# Patient Record
Sex: FEMALE | Race: WHITE | NOT HISPANIC OR LATINO | Employment: PART TIME | URBAN - NONMETROPOLITAN AREA
[De-identification: names, ages, dates, MRNs, and addresses within clinical notes are randomized per-mention and may not be internally consistent; named-entity substitution may affect disease eponyms.]

---

## 2017-01-03 RX ORDER — BUDESONIDE AND FORMOTEROL FUMARATE DIHYDRATE 160; 4.5 UG/1; UG/1
AEROSOL RESPIRATORY (INHALATION)
Qty: 10.2 G | Refills: 1 | Status: SHIPPED | OUTPATIENT
Start: 2017-01-03 | End: 2017-05-10

## 2017-01-03 RX ORDER — PANTOPRAZOLE SODIUM 40 MG/1
TABLET, DELAYED RELEASE ORAL
Qty: 30 TABLET | Refills: 1 | Status: SHIPPED | OUTPATIENT
Start: 2017-01-03 | End: 2017-03-18 | Stop reason: SDUPTHER

## 2017-01-18 RX ORDER — LEVOTHYROXINE SODIUM 0.1 MG/1
TABLET ORAL
Qty: 30 TABLET | Refills: 0 | Status: SHIPPED | OUTPATIENT
Start: 2017-01-18 | End: 2017-02-22 | Stop reason: SDUPTHER

## 2017-01-23 RX ORDER — METHOCARBAMOL 750 MG/1
TABLET, FILM COATED ORAL
Qty: 30 TABLET | Refills: 1 | Status: SHIPPED | OUTPATIENT
Start: 2017-01-23 | End: 2017-04-30 | Stop reason: SDUPTHER

## 2017-01-27 RX ORDER — LISINOPRIL AND HYDROCHLOROTHIAZIDE 12.5; 1 MG/1; MG/1
TABLET ORAL
Qty: 30 TABLET | Refills: 2 | Status: SHIPPED | OUTPATIENT
Start: 2017-01-27 | End: 2017-04-26 | Stop reason: SDUPTHER

## 2017-02-22 RX ORDER — LEVOTHYROXINE SODIUM 0.1 MG/1
TABLET ORAL
Qty: 30 TABLET | Refills: 0 | Status: SHIPPED | OUTPATIENT
Start: 2017-02-22 | End: 2017-03-24 | Stop reason: SDUPTHER

## 2017-02-28 ENCOUNTER — OFFICE VISIT (OUTPATIENT)
Dept: FAMILY MEDICINE CLINIC | Facility: CLINIC | Age: 57
End: 2017-02-28

## 2017-02-28 VITALS
OXYGEN SATURATION: 98 % | DIASTOLIC BLOOD PRESSURE: 64 MMHG | SYSTOLIC BLOOD PRESSURE: 108 MMHG | HEART RATE: 75 BPM | TEMPERATURE: 98 F | WEIGHT: 206 LBS | HEIGHT: 62 IN | BODY MASS INDEX: 37.91 KG/M2

## 2017-02-28 DIAGNOSIS — M25.552 HIP PAIN, BILATERAL: ICD-10-CM

## 2017-02-28 DIAGNOSIS — M21.41 PES PLANUS OF BOTH FEET: ICD-10-CM

## 2017-02-28 DIAGNOSIS — M79.671 BILATERAL FOOT PAIN: ICD-10-CM

## 2017-02-28 DIAGNOSIS — M25.562 CHRONIC PAIN OF BOTH KNEES: ICD-10-CM

## 2017-02-28 DIAGNOSIS — F41.8 MIXED ANXIETY DEPRESSIVE DISORDER: ICD-10-CM

## 2017-02-28 DIAGNOSIS — E53.8 B12 DEFICIENCY: ICD-10-CM

## 2017-02-28 DIAGNOSIS — M21.42 PES PLANUS OF BOTH FEET: ICD-10-CM

## 2017-02-28 DIAGNOSIS — M25.551 HIP PAIN, BILATERAL: ICD-10-CM

## 2017-02-28 DIAGNOSIS — G89.29 CHRONIC PAIN OF BOTH KNEES: ICD-10-CM

## 2017-02-28 DIAGNOSIS — E55.9 VITAMIN D DEFICIENCY: Primary | ICD-10-CM

## 2017-02-28 DIAGNOSIS — E78.2 MIXED HYPERLIPIDEMIA: ICD-10-CM

## 2017-02-28 DIAGNOSIS — R79.89 ABNORMAL LIVER FUNCTION TESTS: ICD-10-CM

## 2017-02-28 DIAGNOSIS — M25.561 CHRONIC PAIN OF BOTH KNEES: ICD-10-CM

## 2017-02-28 DIAGNOSIS — D72.829 LEUKOCYTOSIS, UNSPECIFIED TYPE: ICD-10-CM

## 2017-02-28 DIAGNOSIS — M79.672 BILATERAL FOOT PAIN: ICD-10-CM

## 2017-02-28 DIAGNOSIS — I10 BENIGN ESSENTIAL HYPERTENSION: ICD-10-CM

## 2017-02-28 PROCEDURE — 99214 OFFICE O/P EST MOD 30 MIN: CPT | Performed by: PHYSICIAN ASSISTANT

## 2017-02-28 RX ORDER — CITALOPRAM 20 MG/1
20 TABLET ORAL DAILY
Qty: 30 TABLET | Refills: 1 | Status: SHIPPED | OUTPATIENT
Start: 2017-02-28 | End: 2017-04-17 | Stop reason: SDUPTHER

## 2017-02-28 NOTE — PROGRESS NOTES
Subjective   Lydia Gutierrez is a 56 y.o. female here today for medication follow-up and labs for HTN, hyperlipidemia, thyroid, B12, and vitamin D deficiency. Also having bilateral foot pain with increased working and anxiety during stressful situations at work.     History of Present Illness     NOT TAKING B12 OR VITAMIN D. TAKING ALL OTHER MEDICATIONS AS PRESCRIBED.     NO COMPLAINTS ASIDE FROM BILATERAL FOOT PAIN. HAS NOT GONE FOR PHYSICAL THERAPY. STARTED WORKING END OF September AT [employer] 9-15 HOUR SHIFTS. ON FEET ALL DAY. MEMORY FOAM SHOES HELP BUT AT THE END OF THE DAY, FEET FEEL SWOLLEN BUT DON'T LOOK SWOLLEN. WANTS TO RUB FEET BUT HURTS TO TOUCH FEET WITH NUMBNESS.     CHRONIC LEG PAIN- TAKING 800-1000 MG IN AM AND SOMETIMES AT NIGHT. HELPS LEG PAIN BUT NOT FOOT PAIN.     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 PATIENT REPORTS TAKING PROZAC 20 MG BID OR 2 PILLS QD. PREVIOUSLY ON [employer]- STOPPED WORKING.     The following portions of the patient's history were reviewed and updated as appropriate: allergies, current medications, past family history, past medical history, past social history, past surgical history and problem list.    Review of Systems   Musculoskeletal: Positive for arthralgias and back pain.   Psychiatric/Behavioral: The patient is nervous/anxious.    All other systems reviewed and are negative.      Objective   Physical Exam   Constitutional: She is oriented to person, place, and time. She appears well-developed and well-nourished.   HENT:   Head: Normocephalic and atraumatic.   Right Ear: External ear normal.   Left Ear: External ear normal.   Eyes: Conjunctivae are normal.   Neck: Neck supple.   Cardiovascular: Normal rate, regular rhythm, normal heart sounds and intact distal pulses.  Exam reveals no gallop and no friction rub.    No murmur heard.  Pulmonary/Chest: Effort normal and breath sounds normal. No respiratory distress. She has no wheezes. She has no  rales.   Musculoskeletal: She exhibits no edema or deformity.   BILATERAL PES PLANUS. NORMAL CAP REFILL. NO TTP.    Neurological: She is alert and oriented to person, place, and time.   Skin: Skin is warm and dry.   Psychiatric: She has a normal mood and affect. Her speech is normal and behavior is normal. Judgment and thought content normal. Cognition and memory are normal.   Nursing note and vitals reviewed.      Assessment/Plan   Lydia was seen today for medication follow-up.    Diagnoses and all orders for this visit:    Vitamin D deficiency  -     CBC & Differential  -     Comprehensive Metabolic Panel  -     CK  -     Lipid Panel With LDL / HDL Ratio  -     Thyroid Panel With TSH  -     Vitamin B12 & Folate  -     Iron and TIBC  -     Ferritin  -     Vitamin D 25 Hydroxy  -     Vitamin B6  -     Vitamin B7 (Biotin)    B12 deficiency  -     CBC & Differential  -     Comprehensive Metabolic Panel  -     CK  -     Lipid Panel With LDL / HDL Ratio  -     Thyroid Panel With TSH  -     Vitamin B12 & Folate  -     Iron and TIBC  -     Ferritin  -     Vitamin D 25 Hydroxy  -     Vitamin B6  -     Vitamin B7 (Biotin)    Mixed hyperlipidemia  -     CBC & Differential  -     Comprehensive Metabolic Panel  -     CK  -     Lipid Panel With LDL / HDL Ratio  -     Thyroid Panel With TSH  -     Vitamin B12 & Folate  -     Iron and TIBC  -     Ferritin  -     Vitamin D 25 Hydroxy  -     Vitamin B6  -     Vitamin B7 (Biotin)    Hip pain, bilateral  -     CBC & Differential  -     Comprehensive Metabolic Panel  -     CK  -     Lipid Panel With LDL / HDL Ratio  -     Thyroid Panel With TSH  -     Vitamin B12 & Folate  -     Iron and TIBC  -     Ferritin  -     Vitamin D 25 Hydroxy  -     Vitamin B6  -     Vitamin B7 (Biotin)  -     Ambulatory Referral to Podiatry    Mixed anxiety depressive disorder  -     CBC & Differential  -     Comprehensive Metabolic Panel  -     CK  -     Lipid Panel With LDL / HDL Ratio  -     Thyroid Panel  With TSH  -     Vitamin B12 & Folate  -     Iron and TIBC  -     Ferritin  -     Vitamin D 25 Hydroxy  -     Vitamin B6  -     Vitamin B7 (Biotin)  -     citalopram (CELEXA) 20 MG tablet; Take 1 tablet by mouth Daily.    Abnormal liver function tests  -     CBC & Differential  -     Comprehensive Metabolic Panel  -     CK  -     Lipid Panel With LDL / HDL Ratio  -     Thyroid Panel With TSH  -     Vitamin B12 & Folate  -     Iron and TIBC  -     Ferritin  -     Vitamin D 25 Hydroxy  -     Vitamin B6  -     Vitamin B7 (Biotin)    Benign essential hypertension  -     CBC & Differential  -     Comprehensive Metabolic Panel  -     CK  -     Lipid Panel With LDL / HDL Ratio  -     Thyroid Panel With TSH  -     Vitamin B12 & Folate  -     Iron and TIBC  -     Ferritin  -     Vitamin D 25 Hydroxy  -     Vitamin B6  -     Vitamin B7 (Biotin)    Leukocytosis, unspecified type  -     CBC & Differential  -     Comprehensive Metabolic Panel  -     CK  -     Lipid Panel With LDL / HDL Ratio  -     Thyroid Panel With TSH  -     Vitamin B12 & Folate  -     Iron and TIBC  -     Ferritin  -     Vitamin D 25 Hydroxy  -     Vitamin B6  -     Vitamin B7 (Biotin)    Chronic pain of both knees  -     CBC & Differential  -     Comprehensive Metabolic Panel  -     CK  -     Lipid Panel With LDL / HDL Ratio  -     Thyroid Panel With TSH  -     Vitamin B12 & Folate  -     Iron and TIBC  -     Ferritin  -     Vitamin D 25 Hydroxy  -     Vitamin B6  -     Vitamin B7 (Biotin)  -     Ambulatory Referral to Podiatry    Pes planus of both feet  -     CBC & Differential  -     Comprehensive Metabolic Panel  -     CK  -     Lipid Panel With LDL / HDL Ratio  -     Thyroid Panel With TSH  -     Vitamin B12 & Folate  -     Iron and TIBC  -     Ferritin  -     Vitamin D 25 Hydroxy  -     Vitamin B6  -     Vitamin B7 (Biotin)  -     Ambulatory Referral to Podiatry    Bilateral foot pain  -     CBC & Differential  -     Comprehensive Metabolic Panel  -      CK  -     Lipid Panel With LDL / HDL Ratio  -     Thyroid Panel With TSH  -     Vitamin B12 & Folate  -     Iron and TIBC  -     Ferritin  -     Vitamin D 25 Hydroxy  -     Vitamin B6  -     Vitamin B7 (Biotin)  -     Ambulatory Referral to Podiatry      Patient Instructions   56 YEAR OLD FEMALE WHO PRESENTS TODAY IN FOLLOW UP OF HTN, HYPERLIPIDEMIA, HYPOTHYROIDISM, B12 AND VIT D DEFICIENCY. BP TODAY IS OK. PATIENT TO CONTINUE SAME REGIMEN. SHE IS NOT TAKING B12 OR VITAMIN D. I WILL CHECK LABS TODAY AND MAKE RECOMMENDATIONS. CALL IF NO RESULTS IN 1 WEEK.     PATIENT HAS HAD CHRONIC BILATERAL KNEE AND HIP PAIN. SHE IS ALSO HAVING BILATERAL FOOT PAIN WITH WORKING INCREASED HOURS ON [employer] FLOOR. PATIENT HAS PES PLANUS NOTED. I HAVE DISCUSSED PHYSICAL THERAPY IN THE PAST AND SHE REPORTS NO TIME IN HER SCHEDULE FOR THIS. I WILL REFER TO PODIATRY FOR EVALUATION AND TREATMENT AND CONSIDERATION OF ORTHOTICS.     SHE HAS CONTINUED TO HAVE INCREASED ANXIETY ABOUT 1-2 X WEEKLY AT WORK DURING STRESSFUL SITUATIONS. I WILL HAVE HER DECREASE PROZAC TO 20 MG ONCE DAILY FOR 2 WEEKS THEN DECREASE TO 10 MG ONCE DAILY FOR 3-5 DAYS THEN STOP AND START CELEXA 20 MG ONCE DAILY. WE WILL CONSIDER INCREASE TO 40 MG IF NEEDED AFTER 4-6 WEEKS. FOLLOW UP WITH ME AFTER BEING ON CELEXA 20 MG ONCE DAILY FOR 4-6 WEEKS. TO CALL OR RETURN SOONER IF ANY PROBLEMS OR ADVERSE REACTION WITH MEDICATION. 9-1-1 TO ER IF ANY THOUGHTS OF HARMING HERSELF OR ANYONE ELSE. PATIENT VERBALIZED UNDERSTANDING AND AGREEMENT WITH PLAN AND RECOMMENDATIONS.     SHE IS CURRENTLY TAKING IBUPROFEN 800-1000 MG 1-2 X DAILY. I DISCUSSED CHANGING TO TYLENOL, HOWEVER, PATIENT REPORTS TYLENOL UPSETS HER STOMACH. I DISCUSSED THE RISKS OF NSAIDS, INCLUDING GI BLEED, RENAL AND HEPATIC DAMAGE, INCREASED RISK AND INCIDENCE OF CARDIOVASCULAR AND CEREBROVASCULAR EVENTS. WE WILL REFER TO PODIATRY TO SEE IF THEY CAN GIVE ORTHOTICS OR TREATMENT PLAN TO HELP PAIN AND HAVE HER STOP NSAID  USE.     FOLLOW UP IN 6-8 WEEKS HERE OR SOONER IF NEEDED PENDING LABS AND SYMPTOMS.

## 2017-02-28 NOTE — PATIENT INSTRUCTIONS
56 YEAR OLD FEMALE WHO PRESENTS TODAY IN FOLLOW UP OF HTN, HYPERLIPIDEMIA, HYPOTHYROIDISM, B12 AND VIT D DEFICIENCY. BP TODAY IS OK. PATIENT TO CONTINUE SAME REGIMEN. SHE IS NOT TAKING B12 OR VITAMIN D. I WILL CHECK LABS TODAY AND MAKE RECOMMENDATIONS. CALL IF NO RESULTS IN 1 WEEK.     PATIENT HAS HAD CHRONIC BILATERAL KNEE AND HIP PAIN. SHE IS ALSO HAVING BILATERAL FOOT PAIN WITH WORKING INCREASED HOURS ON [employer] FLOOR. PATIENT HAS PES PLANUS NOTED. I HAVE DISCUSSED PHYSICAL THERAPY IN THE PAST AND SHE REPORTS NO TIME IN HER SCHEDULE FOR THIS. I WILL REFER TO PODIATRY FOR EVALUATION AND TREATMENT AND CONSIDERATION OF ORTHOTICS.     SHE HAS CONTINUED TO HAVE INCREASED ANXIETY ABOUT 1-2 X WEEKLY AT WORK DURING STRESSFUL SITUATIONS. I WILL HAVE HER DECREASE PROZAC TO 20 MG ONCE DAILY FOR 2 WEEKS THEN DECREASE TO 10 MG ONCE DAILY FOR 3-5 DAYS THEN STOP AND START CELEXA 20 MG ONCE DAILY. WE WILL CONSIDER INCREASE TO 40 MG IF NEEDED AFTER 4-6 WEEKS. FOLLOW UP WITH ME AFTER BEING ON CELEXA 20 MG ONCE DAILY FOR 4-6 WEEKS. TO CALL OR RETURN SOONER IF ANY PROBLEMS OR ADVERSE REACTION WITH MEDICATION. 9-1-1 TO ER IF ANY THOUGHTS OF HARMING HERSELF OR ANYONE ELSE. PATIENT VERBALIZED UNDERSTANDING AND AGREEMENT WITH PLAN AND RECOMMENDATIONS.     SHE IS CURRENTLY TAKING IBUPROFEN 800-1000 MG 1-2 X DAILY. I DISCUSSED CHANGING TO TYLENOL, HOWEVER, PATIENT REPORTS TYLENOL UPSETS HER STOMACH. I DISCUSSED THE RISKS OF NSAIDS, INCLUDING GI BLEED, RENAL AND HEPATIC DAMAGE, INCREASED RISK AND INCIDENCE OF CARDIOVASCULAR AND CEREBROVASCULAR EVENTS. WE WILL REFER TO PODIATRY TO SEE IF THEY CAN GIVE ORTHOTICS OR TREATMENT PLAN TO HELP PAIN AND HAVE HER STOP NSAID USE.     FOLLOW UP IN 6-8 WEEKS HERE OR SOONER IF NEEDED PENDING LABS AND SYMPTOMS.

## 2017-03-03 LAB
25(OH)D3+25(OH)D2 SERPL-MCNC: 18.2 NG/ML (ref 30–100)
ALBUMIN SERPL-MCNC: 4.1 G/DL (ref 3.5–5.2)
ALBUMIN/GLOB SERPL: 1.5 G/DL
ALP SERPL-CCNC: 93 U/L (ref 39–117)
ALT SERPL-CCNC: 25 U/L (ref 1–33)
AST SERPL-CCNC: 20 U/L (ref 1–32)
BASOPHILS # BLD AUTO: 0.01 10*3/MM3 (ref 0–0.2)
BASOPHILS NFR BLD AUTO: 0.2 % (ref 0–1.5)
BILIRUB SERPL-MCNC: 0.2 MG/DL (ref 0.1–1.2)
BIOTIN SERPL-MCNC: 0.07 NG/ML (ref 0.05–0.83)
BUN SERPL-MCNC: 25 MG/DL (ref 6–20)
BUN/CREAT SERPL: 41 (ref 7–25)
CALCIUM SERPL-MCNC: 9.3 MG/DL (ref 8.6–10.5)
CHLORIDE SERPL-SCNC: 103 MMOL/L (ref 98–107)
CHOLEST SERPL-MCNC: 234 MG/DL (ref 0–200)
CK SERPL-CCNC: 48 U/L (ref 20–180)
CO2 SERPL-SCNC: 27.7 MMOL/L (ref 22–29)
CREAT SERPL-MCNC: 0.61 MG/DL (ref 0.57–1)
EOSINOPHIL # BLD AUTO: 0.22 10*3/MM3 (ref 0–0.7)
EOSINOPHIL NFR BLD AUTO: 4.1 % (ref 0.3–6.2)
ERYTHROCYTE [DISTWIDTH] IN BLOOD BY AUTOMATED COUNT: 13.9 % (ref 11.7–13)
FERRITIN SERPL-MCNC: 28.8 NG/ML (ref 13–150)
FOLATE SERPL-MCNC: 6.61 NG/ML (ref 4.78–24.2)
FT4I SERPL CALC-MCNC: 1.9 (ref 1.2–4.9)
GLOBULIN SER CALC-MCNC: 2.7 GM/DL
GLUCOSE SERPL-MCNC: 103 MG/DL (ref 65–99)
HCT VFR BLD AUTO: 37.3 % (ref 35.6–45.5)
HDLC SERPL-MCNC: 46 MG/DL (ref 40–60)
HGB BLD-MCNC: 12.3 G/DL (ref 11.9–15.5)
IMM GRANULOCYTES # BLD: 0 10*3/MM3 (ref 0–0.03)
IMM GRANULOCYTES NFR BLD: 0 % (ref 0–0.5)
IRON SATN MFR SERPL: 14 % (ref 20–50)
IRON SERPL-MCNC: 58 MCG/DL (ref 37–145)
LDLC SERPL CALC-MCNC: 159 MG/DL (ref 0–100)
LDLC/HDLC SERPL: 3.45 {RATIO}
LYMPHOCYTES # BLD AUTO: 1.56 10*3/MM3 (ref 0.9–4.8)
LYMPHOCYTES NFR BLD AUTO: 29.3 % (ref 19.6–45.3)
MCH RBC QN AUTO: 29.5 PG (ref 26.9–32)
MCHC RBC AUTO-ENTMCNC: 33 G/DL (ref 32.4–36.3)
MCV RBC AUTO: 89.4 FL (ref 80.5–98.2)
MONOCYTES # BLD AUTO: 0.41 10*3/MM3 (ref 0.2–1.2)
MONOCYTES NFR BLD AUTO: 7.7 % (ref 5–12)
NEUTROPHILS # BLD AUTO: 3.12 10*3/MM3 (ref 1.9–8.1)
NEUTROPHILS NFR BLD AUTO: 58.7 % (ref 42.7–76)
PLATELET # BLD AUTO: 311 10*3/MM3 (ref 140–500)
POTASSIUM SERPL-SCNC: 4.1 MMOL/L (ref 3.5–5.2)
PROT SERPL-MCNC: 6.8 G/DL (ref 6–8.5)
RBC # BLD AUTO: 4.17 10*6/MM3 (ref 3.9–5.2)
SODIUM SERPL-SCNC: 142 MMOL/L (ref 136–145)
T3RU NFR SERPL: 28 % (ref 24–39)
T4 SERPL-MCNC: 6.9 UG/DL (ref 4.5–12)
TIBC SERPL-MCNC: 429 MCG/DL
TRIGL SERPL-MCNC: 147 MG/DL (ref 0–150)
TSH SERPL DL<=0.005 MIU/L-ACNC: 2.62 UIU/ML (ref 0.45–4.5)
UIBC SERPL-MCNC: 371 MCG/DL
VIT B12 SERPL-MCNC: 314 PG/ML (ref 211–946)
VIT B6 SERPL-MCNC: 4.9 UG/L (ref 2–32.8)
VLDLC SERPL CALC-MCNC: 29.4 MG/DL (ref 5–40)
WBC # BLD AUTO: 5.32 10*3/MM3 (ref 4.5–10.7)

## 2017-03-13 RX ORDER — ROSUVASTATIN CALCIUM 20 MG/1
20 TABLET, COATED ORAL NIGHTLY
Qty: 30 TABLET | Refills: 2 | Status: SHIPPED | OUTPATIENT
Start: 2017-03-13 | End: 2017-05-10 | Stop reason: SDUPTHER

## 2017-03-13 RX ORDER — ATORVASTATIN CALCIUM 40 MG/1
40 TABLET, FILM COATED ORAL DAILY
Qty: 30 TABLET | Refills: 2 | Status: CANCELLED | OUTPATIENT
Start: 2017-03-13

## 2017-03-20 RX ORDER — PANTOPRAZOLE SODIUM 40 MG/1
TABLET, DELAYED RELEASE ORAL
Qty: 30 TABLET | Refills: 0 | Status: SHIPPED | OUTPATIENT
Start: 2017-03-20 | End: 2017-05-19 | Stop reason: SDUPTHER

## 2017-03-24 RX ORDER — LEVOTHYROXINE SODIUM 0.1 MG/1
TABLET ORAL
Qty: 30 TABLET | Refills: 2 | Status: SHIPPED | OUTPATIENT
Start: 2017-03-24 | End: 2017-06-30 | Stop reason: SDUPTHER

## 2017-03-31 RX ORDER — LORATADINE 10 MG/1
TABLET ORAL
Qty: 90 TABLET | Refills: 0 | Status: SHIPPED | OUTPATIENT
Start: 2017-03-31 | End: 2017-06-30 | Stop reason: SDUPTHER

## 2017-03-31 RX ORDER — MONTELUKAST SODIUM 10 MG/1
TABLET ORAL
Qty: 90 TABLET | Refills: 0 | Status: SHIPPED | OUTPATIENT
Start: 2017-03-31 | End: 2017-06-30 | Stop reason: SDUPTHER

## 2017-04-17 DIAGNOSIS — F41.8 MIXED ANXIETY DEPRESSIVE DISORDER: ICD-10-CM

## 2017-04-17 NOTE — TELEPHONE ENCOUNTER
PATIENT CANNOT TAKE BOTH MEDICATIONS. SHE WAS WEANED OFF PROZAC AND TO BE TAKING CELEXA. PLEASE VERIFY WHAT PATIENT IS TAKING.

## 2017-04-18 RX ORDER — FLUOXETINE HYDROCHLORIDE 20 MG/1
CAPSULE ORAL
Qty: 60 CAPSULE | Refills: 0 | OUTPATIENT
Start: 2017-04-18

## 2017-04-18 NOTE — TELEPHONE ENCOUNTER
Spoke with patient and she is taking the Celexa and not the Prozac.  Sent refill and canceled other script.

## 2017-04-19 RX ORDER — CITALOPRAM 20 MG/1
TABLET ORAL
Qty: 30 TABLET | Refills: 1 | Status: SHIPPED | OUTPATIENT
Start: 2017-04-19 | End: 2017-05-10 | Stop reason: SDUPTHER

## 2017-04-28 RX ORDER — LISINOPRIL AND HYDROCHLOROTHIAZIDE 12.5; 1 MG/1; MG/1
TABLET ORAL
Qty: 30 TABLET | Refills: 2 | Status: SHIPPED | OUTPATIENT
Start: 2017-04-28 | End: 2017-05-10 | Stop reason: SDUPTHER

## 2017-04-28 RX ORDER — AMLODIPINE BESYLATE 10 MG/1
TABLET ORAL
Qty: 30 TABLET | Refills: 2 | Status: SHIPPED | OUTPATIENT
Start: 2017-04-28 | End: 2017-09-07 | Stop reason: SDUPTHER

## 2017-05-01 RX ORDER — METHOCARBAMOL 750 MG/1
TABLET, FILM COATED ORAL
Qty: 30 TABLET | Refills: 2 | Status: SHIPPED | OUTPATIENT
Start: 2017-05-01 | End: 2017-09-08 | Stop reason: SDUPTHER

## 2017-05-10 ENCOUNTER — OFFICE VISIT (OUTPATIENT)
Dept: FAMILY MEDICINE CLINIC | Facility: CLINIC | Age: 57
End: 2017-05-10

## 2017-05-10 VITALS
SYSTOLIC BLOOD PRESSURE: 112 MMHG | HEART RATE: 85 BPM | BODY MASS INDEX: 38.83 KG/M2 | HEIGHT: 62 IN | OXYGEN SATURATION: 98 % | DIASTOLIC BLOOD PRESSURE: 64 MMHG | TEMPERATURE: 98.2 F | WEIGHT: 211 LBS

## 2017-05-10 DIAGNOSIS — I10 BENIGN ESSENTIAL HYPERTENSION: ICD-10-CM

## 2017-05-10 DIAGNOSIS — E53.8 B12 DEFICIENCY: ICD-10-CM

## 2017-05-10 DIAGNOSIS — E55.9 VITAMIN D DEFICIENCY: ICD-10-CM

## 2017-05-10 DIAGNOSIS — R79.89 ABNORMAL LIVER FUNCTION TESTS: ICD-10-CM

## 2017-05-10 DIAGNOSIS — G47.33 OBSTRUCTIVE SLEEP APNEA: Primary | ICD-10-CM

## 2017-05-10 DIAGNOSIS — E78.2 MIXED HYPERLIPIDEMIA: ICD-10-CM

## 2017-05-10 DIAGNOSIS — G47.61 PERIODIC LIMB MOVEMENT DISORDER: ICD-10-CM

## 2017-05-10 DIAGNOSIS — F41.8 MIXED ANXIETY DEPRESSIVE DISORDER: ICD-10-CM

## 2017-05-10 PROCEDURE — 99214 OFFICE O/P EST MOD 30 MIN: CPT | Performed by: PHYSICIAN ASSISTANT

## 2017-05-10 RX ORDER — CITALOPRAM 40 MG/1
40 TABLET ORAL EVERY MORNING
Qty: 30 TABLET | Refills: 2 | Status: SHIPPED | OUTPATIENT
Start: 2017-05-10 | End: 2017-07-21 | Stop reason: SDUPTHER

## 2017-05-10 RX ORDER — ROSUVASTATIN CALCIUM 20 MG/1
20 TABLET, COATED ORAL NIGHTLY
Qty: 30 TABLET | Refills: 2 | Status: SHIPPED | OUTPATIENT
Start: 2017-05-10 | End: 2017-07-21 | Stop reason: SDUPTHER

## 2017-05-10 RX ORDER — MELATONIN
1000 2 TIMES DAILY
COMMUNITY
End: 2017-09-29 | Stop reason: SDUPTHER

## 2017-05-11 LAB
ALBUMIN SERPL-MCNC: 4.5 G/DL (ref 3.5–5.2)
ALBUMIN/GLOB SERPL: 1.7 G/DL
ALP SERPL-CCNC: 93 U/L (ref 39–117)
ALT SERPL-CCNC: 15 U/L (ref 1–33)
AST SERPL-CCNC: 12 U/L (ref 1–32)
BILIRUB SERPL-MCNC: <0.2 MG/DL (ref 0.1–1.2)
BUN SERPL-MCNC: 28 MG/DL (ref 6–20)
BUN/CREAT SERPL: 41.8 (ref 7–25)
CALCIUM SERPL-MCNC: 9.9 MG/DL (ref 8.6–10.5)
CHLORIDE SERPL-SCNC: 99 MMOL/L (ref 98–107)
CHOLEST SERPL-MCNC: 313 MG/DL (ref 0–200)
CK SERPL-CCNC: 46 U/L (ref 20–180)
CO2 SERPL-SCNC: 24.6 MMOL/L (ref 22–29)
CREAT SERPL-MCNC: 0.67 MG/DL (ref 0.57–1)
GLOBULIN SER CALC-MCNC: 2.7 GM/DL
GLUCOSE SERPL-MCNC: 107 MG/DL (ref 65–99)
HDLC SERPL-MCNC: 37 MG/DL (ref 40–60)
LDLC SERPL CALC-MCNC: ABNORMAL MG/DL
LDLC/HDLC SERPL: ABNORMAL {RATIO}
POTASSIUM SERPL-SCNC: 4 MMOL/L (ref 3.5–5.2)
PROT SERPL-MCNC: 7.2 G/DL (ref 6–8.5)
SODIUM SERPL-SCNC: 139 MMOL/L (ref 136–145)
TRIGL SERPL-MCNC: 425 MG/DL (ref 0–150)
VLDLC SERPL CALC-MCNC: ABNORMAL MG/DL

## 2017-05-19 RX ORDER — PANTOPRAZOLE SODIUM 40 MG/1
40 TABLET, DELAYED RELEASE ORAL DAILY
Qty: 30 TABLET | Refills: 0 | Status: SHIPPED | OUTPATIENT
Start: 2017-05-19 | End: 2017-06-19 | Stop reason: SDUPTHER

## 2017-05-31 RX ORDER — FLUTICASONE PROPIONATE 50 MCG
SPRAY, SUSPENSION (ML) NASAL
Qty: 16 G | Refills: 1 | Status: SHIPPED | OUTPATIENT
Start: 2017-05-31 | End: 2017-12-09 | Stop reason: SDUPTHER

## 2017-06-19 RX ORDER — PANTOPRAZOLE SODIUM 40 MG/1
TABLET, DELAYED RELEASE ORAL
Qty: 30 TABLET | Refills: 2 | Status: SHIPPED | OUTPATIENT
Start: 2017-06-19 | End: 2017-10-04 | Stop reason: SDUPTHER

## 2017-06-20 ENCOUNTER — OFFICE VISIT (OUTPATIENT)
Dept: FAMILY MEDICINE CLINIC | Facility: CLINIC | Age: 57
End: 2017-06-20

## 2017-06-20 VITALS
WEIGHT: 215.6 LBS | DIASTOLIC BLOOD PRESSURE: 70 MMHG | HEART RATE: 84 BPM | SYSTOLIC BLOOD PRESSURE: 110 MMHG | OXYGEN SATURATION: 98 % | TEMPERATURE: 97.8 F | HEIGHT: 62 IN | BODY MASS INDEX: 39.67 KG/M2

## 2017-06-20 DIAGNOSIS — R42 DIZZINESS: Primary | ICD-10-CM

## 2017-06-20 DIAGNOSIS — M25.512 STERNOCLAVICULAR JOINT PAIN, LEFT: ICD-10-CM

## 2017-06-20 DIAGNOSIS — M54.2 CERVICALGIA: ICD-10-CM

## 2017-06-20 DIAGNOSIS — G44.209 TENSION-TYPE HEADACHE, NOT INTRACTABLE, UNSPECIFIED CHRONICITY PATTERN: ICD-10-CM

## 2017-06-20 DIAGNOSIS — M94.0 COSTOCHONDRITIS: ICD-10-CM

## 2017-06-20 PROCEDURE — 99214 OFFICE O/P EST MOD 30 MIN: CPT | Performed by: PHYSICIAN ASSISTANT

## 2017-06-20 RX ORDER — METHYLPREDNISOLONE 4 MG/1
TABLET ORAL
Qty: 21 TABLET | Refills: 0 | Status: SHIPPED | OUTPATIENT
Start: 2017-06-20 | End: 2017-09-26

## 2017-06-20 RX ORDER — AMOXICILLIN 875 MG/1
875 TABLET, COATED ORAL 2 TIMES DAILY
Qty: 20 TABLET | Refills: 0 | Status: SHIPPED | OUTPATIENT
Start: 2017-06-20 | End: 2017-09-26

## 2017-06-20 NOTE — PROGRESS NOTES
Subjective   Lydia Gutierrez is a 56 y.o. female here today for follow-up [facility] ER for last night due to collarbone pain since lifting a chair on Friday felt something pop, told pulled a ligament, increased dizzy spells since Friday     History of Present Illness     PICKED UP A BOX AT WORK AND POPPED BUT DIDN'T HURT. 6/16/17- PICKED UP HEAVY CHAIR AT HOME AND POPPED AND STARTED HURTING. WAS SEEN AT ER 6/19/17. HAD XRAY- REPORTED NEGATIVE. LEFT COLLARBONE NOT AS TENDER BUT STILL HAS SORENESS. STATES CANNOT LAY ON LEFT SIDE AND CANNOT REST. TENDER AT STERNOCLAVICULAR JOINT. PAIN THAT GOES FROM CLAVICLE TO LEFT SHOULDER AND DOWN LEFT ARM. WHEN WORKING AND LIFTING, LEFT ARM NUMBNESS. WORSE WITH POSITIONS AND USE OF LEFT ARM. BETTER IF LAYS ON BACK.     DIZZINESS- STARTED 6/16/17- WOULD HEAR NECK POP IN THE BACK. THEN RINGING SOUND AND DIZZINESS AND OFF BALANCE FEELING. NO OTHER NEUROLOGICAL SYMPTOMS. HEADACHES THE PAST COUPLE DAYS. OCCIPITAL AND TEMPORAL BILATERALLY. NO CONGESTION, COUGH, PND. NORMAL HEARING. DIZZINESS WORSE WITH STANDING AND TURNING HEAD. FEELS LIKE HAS TO GRAB SOMETHING.     6/18- SENT HOME FROM WORK DUE TO DIZZINESS. DIDN'T GO TO WORK YESTERDAY DUE TO SYMPTOMS.     The following portions of the patient's history were reviewed and updated as appropriate: allergies, current medications, past family history, past medical history, past social history, past surgical history and problem list.    Review of Systems   Musculoskeletal:        Pain in collar bone area    Neurological: Positive for dizziness.   All other systems reviewed and are negative.      Objective   Physical Exam   Constitutional: She is oriented to person, place, and time. Vital signs are normal. She appears well-developed and well-nourished.   HENT:   Head: Normocephalic and atraumatic.   Right Ear: Tympanic membrane, external ear and ear canal normal.   Left Ear: Tympanic membrane, external ear and ear canal normal.   Nose: Nose normal.    Mouth/Throat: Uvula is midline, oropharynx is clear and moist and mucous membranes are normal.   Eyes: Conjunctivae and EOM are normal. Pupils are equal, round, and reactive to light.   Neck: Neck supple.   Cardiovascular: Normal rate, regular rhythm, normal heart sounds and intact distal pulses.  Exam reveals no gallop and no friction rub.    No murmur heard.  Pulmonary/Chest: Effort normal and breath sounds normal. No respiratory distress. She has no wheezes. She has no rhonchi. She has no rales. She exhibits tenderness (AS NOTED) and bony tenderness.       Musculoskeletal: She exhibits no edema or deformity.   Neurological: She is alert and oriented to person, place, and time. She has normal strength and normal reflexes. She is not disoriented. No cranial nerve deficit or sensory deficit.   Skin: Skin is warm and dry.   Psychiatric: She has a normal mood and affect. Her speech is normal and behavior is normal. Judgment and thought content normal. She is not actively hallucinating. Cognition and memory are normal. She is attentive.   Nursing note and vitals reviewed.      Assessment/Plan   Lydia was seen today for dizziness and follow-up [facility] er  from yesterday.    Diagnoses and all orders for this visit:    Dizziness  -     amoxicillin (AMOXIL) 875 MG tablet; Take 1 tablet by mouth 2 (Two) Times a Day.  -     MethylPREDNISolone (MEDROL) 4 MG tablet; follow package directions  -     Ambulatory Referral to Physical Therapy Vestibular  -     Duplex Carotid Ultrasound CAR    Sternoclavicular joint pain, left  -     amoxicillin (AMOXIL) 875 MG tablet; Take 1 tablet by mouth 2 (Two) Times a Day.  -     MethylPREDNISolone (MEDROL) 4 MG tablet; follow package directions  -     Ambulatory Referral to Physical Therapy Vestibular    Costochondritis  -     amoxicillin (AMOXIL) 875 MG tablet; Take 1 tablet by mouth 2 (Two) Times a Day.  -     MethylPREDNISolone (MEDROL) 4 MG tablet; follow package directions  -      Ambulatory Referral to Physical Therapy Vestibular    Cervicalgia  -     amoxicillin (AMOXIL) 875 MG tablet; Take 1 tablet by mouth 2 (Two) Times a Day.  -     MethylPREDNISolone (MEDROL) 4 MG tablet; follow package directions  -     Ambulatory Referral to Physical Therapy Vestibular    Tension-type headache, not intractable, unspecified chronicity pattern  -     amoxicillin (AMOXIL) 875 MG tablet; Take 1 tablet by mouth 2 (Two) Times a Day.  -     MethylPREDNISolone (MEDROL) 4 MG tablet; follow package directions  -     Ambulatory Referral to Physical Therapy Vestibular      Patient Instructions   56 YEAR OLD FEMALE WHO PRESENTS TODAY IN FOLLOW UP OF ER YESTERDAY. PATIENT STATES SHE WAS LIFTING A BOX AT WORK LAST WEEK AND FELT A POP, HOWEVER, SHE HAD NO PAIN OR PROBLEMS. PATIENT STATES 6/16/17 SHE WAS LIFTING A CHAIR AT HOME AND FELT ANOTHER POP AND INCREASED PAIN. SHE HAS PAIN AND TENDERNESS AT LEFT STERNOCLAVICULAR JOINT. THIS IS WORSENED WITH LAYING ON LEFT SIDE AND USING LEFT ARM. WHEN OVERUSES LEFT ARM, HAS PAIN ACROSS TO LEFT SHOULDER AND DOWN LEFT ARM WITH ASSOCIATED NUMBNESS IN ARM. PATIENT HAS TENDERNESS AT LEFT STERNAL BORDER AND LEFT STERNOCLAVICULAR JOINT. NO OTHER CLAVICLE TENDERNESS. MILD TENDERNESS LEFT AC JOINT. SHE WAS SEEN AT ER 6/19/17 AND HAD XRAY WITH DJD AC JOINT- OTHERWISE NEGATIVE. WE WILL TRY TO OBTAIN XRAY REPORT FROM [facility].     PATIENT ALSO REPORTS DIZZINESS, EAR POPPING, EAR RINGING, AND PAIN IN LEFT OCCIPUT AS WELL AS TEMPORAL SINCE 6/16/17. SHE DENIES OTHER NEUROLOGICAL SYMPTOMS. PATIENT STATES DIZZINESS IS WORSENED WITH TURNING HEAD. SHE WAS SEEN AT ER WITH NORMAL EXAM BUT NO IMAGING. NORMAL ENT AND NEUROLOGICAL EXAM TODAY. I WILL HAVE HER COMPLETE CAROTID DOPPLER AND TRIAL PHYSICAL THERAPY FOR VESTIBULAR THERAPY AS WELL AS NECK PT. I WILL HAVE HER TAKE MEDROL DOSE PACK AND AMOXIL 875 MG TWICE DAILY FOR 10 DAYS. IF NO IMPROVEMENT, WORSENING, NEW SYMPTOMS, SHE MUST BE  SEEN IMMEDIATELY. IF WE ARE NOT IMMEDIATELY AVAILABLE, TO GO TO ER. CONSIDERATION OF MRI INTERNAL AUDITORY CANAL IF NO RESOLUTION OF SYMPTOMS.     REVIEWED MRI 2014- NORMAL.     PATIENT TO FOLLOW UP PENDING SYMPTOMS.

## 2017-06-20 NOTE — PATIENT INSTRUCTIONS
56 YEAR OLD FEMALE WHO PRESENTS TODAY IN FOLLOW UP OF ER YESTERDAY. PATIENT STATES SHE WAS LIFTING A BOX AT WORK LAST WEEK AND FELT A POP, HOWEVER, SHE HAD NO PAIN OR PROBLEMS. PATIENT STATES 6/16/17 SHE WAS LIFTING A CHAIR AT HOME AND FELT ANOTHER POP AND INCREASED PAIN. SHE HAS PAIN AND TENDERNESS AT LEFT STERNOCLAVICULAR JOINT. THIS IS WORSENED WITH LAYING ON LEFT SIDE AND USING LEFT ARM. WHEN OVERUSES LEFT ARM, HAS PAIN ACROSS TO LEFT SHOULDER AND DOWN LEFT ARM WITH ASSOCIATED NUMBNESS IN ARM. PATIENT HAS TENDERNESS AT LEFT STERNAL BORDER AND LEFT STERNOCLAVICULAR JOINT. NO OTHER CLAVICLE TENDERNESS. MILD TENDERNESS LEFT AC JOINT. SHE WAS SEEN AT ER 6/19/17 AND HAD XRAY WITH DJD AC JOINT- OTHERWISE NEGATIVE. WE WILL TRY TO OBTAIN XRAY REPORT FROM [facility].     PATIENT ALSO REPORTS DIZZINESS, EAR POPPING, EAR RINGING, AND PAIN IN LEFT OCCIPUT AS WELL AS TEMPORAL SINCE 6/16/17. SHE DENIES OTHER NEUROLOGICAL SYMPTOMS. PATIENT STATES DIZZINESS IS WORSENED WITH TURNING HEAD. SHE WAS SEEN AT ER WITH NORMAL EXAM BUT NO IMAGING. NORMAL ENT AND NEUROLOGICAL EXAM TODAY. I WILL HAVE HER COMPLETE CAROTID DOPPLER AND TRIAL PHYSICAL THERAPY FOR VESTIBULAR THERAPY AS WELL AS NECK PT. I WILL HAVE HER TAKE MEDROL DOSE PACK AND AMOXIL 875 MG TWICE DAILY FOR 10 DAYS. IF NO IMPROVEMENT, WORSENING, NEW SYMPTOMS, SHE MUST BE SEEN IMMEDIATELY. IF WE ARE NOT IMMEDIATELY AVAILABLE, TO GO TO ER. CONSIDERATION OF MRI INTERNAL AUDITORY CANAL IF NO RESOLUTION OF SYMPTOMS.     REVIEWED MRI 2014- NORMAL.     PATIENT TO FOLLOW UP PENDING SYMPTOMS.

## 2017-06-30 RX ORDER — LORATADINE 10 MG/1
TABLET ORAL
Qty: 90 TABLET | Refills: 0 | Status: SHIPPED | OUTPATIENT
Start: 2017-06-30 | End: 2017-10-12 | Stop reason: SDUPTHER

## 2017-06-30 RX ORDER — LEVOTHYROXINE SODIUM 0.1 MG/1
TABLET ORAL
Qty: 30 TABLET | Refills: 2 | Status: SHIPPED | OUTPATIENT
Start: 2017-06-30 | End: 2017-09-29 | Stop reason: SDUPTHER

## 2017-06-30 RX ORDER — MONTELUKAST SODIUM 10 MG/1
TABLET ORAL
Qty: 90 TABLET | Refills: 0 | Status: SHIPPED | OUTPATIENT
Start: 2017-06-30 | End: 2017-10-12 | Stop reason: SDUPTHER

## 2017-07-21 DIAGNOSIS — F41.8 MIXED ANXIETY DEPRESSIVE DISORDER: ICD-10-CM

## 2017-07-21 RX ORDER — ALBUTEROL SULFATE 90 UG/1
AEROSOL, METERED RESPIRATORY (INHALATION)
Qty: 18 G | Refills: 1 | Status: SHIPPED | OUTPATIENT
Start: 2017-07-21 | End: 2017-12-29 | Stop reason: SDUPTHER

## 2017-07-21 RX ORDER — CITALOPRAM 40 MG/1
TABLET ORAL
Qty: 30 TABLET | Refills: 0 | Status: SHIPPED | OUTPATIENT
Start: 2017-07-21 | End: 2017-09-26 | Stop reason: SDUPTHER

## 2017-07-21 RX ORDER — ROSUVASTATIN CALCIUM 20 MG/1
TABLET, COATED ORAL
Qty: 30 TABLET | Refills: 0 | Status: SHIPPED | OUTPATIENT
Start: 2017-07-21 | End: 2017-09-29 | Stop reason: SDUPTHER

## 2017-08-21 DIAGNOSIS — F41.8 MIXED ANXIETY DEPRESSIVE DISORDER: ICD-10-CM

## 2017-08-21 RX ORDER — CITALOPRAM 40 MG/1
TABLET ORAL
Qty: 30 TABLET | Refills: 2 | Status: SHIPPED | OUTPATIENT
Start: 2017-08-21 | End: 2017-09-26 | Stop reason: SDUPTHER

## 2017-09-08 RX ORDER — LISINOPRIL AND HYDROCHLOROTHIAZIDE 12.5; 1 MG/1; MG/1
TABLET ORAL
Qty: 30 TABLET | Refills: 2 | Status: SHIPPED | OUTPATIENT
Start: 2017-09-08 | End: 2017-09-26 | Stop reason: SDUPTHER

## 2017-09-08 RX ORDER — AMLODIPINE BESYLATE 10 MG/1
TABLET ORAL
Qty: 30 TABLET | Refills: 2 | Status: SHIPPED | OUTPATIENT
Start: 2017-09-08 | End: 2017-12-20 | Stop reason: SDUPTHER

## 2017-09-08 RX ORDER — METHOCARBAMOL 750 MG/1
750 TABLET, FILM COATED ORAL NIGHTLY
Qty: 30 TABLET | Refills: 0 | Status: SHIPPED | OUTPATIENT
Start: 2017-09-08 | End: 2017-10-12 | Stop reason: SDUPTHER

## 2017-09-08 RX ORDER — METHOCARBAMOL 750 MG/1
TABLET, FILM COATED ORAL
Qty: 30 TABLET | Refills: 2 | OUTPATIENT
Start: 2017-09-08

## 2017-09-26 ENCOUNTER — OFFICE VISIT (OUTPATIENT)
Dept: FAMILY MEDICINE CLINIC | Facility: CLINIC | Age: 57
End: 2017-09-26

## 2017-09-26 VITALS
OXYGEN SATURATION: 98 % | HEIGHT: 62 IN | TEMPERATURE: 98.4 F | DIASTOLIC BLOOD PRESSURE: 70 MMHG | HEART RATE: 84 BPM | WEIGHT: 207.2 LBS | BODY MASS INDEX: 38.13 KG/M2 | SYSTOLIC BLOOD PRESSURE: 114 MMHG

## 2017-09-26 DIAGNOSIS — E78.2 MIXED HYPERLIPIDEMIA: ICD-10-CM

## 2017-09-26 DIAGNOSIS — E55.9 VITAMIN D DEFICIENCY: ICD-10-CM

## 2017-09-26 DIAGNOSIS — E03.9 ACQUIRED HYPOTHYROIDISM: ICD-10-CM

## 2017-09-26 DIAGNOSIS — G89.29 CHRONIC PAIN OF BOTH KNEES: Primary | ICD-10-CM

## 2017-09-26 DIAGNOSIS — E53.8 B12 DEFICIENCY: ICD-10-CM

## 2017-09-26 DIAGNOSIS — I10 BENIGN ESSENTIAL HYPERTENSION: ICD-10-CM

## 2017-09-26 DIAGNOSIS — F41.8 MIXED ANXIETY DEPRESSIVE DISORDER: ICD-10-CM

## 2017-09-26 DIAGNOSIS — Z00.00 HEALTHCARE MAINTENANCE: ICD-10-CM

## 2017-09-26 DIAGNOSIS — M25.552 HIP PAIN, BILATERAL: ICD-10-CM

## 2017-09-26 DIAGNOSIS — M25.561 CHRONIC PAIN OF BOTH KNEES: Primary | ICD-10-CM

## 2017-09-26 DIAGNOSIS — M25.562 CHRONIC PAIN OF BOTH KNEES: Primary | ICD-10-CM

## 2017-09-26 DIAGNOSIS — M25.551 HIP PAIN, BILATERAL: ICD-10-CM

## 2017-09-26 PROCEDURE — 99214 OFFICE O/P EST MOD 30 MIN: CPT | Performed by: PHYSICIAN ASSISTANT

## 2017-09-26 PROCEDURE — 90471 IMMUNIZATION ADMIN: CPT | Performed by: PHYSICIAN ASSISTANT

## 2017-09-26 PROCEDURE — 90686 IIV4 VACC NO PRSV 0.5 ML IM: CPT | Performed by: PHYSICIAN ASSISTANT

## 2017-09-26 RX ORDER — CITALOPRAM 20 MG/1
20 TABLET ORAL DAILY
Qty: 30 TABLET | Refills: 1 | Status: SHIPPED | OUTPATIENT
Start: 2017-09-26 | End: 2017-10-24

## 2017-09-28 LAB
25(OH)D3+25(OH)D2 SERPL-MCNC: 24.5 NG/ML (ref 30–100)
ALBUMIN SERPL-MCNC: 4.4 G/DL (ref 3.5–5.5)
ALBUMIN/GLOB SERPL: 1.7 {RATIO} (ref 1.2–2.2)
ALP SERPL-CCNC: 97 IU/L (ref 39–117)
ALT SERPL-CCNC: 25 IU/L (ref 0–32)
ANA SER QL: NEGATIVE
AST SERPL-CCNC: 19 IU/L (ref 0–40)
BASOPHILS # BLD AUTO: 0 X10E3/UL (ref 0–0.2)
BASOPHILS NFR BLD AUTO: 1 %
BILIRUB SERPL-MCNC: <0.2 MG/DL (ref 0–1.2)
BUN SERPL-MCNC: 17 MG/DL (ref 6–24)
BUN/CREAT SERPL: 29 (ref 9–23)
CALCIUM SERPL-MCNC: 9.2 MG/DL (ref 8.7–10.2)
CCP IGA+IGG SERPL IA-ACNC: 4 UNITS (ref 0–19)
CHLORIDE SERPL-SCNC: 101 MMOL/L (ref 96–106)
CHOLEST SERPL-MCNC: 256 MG/DL (ref 100–199)
CK SERPL-CCNC: 60 U/L (ref 24–173)
CO2 SERPL-SCNC: 25 MMOL/L (ref 18–29)
CREAT SERPL-MCNC: 0.59 MG/DL (ref 0.57–1)
CRP SERPL-MCNC: 5 MG/L (ref 0–4.9)
EOSINOPHIL # BLD AUTO: 0.2 X10E3/UL (ref 0–0.4)
EOSINOPHIL NFR BLD AUTO: 4 %
ERYTHROCYTE [DISTWIDTH] IN BLOOD BY AUTOMATED COUNT: 13.7 % (ref 12.3–15.4)
ERYTHROCYTE [SEDIMENTATION RATE] IN BLOOD BY WESTERGREN METHOD: 13 MM/HR (ref 0–40)
FERRITIN SERPL-MCNC: 38 NG/ML (ref 15–150)
FOLATE SERPL-MCNC: 6.1 NG/ML
FT4I SERPL CALC-MCNC: 1.9 (ref 1.2–4.9)
GLOBULIN SER CALC-MCNC: 2.6 G/DL (ref 1.5–4.5)
GLUCOSE SERPL-MCNC: 91 MG/DL (ref 65–99)
HCT VFR BLD AUTO: 38.9 % (ref 34–46.6)
HDLC SERPL-MCNC: 36 MG/DL
HGB BLD-MCNC: 12.9 G/DL (ref 11.1–15.9)
IMM GRANULOCYTES # BLD: 0 X10E3/UL (ref 0–0.1)
IMM GRANULOCYTES NFR BLD: 0 %
IRON SATN MFR SERPL: 12 % (ref 15–55)
IRON SERPL-MCNC: 45 UG/DL (ref 27–159)
LDLC SERPL CALC-MCNC: 179 MG/DL (ref 0–99)
LDLC/HDLC SERPL: 5 RATIO UNITS (ref 0–3.2)
LYMPHOCYTES # BLD AUTO: 1.8 X10E3/UL (ref 0.7–3.1)
LYMPHOCYTES NFR BLD AUTO: 33 %
MCH RBC QN AUTO: 28.7 PG (ref 26.6–33)
MCHC RBC AUTO-ENTMCNC: 33.2 G/DL (ref 31.5–35.7)
MCV RBC AUTO: 87 FL (ref 79–97)
MONOCYTES # BLD AUTO: 0.3 X10E3/UL (ref 0.1–0.9)
MONOCYTES NFR BLD AUTO: 6 %
NEUTROPHILS # BLD AUTO: 3.1 X10E3/UL (ref 1.4–7)
NEUTROPHILS NFR BLD AUTO: 56 %
PLATELET # BLD AUTO: 326 X10E3/UL (ref 150–379)
POTASSIUM SERPL-SCNC: 3.9 MMOL/L (ref 3.5–5.2)
PROT SERPL-MCNC: 7 G/DL (ref 6–8.5)
RBC # BLD AUTO: 4.49 X10E6/UL (ref 3.77–5.28)
RHEUMATOID FACT SERPL-ACNC: <10 IU/ML (ref 0–13.9)
SODIUM SERPL-SCNC: 142 MMOL/L (ref 134–144)
T3RU NFR SERPL: 28 % (ref 24–39)
T4 SERPL-MCNC: 6.9 UG/DL (ref 4.5–12)
TIBC SERPL-MCNC: 369 UG/DL (ref 250–450)
TRIGL SERPL-MCNC: 206 MG/DL (ref 0–149)
TSH SERPL DL<=0.005 MIU/L-ACNC: 4.77 UIU/ML (ref 0.45–4.5)
UIBC SERPL-MCNC: 324 UG/DL (ref 131–425)
URATE SERPL-MCNC: 3.9 MG/DL (ref 2.5–7.1)
VIT B12 SERPL-MCNC: 361 PG/ML (ref 211–946)
VLDLC SERPL CALC-MCNC: 41 MG/DL (ref 5–40)
WBC # BLD AUTO: 5.4 X10E3/UL (ref 3.4–10.8)

## 2017-09-29 DIAGNOSIS — E55.9 VITAMIN D DEFICIENCY: ICD-10-CM

## 2017-09-29 DIAGNOSIS — F41.8 MIXED ANXIETY DEPRESSIVE DISORDER: Primary | ICD-10-CM

## 2017-09-29 DIAGNOSIS — E53.8 B12 DEFICIENCY: ICD-10-CM

## 2017-09-29 DIAGNOSIS — E03.9 ACQUIRED HYPOTHYROIDISM: ICD-10-CM

## 2017-09-29 RX ORDER — UBIDECARENONE 75 MG
100 CAPSULE ORAL DAILY
Qty: 30 TABLET | Refills: 3 | Status: SHIPPED | OUTPATIENT
Start: 2017-09-29 | End: 2018-02-15 | Stop reason: SDUPTHER

## 2017-09-29 RX ORDER — MELATONIN
1000 DAILY
Qty: 30 TABLET | Refills: 3 | Status: SHIPPED | OUTPATIENT
Start: 2017-09-29 | End: 2021-08-10 | Stop reason: HOSPADM

## 2017-09-29 RX ORDER — LEVOTHYROXINE SODIUM 112 UG/1
112 TABLET ORAL DAILY
Qty: 30 TABLET | Refills: 1 | Status: SHIPPED | OUTPATIENT
Start: 2017-09-29 | End: 2017-10-25 | Stop reason: SDUPTHER

## 2017-09-29 RX ORDER — ROSUVASTATIN CALCIUM 40 MG/1
40 TABLET, COATED ORAL NIGHTLY
Qty: 30 TABLET | Refills: 2 | Status: SHIPPED | OUTPATIENT
Start: 2017-09-29 | End: 2017-12-14 | Stop reason: SDUPTHER

## 2017-10-02 NOTE — PATIENT INSTRUCTIONS
57 YEAR OLD FEMALE WHO PRESENTS TODAY IN FOLLOW UP OF HTN, HYPERLIPIDEMIA, B12 AND VITAMIN D DEFICIENCY, DEPRESSION WITH ANXIETY, AND KNEE PAIN. BP TODAY IS OK- CONTINUE REGIMEN. FASTING LABS TODAY- CALL IF NO RESULTS IN 1 WEEK. PATIENT CONTINUES WITH ANXIETY AND DEPRESSION SYMPTOMS. SHE REPORTS CONTINUED IRRITABILITY AND DIZZINESS WHEN SHE MISSES CELEXA DOSING. NO DIZZINESS IF SHE TAKES MEDICATION. SHE WOULD LIKE TO CHANGE MEDICATION BACK TO PROZAC, AS SHE DID NOT HAVE DIZZINESS WHEN SHE MISSED MEDICATION. I DISCUSSED WITH HER THAT PROZAC HAD STOPPED BEING EFFECTIVE AND I AM CONCERNED TO PUT HE BACK ON THE MEDICATION IF IT IS NOT EFFECTIVE. I DISCUSSED POSSIBLY CHANGING TO SNRI. PATIENT WOULD LIKE TO CHANGE MEDICATION. I WILL HAVE HER DECREASE CELEXA TO 20 MG X 2-4 WEEKS THEN 10 MG X 2 WEEKS THEN WE CAN STOP AND RESTART PROZAC OR NEW MEDICATION. SHE ALSO HAS INCREASED STRESSORS AT HOME AND SOME AT WORK. I WILL HAVE HER FOLLOW UP WITH ME IN 4 WEEKS TO DETERMINE MEDICATION. SHE HAS JOINT PAINS AND KNEE PAIN, CONSIDERATION OF CYMBALTA MAY HELP WITH PAIN AND MOODS. SHE HAS CONTINUED KNEE PAIN AND WAS NOT ABLE TO DO PHYSICAL THERAPY. I WILL REFER TO ORTHOPEDIST FOR EVALUATION. I WILL ALSO CHECK AUTOIMMUNE TESTING WITH SEVERAL JOINT PAINS.

## 2017-10-04 RX ORDER — PANTOPRAZOLE SODIUM 40 MG/1
TABLET, DELAYED RELEASE ORAL
Qty: 30 TABLET | Refills: 2 | Status: SHIPPED | OUTPATIENT
Start: 2017-10-04 | End: 2018-01-13 | Stop reason: SDUPTHER

## 2017-10-12 RX ORDER — LEVOTHYROXINE SODIUM 0.1 MG/1
TABLET ORAL
Qty: 30 TABLET | Refills: 2 | OUTPATIENT
Start: 2017-10-12

## 2017-10-12 RX ORDER — LORATADINE 10 MG/1
TABLET ORAL
Qty: 90 TABLET | Refills: 0 | Status: SHIPPED | OUTPATIENT
Start: 2017-10-12 | End: 2018-01-23 | Stop reason: SDUPTHER

## 2017-10-12 RX ORDER — MONTELUKAST SODIUM 10 MG/1
TABLET ORAL
Qty: 90 TABLET | Refills: 0 | Status: SHIPPED | OUTPATIENT
Start: 2017-10-12 | End: 2018-01-19 | Stop reason: SDUPTHER

## 2017-10-12 RX ORDER — METHOCARBAMOL 750 MG/1
TABLET, FILM COATED ORAL
Qty: 30 TABLET | Refills: 0 | Status: SHIPPED | OUTPATIENT
Start: 2017-10-12 | End: 2017-11-13 | Stop reason: SDUPTHER

## 2017-10-24 ENCOUNTER — OFFICE VISIT (OUTPATIENT)
Dept: ORTHOPEDIC SURGERY | Facility: CLINIC | Age: 57
End: 2017-10-24

## 2017-10-24 ENCOUNTER — OFFICE VISIT (OUTPATIENT)
Dept: FAMILY MEDICINE CLINIC | Facility: CLINIC | Age: 57
End: 2017-10-24

## 2017-10-24 VITALS
OXYGEN SATURATION: 97 % | HEART RATE: 76 BPM | HEIGHT: 62 IN | WEIGHT: 208 LBS | DIASTOLIC BLOOD PRESSURE: 74 MMHG | BODY MASS INDEX: 38.28 KG/M2 | SYSTOLIC BLOOD PRESSURE: 122 MMHG | TEMPERATURE: 98 F

## 2017-10-24 VITALS — TEMPERATURE: 99.4 F | BODY MASS INDEX: 38.28 KG/M2 | WEIGHT: 208 LBS | HEIGHT: 62 IN

## 2017-10-24 DIAGNOSIS — M25.561 CHRONIC PAIN OF BOTH KNEES: Primary | ICD-10-CM

## 2017-10-24 DIAGNOSIS — F41.8 MIXED ANXIETY DEPRESSIVE DISORDER: ICD-10-CM

## 2017-10-24 DIAGNOSIS — E78.2 MIXED HYPERLIPIDEMIA: ICD-10-CM

## 2017-10-24 DIAGNOSIS — E53.8 B12 DEFICIENCY: ICD-10-CM

## 2017-10-24 DIAGNOSIS — G89.29 CHRONIC PAIN OF BOTH KNEES: Primary | ICD-10-CM

## 2017-10-24 DIAGNOSIS — E55.9 VITAMIN D DEFICIENCY: ICD-10-CM

## 2017-10-24 DIAGNOSIS — E03.9 ACQUIRED HYPOTHYROIDISM: Primary | ICD-10-CM

## 2017-10-24 DIAGNOSIS — M25.562 CHRONIC PAIN OF BOTH KNEES: Primary | ICD-10-CM

## 2017-10-24 PROCEDURE — 73562 X-RAY EXAM OF KNEE 3: CPT | Performed by: NURSE PRACTITIONER

## 2017-10-24 PROCEDURE — 99203 OFFICE O/P NEW LOW 30 MIN: CPT | Performed by: NURSE PRACTITIONER

## 2017-10-24 PROCEDURE — 20610 DRAIN/INJ JOINT/BURSA W/O US: CPT | Performed by: NURSE PRACTITIONER

## 2017-10-24 PROCEDURE — 99214 OFFICE O/P EST MOD 30 MIN: CPT | Performed by: PHYSICIAN ASSISTANT

## 2017-10-24 RX ORDER — BUPIVACAINE HYDROCHLORIDE 5 MG/ML
2 INJECTION, SOLUTION EPIDURAL; INTRACAUDAL
Status: COMPLETED | OUTPATIENT
Start: 2017-10-24 | End: 2017-10-24

## 2017-10-24 RX ORDER — FLUOXETINE HYDROCHLORIDE 20 MG/1
CAPSULE ORAL
Qty: 60 CAPSULE | Refills: 1 | Status: SHIPPED | OUTPATIENT
Start: 2017-10-24 | End: 2017-12-29 | Stop reason: SDUPTHER

## 2017-10-24 RX ORDER — METHYLPREDNISOLONE ACETATE 80 MG/ML
80 INJECTION, SUSPENSION INTRA-ARTICULAR; INTRALESIONAL; INTRAMUSCULAR; SOFT TISSUE
Status: COMPLETED | OUTPATIENT
Start: 2017-10-24 | End: 2017-10-24

## 2017-10-24 RX ORDER — MELOXICAM 15 MG/1
TABLET ORAL
Qty: 30 TABLET | Refills: 3 | Status: SHIPPED | OUTPATIENT
Start: 2017-10-24 | End: 2019-12-02

## 2017-10-24 RX ADMIN — METHYLPREDNISOLONE ACETATE 80 MG: 80 INJECTION, SUSPENSION INTRA-ARTICULAR; INTRALESIONAL; INTRAMUSCULAR; SOFT TISSUE at 16:10

## 2017-10-24 RX ADMIN — BUPIVACAINE HYDROCHLORIDE 2 ML: 5 INJECTION, SOLUTION EPIDURAL; INTRACAUDAL at 16:10

## 2017-10-24 NOTE — PROGRESS NOTES
Patient: Lydia Gutierrez  YOB: 1960 57 y.o. female  Medical Record Number: [id number]    Chief Complaints:   Chief Complaint   Patient presents with   • Left Knee - Establish Care, Pain   • Right Knee - Establish Care       History of Present Illness:Lydia Gutierrez is a 57 y.o. female who presentsAs a new patient with complaints of bilateral knee pain left greater than right.  Patient reports she has had knee pain for years.  Denies any injury.  She reports the pain is been worse over the last 2 years or so.  She describes the bilateral knee pain as a severe crushing aching type pain with intermittent swelling worse with standing sitting driving and slightly better with ice.    Allergies:   Allergies   Allergen Reactions   • Atorvastatin Other (See Comments)       Medications:   Current Outpatient Prescriptions   Medication Sig Dispense Refill   • amLODIPine (NORVASC) 10 MG tablet TAKE 1 TABLET BY MOUTH DAILY. 30 tablet 2   • cholecalciferol (VITAMIN D3) 1000 units tablet Take 1 tablet by mouth Daily. 30 tablet 3   • FLUoxetine (PROzac) 20 MG capsule Take 1 capsule by mouth once daily for 1-2 weeks then may increase to 2 capsules by mouth daily. 60 capsule 1   • fluticasone (FLONASE) 50 MCG/ACT nasal spray USE 1 SPRAY IN EACH NOSTRIL TWO TIMES A DAY. 16 g 1   • levothyroxine (SYNTHROID, LEVOTHROID) 112 MCG tablet Take 1 tablet by mouth Daily. 30 tablet 1   • lisinopril-hydrochlorothiazide (PRINZIDE,ZESTORETIC) 10-12.5 MG per tablet TAKE 1 TABLET BY MOUTH DAILY. 30 tablet 2   • loratadine (CLARITIN) 10 MG tablet TAKE 1 TABLET BY MOUTH DAILY AS NEEDED. 90 tablet 0   • methocarbamol (ROBAXIN) 750 MG tablet TAKE 1 TABLET BY MOUTH EVERY NIGHT FOR MUSCLE SPASMS. 30 tablet 0   • montelukast (SINGULAIR) 10 MG tablet TAKE 1 TABLET BY MOUTH AT BEDTIME. 90 tablet 0   • pantoprazole (PROTONIX) 40 MG EC tablet TAKE 1 TABLET BY MOUTH DAILY. 30 tablet 2   • rosuvastatin (CRESTOR) 40 MG tablet Take 1 tablet by mouth  "Every Night. 30 tablet 2   • VENTOLIN  (90 BASE) MCG/ACT inhaler INHALE 1-2 PUFFS BY MOUTH EVERY 4-6 HOURS AS NEEDED AND AS DIRECTED. 18 g 1   • vitamin B-12 (CYANOCOBALAMIN) 100 MCG tablet Take 1 tablet by mouth Daily. 30 tablet 3   • meloxicam (MOBIC) 15 MG tablet 1 PO Daily with food. 30 tablet 3     No current facility-administered medications for this visit.          The following portions of the patient's history were reviewed and updated as appropriate: allergies, current medications, past family history, past medical history, past social history, past surgical history and problem list.    Review of Systems:   A 14 point review of systems was performed. All systems negative except pertinent positives/negative listed in HPI above    Physical Exam:   Vitals:    10/24/17 1535   Temp: 99.4 °F (37.4 °C)   TempSrc: Temporal Artery    Weight: 208 lb (94.3 kg)   Height: 62\" (157.5 cm)       General: A and O x 3, ASA, NAD    SCLERA:    Normal    DENTITION:   Normal  Skin clear no unusual lesions noted  Bilateral knees no appreciable fusion 115° flexion neutral in extension with a positive Yuni negative Lockman she also has significant patellofemoral crepitus noted bilaterally calf soft nontender    Radiology:  Xrays 3views (ap,lateral, sunrise) bilateral knees were ordered and reviewed today secondary to pain and show significant arthritic changes.  No comparative views available     Assessment/Plan:  Osteoarthritis bilateral knees    We will proceed with bilateral knee cortisone injection, prior to injections risks were discussed including but not limited to pain, elevated blood sugars, infection.  Patient verbalized understanding would like to proceed.  In addition she was encouraged to lose weight, we sent a prescription to her pharmacy for meloxicam and referred her to outpatient physical therapy.  We will otherwise see her back as needed    Large Joint Arthrocentesis  Date/Time: 10/24/2017 4:10 " PM  Consent given by: patient  Site marked: site marked  Timeout: Immediately prior to procedure a time out was called to verify the correct patient, procedure, equipment, support staff and site/side marked as required   Supporting Documentation  Indications: pain and joint swelling   Procedure Details  Location: knee - R knee  Preparation: Patient was prepped and draped in the usual sterile fashion  Needle size: 22 G  Approach: anterolateral  Medications administered: 80 mg methylPREDNISolone acetate 80 MG/ML; 2 mL bupivacaine (PF) 0.5 %      Large Joint Arthrocentesis  Date/Time: 10/24/2017 4:10 PM  Consent given by: patient  Site marked: site marked  Timeout: Immediately prior to procedure a time out was called to verify the correct patient, procedure, equipment, support staff and site/side marked as required   Supporting Documentation  Indications: pain and joint swelling   Procedure Details  Location: knee - L knee  Preparation: Patient was prepped and draped in the usual sterile fashion  Needle size: 22 G  Approach: anterolateral  Medications administered: 80 mg methylPREDNISolone acetate 80 MG/ML; 2 mL bupivacaine (PF) 0.5 %

## 2017-10-24 NOTE — PROGRESS NOTES
"Subjective   Lydia Gutierrez is a 57 y.o. female seen today for follow-up for anxiety/depression, states she is decreasing Celexa and is currently taking 10 mg daily     History of Present Illness     DOWN TO CELEXA 10 MG DAILY. WOULD LIKE TO CHANGE BACK TO PROZAC. NO SI/HI. CANNOT TOLERATE THE DIZZINESS AND FEELING POORLY IF SHE MISSES A DOSE. WOULD NOT LIKE MEDICATION THAT WILL MAKE HER FEEL THIS WAY.     STATES THINGS HAVE NOT IMPROVED AT HOME. REPORTS  CONTINUES TO ACT DIFFERENTLY AND IS NOT EATING PROPERLY. HAS \"JUNK THAT HE HAULS\" IN DRIVEWAY, AND PATIENT REPORTS SHE CANNOT GET IN TO DRIVEWAY AND IS HAVING TO PARK IN [place]. SHE REPORTS SHE GAVE HIM UNTIL THE END OF THE WEEK AND IF NOT GONE, SHE WILL MOVE OUT FOR A WHILE AND MOVE IN WITH HER SISTER. FEELS SAFE. BEST FRIEND'S  IS . \    The following portions of the patient's history were reviewed and updated as appropriate: allergies, current medications, past family history, past medical history, past social history, past surgical history and problem list.    Review of Systems   Psychiatric/Behavioral: The patient is nervous/anxious.    All other systems reviewed and are negative.      Objective   Physical Exam   Constitutional: She is oriented to person, place, and time. She appears well-developed and well-nourished.   HENT:   Head: Normocephalic and atraumatic.   Right Ear: External ear normal.   Left Ear: External ear normal.   Nose: Nose normal.   Eyes: Conjunctivae and lids are normal.   Neck: Neck supple. Carotid bruit is not present.   Cardiovascular: Normal rate, regular rhythm, normal heart sounds and intact distal pulses.  Exam reveals no gallop and no friction rub.    No murmur heard.  Pulmonary/Chest: Effort normal and breath sounds normal. No respiratory distress. She has no wheezes. She has no rhonchi. She has no rales.   Musculoskeletal: She exhibits no edema or deformity.   Neurological: She is alert and oriented to person, " place, and time. Gait normal.   Skin: Skin is warm and dry.   Psychiatric: She has a normal mood and affect. Her speech is normal and behavior is normal. Judgment and thought content normal. Cognition and memory are normal.   Nursing note and vitals reviewed.      Assessment/Plan   Lydia was seen today for follow-up for anxiety/depression.    Diagnoses and all orders for this visit:    Acquired hypothyroidism  -     CBC & Differential  -     Comprehensive Metabolic Panel  -     CK  -     Thyroid Panel With TSH  -     Vitamin B12 & Folate    Mixed anxiety depressive disorder  -     CBC & Differential  -     Comprehensive Metabolic Panel  -     CK  -     Thyroid Panel With TSH  -     Vitamin B12 & Folate  -     FLUoxetine (PROzac) 20 MG capsule; Take 1 capsule by mouth once daily for 1-2 weeks then may increase to 2 capsules by mouth daily.    B12 deficiency  -     CBC & Differential  -     Comprehensive Metabolic Panel  -     CK  -     Thyroid Panel With TSH  -     Vitamin B12 & Folate    Vitamin D deficiency  -     CBC & Differential  -     Comprehensive Metabolic Panel  -     CK  -     Thyroid Panel With TSH  -     Vitamin B12 & Folate    Mixed hyperlipidemia  -     CBC & Differential  -     Comprehensive Metabolic Panel  -     CK  -     Thyroid Panel With TSH  -     Vitamin B12 & Folate    Other orders  -     Verbal Order  -     Anti-Thyroglobulin Antibody  -     Thyroid Peroxidase Antibody  -     Written Authorization      Patient Instructions   57 YEAR OLD FEMALE WHO PRESENTS TODAY IN FOLLOW UP OF MOODS, HTN, LIPIDS, B12 AND VITAMIN D DEFICIENCY, AND HYPOTHYROIDISM. SHE REPORTS MOODS HAVE NOT BEEN GREAT BUT CONTINUES WITH INCREASED STRESSORS AT HOME. SHE WOULD LIKE TO STOP CELEXA AND GO BACK ON PROZAC. I AGAIN COUNSELED HER THAT WE STOPPED PROZAC BECAUSE IT WAS NO LONGER WORKING. SHE STATES THIS WAS WITH STARTING A NEW JOB AND THAT SHE FEELS THIS WILL WORK AGAIN, AS SHE HAS LEARNED HER JOB WELL. I WILL HAVE HER  STOP CELEXA AND START PROZAC 20 MG ONCE DAILY. MAY INCREASE IF NEEDED TO 40 MG ONCE DAILY AFTER 1-2 WEEKS. TO FOLLOW UP IN 6 WEEKS - TO BE SEEN SOONER IF ANY AE. I WOULD LIKE TO CONSIDER CYMBALTA WITH CHRONIC PAIN AND DEPRESSION WITH ANXIETY. SHE IS LIKELY TO HAVE SYMPTOMS IF MISSES DOSES OF THIS AND SHE PREFERS NOT TO START THIS. I WILL CHECK FASTING LABS ASAP. FURTHER RECOMMENDATIONS PENDING RESULTS. IF LABS ARE STABLE, FOLLOW UP IN 6 WEEKS FOR RE-EVALUATION OF MOODS.

## 2017-10-25 DIAGNOSIS — E03.9 ACQUIRED HYPOTHYROIDISM: ICD-10-CM

## 2017-10-25 LAB
ALBUMIN SERPL-MCNC: 4.5 G/DL (ref 3.5–5.5)
ALBUMIN/GLOB SERPL: 1.7 {RATIO} (ref 1.2–2.2)
ALP SERPL-CCNC: 97 IU/L (ref 39–117)
ALT SERPL-CCNC: 14 IU/L (ref 0–32)
AST SERPL-CCNC: 14 IU/L (ref 0–40)
BASOPHILS # BLD AUTO: 0 X10E3/UL (ref 0–0.2)
BASOPHILS NFR BLD AUTO: 0 %
BILIRUB SERPL-MCNC: 0.3 MG/DL (ref 0–1.2)
BUN SERPL-MCNC: 22 MG/DL (ref 6–24)
BUN/CREAT SERPL: 34 (ref 9–23)
CALCIUM SERPL-MCNC: 9.3 MG/DL (ref 8.7–10.2)
CHLORIDE SERPL-SCNC: 101 MMOL/L (ref 96–106)
CK SERPL-CCNC: 53 U/L (ref 24–173)
CO2 SERPL-SCNC: 26 MMOL/L (ref 18–29)
CREAT SERPL-MCNC: 0.64 MG/DL (ref 0.57–1)
EOSINOPHIL # BLD AUTO: 0.3 X10E3/UL (ref 0–0.4)
EOSINOPHIL NFR BLD AUTO: 5 %
ERYTHROCYTE [DISTWIDTH] IN BLOOD BY AUTOMATED COUNT: 13.7 % (ref 12.3–15.4)
FOLATE SERPL-MCNC: 5.2 NG/ML
FT4I SERPL CALC-MCNC: 2 (ref 1.2–4.9)
GFR SERPLBLD CREATININE-BSD FMLA CKD-EPI: 101 ML/MIN/1.73
GFR SERPLBLD CREATININE-BSD FMLA CKD-EPI: 116 ML/MIN/1.73
GLOBULIN SER CALC-MCNC: 2.7 G/DL (ref 1.5–4.5)
GLUCOSE SERPL-MCNC: 100 MG/DL (ref 65–99)
HCT VFR BLD AUTO: 37.8 % (ref 34–46.6)
HGB BLD-MCNC: 12.4 G/DL (ref 11.1–15.9)
IMM GRANULOCYTES # BLD: 0 X10E3/UL (ref 0–0.1)
IMM GRANULOCYTES NFR BLD: 0 %
LYMPHOCYTES # BLD AUTO: 1.7 X10E3/UL (ref 0.7–3.1)
LYMPHOCYTES NFR BLD AUTO: 25 %
Lab: NORMAL
Lab: NORMAL
MCH RBC QN AUTO: 28.6 PG (ref 26.6–33)
MCHC RBC AUTO-ENTMCNC: 32.8 G/DL (ref 31.5–35.7)
MCV RBC AUTO: 87 FL (ref 79–97)
MONOCYTES # BLD AUTO: 0.5 X10E3/UL (ref 0.1–0.9)
MONOCYTES NFR BLD AUTO: 7 %
NEUTROPHILS # BLD AUTO: 4.3 X10E3/UL (ref 1.4–7)
NEUTROPHILS NFR BLD AUTO: 63 %
PLATELET # BLD AUTO: 313 X10E3/UL (ref 150–379)
POTASSIUM SERPL-SCNC: 3.5 MMOL/L (ref 3.5–5.2)
PROT SERPL-MCNC: 7.2 G/DL (ref 6–8.5)
RBC # BLD AUTO: 4.33 X10E6/UL (ref 3.77–5.28)
SODIUM SERPL-SCNC: 144 MMOL/L (ref 134–144)
T3RU NFR SERPL: 27 % (ref 24–39)
T4 SERPL-MCNC: 7.5 UG/DL (ref 4.5–12)
TSH SERPL DL<=0.005 MIU/L-ACNC: 5.26 UIU/ML (ref 0.45–4.5)
VIT B12 SERPL-MCNC: 485 PG/ML (ref 211–946)
WBC # BLD AUTO: 6.8 X10E3/UL (ref 3.4–10.8)

## 2017-10-25 RX ORDER — LEVOTHYROXINE SODIUM 0.12 MG/1
125 TABLET ORAL DAILY
Qty: 30 TABLET | Refills: 1 | Status: SHIPPED | OUTPATIENT
Start: 2017-10-25 | End: 2018-01-10 | Stop reason: SDUPTHER

## 2017-10-27 LAB
THYROGLOB AB SERPL-ACNC: <1 IU/ML (ref 0–0.9)
THYROPEROXIDASE AB SERPL-ACNC: 9 IU/ML (ref 0–34)
WRITTEN AUTHORIZATION: NORMAL

## 2017-10-30 NOTE — PATIENT INSTRUCTIONS
57 YEAR OLD FEMALE WHO PRESENTS TODAY IN FOLLOW UP OF MOODS, HTN, LIPIDS, B12 AND VITAMIN D DEFICIENCY, AND HYPOTHYROIDISM. SHE REPORTS MOODS HAVE NOT BEEN GREAT BUT CONTINUES WITH INCREASED STRESSORS AT HOME. SHE WOULD LIKE TO STOP CELEXA AND GO BACK ON PROZAC. I AGAIN COUNSELED HER THAT WE STOPPED PROZAC BECAUSE IT WAS NO LONGER WORKING. SHE STATES THIS WAS WITH STARTING A NEW JOB AND THAT SHE FEELS THIS WILL WORK AGAIN, AS SHE HAS LEARNED HER JOB WELL. I WILL HAVE HER STOP CELEXA AND START PROZAC 20 MG ONCE DAILY. MAY INCREASE IF NEEDED TO 40 MG ONCE DAILY AFTER 1-2 WEEKS. TO FOLLOW UP IN 6 WEEKS - TO BE SEEN SOONER IF ANY AE. I WOULD LIKE TO CONSIDER CYMBALTA WITH CHRONIC PAIN AND DEPRESSION WITH ANXIETY. SHE IS LIKELY TO HAVE SYMPTOMS IF MISSES DOSES OF THIS AND SHE PREFERS NOT TO START THIS. I WILL CHECK FASTING LABS ASAP. FURTHER RECOMMENDATIONS PENDING RESULTS. IF LABS ARE STABLE, FOLLOW UP IN 6 WEEKS FOR RE-EVALUATION OF MOODS.

## 2017-11-13 RX ORDER — METHOCARBAMOL 750 MG/1
TABLET, FILM COATED ORAL
Qty: 30 TABLET | Refills: 0 | Status: SHIPPED | OUTPATIENT
Start: 2017-11-13 | End: 2017-12-14 | Stop reason: SDUPTHER

## 2017-11-17 RX ORDER — ALBUTEROL SULFATE 90 UG/1
AEROSOL, METERED RESPIRATORY (INHALATION)
Qty: 18 G | Refills: 1 | OUTPATIENT
Start: 2017-11-17

## 2017-11-17 NOTE — TELEPHONE ENCOUNTER
THIS REPORTS LAST RF 10/19- SHE SHOULDN'T BE USING ENTIRE ALBUTEROL INHALER PER MONTH. PLEASE FIND OUT HOW OFTEN SHE IS USING RESCUE INHALER.

## 2017-12-11 RX ORDER — FLUTICASONE PROPIONATE 50 MCG
SPRAY, SUSPENSION (ML) NASAL
Qty: 16 G | Refills: 1 | Status: SHIPPED | OUTPATIENT
Start: 2017-12-11 | End: 2018-04-30 | Stop reason: SDUPTHER

## 2017-12-14 DIAGNOSIS — F41.8 MIXED ANXIETY DEPRESSIVE DISORDER: ICD-10-CM

## 2017-12-15 RX ORDER — METHOCARBAMOL 750 MG/1
TABLET, FILM COATED ORAL
Qty: 30 TABLET | Refills: 0 | Status: SHIPPED | OUTPATIENT
Start: 2017-12-15 | End: 2018-01-19 | Stop reason: SDUPTHER

## 2017-12-15 RX ORDER — ROSUVASTATIN CALCIUM 40 MG/1
TABLET, COATED ORAL
Qty: 30 TABLET | Refills: 0 | Status: SHIPPED | OUTPATIENT
Start: 2017-12-15 | End: 2018-01-30 | Stop reason: SDUPTHER

## 2017-12-20 RX ORDER — LISINOPRIL AND HYDROCHLOROTHIAZIDE 12.5; 1 MG/1; MG/1
TABLET ORAL
Qty: 30 TABLET | Refills: 2 | Status: SHIPPED | OUTPATIENT
Start: 2017-12-20 | End: 2018-02-25 | Stop reason: SDUPTHER

## 2017-12-20 RX ORDER — AMLODIPINE BESYLATE 10 MG/1
TABLET ORAL
Qty: 30 TABLET | Refills: 2 | Status: SHIPPED | OUTPATIENT
Start: 2017-12-20 | End: 2018-02-25 | Stop reason: SDUPTHER

## 2017-12-29 DIAGNOSIS — F41.8 MIXED ANXIETY DEPRESSIVE DISORDER: ICD-10-CM

## 2017-12-29 RX ORDER — ALBUTEROL SULFATE 90 UG/1
AEROSOL, METERED RESPIRATORY (INHALATION)
Qty: 18 G | Refills: 1 | Status: SHIPPED | OUTPATIENT
Start: 2017-12-29 | End: 2018-03-15 | Stop reason: SDUPTHER

## 2017-12-29 RX ORDER — FLUOXETINE HYDROCHLORIDE 20 MG/1
CAPSULE ORAL
Qty: 60 CAPSULE | Refills: 1 | Status: SHIPPED | OUTPATIENT
Start: 2017-12-29 | End: 2018-03-05 | Stop reason: SDUPTHER

## 2018-01-08 ENCOUNTER — OFFICE VISIT (OUTPATIENT)
Dept: FAMILY MEDICINE CLINIC | Facility: CLINIC | Age: 58
End: 2018-01-08

## 2018-01-08 VITALS
TEMPERATURE: 97.6 F | SYSTOLIC BLOOD PRESSURE: 140 MMHG | DIASTOLIC BLOOD PRESSURE: 82 MMHG | WEIGHT: 207.8 LBS | BODY MASS INDEX: 38.24 KG/M2 | HEART RATE: 83 BPM | OXYGEN SATURATION: 98 % | HEIGHT: 62 IN

## 2018-01-08 DIAGNOSIS — F41.8 MIXED ANXIETY DEPRESSIVE DISORDER: ICD-10-CM

## 2018-01-08 DIAGNOSIS — E03.9 ACQUIRED HYPOTHYROIDISM: ICD-10-CM

## 2018-01-08 DIAGNOSIS — I10 BENIGN ESSENTIAL HYPERTENSION: Primary | ICD-10-CM

## 2018-01-08 DIAGNOSIS — E55.9 VITAMIN D DEFICIENCY: ICD-10-CM

## 2018-01-08 DIAGNOSIS — E53.8 B12 DEFICIENCY: ICD-10-CM

## 2018-01-08 DIAGNOSIS — D72.829 LEUKOCYTOSIS, UNSPECIFIED TYPE: ICD-10-CM

## 2018-01-08 DIAGNOSIS — R79.89 ABNORMAL C-REACTIVE PROTEIN: ICD-10-CM

## 2018-01-08 DIAGNOSIS — E78.2 MIXED HYPERLIPIDEMIA: ICD-10-CM

## 2018-01-08 PROCEDURE — 99214 OFFICE O/P EST MOD 30 MIN: CPT | Performed by: PHYSICIAN ASSISTANT

## 2018-01-08 NOTE — PATIENT INSTRUCTIONS
57 YEAR OLD FEMALE WHO PRESENTS TODAY IN FOLLOW UP OF HTN, HYPERLIPIDEMIA, HYPOTHYROIDISM, B12 AND VITAMIN D DEFICIENCY, LEUKOCYTOSIS, ELEVATED CRP, AND DEPRESSION WITH ANXIETY. PATIENT'S BP IS ELEVATED TODAY. SHE RECENTLY STARTED MOBIC FROM ORTHOPEDIST. I ASKED THAT SHE STOP THIS MEDICATION. TO MONITOR BP- IF CONTINUED ELEVATION, TO CALL OR RETURN ASAP. OTHERWISE, TO REMAIN OFF NSAIDS. PATIENT REPORTS THE INITIAL INJECTION FROM [facility]THOPEDIST HELPED FOR ONLY A SHORT TIME. SHE WILL CONTACT ORTHOPEDIC SURGEON FOR FOLLOW UP AND TREATMENT. FASTING LABS TODAY- CALL IF NO RESULTS IN 1 WEEK. PATIENT REPORTS MOODS HAVE BEEN STABLE. SHE CONTINUES UNDER INCREASED STRESS AT HOME, BUT REPORTS SHE IS COPING WELL, IS SAFE, AND HAS SUPPORT. I WILL CONTINUE MEDICATIONS. TO BE SEEN IF WORSENING OR UNCONTROLLED MOODS. FOLLOW UP IN 6 WEEKS FOR MOOD RE-EVALUATION, THYROID, AND HTN. TO BE SEEN SOONER IF NEEDED.

## 2018-01-08 NOTE — PROGRESS NOTES
Subjective   Lydia Gutierrez is a 57 y.o. female seen today for medication check for hyperlipidemia, hypothyroid, hypertension,  Vitamin D and B 12 deficiency     History of Present Illness     HAS DECIDED IS MOVING OUT. PATIENT REPORTS THINGS HAVE NOT IMPROVED AT HOME AND IS NOT ABLE TO CONTINUE TO TOLERATE. PATIENT REPORTS  LOST HIS JOB. REPORTS MEDICATION IS WORKING WELL. SHE DENIES ANY BREAKTHROUGH SYMPTOMS OR UNCONTROLLED MOODS. NO SI/HI.     PATIENT REPORTS SEEN BY ORTHO WITH INJECTION IN KNEE. STATES INJECTION WORKED FOR ABOUT 3 WEEKS THEN STARTED HURTING AGAIN. WAS ALSO GIVEN MOBIC 15 MG ONCE DAILY AND HAS BEEN TAKING - NOT HELPING.     NO OTHER CONCERNS OR PROBLEMS. TOLERATING MEDICATIONS WELL.     The following portions of the patient's history were reviewed and updated as appropriate: allergies, current medications, past family history, past medical history, past social history, past surgical history and problem list.    Review of Systems   Musculoskeletal: Positive for arthralgias.   All other systems reviewed and are negative.      Objective   Physical Exam   Constitutional: She is oriented to person, place, and time. She appears well-developed and well-nourished.   HENT:   Head: Normocephalic and atraumatic.   Right Ear: External ear normal.   Left Ear: External ear normal.   Nose: Nose normal.   Eyes: Conjunctivae and lids are normal.   Neck: Neck supple. Carotid bruit is not present.   Cardiovascular: Normal rate, regular rhythm, normal heart sounds and intact distal pulses.  Exam reveals no gallop and no friction rub.    No murmur heard.  Pulmonary/Chest: Effort normal and breath sounds normal. No respiratory distress. She has no wheezes. She has no rhonchi. She has no rales.   Musculoskeletal: She exhibits no edema or deformity.   Neurological: She is alert and oriented to person, place, and time. Gait normal.   Skin: Skin is warm and dry.   Psychiatric: She has a normal mood and affect. Her  speech is normal and behavior is normal. Judgment and thought content normal. Cognition and memory are normal.   Nursing note and vitals reviewed.      Assessment/Plan   Lydia was seen today for follow-up.    Diagnoses and all orders for this visit:    Benign essential hypertension  -     CBC & Differential  -     Comprehensive Metabolic Panel  -     CK  -     Iron and TIBC  -     Ferritin  -     Lipid Panel With LDL / HDL Ratio  -     Thyroid Panel With TSH  -     Vitamin B12 & Folate  -     Vitamin D 25 Hydroxy  -     C-reactive Protein    Mixed hyperlipidemia  -     CBC & Differential  -     Comprehensive Metabolic Panel  -     CK  -     Iron and TIBC  -     Ferritin  -     Lipid Panel With LDL / HDL Ratio  -     Thyroid Panel With TSH  -     Vitamin B12 & Folate  -     Vitamin D 25 Hydroxy  -     C-reactive Protein    B12 deficiency  -     CBC & Differential  -     Comprehensive Metabolic Panel  -     CK  -     Iron and TIBC  -     Ferritin  -     Lipid Panel With LDL / HDL Ratio  -     Thyroid Panel With TSH  -     Vitamin B12 & Folate  -     Vitamin D 25 Hydroxy  -     C-reactive Protein    Vitamin D deficiency  -     CBC & Differential  -     Comprehensive Metabolic Panel  -     CK  -     Iron and TIBC  -     Ferritin  -     Lipid Panel With LDL / HDL Ratio  -     Thyroid Panel With TSH  -     Vitamin B12 & Folate  -     Vitamin D 25 Hydroxy  -     C-reactive Protein    Acquired hypothyroidism  -     CBC & Differential  -     Comprehensive Metabolic Panel  -     CK  -     Iron and TIBC  -     Ferritin  -     Lipid Panel With LDL / HDL Ratio  -     Thyroid Panel With TSH  -     Vitamin B12 & Folate  -     Vitamin D 25 Hydroxy  -     C-reactive Protein    Leukocytosis, unspecified type  -     CBC & Differential  -     Comprehensive Metabolic Panel  -     CK  -     Iron and TIBC  -     Ferritin  -     Lipid Panel With LDL / HDL Ratio  -     Thyroid Panel With TSH  -     Vitamin B12 & Folate  -     Vitamin D 25  Hydroxy  -     C-reactive Protein    Abnormal C-reactive protein  -     CBC & Differential  -     Comprehensive Metabolic Panel  -     CK  -     Iron and TIBC  -     Ferritin  -     Lipid Panel With LDL / HDL Ratio  -     Thyroid Panel With TSH  -     Vitamin B12 & Folate  -     Vitamin D 25 Hydroxy  -     C-reactive Protein    Mixed anxiety depressive disorder  -     CBC & Differential  -     Comprehensive Metabolic Panel  -     CK  -     Iron and TIBC  -     Ferritin  -     Lipid Panel With LDL / HDL Ratio  -     Thyroid Panel With TSH  -     Vitamin B12 & Folate  -     Vitamin D 25 Hydroxy  -     C-reactive Protein      Patient Instructions   57 YEAR OLD FEMALE WHO PRESENTS TODAY IN FOLLOW UP OF HTN, HYPERLIPIDEMIA, HYPOTHYROIDISM, B12 AND VITAMIN D DEFICIENCY, LEUKOCYTOSIS, ELEVATED CRP, AND DEPRESSION WITH ANXIETY. PATIENT'S BP IS ELEVATED TODAY. SHE RECENTLY STARTED MOBIC FROM ORTHOPEDIST. I ASKED THAT SHE STOP THIS MEDICATION. TO MONITOR BP- IF CONTINUED ELEVATION, TO CALL OR RETURN ASAP. OTHERWISE, TO REMAIN OFF NSAIDS. PATIENT REPORTS THE INITIAL INJECTION FROM ORTHOPEDIST HELPED FOR ONLY A SHORT TIME. SHE WILL CONTACT ORTHOPEDIC SURGEON FOR FOLLOW UP AND TREATMENT. FASTING LABS TODAY- CALL IF NO RESULTS IN 1 WEEK. PATIENT REPORTS MOODS HAVE BEEN STABLE. SHE CONTINUES UNDER INCREASED STRESS AT HOME, BUT REPORTS SHE IS COPING WELL, IS SAFE, AND HAS SUPPORT. I WILL CONTINUE MEDICATIONS. TO BE SEEN IF WORSENING OR UNCONTROLLED MOODS. FOLLOW UP IN 6 WEEKS FOR MOOD RE-EVALUATION, THYROID, AND HTN. TO BE SEEN SOONER IF NEEDED.

## 2018-01-09 LAB
25(OH)D3+25(OH)D2 SERPL-MCNC: 23.3 NG/ML (ref 30–100)
ALBUMIN SERPL-MCNC: 4.4 G/DL (ref 3.5–5.2)
ALBUMIN/GLOB SERPL: 1.6 G/DL
ALP SERPL-CCNC: 103 U/L (ref 39–117)
ALT SERPL-CCNC: 17 U/L (ref 1–33)
AST SERPL-CCNC: 13 U/L (ref 1–32)
BASOPHILS # BLD AUTO: 0.03 10*3/MM3 (ref 0–0.2)
BASOPHILS NFR BLD AUTO: 0.4 % (ref 0–1.5)
BILIRUB SERPL-MCNC: 0.4 MG/DL (ref 0.1–1.2)
BUN SERPL-MCNC: 25 MG/DL (ref 6–20)
BUN/CREAT SERPL: 40.3 (ref 7–25)
CALCIUM SERPL-MCNC: 9.4 MG/DL (ref 8.6–10.5)
CHLORIDE SERPL-SCNC: 100 MMOL/L (ref 98–107)
CHOLEST SERPL-MCNC: 169 MG/DL (ref 0–200)
CK SERPL-CCNC: 57 U/L (ref 20–180)
CO2 SERPL-SCNC: 28.3 MMOL/L (ref 22–29)
CREAT SERPL-MCNC: 0.62 MG/DL (ref 0.57–1)
CRP SERPL-MCNC: 0.49 MG/DL (ref 0–0.5)
EOSINOPHIL # BLD AUTO: 0.24 10*3/MM3 (ref 0–0.7)
EOSINOPHIL NFR BLD AUTO: 3.5 % (ref 0.3–6.2)
ERYTHROCYTE [DISTWIDTH] IN BLOOD BY AUTOMATED COUNT: 14 % (ref 11.7–13)
FERRITIN SERPL-MCNC: 33.57 NG/ML (ref 13–150)
FOLATE SERPL-MCNC: 10.17 NG/ML (ref 4.78–24.2)
FT4I SERPL CALC-MCNC: 2 (ref 1.2–4.9)
GLOBULIN SER CALC-MCNC: 2.8 GM/DL
GLUCOSE SERPL-MCNC: 112 MG/DL (ref 65–99)
HCT VFR BLD AUTO: 39.2 % (ref 35.6–45.5)
HDLC SERPL-MCNC: 44 MG/DL (ref 40–60)
HGB BLD-MCNC: 12.8 G/DL (ref 11.9–15.5)
IMM GRANULOCYTES # BLD: 0 10*3/MM3 (ref 0–0.03)
IMM GRANULOCYTES NFR BLD: 0 % (ref 0–0.5)
IRON SATN MFR SERPL: 21 % (ref 20–50)
IRON SERPL-MCNC: 100 MCG/DL (ref 37–145)
LDLC SERPL CALC-MCNC: 83 MG/DL (ref 0–100)
LDLC/HDLC SERPL: 1.88 {RATIO}
LYMPHOCYTES # BLD AUTO: 1.68 10*3/MM3 (ref 0.9–4.8)
LYMPHOCYTES NFR BLD AUTO: 24.3 % (ref 19.6–45.3)
MCH RBC QN AUTO: 29.7 PG (ref 26.9–32)
MCHC RBC AUTO-ENTMCNC: 32.7 G/DL (ref 32.4–36.3)
MCV RBC AUTO: 91 FL (ref 80.5–98.2)
MONOCYTES # BLD AUTO: 0.57 10*3/MM3 (ref 0.2–1.2)
MONOCYTES NFR BLD AUTO: 8.2 % (ref 5–12)
NEUTROPHILS # BLD AUTO: 4.4 10*3/MM3 (ref 1.9–8.1)
NEUTROPHILS NFR BLD AUTO: 63.6 % (ref 42.7–76)
PLATELET # BLD AUTO: 333 10*3/MM3 (ref 140–500)
POTASSIUM SERPL-SCNC: 4.2 MMOL/L (ref 3.5–5.2)
PROT SERPL-MCNC: 7.2 G/DL (ref 6–8.5)
RBC # BLD AUTO: 4.31 10*6/MM3 (ref 3.9–5.2)
SODIUM SERPL-SCNC: 141 MMOL/L (ref 136–145)
T3RU NFR SERPL: 29 % (ref 24–39)
T4 SERPL-MCNC: 7 UG/DL (ref 4.5–12)
TIBC SERPL-MCNC: 470 MCG/DL
TRIGL SERPL-MCNC: 211 MG/DL (ref 0–150)
TSH SERPL DL<=0.005 MIU/L-ACNC: 3.29 UIU/ML (ref 0.45–4.5)
UIBC SERPL-MCNC: 370 MCG/DL
VIT B12 SERPL-MCNC: 510 PG/ML (ref 211–946)
VLDLC SERPL CALC-MCNC: 42.2 MG/DL (ref 5–40)
WBC # BLD AUTO: 6.92 10*3/MM3 (ref 4.5–10.7)

## 2018-01-10 DIAGNOSIS — E03.9 ACQUIRED HYPOTHYROIDISM: ICD-10-CM

## 2018-01-12 RX ORDER — LEVOTHYROXINE SODIUM 0.12 MG/1
TABLET ORAL
Qty: 30 TABLET | Refills: 1 | Status: SHIPPED | OUTPATIENT
Start: 2018-01-12 | End: 2018-03-12 | Stop reason: SDUPTHER

## 2018-01-15 RX ORDER — PANTOPRAZOLE SODIUM 40 MG/1
TABLET, DELAYED RELEASE ORAL
Qty: 30 TABLET | Refills: 2 | Status: SHIPPED | OUTPATIENT
Start: 2018-01-15 | End: 2018-04-30 | Stop reason: SDUPTHER

## 2018-01-22 RX ORDER — MONTELUKAST SODIUM 10 MG/1
TABLET ORAL
Qty: 90 TABLET | Refills: 0 | Status: SHIPPED | OUTPATIENT
Start: 2018-01-22 | End: 2018-05-04 | Stop reason: SDUPTHER

## 2018-01-22 RX ORDER — METHOCARBAMOL 750 MG/1
TABLET, FILM COATED ORAL
Qty: 30 TABLET | Refills: 0 | Status: SHIPPED | OUTPATIENT
Start: 2018-01-22 | End: 2018-02-25 | Stop reason: SDUPTHER

## 2018-01-23 RX ORDER — LORATADINE 10 MG/1
TABLET ORAL
Qty: 90 TABLET | Refills: 0 | Status: SHIPPED | OUTPATIENT
Start: 2018-01-23 | End: 2018-05-04 | Stop reason: SDUPTHER

## 2018-01-30 DIAGNOSIS — F41.8 MIXED ANXIETY DEPRESSIVE DISORDER: ICD-10-CM

## 2018-01-30 RX ORDER — ROSUVASTATIN CALCIUM 40 MG/1
TABLET, COATED ORAL
Qty: 30 TABLET | Refills: 2 | Status: SHIPPED | OUTPATIENT
Start: 2018-01-30 | End: 2018-04-06 | Stop reason: SDUPTHER

## 2018-02-06 ENCOUNTER — OFFICE VISIT (OUTPATIENT)
Dept: ORTHOPEDIC SURGERY | Facility: CLINIC | Age: 58
End: 2018-02-06

## 2018-02-06 VITALS — HEIGHT: 61 IN | BODY MASS INDEX: 39.57 KG/M2 | TEMPERATURE: 98.5 F | WEIGHT: 209.6 LBS

## 2018-02-06 DIAGNOSIS — M25.561 CHRONIC PAIN OF BOTH KNEES: Primary | ICD-10-CM

## 2018-02-06 DIAGNOSIS — M25.562 CHRONIC PAIN OF BOTH KNEES: Primary | ICD-10-CM

## 2018-02-06 DIAGNOSIS — G89.29 CHRONIC PAIN OF BOTH KNEES: Primary | ICD-10-CM

## 2018-02-06 PROCEDURE — 20610 DRAIN/INJ JOINT/BURSA W/O US: CPT | Performed by: NURSE PRACTITIONER

## 2018-02-06 RX ORDER — METHYLPREDNISOLONE ACETATE 80 MG/ML
80 INJECTION, SUSPENSION INTRA-ARTICULAR; INTRALESIONAL; INTRAMUSCULAR; SOFT TISSUE
Status: COMPLETED | OUTPATIENT
Start: 2018-02-06 | End: 2018-02-06

## 2018-02-06 RX ORDER — BUPIVACAINE HYDROCHLORIDE 5 MG/ML
2 INJECTION, SOLUTION EPIDURAL; INTRACAUDAL
Status: COMPLETED | OUTPATIENT
Start: 2018-02-06 | End: 2018-02-06

## 2018-02-06 RX ADMIN — METHYLPREDNISOLONE ACETATE 80 MG: 80 INJECTION, SUSPENSION INTRA-ARTICULAR; INTRALESIONAL; INTRAMUSCULAR; SOFT TISSUE at 14:55

## 2018-02-06 RX ADMIN — BUPIVACAINE HYDROCHLORIDE 2 ML: 5 INJECTION, SOLUTION EPIDURAL; INTRACAUDAL at 14:55

## 2018-02-06 NOTE — PROGRESS NOTES
2/6/2018    Lydia Gutierrez is here today for worsening knee pain. Pt has undergone injection of the knee in the past with good resolution of symptoms. Pt is requesting a repeat injection.     KNEE Injection Procedure Note:    Large Joint Arthrocentesis  Date/Time: 2/6/2018 2:55 PM  Consent given by: patient  Site marked: site marked  Timeout: Immediately prior to procedure a time out was called to verify the correct patient, procedure, equipment, support staff and site/side marked as required   Supporting Documentation  Indications: pain and joint swelling   Procedure Details  Location: knee - R knee  Preparation: Patient was prepped and draped in the usual sterile fashion  Needle size: 22 G  Approach: anterolateral  Medications administered: 80 mg methylPREDNISolone acetate 80 MG/ML; 2 mL bupivacaine (PF) 0.5 %  Patient tolerance: patient tolerated the procedure well with no immediate complications    Large Joint Arthrocentesis  Date/Time: 2/6/2018 2:55 PM  Consent given by: patient  Site marked: site marked  Timeout: Immediately prior to procedure a time out was called to verify the correct patient, procedure, equipment, support staff and site/side marked as required   Supporting Documentation  Indications: pain and joint swelling   Procedure Details  Location: knee - L knee  Preparation: Patient was prepped and draped in the usual sterile fashion  Needle size: 22 G  Approach: anterolateral  Medications administered: 2 mL bupivacaine (PF) 0.5 %; 80 mg methylPREDNISolone acetate 80 MG/ML  Patient tolerance: patient tolerated the procedure well with no immediate complications        Prior to injections risks were discussed including pain, infection, elevated blood sugar.  Patient verbalized understanding would like to proceed with injections  At the conclusion of the injection I discussed the importance of continued quad strengthening exercises on a daily basis.  Patient was given a new order for physical therapy and  I will see the patient back if the symptoms should fail to improve or worsen.    Anne Tejeda, APRN  2/6/2018

## 2018-02-15 DIAGNOSIS — E53.8 B12 DEFICIENCY: ICD-10-CM

## 2018-02-15 RX ORDER — UBIDECARENONE 75 MG
CAPSULE ORAL
Qty: 30 TABLET | Refills: 3 | Status: SHIPPED | OUTPATIENT
Start: 2018-02-15 | End: 2018-07-05 | Stop reason: SDUPTHER

## 2018-03-01 RX ORDER — LISINOPRIL AND HYDROCHLOROTHIAZIDE 12.5; 1 MG/1; MG/1
TABLET ORAL
Qty: 30 TABLET | Refills: 0 | Status: SHIPPED | OUTPATIENT
Start: 2018-03-01 | End: 2018-05-04 | Stop reason: SDUPTHER

## 2018-03-01 RX ORDER — METHOCARBAMOL 750 MG/1
TABLET, FILM COATED ORAL
Qty: 30 TABLET | Refills: 0 | Status: SHIPPED | OUTPATIENT
Start: 2018-03-01 | End: 2018-04-02 | Stop reason: SDUPTHER

## 2018-03-01 RX ORDER — AMLODIPINE BESYLATE 10 MG/1
TABLET ORAL
Qty: 30 TABLET | Refills: 0 | Status: SHIPPED | OUTPATIENT
Start: 2018-03-01 | End: 2018-05-04 | Stop reason: SDUPTHER

## 2018-03-05 DIAGNOSIS — F41.8 MIXED ANXIETY DEPRESSIVE DISORDER: ICD-10-CM

## 2018-03-05 RX ORDER — FLUOXETINE HYDROCHLORIDE 20 MG/1
CAPSULE ORAL
Qty: 60 CAPSULE | Refills: 0 | Status: SHIPPED | OUTPATIENT
Start: 2018-03-05 | End: 2018-04-06 | Stop reason: SDUPTHER

## 2018-03-06 ENCOUNTER — OFFICE VISIT (OUTPATIENT)
Dept: FAMILY MEDICINE CLINIC | Facility: CLINIC | Age: 58
End: 2018-03-06

## 2018-03-06 VITALS
SYSTOLIC BLOOD PRESSURE: 120 MMHG | OXYGEN SATURATION: 97 % | BODY MASS INDEX: 39.35 KG/M2 | TEMPERATURE: 97.8 F | WEIGHT: 208.4 LBS | HEART RATE: 75 BPM | DIASTOLIC BLOOD PRESSURE: 74 MMHG | HEIGHT: 61 IN

## 2018-03-06 DIAGNOSIS — D72.829 LEUKOCYTOSIS, UNSPECIFIED TYPE: ICD-10-CM

## 2018-03-06 DIAGNOSIS — I10 BENIGN ESSENTIAL HYPERTENSION: Primary | ICD-10-CM

## 2018-03-06 DIAGNOSIS — E78.2 MIXED HYPERLIPIDEMIA: ICD-10-CM

## 2018-03-06 DIAGNOSIS — J02.9 ACUTE PHARYNGITIS, UNSPECIFIED ETIOLOGY: ICD-10-CM

## 2018-03-06 DIAGNOSIS — R79.89 ABNORMAL LIVER FUNCTION TESTS: ICD-10-CM

## 2018-03-06 DIAGNOSIS — F41.8 MIXED ANXIETY DEPRESSIVE DISORDER: ICD-10-CM

## 2018-03-06 DIAGNOSIS — E03.9 ACQUIRED HYPOTHYROIDISM: ICD-10-CM

## 2018-03-06 DIAGNOSIS — E53.8 B12 DEFICIENCY: ICD-10-CM

## 2018-03-06 LAB
EXPIRATION DATE: NORMAL
INTERNAL CONTROL: NORMAL
Lab: NORMAL
S PYO AG THROAT QL: NEGATIVE

## 2018-03-06 PROCEDURE — 87880 STREP A ASSAY W/OPTIC: CPT | Performed by: PHYSICIAN ASSISTANT

## 2018-03-06 PROCEDURE — 99214 OFFICE O/P EST MOD 30 MIN: CPT | Performed by: PHYSICIAN ASSISTANT

## 2018-03-06 NOTE — PROGRESS NOTES
"Subjective   Lydia Gutierrez is a 57 y.o. female seen today follow-up moods, hypertensin, hypothyroid    History of Present Illness     RIGHT SIDED SORE THROAT- SUBMANDIBULAR PAIN- ALSO FEELS UNDER TONGUE. RIGHT EAR \"BUBBLING\". HAS BEEN USING MUCINEX AND NASAL SPRAY- HELPED SOME. LAST DENTIST WAS WHEN HAD RIGHT LOWER TOOTH PULLED- FEW YEARS AGO. NO FEVER, WEIGHT LOSS, NIGHT SWEATS.     PATIENT REPORTS MOODS HAVE IMPROVED. IS WORKING THINGS OUT WITH HER . STATES THEY ARE MOVING TO AN APARTMENT TOGETHER. HE STOPPED DRUG USE AND IS WORKING ON HIS TRUCK TO GET A JOB. HIS FRIENDS ARE NOT COMING BY ALL THE TIME. PATIENT FEELS THIS IS OK AND WOULD LIKE TO CONTINUE TO WORK IT OUT.     LATELY HAS HAD TO WEAR LONG SLEEVES AND HAS NOT BEEN SWEATING MUCH ANYMORE. PREVIOUSLY DIDN'T LIKE LONG SLEEVES AND STAYED WARMER.     The following portions of the patient's history were reviewed and updated as appropriate: allergies, current medications, past family history, past medical history, past social history, past surgical history and problem list.    Review of Systems   HENT: Positive for sore throat.    Endocrine: Positive for cold intolerance.   Hematological: Positive for adenopathy.   All other systems reviewed and are negative.      Objective   Physical Exam   Constitutional: She is oriented to person, place, and time. Vital signs are normal. She appears well-developed and well-nourished.   HENT:   Head: Normocephalic and atraumatic.   Right Ear: Tympanic membrane, external ear and ear canal normal.   Left Ear: Tympanic membrane, external ear and ear canal normal.   Nose: Nose normal.   Mouth/Throat: Uvula is midline, oropharynx is clear and moist and mucous membranes are normal.       Eyes: Conjunctivae and lids are normal.   Neck: Neck supple. Carotid bruit is not present.   Cardiovascular: Normal rate, regular rhythm, normal heart sounds and intact distal pulses.  Exam reveals no gallop and no friction rub.    No murmur " heard.  Pulmonary/Chest: Effort normal and breath sounds normal. No respiratory distress. She has no wheezes. She has no rhonchi. She has no rales.   Musculoskeletal: She exhibits no edema or deformity.   Neurological: She is alert and oriented to person, place, and time. Gait normal.   Skin: Skin is warm and dry.   Psychiatric: She has a normal mood and affect. Her speech is normal and behavior is normal. Judgment and thought content normal. Cognition and memory are normal.   Nursing note and vitals reviewed.      Assessment/Plan   Lydia was seen today for follow-up.    Diagnoses and all orders for this visit:    Benign essential hypertension  -     CBC & Differential  -     Comprehensive Metabolic Panel  -     Thyroid Panel With TSH  -     Vitamin B12 & Folate  -     Claus-Barr Virus VCA Antibody Panel  -     POC Rapid Strep A    Mixed hyperlipidemia  -     CBC & Differential  -     Comprehensive Metabolic Panel  -     Thyroid Panel With TSH  -     Vitamin B12 & Folate  -     Claus-Barr Virus VCA Antibody Panel  -     POC Rapid Strep A    B12 deficiency  -     CBC & Differential  -     Comprehensive Metabolic Panel  -     Thyroid Panel With TSH  -     Vitamin B12 & Folate  -     Claus-Barr Virus VCA Antibody Panel  -     POC Rapid Strep A    Acquired hypothyroidism  -     CBC & Differential  -     Comprehensive Metabolic Panel  -     Thyroid Panel With TSH  -     Vitamin B12 & Folate  -     Claus-Barr Virus VCA Antibody Panel  -     POC Rapid Strep A    Leukocytosis, unspecified type  -     CBC & Differential  -     Comprehensive Metabolic Panel  -     Thyroid Panel With TSH  -     Vitamin B12 & Folate  -     Claus-Barr Virus VCA Antibody Panel  -     POC Rapid Strep A    Abnormal liver function tests  -     CBC & Differential  -     Comprehensive Metabolic Panel  -     Thyroid Panel With TSH  -     Vitamin B12 & Folate  -     Claus-Barr Virus VCA Antibody Panel  -     POC Rapid Strep A    Mixed anxiety  depressive disorder  -     CBC & Differential  -     Comprehensive Metabolic Panel  -     Thyroid Panel With TSH  -     Vitamin B12 & Folate  -     Claus-Barr Virus VCA Antibody Panel  -     POC Rapid Strep A    Acute pharyngitis, unspecified etiology  -     CBC & Differential  -     Comprehensive Metabolic Panel  -     Thyroid Panel With TSH  -     Vitamin B12 & Folate  -     Claus-Barr Virus VCA Antibody Panel  -     POC Rapid Strep A      Patient Instructions   57 YEAR OLD FEMALE WHO PRESENTS TODAY IN FOLLOW UP OF HTN, HYPERLIPIDEMIA, HYPOTHYROIDISM, B12 DEFICIENCY, AND MOODS. BLOOD PRESSURE IS OK TODAY. CONTINUE LISINOPRIL HCTZ 10/12.5 MG 1 TABLET ONCE DAILY. SHE WILL HAVE LABS TODAY- CALL IF NO RESULTS IN 1 WEEK. IF LABS ARE STABLE, FOLLOW UP IN 2-3 MONTHS, FASTING, FOR FOLLOW UP WITH RECHECK LIPIDS, VIT D, B12, THYROID, AND FOLLOW UP MOODS. MOODS HAVE BEEN OK. SHE IS GETTING ALONG BETTER WITH . THEY ARE GOING TO MOVE FROM THEIR HOME AND GET AN APARTMENT. UTILITIES ARE VERY COSTLY AT CURRENT HOME. THIS WILL HELP WITH FINANCES.  IS FIXING HIS TRUCK TO GET A JOB. TO FOLLOW UP ASAP IF WORSENING MOODS.     PATIENT HAS RIGHT SIDED SORE THROAT WITH PAIN IN RIGHT SUBMANDIBULAR GLAND THAT RADIATES TO UNDER HER TONGUE. SHE HAS TENDERNESS AT SAME LOCATION AND POSSIBLE LAD VS GLAND ENLARGEMENT THAT IS VERY MILD. SHE DOES HAVE A WHITE LESION/PLAQUE RIGHT LOWER GUM. SHE DENIES WEARING A PARTIAL (TOOTH REMOVED). PATIENT ADVISED TO SEE HER DENTIST ASAP. IF CONTINUED PAIN, LESION, OR ANY OTHER SYMPTOMS, NEEDS CT SOFT TISSUE NECK AND TO SEE ENT.

## 2018-03-07 LAB
ALBUMIN SERPL-MCNC: 4.1 G/DL (ref 3.5–5.2)
ALBUMIN/GLOB SERPL: 1.7 G/DL
ALP SERPL-CCNC: 87 U/L (ref 39–117)
ALT SERPL-CCNC: 15 U/L (ref 1–33)
AST SERPL-CCNC: 11 U/L (ref 1–32)
BASOPHILS # BLD AUTO: 0.03 10*3/MM3 (ref 0–0.2)
BASOPHILS NFR BLD AUTO: 0.5 % (ref 0–1.5)
BILIRUB SERPL-MCNC: 0.2 MG/DL (ref 0.1–1.2)
BUN SERPL-MCNC: 24 MG/DL (ref 6–20)
BUN/CREAT SERPL: 35.8 (ref 7–25)
CALCIUM SERPL-MCNC: 9.4 MG/DL (ref 8.6–10.5)
CHLORIDE SERPL-SCNC: 103 MMOL/L (ref 98–107)
CO2 SERPL-SCNC: 27 MMOL/L (ref 22–29)
CREAT SERPL-MCNC: 0.67 MG/DL (ref 0.57–1)
EBV EA IGG SER-ACNC: <9 U/ML (ref 0–8.9)
EBV NA IGG SER IA-ACNC: <18 U/ML (ref 0–17.9)
EBV VCA IGG SER IA-ACNC: 57.8 U/ML (ref 0–17.9)
EBV VCA IGM SER IA-ACNC: <36 U/ML (ref 0–35.9)
EOSINOPHIL # BLD AUTO: 0.33 10*3/MM3 (ref 0–0.7)
EOSINOPHIL NFR BLD AUTO: 5 % (ref 0.3–6.2)
ERYTHROCYTE [DISTWIDTH] IN BLOOD BY AUTOMATED COUNT: 13.8 % (ref 11.7–13)
FOLATE SERPL-MCNC: 6.6 NG/ML (ref 4.78–24.2)
FT4I SERPL CALC-MCNC: 1.8 (ref 1.2–4.9)
GFR SERPLBLD CREATININE-BSD FMLA CKD-EPI: 111 ML/MIN/1.73
GFR SERPLBLD CREATININE-BSD FMLA CKD-EPI: 91 ML/MIN/1.73
GLOBULIN SER CALC-MCNC: 2.4 GM/DL
GLUCOSE SERPL-MCNC: 109 MG/DL (ref 65–99)
HCT VFR BLD AUTO: 39.5 % (ref 35.6–45.5)
HGB BLD-MCNC: 12.5 G/DL (ref 11.9–15.5)
IMM GRANULOCYTES # BLD: 0 10*3/MM3 (ref 0–0.03)
IMM GRANULOCYTES NFR BLD: 0 % (ref 0–0.5)
LYMPHOCYTES # BLD AUTO: 2.03 10*3/MM3 (ref 0.9–4.8)
LYMPHOCYTES NFR BLD AUTO: 30.9 % (ref 19.6–45.3)
MCH RBC QN AUTO: 29.3 PG (ref 26.9–32)
MCHC RBC AUTO-ENTMCNC: 31.6 G/DL (ref 32.4–36.3)
MCV RBC AUTO: 92.5 FL (ref 80.5–98.2)
MONOCYTES # BLD AUTO: 0.55 10*3/MM3 (ref 0.2–1.2)
MONOCYTES NFR BLD AUTO: 8.4 % (ref 5–12)
NEUTROPHILS # BLD AUTO: 3.64 10*3/MM3 (ref 1.9–8.1)
NEUTROPHILS NFR BLD AUTO: 55.2 % (ref 42.7–76)
PLATELET # BLD AUTO: 321 10*3/MM3 (ref 140–500)
POTASSIUM SERPL-SCNC: 3.6 MMOL/L (ref 3.5–5.2)
PROT SERPL-MCNC: 6.5 G/DL (ref 6–8.5)
RBC # BLD AUTO: 4.27 10*6/MM3 (ref 3.9–5.2)
SERVICE CMNT-IMP: ABNORMAL
SODIUM SERPL-SCNC: 142 MMOL/L (ref 136–145)
T3RU NFR SERPL: 27 % (ref 24–39)
T4 SERPL-MCNC: 6.7 UG/DL (ref 4.5–12)
TSH SERPL DL<=0.005 MIU/L-ACNC: 4.65 UIU/ML (ref 0.45–4.5)
VIT B12 SERPL-MCNC: 533 PG/ML (ref 211–946)
WBC # BLD AUTO: 6.58 10*3/MM3 (ref 4.5–10.7)

## 2018-03-09 NOTE — PATIENT INSTRUCTIONS
57 YEAR OLD FEMALE WHO PRESENTS TODAY IN FOLLOW UP OF HTN, HYPERLIPIDEMIA, HYPOTHYROIDISM, B12 DEFICIENCY, AND MOODS. BLOOD PRESSURE IS OK TODAY. CONTINUE LISINOPRIL HCTZ 10/12.5 MG 1 TABLET ONCE DAILY. SHE WILL HAVE LABS TODAY- CALL IF NO RESULTS IN 1 WEEK. IF LABS ARE STABLE, FOLLOW UP IN 2-3 MONTHS, FASTING, FOR FOLLOW UP WITH RECHECK LIPIDS, VIT D, B12, THYROID, AND FOLLOW UP MOODS. MOODS HAVE BEEN OK. SHE IS GETTING ALONG BETTER WITH . THEY ARE GOING TO MOVE FROM THEIR HOME AND GET AN APARTMENT. UTILITIES ARE VERY COSTLY AT CURRENT HOME. THIS WILL HELP WITH FINANCES.  IS FIXING HIS TRUCK TO GET A JOB. TO FOLLOW UP ASAP IF WORSENING MOODS.     PATIENT HAS RIGHT SIDED SORE THROAT WITH PAIN IN RIGHT SUBMANDIBULAR GLAND THAT RADIATES TO UNDER HER TONGUE. SHE HAS TENDERNESS AT SAME LOCATION AND POSSIBLE LAD VS GLAND ENLARGEMENT THAT IS VERY MILD. SHE DOES HAVE A WHITE LESION/PLAQUE RIGHT LOWER GUM. SHE DENIES WEARING A PARTIAL (TOOTH REMOVED). PATIENT ADVISED TO SEE HER DENTIST ASAP. IF CONTINUED PAIN, LESION, OR ANY OTHER SYMPTOMS, NEEDS CT SOFT TISSUE NECK AND TO SEE ENT.

## 2018-03-12 DIAGNOSIS — E03.9 ACQUIRED HYPOTHYROIDISM: ICD-10-CM

## 2018-03-12 RX ORDER — LEVOTHYROXINE SODIUM 137 UG/1
137 TABLET ORAL DAILY
Qty: 30 TABLET | Refills: 1 | Status: SHIPPED | OUTPATIENT
Start: 2018-03-12 | End: 2018-04-15 | Stop reason: SDUPTHER

## 2018-03-15 RX ORDER — ALBUTEROL SULFATE 90 UG/1
AEROSOL, METERED RESPIRATORY (INHALATION)
Qty: 18 G | Refills: 1 | Status: SHIPPED | OUTPATIENT
Start: 2018-03-15 | End: 2018-06-04 | Stop reason: SDUPTHER

## 2018-04-02 RX ORDER — METHOCARBAMOL 750 MG/1
TABLET, FILM COATED ORAL
Qty: 30 TABLET | Refills: 0 | Status: SHIPPED | OUTPATIENT
Start: 2018-04-02 | End: 2018-05-04 | Stop reason: SDUPTHER

## 2018-04-06 DIAGNOSIS — F41.8 MIXED ANXIETY DEPRESSIVE DISORDER: ICD-10-CM

## 2018-04-06 RX ORDER — FLUOXETINE HYDROCHLORIDE 20 MG/1
CAPSULE ORAL
Qty: 60 CAPSULE | Refills: 0 | Status: SHIPPED | OUTPATIENT
Start: 2018-04-06 | End: 2018-05-09 | Stop reason: SDUPTHER

## 2018-04-06 RX ORDER — ROSUVASTATIN CALCIUM 40 MG/1
TABLET, COATED ORAL
Qty: 30 TABLET | Refills: 2 | Status: SHIPPED | OUTPATIENT
Start: 2018-04-06 | End: 2018-07-13 | Stop reason: SDUPTHER

## 2018-04-15 DIAGNOSIS — E03.9 ACQUIRED HYPOTHYROIDISM: ICD-10-CM

## 2018-04-16 RX ORDER — LEVOTHYROXINE SODIUM 137 UG/1
137 TABLET ORAL DAILY
Qty: 30 TABLET | Refills: 1 | Status: SHIPPED | OUTPATIENT
Start: 2018-04-16 | End: 2018-08-07 | Stop reason: SDUPTHER

## 2018-04-24 ENCOUNTER — OFFICE VISIT (OUTPATIENT)
Dept: FAMILY MEDICINE CLINIC | Facility: CLINIC | Age: 58
End: 2018-04-24

## 2018-04-24 VITALS
DIASTOLIC BLOOD PRESSURE: 68 MMHG | HEART RATE: 77 BPM | BODY MASS INDEX: 39.61 KG/M2 | SYSTOLIC BLOOD PRESSURE: 120 MMHG | TEMPERATURE: 98 F | OXYGEN SATURATION: 96 % | HEIGHT: 61 IN | WEIGHT: 209.8 LBS

## 2018-04-24 DIAGNOSIS — E53.8 B12 DEFICIENCY: ICD-10-CM

## 2018-04-24 DIAGNOSIS — D72.829 LEUKOCYTOSIS, UNSPECIFIED TYPE: ICD-10-CM

## 2018-04-24 DIAGNOSIS — I10 BENIGN ESSENTIAL HYPERTENSION: Primary | ICD-10-CM

## 2018-04-24 DIAGNOSIS — F41.8 MIXED ANXIETY DEPRESSIVE DISORDER: ICD-10-CM

## 2018-04-24 DIAGNOSIS — E03.9 ACQUIRED HYPOTHYROIDISM: ICD-10-CM

## 2018-04-24 DIAGNOSIS — E78.2 MIXED HYPERLIPIDEMIA: ICD-10-CM

## 2018-04-24 DIAGNOSIS — E55.9 VITAMIN D DEFICIENCY: ICD-10-CM

## 2018-04-24 PROCEDURE — 99214 OFFICE O/P EST MOD 30 MIN: CPT | Performed by: PHYSICIAN ASSISTANT
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
## 2018-04-25 LAB
25(OH)D3+25(OH)D2 SERPL-MCNC: 30.1 NG/ML (ref 30–100)
ALBUMIN SERPL-MCNC: 4.3 G/DL (ref 3.5–5.2)
ALBUMIN/GLOB SERPL: 1.8 G/DL
ALP SERPL-CCNC: 91 U/L (ref 39–117)
ALT SERPL-CCNC: 15 U/L (ref 1–33)
AST SERPL-CCNC: 16 U/L (ref 1–32)
BASOPHILS # BLD AUTO: 0.03 10*3/MM3 (ref 0–0.2)
BASOPHILS NFR BLD AUTO: 0.4 % (ref 0–1.5)
BILIRUB SERPL-MCNC: 0.3 MG/DL (ref 0.1–1.2)
BUN SERPL-MCNC: 19 MG/DL (ref 6–20)
BUN/CREAT SERPL: 32.2 (ref 7–25)
CALCIUM SERPL-MCNC: 9.1 MG/DL (ref 8.6–10.5)
CHLORIDE SERPL-SCNC: 102 MMOL/L (ref 98–107)
CHOLEST SERPL-MCNC: 143 MG/DL (ref 0–200)
CO2 SERPL-SCNC: 26.5 MMOL/L (ref 22–29)
CREAT SERPL-MCNC: 0.59 MG/DL (ref 0.57–1)
EOSINOPHIL # BLD AUTO: 0.27 10*3/MM3 (ref 0–0.7)
EOSINOPHIL NFR BLD AUTO: 3.7 % (ref 0.3–6.2)
ERYTHROCYTE [DISTWIDTH] IN BLOOD BY AUTOMATED COUNT: 13.7 % (ref 11.7–13)
FOLATE SERPL-MCNC: 7.87 NG/ML (ref 4.78–24.2)
FT4I SERPL CALC-MCNC: 2.4 (ref 1.2–4.9)
GFR SERPLBLD CREATININE-BSD FMLA CKD-EPI: 106 ML/MIN/1.73
GFR SERPLBLD CREATININE-BSD FMLA CKD-EPI: 128 ML/MIN/1.73
GLOBULIN SER CALC-MCNC: 2.4 GM/DL
GLUCOSE SERPL-MCNC: 103 MG/DL (ref 65–99)
HCT VFR BLD AUTO: 39.2 % (ref 35.6–45.5)
HDLC SERPL-MCNC: 41 MG/DL (ref 40–60)
HGB BLD-MCNC: 13 G/DL (ref 11.9–15.5)
IMM GRANULOCYTES # BLD: 0.02 10*3/MM3 (ref 0–0.03)
IMM GRANULOCYTES NFR BLD: 0.3 % (ref 0–0.5)
LDLC SERPL CALC-MCNC: 63 MG/DL (ref 0–100)
LDLC/HDLC SERPL: 1.54 {RATIO}
LYMPHOCYTES # BLD AUTO: 1.92 10*3/MM3 (ref 0.9–4.8)
LYMPHOCYTES NFR BLD AUTO: 26.5 % (ref 19.6–45.3)
MCH RBC QN AUTO: 30.4 PG (ref 26.9–32)
MCHC RBC AUTO-ENTMCNC: 33.2 G/DL (ref 32.4–36.3)
MCV RBC AUTO: 91.6 FL (ref 80.5–98.2)
MONOCYTES # BLD AUTO: 0.53 10*3/MM3 (ref 0.2–1.2)
MONOCYTES NFR BLD AUTO: 7.3 % (ref 5–12)
NEUTROPHILS # BLD AUTO: 4.49 10*3/MM3 (ref 1.9–8.1)
NEUTROPHILS NFR BLD AUTO: 62.1 % (ref 42.7–76)
PLATELET # BLD AUTO: 339 10*3/MM3 (ref 140–500)
POTASSIUM SERPL-SCNC: 4.1 MMOL/L (ref 3.5–5.2)
PROT SERPL-MCNC: 6.7 G/DL (ref 6–8.5)
RBC # BLD AUTO: 4.28 10*6/MM3 (ref 3.9–5.2)
SODIUM SERPL-SCNC: 143 MMOL/L (ref 136–145)
T3RU NFR SERPL: 30 % (ref 24–39)
T4 SERPL-MCNC: 7.9 UG/DL (ref 4.5–12)
TRIGL SERPL-MCNC: 194 MG/DL (ref 0–150)
TSH SERPL DL<=0.005 MIU/L-ACNC: 2.19 UIU/ML (ref 0.45–4.5)
VIT B12 SERPL-MCNC: 533 PG/ML (ref 211–946)
VLDLC SERPL CALC-MCNC: 38.8 MG/DL (ref 5–40)
WBC # BLD AUTO: 7.24 10*3/MM3 (ref 4.5–10.7)

## 2018-04-27 NOTE — PATIENT INSTRUCTIONS
57 YEAR OLD FEMALE WHO PRESENTS TODAY IN FOLLOW UP OF ANXIETY AND DEPRESSION, HTN, HYPERLIPIDEMIA, HYPOTHYROIDISM, B12 AND VITAMIN D DEFICIENCY. SHE CONTINUES WITH HOME STRESSORS AND FINANCIAL STRESSORS. PATIENT REPORTS SHE IS COPING OK AND DOES NOT NEED CHANGES TO REGIMEN. TO CALL OR RETURN IF NEEDS MEDICATION CHANGES OR REFERRAL TO COUNSELING. BLOOD PRESSURE IS OK TODAY. CONTINUE NORVASC 10 MG ONCE DAILY AND LISINOPRIL HCTZ 10/12.5 MG ONCE DAILY. I WILL CHECK FASTING LABS TODAY- CALL IF NO RESULTS IN 1 WEEK. FOLLOW UP IN 3-6 MONTHS PENDING LABS. FOLLOW UP ASAP IF NEEDED.

## 2018-04-30 RX ORDER — FLUTICASONE PROPIONATE 50 MCG
SPRAY, SUSPENSION (ML) NASAL
Qty: 16 G | Refills: 1 | Status: SHIPPED | OUTPATIENT
Start: 2018-04-30 | End: 2019-12-02 | Stop reason: SDUPTHER

## 2018-04-30 RX ORDER — PANTOPRAZOLE SODIUM 40 MG/1
TABLET, DELAYED RELEASE ORAL
Qty: 30 TABLET | Refills: 2 | Status: SHIPPED | OUTPATIENT
Start: 2018-04-30 | End: 2018-08-07 | Stop reason: SDUPTHER

## 2018-04-30 NOTE — PROGRESS NOTES
I have reviewed the notes, assessments, and/or procedures performed by Joanna Sanabria, I concur with her/his documentation of Lydia Gutierrez.

## 2018-05-04 RX ORDER — LISINOPRIL AND HYDROCHLOROTHIAZIDE 12.5; 1 MG/1; MG/1
TABLET ORAL
Qty: 30 TABLET | Refills: 0 | Status: SHIPPED | OUTPATIENT
Start: 2018-05-04 | End: 2018-06-04 | Stop reason: SDUPTHER

## 2018-05-04 RX ORDER — MONTELUKAST SODIUM 10 MG/1
TABLET ORAL
Qty: 90 TABLET | Refills: 0 | Status: SHIPPED | OUTPATIENT
Start: 2018-05-04 | End: 2018-08-07 | Stop reason: SDUPTHER

## 2018-05-04 RX ORDER — LORATADINE 10 MG/1
TABLET ORAL
Qty: 90 TABLET | Refills: 0 | Status: SHIPPED | OUTPATIENT
Start: 2018-05-04 | End: 2018-06-04 | Stop reason: SDUPTHER

## 2018-05-04 RX ORDER — AMLODIPINE BESYLATE 10 MG/1
TABLET ORAL
Qty: 30 TABLET | Refills: 0 | Status: SHIPPED | OUTPATIENT
Start: 2018-05-04 | End: 2018-06-04 | Stop reason: SDUPTHER

## 2018-05-07 RX ORDER — METHOCARBAMOL 750 MG/1
TABLET, FILM COATED ORAL
Qty: 30 TABLET | Refills: 0 | Status: SHIPPED | OUTPATIENT
Start: 2018-05-07 | End: 2018-06-11 | Stop reason: SDUPTHER

## 2018-05-09 DIAGNOSIS — F41.8 MIXED ANXIETY DEPRESSIVE DISORDER: ICD-10-CM

## 2018-05-09 RX ORDER — FLUOXETINE HYDROCHLORIDE 20 MG/1
CAPSULE ORAL
Qty: 60 CAPSULE | Refills: 2 | Status: SHIPPED | OUTPATIENT
Start: 2018-05-09 | End: 2018-07-13 | Stop reason: SDUPTHER

## 2018-06-04 RX ORDER — ALBUTEROL SULFATE 90 UG/1
AEROSOL, METERED RESPIRATORY (INHALATION)
Qty: 18 G | Refills: 1 | Status: SHIPPED | OUTPATIENT
Start: 2018-06-04 | End: 2018-08-07 | Stop reason: SDUPTHER

## 2018-06-04 RX ORDER — LISINOPRIL AND HYDROCHLOROTHIAZIDE 12.5; 1 MG/1; MG/1
TABLET ORAL
Qty: 30 TABLET | Refills: 2 | Status: SHIPPED | OUTPATIENT
Start: 2018-06-04 | End: 2018-08-07 | Stop reason: SDUPTHER

## 2018-06-04 RX ORDER — AMLODIPINE BESYLATE 10 MG/1
TABLET ORAL
Qty: 30 TABLET | Refills: 2 | Status: SHIPPED | OUTPATIENT
Start: 2018-06-04 | End: 2018-08-07 | Stop reason: SDUPTHER

## 2018-06-04 RX ORDER — LORATADINE 10 MG/1
TABLET ORAL
Qty: 90 TABLET | Refills: 0 | Status: SHIPPED | OUTPATIENT
Start: 2018-06-04 | End: 2018-11-15 | Stop reason: SDUPTHER

## 2018-06-11 RX ORDER — METHOCARBAMOL 750 MG/1
TABLET, FILM COATED ORAL
Qty: 30 TABLET | Refills: 0 | Status: SHIPPED | OUTPATIENT
Start: 2018-06-11 | End: 2018-07-13 | Stop reason: SDUPTHER

## 2018-07-05 DIAGNOSIS — E53.8 B12 DEFICIENCY: ICD-10-CM

## 2018-07-05 RX ORDER — UBIDECARENONE 75 MG
CAPSULE ORAL
Qty: 30 TABLET | Refills: 0 | Status: SHIPPED | OUTPATIENT
Start: 2018-07-05 | End: 2018-08-07 | Stop reason: SDUPTHER

## 2018-07-13 DIAGNOSIS — F41.8 MIXED ANXIETY DEPRESSIVE DISORDER: ICD-10-CM

## 2018-07-13 RX ORDER — METHOCARBAMOL 750 MG/1
TABLET, FILM COATED ORAL
Qty: 30 TABLET | Refills: 0 | Status: SHIPPED | OUTPATIENT
Start: 2018-07-13 | End: 2018-08-14 | Stop reason: SDUPTHER

## 2018-07-13 RX ORDER — FLUOXETINE HYDROCHLORIDE 20 MG/1
CAPSULE ORAL
Qty: 60 CAPSULE | Refills: 0 | Status: SHIPPED | OUTPATIENT
Start: 2018-07-13 | End: 2018-09-15 | Stop reason: SDUPTHER

## 2018-07-13 RX ORDER — ROSUVASTATIN CALCIUM 40 MG/1
TABLET, COATED ORAL
Qty: 30 TABLET | Refills: 0 | Status: SHIPPED | OUTPATIENT
Start: 2018-07-13 | End: 2018-09-15 | Stop reason: SDUPTHER

## 2018-07-24 ENCOUNTER — OFFICE VISIT (OUTPATIENT)
Dept: FAMILY MEDICINE CLINIC | Facility: CLINIC | Age: 58
End: 2018-07-24

## 2018-07-24 VITALS
HEIGHT: 61 IN | HEART RATE: 89 BPM | SYSTOLIC BLOOD PRESSURE: 120 MMHG | DIASTOLIC BLOOD PRESSURE: 70 MMHG | TEMPERATURE: 98.4 F | BODY MASS INDEX: 39.99 KG/M2 | WEIGHT: 211.8 LBS | OXYGEN SATURATION: 96 %

## 2018-07-24 DIAGNOSIS — E78.2 MIXED HYPERLIPIDEMIA: ICD-10-CM

## 2018-07-24 DIAGNOSIS — E53.8 B12 DEFICIENCY: ICD-10-CM

## 2018-07-24 DIAGNOSIS — D72.829 LEUKOCYTOSIS, UNSPECIFIED TYPE: ICD-10-CM

## 2018-07-24 DIAGNOSIS — R53.83 OTHER FATIGUE: ICD-10-CM

## 2018-07-24 DIAGNOSIS — I10 BENIGN ESSENTIAL HYPERTENSION: Primary | ICD-10-CM

## 2018-07-24 DIAGNOSIS — E55.9 VITAMIN D DEFICIENCY: ICD-10-CM

## 2018-07-24 DIAGNOSIS — R79.89 ABNORMAL LIVER FUNCTION TESTS: ICD-10-CM

## 2018-07-24 DIAGNOSIS — F41.8 MIXED ANXIETY DEPRESSIVE DISORDER: ICD-10-CM

## 2018-07-24 DIAGNOSIS — E03.9 ACQUIRED HYPOTHYROIDISM: ICD-10-CM

## 2018-07-24 LAB
25(OH)D3+25(OH)D2 SERPL-MCNC: 30.4 NG/ML (ref 30–100)
ALBUMIN SERPL-MCNC: 4.6 G/DL (ref 3.5–5.2)
ALBUMIN/GLOB SERPL: 2.1 G/DL
ALP SERPL-CCNC: 105 U/L (ref 39–117)
ALT SERPL-CCNC: 18 U/L (ref 1–33)
AST SERPL-CCNC: 14 U/L (ref 1–32)
BASOPHILS # BLD AUTO: 0.02 10*3/MM3 (ref 0–0.2)
BASOPHILS NFR BLD AUTO: 0.4 % (ref 0–1.5)
BILIRUB SERPL-MCNC: 0.2 MG/DL (ref 0.1–1.2)
BUN SERPL-MCNC: 22 MG/DL (ref 6–20)
BUN/CREAT SERPL: 31.9 (ref 7–25)
CALCIUM SERPL-MCNC: 9.5 MG/DL (ref 8.6–10.5)
CHLORIDE SERPL-SCNC: 102 MMOL/L (ref 98–107)
CHOLEST SERPL-MCNC: 140 MG/DL (ref 0–200)
CK SERPL-CCNC: 64 U/L (ref 20–180)
CO2 SERPL-SCNC: 26.3 MMOL/L (ref 22–29)
CREAT SERPL-MCNC: 0.69 MG/DL (ref 0.57–1)
EOSINOPHIL # BLD AUTO: 0.2 10*3/MM3 (ref 0–0.7)
EOSINOPHIL NFR BLD AUTO: 3.6 % (ref 0.3–6.2)
ERYTHROCYTE [DISTWIDTH] IN BLOOD BY AUTOMATED COUNT: 13.3 % (ref 11.7–13)
FOLATE SERPL-MCNC: 9.07 NG/ML (ref 4.78–24.2)
GLOBULIN SER CALC-MCNC: 2.2 GM/DL
GLUCOSE SERPL-MCNC: 105 MG/DL (ref 65–99)
HCT VFR BLD AUTO: 39.1 % (ref 35.6–45.5)
HDLC SERPL-MCNC: 42 MG/DL (ref 40–60)
HGB BLD-MCNC: 13 G/DL (ref 11.9–15.5)
IMM GRANULOCYTES # BLD: 0.01 10*3/MM3 (ref 0–0.03)
IMM GRANULOCYTES NFR BLD: 0.2 % (ref 0–0.5)
LDLC SERPL CALC-MCNC: 64 MG/DL (ref 0–100)
LDLC/HDLC SERPL: 1.53 {RATIO}
LYMPHOCYTES # BLD AUTO: 1.7 10*3/MM3 (ref 0.9–4.8)
LYMPHOCYTES NFR BLD AUTO: 30.3 % (ref 19.6–45.3)
MCH RBC QN AUTO: 29.5 PG (ref 26.9–32)
MCHC RBC AUTO-ENTMCNC: 33.2 G/DL (ref 32.4–36.3)
MCV RBC AUTO: 88.7 FL (ref 80.5–98.2)
MONOCYTES # BLD AUTO: 0.39 10*3/MM3 (ref 0.2–1.2)
MONOCYTES NFR BLD AUTO: 7 % (ref 5–12)
NEUTROPHILS # BLD AUTO: 3.3 10*3/MM3 (ref 1.9–8.1)
NEUTROPHILS NFR BLD AUTO: 58.7 % (ref 42.7–76)
PLATELET # BLD AUTO: 321 10*3/MM3 (ref 140–500)
POTASSIUM SERPL-SCNC: 4.2 MMOL/L (ref 3.5–5.2)
PROT SERPL-MCNC: 6.8 G/DL (ref 6–8.5)
RBC # BLD AUTO: 4.41 10*6/MM3 (ref 3.9–5.2)
SODIUM SERPL-SCNC: 142 MMOL/L (ref 136–145)
TRIGL SERPL-MCNC: 168 MG/DL (ref 0–150)
TSH SERPL DL<=0.005 MIU/L-ACNC: 1.24 MIU/ML (ref 0.27–4.2)
VIT B12 SERPL-MCNC: 602 PG/ML (ref 211–946)
VLDLC SERPL CALC-MCNC: 33.6 MG/DL (ref 5–40)
WBC # BLD AUTO: 5.61 10*3/MM3 (ref 4.5–10.7)

## 2018-07-24 PROCEDURE — 99214 OFFICE O/P EST MOD 30 MIN: CPT | Performed by: PHYSICIAN ASSISTANT

## 2018-07-24 NOTE — PATIENT INSTRUCTIONS
Patient seen today for follow-up hypertension, hyperlipidemia, Vitamin D and B 12 deficiency. She is tolerating medications alright with no AE. She is not taking Mobic any longer. She continues with knee pain, worse when she is on her feet more. Working extra but it has been good to with finances. Occasional chest pain only when she is angry/upset or in an argument with her . She is able to control this by calming down. She denies any associated symptoms and she denies symptoms with exertion, activity, or lifting. I discussed with patient that she needs further workup if symptoms recur, persist, or change in symptoms or new symptoms. She should be seen here or at the ER. She does not think medication change is necessary at this time. She should call or return if she feels her medication for moods needs adjustment. Patient reports moods are overall ok- only having issues with her strained relationship with her  and one of her sons.     Otherwise, she feels okay and states that she is taking her meds as directed. Patient is fasting today and will have labs drawn. Patient to call if no lab results in about a week. Instructed to follow back up 3-6 months pending lab results.

## 2018-07-24 NOTE — PROGRESS NOTES
Subjective   Lydia Gutierrez is a 57 y.o. female. Patient seen today for follow-up hypertension, hyperlipidemia, Vitamin D and B 12 deficiency     History of Present Illness     Patient seen today for follow-up hypertension, hyperlipidemia, Vitamin D and B 12 deficiency  Tolerating medications alright with no AE. States that she does not think she needs refills on medications at the moment    Not taking Mobic anymore. Both knees hurt her, but she does not have the time to see ortho or worry about surgery at the moment. Saw an ortho specialist in the past but cannot remember who, has not contacted them for awhile regarding knees.    Working a lot but overall moods have been good. Sleeping okay at night. Sometimes gets CP when she gets angry with an argument with her . Sharp and stabbing, but goes away when she calms down. No SOA, syncope, or any other associated symptoms. Relates the anger to stress and being tired dealing with all the bills. No CP with exertion, activity, lifting, working. Symptoms only occur while she is upset or in an argument.     Otherwise feels okay and states that she is taking her meds as directed.  Patient is fasting today.    The following portions of the patient's history were reviewed and updated as appropriate: allergies, current medications, past family history, past medical history, past social history, past surgical history and problem list.    Review of Systems   Cardiovascular: Positive for chest pain.   Musculoskeletal: Positive for arthralgias.   Psychiatric/Behavioral: Positive for agitation.   All other systems reviewed and are negative.      Objective   Physical Exam   Constitutional: She is oriented to person, place, and time. Vital signs are normal. She appears well-developed and well-nourished.   HENT:   Head: Normocephalic and atraumatic.   Right Ear: External ear normal.   Left Ear: External ear normal.   Nose: Nose normal.   Eyes: Conjunctivae and lids are normal.    Neck: Neck supple. Carotid bruit is not present.   Cardiovascular: Normal rate, regular rhythm, normal heart sounds and intact distal pulses.  Exam reveals no gallop and no friction rub.    No murmur heard.  Pulses:       Radial pulses are 2+ on the right side, and 2+ on the left side.        Posterior tibial pulses are 2+ on the right side, and 2+ on the left side.   Pulmonary/Chest: Effort normal and breath sounds normal. No respiratory distress. She has no decreased breath sounds. She has no wheezes. She has no rhonchi. She has no rales.   Musculoskeletal: She exhibits no edema or deformity.   Neurological: She is alert and oriented to person, place, and time. Gait normal.   Skin: Skin is warm and dry.   Psychiatric: She has a normal mood and affect. Her speech is normal and behavior is normal. Judgment and thought content normal. Cognition and memory are normal.   Nursing note and vitals reviewed.      Assessment/Plan   Lydia was seen today for follow-up.    Diagnoses and all orders for this visit:    Benign essential hypertension  -     Lipid Panel With LDL/HDL Ratio  -     Comprehensive metabolic panel    Mixed hyperlipidemia  -     Lipid Panel With LDL/HDL Ratio  -     CK  -     Comprehensive metabolic panel    Vitamin D deficiency  -     Vitamin D 25 hydroxy    B12 deficiency  -     Vitamin B12  -     Folate    Acquired hypothyroidism  -     TSH  -     CBC w AUTO Differential    Leukocytosis, unspecified type  -     CBC w AUTO Differential    Other fatigue  -     CBC w AUTO Differential  -     Comprehensive metabolic panel    Mixed anxiety depressive disorder    Abnormal liver function tests      Patient Instructions   Patient seen today for follow-up hypertension, hyperlipidemia, Vitamin D and B 12 deficiency. She is tolerating medications alright with no AE. She is not taking Mobic any longer. She continues with knee pain, worse when she is on her feet more. Working extra but it has been good to with  finances. Occasional chest pain only when she is angry/upset or in an argument with her . She is able to control this by calming down. She denies any associated symptoms and she denies symptoms with exertion, activity, or lifting. I discussed with patient that she needs further workup if symptoms recur, persist, or change in symptoms or new symptoms. She should be seen here or at the ER. She does not think medication change is necessary at this time. She should call or return if she feels her medication for moods needs adjustment. Patient reports moods are overall ok- only having issues with her strained relationship with her  and one of her sons.     Otherwise, she feels okay and states that she is taking her meds as directed. Patient is fasting today and will have labs drawn. Patient to call if no lab results in about a week. Instructed to follow back up 3-6 months pending lab results.

## 2018-08-07 DIAGNOSIS — E53.8 B12 DEFICIENCY: ICD-10-CM

## 2018-08-07 DIAGNOSIS — E03.9 ACQUIRED HYPOTHYROIDISM: ICD-10-CM

## 2018-08-07 RX ORDER — PANTOPRAZOLE SODIUM 40 MG/1
TABLET, DELAYED RELEASE ORAL
Qty: 30 TABLET | Refills: 2 | Status: SHIPPED | OUTPATIENT
Start: 2018-08-07 | End: 2018-10-11 | Stop reason: SDUPTHER

## 2018-08-07 RX ORDER — ALBUTEROL SULFATE 90 UG/1
AEROSOL, METERED RESPIRATORY (INHALATION)
Qty: 18 G | Refills: 2 | Status: SHIPPED | OUTPATIENT
Start: 2018-08-07 | End: 2019-01-22 | Stop reason: SDUPTHER

## 2018-08-07 RX ORDER — MONTELUKAST SODIUM 10 MG/1
TABLET ORAL
Qty: 90 TABLET | Refills: 2 | Status: SHIPPED | OUTPATIENT
Start: 2018-08-07 | End: 2019-05-31 | Stop reason: SDUPTHER

## 2018-08-07 RX ORDER — LEVOTHYROXINE SODIUM 137 UG/1
137 TABLET ORAL DAILY
Qty: 30 TABLET | Refills: 2 | Status: SHIPPED | OUTPATIENT
Start: 2018-08-07 | End: 2018-10-11 | Stop reason: SDUPTHER

## 2018-08-07 RX ORDER — AMLODIPINE BESYLATE 10 MG/1
TABLET ORAL
Qty: 30 TABLET | Refills: 2 | Status: SHIPPED | OUTPATIENT
Start: 2018-08-07 | End: 2018-12-18 | Stop reason: SDUPTHER

## 2018-08-07 RX ORDER — UBIDECARENONE 75 MG
CAPSULE ORAL
Qty: 30 TABLET | Refills: 2 | Status: SHIPPED | OUTPATIENT
Start: 2018-08-07 | End: 2018-12-18 | Stop reason: SDUPTHER

## 2018-08-07 RX ORDER — LISINOPRIL AND HYDROCHLOROTHIAZIDE 12.5; 1 MG/1; MG/1
TABLET ORAL
Qty: 30 TABLET | Refills: 2 | Status: SHIPPED | OUTPATIENT
Start: 2018-08-07 | End: 2018-12-18 | Stop reason: SDUPTHER

## 2018-08-14 ENCOUNTER — OFFICE VISIT (OUTPATIENT)
Dept: ORTHOPEDIC SURGERY | Facility: CLINIC | Age: 58
End: 2018-08-14

## 2018-08-14 VITALS — WEIGHT: 211 LBS | HEIGHT: 55 IN | TEMPERATURE: 98.4 F | BODY MASS INDEX: 48.83 KG/M2

## 2018-08-14 DIAGNOSIS — M25.562 CHRONIC PAIN OF BOTH KNEES: Primary | ICD-10-CM

## 2018-08-14 DIAGNOSIS — M25.561 CHRONIC PAIN OF BOTH KNEES: Primary | ICD-10-CM

## 2018-08-14 DIAGNOSIS — G89.29 CHRONIC PAIN OF BOTH KNEES: Primary | ICD-10-CM

## 2018-08-14 PROCEDURE — 73562 X-RAY EXAM OF KNEE 3: CPT | Performed by: ORTHOPAEDIC SURGERY

## 2018-08-14 PROCEDURE — 99213 OFFICE O/P EST LOW 20 MIN: CPT | Performed by: ORTHOPAEDIC SURGERY

## 2018-08-14 RX ORDER — METHOCARBAMOL 750 MG/1
TABLET, FILM COATED ORAL
Qty: 30 TABLET | Refills: 0 | Status: SHIPPED | OUTPATIENT
Start: 2018-08-14 | End: 2018-09-26 | Stop reason: SDUPTHER

## 2018-08-14 NOTE — PROGRESS NOTES
Patient: Lydia Gutierrez  YOB: 1960 57 y.o. female  Medical Record Number: [id number]    Chief Complaints:   Chief Complaint   Patient presents with   • Left Knee - Follow-up, Pain   • Right Knee - Follow-up, Pain       History of Present Illness:Lydia Gutierrez is a 57 y.o. female who presents for follow-up of  Bilateral knee pain- moderate medial ache progressive over last few months, worse with activity    Allergies:   Allergies   Allergen Reactions   • Atorvastatin Other (See Comments)       Medications:   Current Outpatient Prescriptions   Medication Sig Dispense Refill   • amLODIPine (NORVASC) 10 MG tablet TAKE 1 TABLET BY MOUTH DAILY. 30 tablet 2   • cholecalciferol (VITAMIN D3) 1000 units tablet Take 1 tablet by mouth Daily. 30 tablet 3   • FLUoxetine (PROzac) 20 MG capsule TAKE 2 CAPSULES BY MOUTH DAILY. 60 capsule 0   • fluticasone (FLONASE) 50 MCG/ACT nasal spray INHALE ONE SPRAY IN EACH NOSTRIL TWO TIMES A DAY 16 g 1   • levothyroxine (SYNTHROID, LEVOTHROID) 137 MCG tablet TAKE 1 TABLET BY MOUTH DAILY. 30 tablet 2   • lisinopril-hydrochlorothiazide (PRINZIDE,ZESTORETIC) 10-12.5 MG per tablet TAKE 1 TABLET BY MOUTH DAILY. 30 tablet 2   • loratadine (CLARITIN) 10 MG tablet TAKE 1 TABLET BY MOUTH DAILY AS NEEDED. 90 tablet 0   • meloxicam (MOBIC) 15 MG tablet 1 PO Daily with food. 30 tablet 3   • montelukast (SINGULAIR) 10 MG tablet TAKE 1 TABLET BY MOUTH AT BEDTIME. 90 tablet 2   • pantoprazole (PROTONIX) 40 MG EC tablet TAKE 1 TABLET BY MOUTH DAILY. 30 tablet 2   • rosuvastatin (CRESTOR) 40 MG tablet TAKE 1 TABLET BY MOUTH EVERY NIGHT. MUST KEEP SCHEDULED APPOINTMENT 30 tablet 0   • VENTOLIN  (90 Base) MCG/ACT inhaler INHALE 1-2 PUFFS BY MOUTH EVERY 4-6 HOURS AS NEEDED AND AS DIRECTED. 18 g 2   • vitamin B-12 (CYANOCOBALAMIN) 100 MCG tablet TAKE 1 TABLET BY MOUTH DAILY. 30 tablet 2   • methocarbamol (ROBAXIN) 750 MG tablet TAKE 1 TABLET BY MOUTH EVERY NIGHT FOR MUSCLE SPASMS. 30 tablet 0  "    No current facility-administered medications for this visit.          The following portions of the patient's history were reviewed and updated as appropriate: allergies, current medications, past family history, past medical history, past social history, past surgical history and problem list.    Review of Systems:   A 14 point review of systems was performed. All systems negative except pertinent positives/negative listed in HPI above    Physical Exam:   Vitals:    08/14/18 1622   Temp: 98.4 °F (36.9 °C)   Weight: 95.7 kg (211 lb)   Height: 61 cm (24.02\")       General: A and O x 3, ASA, NAD    SCLERA:    Normal    DENTITION:   Normal  Knee:  bilateral    ALIGNMENT:     Varus  ,   Patella  tracks  midline    GAIT:    Antalgic    SKIN:    No abnormality    RANGE OF MOTION:   3  -  120   DEG    STRENGTH:   4  / 5    LIGAMENTS:    No varus / valgus instability.   Negative  Lachman.    MENISCUS:     Negative   Yuni       DISTAL PULSES:    Paplable    DISTAL SENSATION :   Intact    LYMPHATICS:     No   lymphadenopathy    OTHER:          - Positive   effusion      - Crepitance with ROM       Radiology:  Xrays 3views both knees (ap,lateral, sunrise) were ordered and reviewed for evaluation of knee pain demonstratingmoderate joint space narrowing. Comparison views  not available    Assessment/Plan:  Bilateral knee OA. PLan Visco injections, PT, weight mgt.  "

## 2018-08-15 ENCOUNTER — TELEPHONE (OUTPATIENT)
Dept: ORTHOPEDIC SURGERY | Facility: CLINIC | Age: 58
End: 2018-08-15

## 2018-08-21 ENCOUNTER — TELEPHONE (OUTPATIENT)
Dept: ORTHOPEDIC SURGERY | Facility: CLINIC | Age: 58
End: 2018-08-21

## 2018-08-21 NOTE — TELEPHONE ENCOUNTER
[employer] denied synvisc one and I tried calling pt and phone rang and finally it said could not leave a msg and I have tried several times, I put on appt line cortisone only/dm

## 2018-09-04 ENCOUNTER — CLINICAL SUPPORT (OUTPATIENT)
Dept: ORTHOPEDIC SURGERY | Facility: CLINIC | Age: 58
End: 2018-09-04

## 2018-09-04 VITALS — WEIGHT: 214 LBS | HEIGHT: 55 IN | BODY MASS INDEX: 49.53 KG/M2 | TEMPERATURE: 98.4 F

## 2018-09-04 DIAGNOSIS — M17.0 PRIMARY OSTEOARTHRITIS OF KNEES, BILATERAL: Primary | ICD-10-CM

## 2018-09-04 PROCEDURE — 20610 DRAIN/INJ JOINT/BURSA W/O US: CPT | Performed by: NURSE PRACTITIONER

## 2018-09-04 RX ORDER — LIDOCAINE HYDROCHLORIDE 20 MG/ML
2 INJECTION, SOLUTION EPIDURAL; INFILTRATION; INTRACAUDAL; PERINEURAL
Status: COMPLETED | OUTPATIENT
Start: 2018-09-04 | End: 2018-09-04

## 2018-09-04 RX ORDER — METHYLPREDNISOLONE ACETATE 80 MG/ML
80 INJECTION, SUSPENSION INTRA-ARTICULAR; INTRALESIONAL; INTRAMUSCULAR; SOFT TISSUE
Status: COMPLETED | OUTPATIENT
Start: 2018-09-04 | End: 2018-09-04

## 2018-09-04 RX ADMIN — LIDOCAINE HYDROCHLORIDE 2 ML: 20 INJECTION, SOLUTION EPIDURAL; INFILTRATION; INTRACAUDAL; PERINEURAL at 10:11

## 2018-09-04 RX ADMIN — METHYLPREDNISOLONE ACETATE 80 MG: 80 INJECTION, SUSPENSION INTRA-ARTICULAR; INTRALESIONAL; INTRAMUSCULAR; SOFT TISSUE at 10:11

## 2018-09-04 RX ADMIN — LIDOCAINE HYDROCHLORIDE 2 ML: 20 INJECTION, SOLUTION EPIDURAL; INFILTRATION; INTRACAUDAL; PERINEURAL at 10:12

## 2018-09-04 RX ADMIN — METHYLPREDNISOLONE ACETATE 80 MG: 80 INJECTION, SUSPENSION INTRA-ARTICULAR; INTRALESIONAL; INTRAMUSCULAR; SOFT TISSUE at 10:12

## 2018-09-04 NOTE — PROGRESS NOTES
9/4/2018    Lydia Gutierrez is here today for worsening knee pain. Pt has undergone injection of the knee in the past with good resolution of symptoms. Pt is requesting a repeat injection.     KNEE Injection Procedure Note:    Large Joint Arthrocentesis  Date/Time: 9/4/2018 10:11 AM  Consent given by: patient  Site marked: site marked  Timeout: Immediately prior to procedure a time out was called to verify the correct patient, procedure, equipment, support staff and site/side marked as required   Supporting Documentation  Indications: pain   Procedure Details  Location: knee - R knee  Preparation: Patient was prepped and draped in the usual sterile fashion  Needle size: 25 G  Approach: anteromedial  Medications administered: 80 mg methylPREDNISolone acetate 80 MG/ML; 2 mL lidocaine PF 2% 2 %  Patient tolerance: patient tolerated the procedure well with no immediate complications    Large Joint Arthrocentesis  Date/Time: 9/4/2018 10:12 AM  Consent given by: patient  Site marked: site marked  Timeout: Immediately prior to procedure a time out was called to verify the correct patient, procedure, equipment, support staff and site/side marked as required   Supporting Documentation  Indications: pain   Procedure Details  Location: knee - L knee  Preparation: Patient was prepped and draped in the usual sterile fashion  Needle size: 25 G  Approach: anteromedial  Medications administered: 2 mL lidocaine PF 2% 2 %; 80 mg methylPREDNISolone acetate 80 MG/ML  Patient tolerance: patient tolerated the procedure well with no immediate complications      Prior to injections risks were discussed including pain, infection, elevated blood sugar.  Patient verbalized understanding would like to proceed with injections    At the conclusion of the injection I discussed the importance of continued quad strengthening exercises on a daily basis. I will see the patient back if the symptoms should fail to improve or worsen.    Anne Tejeda  APRN  9/4/2018

## 2018-09-15 DIAGNOSIS — F41.8 MIXED ANXIETY DEPRESSIVE DISORDER: ICD-10-CM

## 2018-09-17 RX ORDER — FLUOXETINE HYDROCHLORIDE 20 MG/1
CAPSULE ORAL
Qty: 60 CAPSULE | Refills: 2 | Status: SHIPPED | OUTPATIENT
Start: 2018-09-17 | End: 2018-11-18 | Stop reason: SDUPTHER

## 2018-09-17 RX ORDER — ROSUVASTATIN CALCIUM 40 MG/1
TABLET, COATED ORAL
Qty: 30 TABLET | Refills: 2 | Status: SHIPPED | OUTPATIENT
Start: 2018-09-17 | End: 2018-11-18 | Stop reason: SDUPTHER

## 2018-09-26 RX ORDER — METHOCARBAMOL 750 MG/1
TABLET, FILM COATED ORAL
Qty: 30 TABLET | Refills: 0 | Status: SHIPPED | OUTPATIENT
Start: 2018-09-26 | End: 2018-11-02 | Stop reason: SDUPTHER

## 2018-10-11 DIAGNOSIS — E03.9 ACQUIRED HYPOTHYROIDISM: ICD-10-CM

## 2018-10-11 RX ORDER — LEVOTHYROXINE SODIUM 137 UG/1
137 TABLET ORAL DAILY
Qty: 30 TABLET | Refills: 0 | Status: SHIPPED | OUTPATIENT
Start: 2018-10-11 | End: 2018-12-18 | Stop reason: SDUPTHER

## 2018-10-11 RX ORDER — PANTOPRAZOLE SODIUM 40 MG/1
TABLET, DELAYED RELEASE ORAL
Qty: 30 TABLET | Refills: 0 | Status: SHIPPED | OUTPATIENT
Start: 2018-10-11 | End: 2018-12-18 | Stop reason: SDUPTHER

## 2018-10-24 ENCOUNTER — OFFICE VISIT (OUTPATIENT)
Dept: FAMILY MEDICINE CLINIC | Facility: CLINIC | Age: 58
End: 2018-10-24

## 2018-10-24 VITALS
OXYGEN SATURATION: 98 % | HEART RATE: 88 BPM | HEIGHT: 55 IN | DIASTOLIC BLOOD PRESSURE: 78 MMHG | TEMPERATURE: 98.6 F | SYSTOLIC BLOOD PRESSURE: 120 MMHG | WEIGHT: 208.6 LBS | BODY MASS INDEX: 48.28 KG/M2

## 2018-10-24 DIAGNOSIS — L03.114 CELLULITIS OF LEFT UPPER EXTREMITY: Primary | ICD-10-CM

## 2018-10-24 PROCEDURE — 99213 OFFICE O/P EST LOW 20 MIN: CPT | Performed by: PHYSICIAN ASSISTANT

## 2018-10-24 RX ORDER — GINSENG 100 MG
CAPSULE ORAL 3 TIMES DAILY
Qty: 114 G | Refills: 0 | Status: SHIPPED | OUTPATIENT
Start: 2018-10-24 | End: 2019-02-25

## 2018-10-24 RX ORDER — CLINDAMYCIN HYDROCHLORIDE 300 MG/1
300 CAPSULE ORAL 4 TIMES DAILY
Qty: 40 CAPSULE | Refills: 0 | Status: SHIPPED | OUTPATIENT
Start: 2018-10-24 | End: 2019-02-25

## 2018-10-24 NOTE — PROGRESS NOTES
Subjective   Lydia Gutierrez is a 58 y.o. female. Patient complaining of insect bite on left forearm with increased redness and swelling     History of Present Illness     REPORTS SHE NOTICED REDNESS, SWELLING AND POSSIBLE INSECT BITE ON LEFT FOREARM ABOUT 3 DAYS AGO. NO WORSENING BUT NO IMPROVEMENT. NO FEVER, SYSTEMIC SYMPTOMS.    The following portions of the patient's history were reviewed and updated as appropriate: allergies, current medications, past family history, past medical history, past social history, past surgical history and problem list.    Review of Systems   Skin:        Insect bite on left forearm   All other systems reviewed and are negative.      Objective   Physical Exam   Constitutional: She is oriented to person, place, and time. She appears well-developed and well-nourished.   HENT:   Head: Normocephalic and atraumatic.   Right Ear: External ear normal.   Left Ear: External ear normal.   Nose: Nose normal.   Eyes: Conjunctivae and lids are normal.   Neck: Neck supple. Carotid bruit is not present.   Cardiovascular: Normal rate, regular rhythm, normal heart sounds and intact distal pulses.  Exam reveals no gallop and no friction rub.    No murmur heard.  Pulmonary/Chest: Effort normal and breath sounds normal. No respiratory distress. She has no wheezes. She has no rhonchi. She has no rales.   Musculoskeletal: She exhibits no edema or deformity.   Neurological: She is alert and oriented to person, place, and time. Gait normal.   Skin: Skin is warm and dry.        SMALL AREA OF MILD EDEMA AND ERYTHEMA WITH TRACE INDURATION WITHOUT ABSCESS.    Psychiatric: She has a normal mood and affect. Her speech is normal and behavior is normal. Judgment and thought content normal. Cognition and memory are normal.   Nursing note and vitals reviewed.      Assessment/Plan   Lydia was seen today for insect bite.    Diagnoses and all orders for this visit:    Cellulitis of left upper extremity  -     bacitracin  500 UNIT/GM ointment; Apply  topically to the appropriate area as directed 3 (Three) Times a Day.  -     clindamycin (CLEOCIN) 300 MG capsule; Take 1 capsule by mouth 4 (Four) Times a Day.      Patient Instructions   58 YEAR OLD FEMALE WHO PRESENTS TODAY WITH 3 DAY HISTORY OF MILD CELLULITIS LEFT FOREARM. NO TRUE ABSCESS TO I & D. I WILL HAVE HER DO WARM COMPRESS 10-15 MINUTES 3-4 X DAILY. SHE SHOULD ALSO USE BACITRACIN TOPICALLY 3 X DAILY AND KEEP COVERED WITH BANDAGE. I WILL GIVE CLINDAMYCIN 300 MG TWICE DAILY FOR 10 DAYS. TO BE SEEN ASAP IF WORSENING, NO IMPROVEMENT, OR NEW SYMPTOMS.

## 2018-10-28 NOTE — PATIENT INSTRUCTIONS
58 YEAR OLD FEMALE WHO PRESENTS TODAY WITH 3 DAY HISTORY OF MILD CELLULITIS LEFT FOREARM. NO TRUE ABSCESS TO I & D. I WILL HAVE HER DO WARM COMPRESS 10-15 MINUTES 3-4 X DAILY. SHE SHOULD ALSO USE BACITRACIN TOPICALLY 3 X DAILY AND KEEP COVERED WITH BANDAGE. I WILL GIVE CLINDAMYCIN 300 MG TWICE DAILY FOR 10 DAYS. TO BE SEEN ASAP IF WORSENING, NO IMPROVEMENT, OR NEW SYMPTOMS.

## 2018-11-02 RX ORDER — METHOCARBAMOL 750 MG/1
TABLET, FILM COATED ORAL
Qty: 30 TABLET | Refills: 0 | Status: SHIPPED | OUTPATIENT
Start: 2018-11-02 | End: 2018-12-18 | Stop reason: SDUPTHER

## 2018-11-06 ENCOUNTER — OFFICE VISIT (OUTPATIENT)
Dept: ORTHOPEDIC SURGERY | Facility: CLINIC | Age: 58
End: 2018-11-06

## 2018-11-06 VITALS — WEIGHT: 208 LBS | HEIGHT: 55 IN | BODY MASS INDEX: 48.14 KG/M2

## 2018-11-06 DIAGNOSIS — G89.29 CHRONIC PAIN OF BOTH KNEES: Primary | ICD-10-CM

## 2018-11-06 DIAGNOSIS — M25.561 CHRONIC PAIN OF BOTH KNEES: Primary | ICD-10-CM

## 2018-11-06 DIAGNOSIS — M25.562 CHRONIC PAIN OF BOTH KNEES: Primary | ICD-10-CM

## 2018-11-06 PROCEDURE — 99213 OFFICE O/P EST LOW 20 MIN: CPT | Performed by: NURSE PRACTITIONER

## 2018-11-06 PROCEDURE — 73562 X-RAY EXAM OF KNEE 3: CPT | Performed by: NURSE PRACTITIONER

## 2018-11-06 PROCEDURE — 20610 DRAIN/INJ JOINT/BURSA W/O US: CPT | Performed by: NURSE PRACTITIONER

## 2018-11-06 RX ORDER — METHYLPREDNISOLONE ACETATE 80 MG/ML
80 INJECTION, SUSPENSION INTRA-ARTICULAR; INTRALESIONAL; INTRAMUSCULAR; SOFT TISSUE
Status: COMPLETED | OUTPATIENT
Start: 2018-11-06 | End: 2018-11-06

## 2018-11-06 RX ORDER — LIDOCAINE HYDROCHLORIDE 10 MG/ML
2 INJECTION, SOLUTION EPIDURAL; INFILTRATION; INTRACAUDAL; PERINEURAL
Status: COMPLETED | OUTPATIENT
Start: 2018-11-06 | End: 2018-11-06

## 2018-11-06 RX ORDER — BACITRACIN ZINC 500 [USP'U]/G
OINTMENT TOPICAL
Refills: 0 | COMMUNITY
Start: 2018-10-24 | End: 2019-02-25

## 2018-11-06 RX ADMIN — LIDOCAINE HYDROCHLORIDE 2 ML: 10 INJECTION, SOLUTION EPIDURAL; INFILTRATION; INTRACAUDAL; PERINEURAL at 12:01

## 2018-11-06 RX ADMIN — METHYLPREDNISOLONE ACETATE 80 MG: 80 INJECTION, SUSPENSION INTRA-ARTICULAR; INTRALESIONAL; INTRAMUSCULAR; SOFT TISSUE at 12:01

## 2018-11-06 NOTE — PROGRESS NOTES
Patient: Lydia Gutierrez  YOB: 1960 58 y.o. female  Medical Record Number: [id number]    Chief Complaints:   Chief Complaint   Patient presents with   • Left Knee - Pain, Follow-up   • Right Knee - Pain, Follow-up       History of Present Illness:Lydia Gutierrez is a 58 y.o. female who presents for follow-up of  bilateral knee pain.  She has known osteoarthritis.  She had injections little over 2 months ago, had a recent fall which she twisted both knees and they have become significantly more painful.  She describes the bilateral knee pain as a severe constant stabbing aching type pain with intermittent swelling and clicking.  Standing sitting driving walking make the pain worse ice rest and injections do seem to help.    Allergies:   Allergies   Allergen Reactions   • Atorvastatin Other (See Comments)       Medications:   Current Outpatient Prescriptions   Medication Sig Dispense Refill   • amLODIPine (NORVASC) 10 MG tablet TAKE 1 TABLET BY MOUTH DAILY. 30 tablet 2   • bacitracin 500 UNIT/GM ointment Apply  topically to the appropriate area as directed 3 (Three) Times a Day. 114 g 0   • bacitracin 500 UNIT/GM ointment APPLY TOPICALLY TO THE APPROPRIATE AREA AS DIRECTED THREE TIMES A DAY  0   • cholecalciferol (VITAMIN D3) 1000 units tablet Take 1 tablet by mouth Daily. 30 tablet 3   • clindamycin (CLEOCIN) 300 MG capsule Take 1 capsule by mouth 4 (Four) Times a Day. 40 capsule 0   • FLUoxetine (PROzac) 20 MG capsule TAKE 2 CAPSULES BY MOUTH DAILY. 60 capsule 2   • fluticasone (FLONASE) 50 MCG/ACT nasal spray INHALE ONE SPRAY IN EACH NOSTRIL TWO TIMES A DAY 16 g 1   • levothyroxine (SYNTHROID, LEVOTHROID) 137 MCG tablet TAKE 1 TABLET BY MOUTH DAILY. 30 tablet 0   • lisinopril-hydrochlorothiazide (PRINZIDE,ZESTORETIC) 10-12.5 MG per tablet TAKE 1 TABLET BY MOUTH DAILY. 30 tablet 2   • loratadine (CLARITIN) 10 MG tablet TAKE 1 TABLET BY MOUTH DAILY AS NEEDED. 90 tablet 0   • meloxicam (MOBIC) 15 MG tablet  "1 PO Daily with food. 30 tablet 3   • methocarbamol (ROBAXIN) 750 MG tablet TAKE 1 TABLET BY MOUTH EVERY NIGHT FOR MUSCLE SPASMS. 30 tablet 0   • montelukast (SINGULAIR) 10 MG tablet TAKE 1 TABLET BY MOUTH AT BEDTIME. 90 tablet 2   • pantoprazole (PROTONIX) 40 MG EC tablet TAKE 1 TABLET BY MOUTH DAILY. 30 tablet 0   • rosuvastatin (CRESTOR) 40 MG tablet TAKE 1 TABLET BY MOUTH EVERY NIGHT. MUST KEEP SCHEDULED APPOINTMENT 30 tablet 2   • VENTOLIN  (90 Base) MCG/ACT inhaler INHALE 1-2 PUFFS BY MOUTH EVERY 4-6 HOURS AS NEEDED AND AS DIRECTED. 18 g 2   • vitamin B-12 (CYANOCOBALAMIN) 100 MCG tablet TAKE 1 TABLET BY MOUTH DAILY. 30 tablet 2     No current facility-administered medications for this visit.          The following portions of the patient's history were reviewed and updated as appropriate: allergies, current medications, past family history, past medical history, past social history, past surgical history and problem list.    Review of Systems:   A 14 point review of systems was performed. All systems negative except pertinent positives/negative listed in HPI above    Physical Exam:   Vitals:    11/06/18 1132   Weight: 94.3 kg (208 lb)   Height: 61 cm (24.02\")       General: A and O x 3, ASA, NAD    SCLERA:    Normal    DENTITION:   Normal  Skin clear no unusual lesions noted  Bilateral knees patient has trace amount of effusion noted bilaterally with 115° flexion neutral and extension with a positive Yuni negative Lockman calf is soft and nontender    Radiology:  Xrays 3views (ap,lateral, sunrise) bilateral knees were ordered and reviewed today secondary to pain and show significant arthritic changes.  Comparative views are unchanged     Assessment/Plan:  Osteoarthritis bilateral knees    Patient and I discussed treatment options and she would like to proceed with bilateral knee cortisone injections.  Prior to injections risks were discussed including pain, infection, elevated blood sugar.  Patient " verbalized understanding would like to proceed with injections.  She was referred outpatient physical therapy.  She was encouraged to lose weight.  She will try and office PRN    Large Joint Arthrocentesis  Date/Time: 11/6/2018 12:01 PM  Consent given by: patient  Site marked: site marked  Timeout: Immediately prior to procedure a time out was called to verify the correct patient, procedure, equipment, support staff and site/side marked as required   Supporting Documentation  Indications: pain and joint swelling   Procedure Details  Location: knee - R knee  Preparation: Patient was prepped and draped in the usual sterile fashion  Needle size: 22 G  Approach: anterolateral  Medications administered: 80 mg methylPREDNISolone acetate 80 MG/ML; 2 mL lidocaine PF 1% 1 %  Patient tolerance: patient tolerated the procedure well with no immediate complications    Large Joint Arthrocentesis  Date/Time: 11/6/2018 12:01 PM  Consent given by: patient  Site marked: site marked  Timeout: Immediately prior to procedure a time out was called to verify the correct patient, procedure, equipment, support staff and site/side marked as required   Supporting Documentation  Indications: pain and joint swelling   Procedure Details  Location: knee - L knee  Preparation: Patient was prepped and draped in the usual sterile fashion  Needle size: 22 G  Approach: anterolateral  Medications administered: 80 mg methylPREDNISolone acetate 80 MG/ML; 2 mL lidocaine PF 1% 1 %  Patient tolerance: patient tolerated the procedure well with no immediate complications

## 2018-11-13 ENCOUNTER — APPOINTMENT (OUTPATIENT)
Dept: PHYSICAL THERAPY | Facility: HOSPITAL | Age: 58
End: 2018-11-13

## 2018-11-15 RX ORDER — LORATADINE 10 MG/1
TABLET ORAL
Qty: 90 TABLET | Refills: 0 | Status: SHIPPED | OUTPATIENT
Start: 2018-11-15 | End: 2019-02-25 | Stop reason: SDUPTHER

## 2018-11-18 DIAGNOSIS — F41.8 MIXED ANXIETY DEPRESSIVE DISORDER: ICD-10-CM

## 2018-11-19 RX ORDER — FLUOXETINE HYDROCHLORIDE 20 MG/1
CAPSULE ORAL
Qty: 60 CAPSULE | Refills: 2 | Status: SHIPPED | OUTPATIENT
Start: 2018-11-19 | End: 2019-03-29 | Stop reason: SDUPTHER

## 2018-11-19 RX ORDER — ROSUVASTATIN CALCIUM 40 MG/1
40 TABLET, COATED ORAL NIGHTLY
Qty: 30 TABLET | Refills: 2 | Status: SHIPPED | OUTPATIENT
Start: 2018-11-19 | End: 2019-03-29 | Stop reason: SDUPTHER

## 2018-12-18 DIAGNOSIS — E53.8 B12 DEFICIENCY: ICD-10-CM

## 2018-12-18 DIAGNOSIS — E03.9 ACQUIRED HYPOTHYROIDISM: ICD-10-CM

## 2018-12-18 RX ORDER — LEVOTHYROXINE SODIUM 137 UG/1
137 TABLET ORAL DAILY
Qty: 30 TABLET | Refills: 0 | Status: SHIPPED | OUTPATIENT
Start: 2018-12-18 | End: 2019-01-22 | Stop reason: SDUPTHER

## 2018-12-18 RX ORDER — PANTOPRAZOLE SODIUM 40 MG/1
TABLET, DELAYED RELEASE ORAL
Qty: 30 TABLET | Refills: 0 | Status: SHIPPED | OUTPATIENT
Start: 2018-12-18 | End: 2019-01-22 | Stop reason: SDUPTHER

## 2018-12-18 RX ORDER — METHOCARBAMOL 750 MG/1
TABLET, FILM COATED ORAL
Qty: 30 TABLET | Refills: 0 | Status: SHIPPED | OUTPATIENT
Start: 2018-12-18 | End: 2019-01-22 | Stop reason: SDUPTHER

## 2018-12-18 RX ORDER — LISINOPRIL AND HYDROCHLOROTHIAZIDE 12.5; 1 MG/1; MG/1
TABLET ORAL
Qty: 30 TABLET | Refills: 2 | Status: SHIPPED | OUTPATIENT
Start: 2018-12-18 | End: 2019-03-29 | Stop reason: SDUPTHER

## 2018-12-18 RX ORDER — AMLODIPINE BESYLATE 10 MG/1
TABLET ORAL
Qty: 30 TABLET | Refills: 2 | Status: SHIPPED | OUTPATIENT
Start: 2018-12-18 | End: 2019-03-29 | Stop reason: SDUPTHER

## 2018-12-18 RX ORDER — UBIDECARENONE 75 MG
CAPSULE ORAL
Qty: 30 TABLET | Refills: 2 | Status: SHIPPED | OUTPATIENT
Start: 2018-12-18 | End: 2019-03-29 | Stop reason: SDUPTHER

## 2019-01-22 DIAGNOSIS — E03.9 ACQUIRED HYPOTHYROIDISM: ICD-10-CM

## 2019-01-22 RX ORDER — METHOCARBAMOL 750 MG/1
TABLET, FILM COATED ORAL
Qty: 30 TABLET | Refills: 0 | Status: SHIPPED | OUTPATIENT
Start: 2019-01-22 | End: 2019-02-26 | Stop reason: SDUPTHER

## 2019-01-22 RX ORDER — PANTOPRAZOLE SODIUM 40 MG/1
TABLET, DELAYED RELEASE ORAL
Qty: 30 TABLET | Refills: 0 | Status: SHIPPED | OUTPATIENT
Start: 2019-01-22 | End: 2019-02-26 | Stop reason: SDUPTHER

## 2019-01-22 RX ORDER — ALBUTEROL SULFATE 90 UG/1
AEROSOL, METERED RESPIRATORY (INHALATION)
Qty: 18 G | Refills: 0 | Status: SHIPPED | OUTPATIENT
Start: 2019-01-22 | End: 2019-12-02 | Stop reason: SDUPTHER

## 2019-01-22 RX ORDER — LEVOTHYROXINE SODIUM 137 UG/1
137 TABLET ORAL DAILY
Qty: 30 TABLET | Refills: 0 | Status: SHIPPED | OUTPATIENT
Start: 2019-01-22 | End: 2019-02-26 | Stop reason: SDUPTHER

## 2019-02-25 ENCOUNTER — OFFICE VISIT (OUTPATIENT)
Dept: FAMILY MEDICINE CLINIC | Facility: CLINIC | Age: 59
End: 2019-02-25

## 2019-02-25 VITALS
BODY MASS INDEX: 39.65 KG/M2 | TEMPERATURE: 98.1 F | DIASTOLIC BLOOD PRESSURE: 74 MMHG | HEART RATE: 85 BPM | OXYGEN SATURATION: 99 % | HEIGHT: 61 IN | WEIGHT: 210 LBS | SYSTOLIC BLOOD PRESSURE: 120 MMHG

## 2019-02-25 DIAGNOSIS — R79.89 ABNORMAL C-REACTIVE PROTEIN: ICD-10-CM

## 2019-02-25 DIAGNOSIS — D72.829 LEUKOCYTOSIS, UNSPECIFIED TYPE: ICD-10-CM

## 2019-02-25 DIAGNOSIS — R79.89 ABNORMAL LIVER FUNCTION TESTS: ICD-10-CM

## 2019-02-25 DIAGNOSIS — R73.01 ELEVATED FASTING GLUCOSE: ICD-10-CM

## 2019-02-25 DIAGNOSIS — E53.8 B12 DEFICIENCY: ICD-10-CM

## 2019-02-25 DIAGNOSIS — E55.9 VITAMIN D DEFICIENCY: ICD-10-CM

## 2019-02-25 DIAGNOSIS — I10 BENIGN ESSENTIAL HYPERTENSION: Primary | ICD-10-CM

## 2019-02-25 DIAGNOSIS — E78.2 MIXED HYPERLIPIDEMIA: ICD-10-CM

## 2019-02-25 DIAGNOSIS — R31.9 HEMATURIA, UNSPECIFIED TYPE: ICD-10-CM

## 2019-02-25 DIAGNOSIS — E03.9 ACQUIRED HYPOTHYROIDISM: ICD-10-CM

## 2019-02-25 DIAGNOSIS — F41.8 MIXED ANXIETY DEPRESSIVE DISORDER: ICD-10-CM

## 2019-02-25 LAB
BILIRUB BLD-MCNC: NEGATIVE MG/DL
CLARITY, POC: CLEAR
COLOR UR: YELLOW
EXPIRATION DATE: NORMAL
GLUCOSE UR STRIP-MCNC: NEGATIVE MG/DL
HBA1C MFR BLD: 5.6 %
KETONES UR QL: ABNORMAL
LEUKOCYTE EST, POC: NEGATIVE
Lab: 975
NITRITE UR-MCNC: NEGATIVE MG/ML
PH UR: 5.5 [PH] (ref 5–8)
PROT UR STRIP-MCNC: NEGATIVE MG/DL
RBC # UR STRIP: ABNORMAL /UL
SP GR UR: 1.03 (ref 1–1.03)
UROBILINOGEN UR QL: NORMAL

## 2019-02-25 PROCEDURE — 83036 HEMOGLOBIN GLYCOSYLATED A1C: CPT | Performed by: PHYSICIAN ASSISTANT

## 2019-02-25 PROCEDURE — 99214 OFFICE O/P EST MOD 30 MIN: CPT | Performed by: PHYSICIAN ASSISTANT

## 2019-02-25 RX ORDER — LORATADINE 10 MG/1
TABLET ORAL
Qty: 90 TABLET | Refills: 0 | Status: SHIPPED | OUTPATIENT
Start: 2019-02-25 | End: 2019-05-31 | Stop reason: SDUPTHER

## 2019-02-25 NOTE — PROGRESS NOTES
Subjective   Lydia Gutierrez is a 58 y.o. female. Patient seen today for medication management for hypertension, hyperlipidemia, Vitamin B 12 and D deficiency    History of Present Illness     Some sinus pressure and congestion.     Bilateral knee pain. Undergoing injections with orthopedist. Helping some.     No other concerns.     The following portions of the patient's history were reviewed and updated as appropriate: allergies, current medications, past family history, past medical history, past social history, past surgical history and problem list.    Review of Systems   All other systems reviewed and are negative.      Objective   Physical Exam   Constitutional: She is oriented to person, place, and time. Vital signs are normal. She appears well-developed and well-nourished.   HENT:   Head: Normocephalic and atraumatic.   Right Ear: Tympanic membrane, external ear and ear canal normal.   Left Ear: Tympanic membrane, external ear and ear canal normal.   Nose: Nose normal.   Mouth/Throat: Uvula is midline, oropharynx is clear and moist and mucous membranes are normal.   Eyes: Conjunctivae and lids are normal.   Neck: Neck supple. Carotid bruit is not present.   Cardiovascular: Normal rate, regular rhythm, normal heart sounds and intact distal pulses. Exam reveals no gallop and no friction rub.   No murmur heard.  Pulmonary/Chest: Effort normal and breath sounds normal. No respiratory distress. She has no wheezes. She has no rhonchi. She has no rales.   Musculoskeletal: She exhibits no edema or deformity.   Neurological: She is alert and oriented to person, place, and time. Gait normal.   Skin: Skin is warm and dry.   Psychiatric: She has a normal mood and affect. Her speech is normal and behavior is normal. Judgment and thought content normal. Cognition and memory are normal.   Nursing note and vitals reviewed.      Assessment/Plan   Lydia was seen today for med management.    Diagnoses and all orders for this  visit:    Benign essential hypertension  -     Comprehensive Metabolic Panel  -     POC Urinalysis Dipstick, Automated    Mixed hyperlipidemia  -     Comprehensive Metabolic Panel  -     CK  -     Lipid Panel With LDL / HDL Ratio    B12 deficiency  -     CBC & Differential  -     Vitamin B12 & Folate    Vitamin D deficiency  -     Comprehensive Metabolic Panel  -     Vitamin D 25 Hydroxy    Acquired hypothyroidism  -     Thyroid Panel With TSH    Leukocytosis, unspecified type  -     CBC & Differential    Abnormal liver function tests  -     Comprehensive Metabolic Panel    Abnormal C-reactive protein    Mixed anxiety depressive disorder    Elevated fasting glucose  -     POC Glycated Hemoglobin, Total  -     POC Urinalysis Dipstick, Automated    Hematuria, unspecified type  -     Urine Culture - Urine, Urine, Clean Catch  -     Urinalysis With Microscopic - Urine, Clean Catch    Other orders  -     Microscopic Examination -      Patient Instructions   58 year old female who presents today for follow-up hypertension, hyperlipidemia, Vitamin D and B 12 deficiency. She is tolerating medications well with no AE. She is not taking Mobic any longer. She continues with knee pain, worse when she is on her feet more. She has seen orthopedic surgery and is undergoing injections. She has  from her  and as a protective order. She reports this has improved her stress and symptoms. She has been feeling much better overall.     Has had some sinus pressure and congestion. Patient to take Claritin once daily and Flonase 2 sprays in each nostril once daily, and avoid decongestant unless absolutely necessary. She will need to monitor BP and pulse if she takes a medication with a decongestant.      Otherwise, she is taking her meds as directed. Patient is fasting today-labs today. Patient to call if no lab results in about a week. Instructed to follow back up 3-6 months pending lab results

## 2019-02-26 DIAGNOSIS — E03.9 ACQUIRED HYPOTHYROIDISM: ICD-10-CM

## 2019-02-26 LAB
25(OH)D3+25(OH)D2 SERPL-MCNC: 21.4 NG/ML (ref 30–100)
ALBUMIN SERPL-MCNC: 4.6 G/DL (ref 3.5–5.2)
ALBUMIN/GLOB SERPL: 1.6 G/DL
ALP SERPL-CCNC: 91 U/L (ref 39–117)
ALT SERPL-CCNC: 20 U/L (ref 1–33)
AST SERPL-CCNC: 14 U/L (ref 1–32)
BASOPHILS # BLD AUTO: 0.05 10*3/MM3 (ref 0–0.2)
BASOPHILS NFR BLD AUTO: 0.8 % (ref 0–1.5)
BILIRUB SERPL-MCNC: <0.2 MG/DL (ref 0.1–1.2)
BUN SERPL-MCNC: 19 MG/DL (ref 6–20)
BUN/CREAT SERPL: 31.7 (ref 7–25)
CALCIUM SERPL-MCNC: 10.3 MG/DL (ref 8.6–10.5)
CHLORIDE SERPL-SCNC: 104 MMOL/L (ref 98–107)
CHOLEST SERPL-MCNC: 184 MG/DL (ref 0–200)
CK SERPL-CCNC: 61 U/L (ref 20–180)
CO2 SERPL-SCNC: 25.6 MMOL/L (ref 22–29)
CREAT SERPL-MCNC: 0.6 MG/DL (ref 0.57–1)
EOSINOPHIL # BLD AUTO: 0.36 10*3/MM3 (ref 0–0.4)
EOSINOPHIL NFR BLD AUTO: 5.5 % (ref 0.3–6.2)
ERYTHROCYTE [DISTWIDTH] IN BLOOD BY AUTOMATED COUNT: 13.7 % (ref 12.3–15.4)
FOLATE SERPL-MCNC: 7.55 NG/ML (ref 4.78–24.2)
FT4I SERPL CALC-MCNC: 2.1 (ref 1.2–4.9)
GLOBULIN SER CALC-MCNC: 2.8 GM/DL
GLUCOSE SERPL-MCNC: 113 MG/DL (ref 65–99)
HCT VFR BLD AUTO: 40.9 % (ref 34–46.6)
HDLC SERPL-MCNC: 38 MG/DL (ref 40–60)
HGB BLD-MCNC: 12.8 G/DL (ref 12–15.9)
IMM GRANULOCYTES # BLD AUTO: 0.02 10*3/MM3 (ref 0–0.05)
IMM GRANULOCYTES NFR BLD AUTO: 0.3 % (ref 0–0.5)
LDLC SERPL CALC-MCNC: 96 MG/DL (ref 0–100)
LDLC/HDLC SERPL: 2.53 {RATIO}
LYMPHOCYTES # BLD AUTO: 1.98 10*3/MM3 (ref 0.7–3.1)
LYMPHOCYTES NFR BLD AUTO: 30.5 % (ref 19.6–45.3)
MCH RBC QN AUTO: 28.9 PG (ref 26.6–33)
MCHC RBC AUTO-ENTMCNC: 31.3 G/DL (ref 31.5–35.7)
MCV RBC AUTO: 92.3 FL (ref 79–97)
MONOCYTES # BLD AUTO: 0.52 10*3/MM3 (ref 0.1–0.9)
MONOCYTES NFR BLD AUTO: 8 % (ref 5–12)
NEUTROPHILS # BLD AUTO: 3.57 10*3/MM3 (ref 1.4–7)
NEUTROPHILS NFR BLD AUTO: 54.9 % (ref 42.7–76)
NRBC BLD AUTO-RTO: 0 /100 WBC (ref 0–0)
PLATELET # BLD AUTO: 344 10*3/MM3 (ref 140–450)
POTASSIUM SERPL-SCNC: 4 MMOL/L (ref 3.5–5.2)
PROT SERPL-MCNC: 7.4 G/DL (ref 6–8.5)
RBC # BLD AUTO: 4.43 10*6/MM3 (ref 3.77–5.28)
SODIUM SERPL-SCNC: 138 MMOL/L (ref 136–145)
T3RU NFR SERPL: 28 % (ref 24–39)
T4 SERPL-MCNC: 7.4 UG/DL (ref 4.5–12)
TRIGL SERPL-MCNC: 249 MG/DL (ref 0–150)
TSH SERPL DL<=0.005 MIU/L-ACNC: 1.78 UIU/ML (ref 0.45–4.5)
VIT B12 SERPL-MCNC: 563 PG/ML (ref 211–946)
VLDLC SERPL CALC-MCNC: 49.8 MG/DL (ref 5–40)
WBC # BLD AUTO: 6.5 10*3/MM3 (ref 3.4–10.8)

## 2019-02-26 RX ORDER — METHOCARBAMOL 750 MG/1
TABLET, FILM COATED ORAL
Qty: 30 TABLET | Refills: 0 | Status: SHIPPED | OUTPATIENT
Start: 2019-02-26 | End: 2019-03-29 | Stop reason: SDUPTHER

## 2019-02-26 RX ORDER — LEVOTHYROXINE SODIUM 137 UG/1
137 TABLET ORAL DAILY
Qty: 30 TABLET | Refills: 0 | Status: SHIPPED | OUTPATIENT
Start: 2019-02-26 | End: 2019-03-29 | Stop reason: SDUPTHER

## 2019-02-26 RX ORDER — PANTOPRAZOLE SODIUM 40 MG/1
TABLET, DELAYED RELEASE ORAL
Qty: 30 TABLET | Refills: 0 | Status: SHIPPED | OUTPATIENT
Start: 2019-02-26 | End: 2019-03-29 | Stop reason: SDUPTHER

## 2019-02-28 LAB
APPEARANCE UR: (no result)
BACTERIA #/AREA URNS HPF: NORMAL /[HPF]
BACTERIA UR CULT: ABNORMAL
BACTERIA UR CULT: ABNORMAL
BILIRUB UR QL STRIP: NEGATIVE
COLOR UR: YELLOW
EPI CELLS #/AREA URNS HPF: NORMAL /HPF
GLUCOSE UR QL: NEGATIVE
HGB UR QL STRIP: NEGATIVE
KETONES UR QL STRIP: NEGATIVE
LEUKOCYTE ESTERASE UR QL STRIP: NEGATIVE
MICRO URNS: (no result)
MICRO URNS: (no result)
MUCOUS THREADS URNS QL MICRO: PRESENT
NITRITE UR QL STRIP: NEGATIVE
OTHER ANTIBIOTIC SUSC ISLT: ABNORMAL
PH UR STRIP: 5 [PH] (ref 5–7.5)
PROT UR QL STRIP: NEGATIVE
RBC #/AREA URNS HPF: NORMAL /HPF
SP GR UR: 1.03 (ref 1–1.03)
UROBILINOGEN UR STRIP-MCNC: 0.2 MG/DL (ref 0.2–1)
WBC #/AREA URNS HPF: NORMAL /HPF

## 2019-02-28 NOTE — PATIENT INSTRUCTIONS
58 year old female who presents today for follow-up hypertension, hyperlipidemia, Vitamin D and B 12 deficiency. She is tolerating medications well with no AE. She is not taking Mobic any longer. She continues with knee pain, worse when she is on her feet more. She has seen orthopedic surgery and is undergoing injections. She has  from her  and as a protective order. She reports this has improved her stress and symptoms. She has been feeling much better overall.     Has had some sinus pressure and congestion. Patient to take Claritin once daily and Flonase 2 sprays in each nostril once daily, and avoid decongestant unless absolutely necessary. She will need to monitor BP and pulse if she takes a medication with a decongestant.      Otherwise, she is taking her meds as directed. Patient is fasting today-labs today. Patient to call if no lab results in about a week. Instructed to follow back up 3-6 months pending lab results

## 2019-03-01 RX ORDER — NITROFURANTOIN 25; 75 MG/1; MG/1
100 CAPSULE ORAL 2 TIMES DAILY
Qty: 14 CAPSULE | Refills: 0 | Status: SHIPPED | OUTPATIENT
Start: 2019-03-01 | End: 2019-07-02

## 2019-03-19 ENCOUNTER — CLINICAL SUPPORT (OUTPATIENT)
Dept: ORTHOPEDIC SURGERY | Facility: CLINIC | Age: 59
End: 2019-03-19

## 2019-03-19 VITALS — WEIGHT: 208 LBS | TEMPERATURE: 98.2 F | HEIGHT: 61 IN | BODY MASS INDEX: 39.27 KG/M2

## 2019-03-19 DIAGNOSIS — M17.0 BILATERAL PRIMARY OSTEOARTHRITIS OF KNEE: Primary | ICD-10-CM

## 2019-03-19 PROCEDURE — 20610 DRAIN/INJ JOINT/BURSA W/O US: CPT | Performed by: NURSE PRACTITIONER

## 2019-03-19 RX ORDER — LIDOCAINE HYDROCHLORIDE 20 MG/ML
2 INJECTION, SOLUTION EPIDURAL; INFILTRATION; INTRACAUDAL; PERINEURAL
Status: COMPLETED | OUTPATIENT
Start: 2019-03-19 | End: 2019-03-19

## 2019-03-19 RX ORDER — METHYLPREDNISOLONE ACETATE 80 MG/ML
80 INJECTION, SUSPENSION INTRA-ARTICULAR; INTRALESIONAL; INTRAMUSCULAR; SOFT TISSUE
Status: COMPLETED | OUTPATIENT
Start: 2019-03-19 | End: 2019-03-19

## 2019-03-19 RX ORDER — LIDOCAINE HYDROCHLORIDE 20 MG/ML
2 INJECTION, SOLUTION EPIDURAL; INFILTRATION; INTRACAUDAL; PERINEURAL
Status: SHIPPED | OUTPATIENT
Start: 2019-03-19 | End: 2019-03-19

## 2019-03-19 RX ADMIN — LIDOCAINE HYDROCHLORIDE 2 ML: 20 INJECTION, SOLUTION EPIDURAL; INFILTRATION; INTRACAUDAL; PERINEURAL at 09:05

## 2019-03-19 RX ADMIN — METHYLPREDNISOLONE ACETATE 80 MG: 80 INJECTION, SUSPENSION INTRA-ARTICULAR; INTRALESIONAL; INTRAMUSCULAR; SOFT TISSUE at 09:06

## 2019-03-19 RX ADMIN — LIDOCAINE HYDROCHLORIDE 2 ML: 20 INJECTION, SOLUTION EPIDURAL; INFILTRATION; INTRACAUDAL; PERINEURAL at 09:06

## 2019-03-19 RX ADMIN — METHYLPREDNISOLONE ACETATE 80 MG: 80 INJECTION, SUSPENSION INTRA-ARTICULAR; INTRALESIONAL; INTRAMUSCULAR; SOFT TISSUE at 09:05

## 2019-03-19 NOTE — PROGRESS NOTES
3/19/2019    Lydia Gutierrez is here today for worsening knee pain. Pt has undergone injection of the knee in the past with good resolution of symptoms. Pt is requesting a repeat injection.     KNEE Injection Procedure Note:    Large Joint Arthrocentesis: R knee  Date/Time: 3/19/2019 9:05 AM  Consent given by: patient  Site marked: site marked  Timeout: Immediately prior to procedure a time out was called to verify the correct patient, procedure, equipment, support staff and site/side marked as required   Supporting Documentation  Indications: pain   Procedure Details  Location: knee - R knee  Preparation: Patient was prepped and draped in the usual sterile fashion  Needle size: 22 G  Approach: anterolateral  Medications administered: 80 mg methylPREDNISolone acetate 80 MG/ML; 2 mL lidocaine PF 2% 2 %      Large Joint Arthrocentesis: L knee  Date/Time: 3/19/2019 9:06 AM  Consent given by: patient  Site marked: site marked  Timeout: Immediately prior to procedure a time out was called to verify the correct patient, procedure, equipment, support staff and site/side marked as required   Supporting Documentation  Indications: pain   Procedure Details  Location: knee - L knee  Needle size: 22 G  Approach: anterolateral  Medications administered: 80 mg methylPREDNISolone acetate 80 MG/ML; 2 mL lidocaine PF 2% 2 %  Patient tolerance: patient tolerated the procedure well with no immediate complications      Prior to injections risks were discussed including pain, infection, elevated blood sugar.  Patient verbalized understanding would like to proceed with injections    At the conclusion of the injection I discussed the importance of continued quad strengthening exercises on a daily basis. I will see the patient back if the symptoms should fail to improve or worsen.    Anne Tejeda APRN  3/19/2019

## 2019-03-29 DIAGNOSIS — F41.8 MIXED ANXIETY DEPRESSIVE DISORDER: ICD-10-CM

## 2019-03-29 DIAGNOSIS — E03.9 ACQUIRED HYPOTHYROIDISM: ICD-10-CM

## 2019-03-29 DIAGNOSIS — E53.8 B12 DEFICIENCY: ICD-10-CM

## 2019-03-29 RX ORDER — FLUOXETINE HYDROCHLORIDE 20 MG/1
CAPSULE ORAL
Qty: 60 CAPSULE | Refills: 2 | Status: SHIPPED | OUTPATIENT
Start: 2019-03-29 | End: 2019-05-31 | Stop reason: SDUPTHER

## 2019-03-29 RX ORDER — LEVOTHYROXINE SODIUM 137 UG/1
137 TABLET ORAL DAILY
Qty: 30 TABLET | Refills: 2 | Status: SHIPPED | OUTPATIENT
Start: 2019-03-29 | End: 2019-05-31 | Stop reason: SDUPTHER

## 2019-03-29 RX ORDER — PANTOPRAZOLE SODIUM 40 MG/1
TABLET, DELAYED RELEASE ORAL
Qty: 30 TABLET | Refills: 2 | Status: SHIPPED | OUTPATIENT
Start: 2019-03-29 | End: 2019-05-31 | Stop reason: SDUPTHER

## 2019-03-29 RX ORDER — ROSUVASTATIN CALCIUM 40 MG/1
40 TABLET, COATED ORAL NIGHTLY
Qty: 30 TABLET | Refills: 2 | Status: SHIPPED | OUTPATIENT
Start: 2019-03-29 | End: 2019-05-31 | Stop reason: SDUPTHER

## 2019-03-29 RX ORDER — LISINOPRIL AND HYDROCHLOROTHIAZIDE 12.5; 1 MG/1; MG/1
TABLET ORAL
Qty: 30 TABLET | Refills: 2 | Status: SHIPPED | OUTPATIENT
Start: 2019-03-29 | End: 2019-05-31 | Stop reason: SDUPTHER

## 2019-03-29 RX ORDER — AMLODIPINE BESYLATE 10 MG/1
TABLET ORAL
Qty: 30 TABLET | Refills: 2 | Status: SHIPPED | OUTPATIENT
Start: 2019-03-29 | End: 2019-05-31 | Stop reason: SDUPTHER

## 2019-03-29 RX ORDER — UBIDECARENONE 75 MG
CAPSULE ORAL
Qty: 30 TABLET | Refills: 2 | Status: SHIPPED | OUTPATIENT
Start: 2019-03-29 | End: 2019-05-31 | Stop reason: SDUPTHER

## 2019-03-31 RX ORDER — METHOCARBAMOL 750 MG/1
TABLET, FILM COATED ORAL
Qty: 30 TABLET | Refills: 0 | Status: SHIPPED | OUTPATIENT
Start: 2019-03-31 | End: 2019-05-31 | Stop reason: SDUPTHER

## 2019-05-31 DIAGNOSIS — E03.9 ACQUIRED HYPOTHYROIDISM: ICD-10-CM

## 2019-05-31 DIAGNOSIS — F41.8 MIXED ANXIETY DEPRESSIVE DISORDER: ICD-10-CM

## 2019-05-31 DIAGNOSIS — E53.8 B12 DEFICIENCY: ICD-10-CM

## 2019-05-31 RX ORDER — LORATADINE 10 MG/1
TABLET ORAL
Qty: 90 TABLET | Refills: 0 | Status: SHIPPED | OUTPATIENT
Start: 2019-05-31 | End: 2019-09-03 | Stop reason: SDUPTHER

## 2019-05-31 RX ORDER — MONTELUKAST SODIUM 10 MG/1
TABLET ORAL
Qty: 90 TABLET | Refills: 2 | Status: SHIPPED | OUTPATIENT
Start: 2019-05-31 | End: 2020-04-06

## 2019-05-31 RX ORDER — LEVOTHYROXINE SODIUM 137 UG/1
137 TABLET ORAL DAILY
Qty: 30 TABLET | Refills: 2 | Status: SHIPPED | OUTPATIENT
Start: 2019-05-31 | End: 2019-10-08 | Stop reason: SDUPTHER

## 2019-05-31 RX ORDER — UBIDECARENONE 75 MG
CAPSULE ORAL
Qty: 30 TABLET | Refills: 2 | Status: SHIPPED | OUTPATIENT
Start: 2019-05-31 | End: 2019-10-08 | Stop reason: SDUPTHER

## 2019-05-31 RX ORDER — PANTOPRAZOLE SODIUM 40 MG/1
TABLET, DELAYED RELEASE ORAL
Qty: 30 TABLET | Refills: 2 | Status: SHIPPED | OUTPATIENT
Start: 2019-05-31 | End: 2019-10-08 | Stop reason: SDUPTHER

## 2019-05-31 RX ORDER — FLUOXETINE HYDROCHLORIDE 20 MG/1
CAPSULE ORAL
Qty: 60 CAPSULE | Refills: 2 | Status: SHIPPED | OUTPATIENT
Start: 2019-05-31 | End: 2019-10-08 | Stop reason: SDUPTHER

## 2019-05-31 RX ORDER — AMLODIPINE BESYLATE 10 MG/1
TABLET ORAL
Qty: 30 TABLET | Refills: 2 | Status: SHIPPED | OUTPATIENT
Start: 2019-05-31 | End: 2019-10-08 | Stop reason: SDUPTHER

## 2019-05-31 RX ORDER — METHOCARBAMOL 750 MG/1
TABLET, FILM COATED ORAL
Qty: 30 TABLET | Refills: 0 | Status: SHIPPED | OUTPATIENT
Start: 2019-05-31 | End: 2019-07-01 | Stop reason: SDUPTHER

## 2019-05-31 RX ORDER — LISINOPRIL AND HYDROCHLOROTHIAZIDE 12.5; 1 MG/1; MG/1
TABLET ORAL
Qty: 30 TABLET | Refills: 2 | Status: SHIPPED | OUTPATIENT
Start: 2019-05-31 | End: 2019-10-08 | Stop reason: SDUPTHER

## 2019-05-31 RX ORDER — ROSUVASTATIN CALCIUM 40 MG/1
40 TABLET, COATED ORAL NIGHTLY
Qty: 30 TABLET | Refills: 2 | Status: SHIPPED | OUTPATIENT
Start: 2019-05-31 | End: 2019-10-08 | Stop reason: SDUPTHER

## 2019-06-18 ENCOUNTER — CLINICAL SUPPORT (OUTPATIENT)
Dept: ORTHOPEDIC SURGERY | Facility: CLINIC | Age: 59
End: 2019-06-18

## 2019-06-18 VITALS — HEIGHT: 61 IN | BODY MASS INDEX: 39.27 KG/M2 | TEMPERATURE: 98.6 F | WEIGHT: 208 LBS

## 2019-06-18 DIAGNOSIS — M25.561 CHRONIC PAIN OF BOTH KNEES: Primary | ICD-10-CM

## 2019-06-18 DIAGNOSIS — G89.29 CHRONIC PAIN OF BOTH KNEES: Primary | ICD-10-CM

## 2019-06-18 DIAGNOSIS — M25.562 CHRONIC PAIN OF BOTH KNEES: Primary | ICD-10-CM

## 2019-06-18 PROCEDURE — 20610 DRAIN/INJ JOINT/BURSA W/O US: CPT | Performed by: NURSE PRACTITIONER

## 2019-06-18 RX ORDER — LIDOCAINE HYDROCHLORIDE 10 MG/ML
2 INJECTION, SOLUTION EPIDURAL; INFILTRATION; INTRACAUDAL; PERINEURAL
Status: COMPLETED | OUTPATIENT
Start: 2019-06-18 | End: 2019-06-18

## 2019-06-18 RX ORDER — METHYLPREDNISOLONE ACETATE 80 MG/ML
80 INJECTION, SUSPENSION INTRA-ARTICULAR; INTRALESIONAL; INTRAMUSCULAR; SOFT TISSUE
Status: COMPLETED | OUTPATIENT
Start: 2019-06-18 | End: 2019-06-18

## 2019-06-18 RX ADMIN — METHYLPREDNISOLONE ACETATE 80 MG: 80 INJECTION, SUSPENSION INTRA-ARTICULAR; INTRALESIONAL; INTRAMUSCULAR; SOFT TISSUE at 10:34

## 2019-06-18 RX ADMIN — LIDOCAINE HYDROCHLORIDE 2 ML: 10 INJECTION, SOLUTION EPIDURAL; INFILTRATION; INTRACAUDAL; PERINEURAL at 10:34

## 2019-06-18 NOTE — PROGRESS NOTES
6/18/2019    Lydia Gutierrez is here today for worsening knee pain. Pt has undergone injection of the knee in the past with good resolution of symptoms. Pt is requesting a repeat injection.     KNEE Injection Procedure Note:    Large Joint Arthrocentesis: R knee  Date/Time: 6/18/2019 10:34 AM  Consent given by: patient  Site marked: site marked  Timeout: Immediately prior to procedure a time out was called to verify the correct patient, procedure, equipment, support staff and site/side marked as required   Supporting Documentation  Indications: pain and joint swelling   Procedure Details  Location: knee - R knee  Preparation: Patient was prepped and draped in the usual sterile fashion  Needle gauge: 21 g.  Approach: anterolateral  Medications administered: 80 mg methylPREDNISolone acetate 80 MG/ML; 2 mL lidocaine PF 1% 1 %  Patient tolerance: patient tolerated the procedure well with no immediate complications    Large Joint Arthrocentesis: L knee  Date/Time: 6/18/2019 10:34 AM  Consent given by: patient  Site marked: site marked  Timeout: Immediately prior to procedure a time out was called to verify the correct patient, procedure, equipment, support staff and site/side marked as required   Supporting Documentation  Indications: pain and joint swelling   Procedure Details  Location: knee - L knee  Preparation: Patient was prepped and draped in the usual sterile fashion  Needle gauge: 21 g   Approach: anterolateral  Medications administered: 80 mg methylPREDNISolone acetate 80 MG/ML; 2 mL lidocaine PF 1% 1 %  Patient tolerance: patient tolerated the procedure well with no immediate complications      Prior to injection risks were discussed including pain, infection, low blood sugar.  Patient verbalized understanding would like to proceed with injection    At the conclusion of the injection I discussed the importance of continued quad strengthening exercises on a daily basis. I will see the patient back if the symptoms  should fail to improve or worsen.    Anne Tejeda, APRN  6/18/2019

## 2019-07-01 RX ORDER — METHOCARBAMOL 750 MG/1
TABLET, FILM COATED ORAL
Qty: 30 TABLET | Refills: 0 | Status: SHIPPED | OUTPATIENT
Start: 2019-07-01 | End: 2019-08-01 | Stop reason: SDUPTHER

## 2019-07-02 ENCOUNTER — OFFICE VISIT (OUTPATIENT)
Dept: FAMILY MEDICINE CLINIC | Facility: CLINIC | Age: 59
End: 2019-07-02

## 2019-07-02 VITALS
OXYGEN SATURATION: 100 % | TEMPERATURE: 98.6 F | HEIGHT: 61 IN | BODY MASS INDEX: 39.01 KG/M2 | WEIGHT: 206.6 LBS | SYSTOLIC BLOOD PRESSURE: 120 MMHG | DIASTOLIC BLOOD PRESSURE: 68 MMHG | HEART RATE: 77 BPM

## 2019-07-02 DIAGNOSIS — E55.9 VITAMIN D DEFICIENCY: ICD-10-CM

## 2019-07-02 DIAGNOSIS — I10 BENIGN ESSENTIAL HYPERTENSION: Primary | ICD-10-CM

## 2019-07-02 DIAGNOSIS — E78.2 MIXED HYPERLIPIDEMIA: ICD-10-CM

## 2019-07-02 DIAGNOSIS — F41.8 MIXED ANXIETY DEPRESSIVE DISORDER: ICD-10-CM

## 2019-07-02 DIAGNOSIS — E03.9 ACQUIRED HYPOTHYROIDISM: ICD-10-CM

## 2019-07-02 DIAGNOSIS — R79.89 ABNORMAL LIVER FUNCTION TESTS: ICD-10-CM

## 2019-07-02 DIAGNOSIS — D72.829 LEUKOCYTOSIS, UNSPECIFIED TYPE: ICD-10-CM

## 2019-07-02 DIAGNOSIS — E53.8 B12 DEFICIENCY: ICD-10-CM

## 2019-07-02 PROCEDURE — 99214 OFFICE O/P EST MOD 30 MIN: CPT | Performed by: PHYSICIAN ASSISTANT

## 2019-07-02 NOTE — PROGRESS NOTES
Subjective   Lydia Gutierrez is a 58 y.o. female here today for medication management for hypertension, hypothyroid, hyperlipidemia     History of Present Illness     Patient has been doing ok. She quit her job at [employer] and will start with the [employer] within the week. 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 Her  is going to anger management classes and is no longer using drugs. Patient reports they are working on their relationship. She feels this is positive and that she is doing well. Moods are ok.     Patient continues with knee pain. She reports she will need to do something with her knees and consider surgery soon. She will return to her orthopedist when she is ready to proceed or if she needs further injections or treatments.     Patient denies new complaints. She is tolerating medications without AE.     The following portions of the patient's history were reviewed and updated as appropriate: allergies, current medications, past family history, past medical history, past social history, past surgical history and problem list.    Review of Systems   All other systems reviewed and are negative.      Objective   Physical Exam   Constitutional: She is oriented to person, place, and time. She appears well-developed and well-nourished.   HENT:   Head: Normocephalic and atraumatic.   Right Ear: External ear normal.   Left Ear: External ear normal.   Nose: Nose normal.   Eyes: Conjunctivae and lids are normal.   Neck: Neck supple. Carotid bruit is not present.   Cardiovascular: Normal rate, regular rhythm, normal heart sounds and intact distal pulses. Exam reveals no gallop and no friction rub.   No murmur heard.  Pulmonary/Chest: Effort normal and breath sounds normal. No respiratory distress. She has no wheezes. She has no rhonchi. She has no rales.   Musculoskeletal: She exhibits no edema or deformity.    Neurological: She is alert and oriented to person, place, and time. Gait normal.   Skin: Skin is warm and dry.   Psychiatric: She has a normal mood and affect. Her speech is normal and behavior is normal. Judgment and thought content normal. Cognition and memory are normal.   Nursing note and vitals reviewed.      Assessment/Plan   Lydia was seen today for med management.    Diagnoses and all orders for this visit:    Benign essential hypertension  -     Comprehensive Metabolic Panel    Mixed hyperlipidemia  -     Comprehensive Metabolic Panel  -     CK  -     Lipid Panel With LDL / HDL Ratio    B12 deficiency  -     CBC & Differential  -     Vitamin B12 & Folate    Vitamin D deficiency  -     Comprehensive Metabolic Panel  -     Vitamin D 25 Hydroxy    Acquired hypothyroidism  -     Thyroid Panel With TSH    Leukocytosis, unspecified type  -     CBC & Differential    Mixed anxiety depressive disorder    Abnormal liver function tests  -     Comprehensive Metabolic Panel      Patient Instructions   58 year old female who presents today for follow-up hypertension, hyperlipidemia, hypothyroidism, Vitamin D and B 12 deficiency, elevated liver function tests, leukocytosis, and depression with anxiety. She is tolerating medications well with no AE. She is taking her medications as directed. Patient to have fasting labs today. Call if no lab results in about a week. Follow back up 3-6 months pending lab results.     At previous visit in 2/2019, she had  from her  and had a protective order. She reported this improved her stress and symptoms. Since that time, he has started anger management classes and is no longer using drugs. She reports they are working to reconcile and that it is a healthy reconciliation. 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 Patient reports moods are well controlled.     She is no  longer taking Mobic. She continues with knee pain, worse when she is on her feet more. She has seen orthopedic surgery and is undergoing injections. Patient will consider surgery due to ongoing knee pain and will follow up with Orthopedic surgery when she is ready to proceed.

## 2019-07-03 LAB
25(OH)D3+25(OH)D2 SERPL-MCNC: 23.6 NG/ML (ref 30–100)
ALBUMIN SERPL-MCNC: 4.5 G/DL (ref 3.5–5.2)
ALBUMIN/GLOB SERPL: 2.6 G/DL
ALP SERPL-CCNC: 94 U/L (ref 39–117)
ALT SERPL-CCNC: 13 U/L (ref 1–33)
AST SERPL-CCNC: 13 U/L (ref 1–32)
BASOPHILS # BLD AUTO: 0.03 10*3/MM3 (ref 0–0.2)
BASOPHILS NFR BLD AUTO: 0.4 % (ref 0–1.5)
BILIRUB SERPL-MCNC: 0.2 MG/DL (ref 0.2–1.2)
BUN SERPL-MCNC: 19 MG/DL (ref 6–20)
BUN/CREAT SERPL: 41.3 (ref 7–25)
CALCIUM SERPL-MCNC: 8.9 MG/DL (ref 8.6–10.5)
CHLORIDE SERPL-SCNC: 106 MMOL/L (ref 98–107)
CHOLEST SERPL-MCNC: 139 MG/DL (ref 0–200)
CK SERPL-CCNC: 43 U/L (ref 20–180)
CO2 SERPL-SCNC: 25.7 MMOL/L (ref 22–29)
CREAT SERPL-MCNC: 0.46 MG/DL (ref 0.57–1)
EOSINOPHIL # BLD AUTO: 0.18 10*3/MM3 (ref 0–0.4)
EOSINOPHIL NFR BLD AUTO: 2.6 % (ref 0.3–6.2)
ERYTHROCYTE [DISTWIDTH] IN BLOOD BY AUTOMATED COUNT: 13.8 % (ref 12.3–15.4)
FOLATE SERPL-MCNC: 5.29 NG/ML (ref 4.78–24.2)
FT4I SERPL CALC-MCNC: 2.5 (ref 1.2–4.9)
GLOBULIN SER CALC-MCNC: 1.7 GM/DL
GLUCOSE SERPL-MCNC: 112 MG/DL (ref 65–99)
HCT VFR BLD AUTO: 37.9 % (ref 34–46.6)
HDLC SERPL-MCNC: 47 MG/DL (ref 40–60)
HGB BLD-MCNC: 12 G/DL (ref 12–15.9)
IMM GRANULOCYTES # BLD AUTO: 0.02 10*3/MM3 (ref 0–0.05)
IMM GRANULOCYTES NFR BLD AUTO: 0.3 % (ref 0–0.5)
LDLC SERPL CALC-MCNC: 64 MG/DL (ref 0–100)
LDLC/HDLC SERPL: 1.37 {RATIO}
LYMPHOCYTES # BLD AUTO: 1.81 10*3/MM3 (ref 0.7–3.1)
LYMPHOCYTES NFR BLD AUTO: 26 % (ref 19.6–45.3)
MCH RBC QN AUTO: 29.2 PG (ref 26.6–33)
MCHC RBC AUTO-ENTMCNC: 31.7 G/DL (ref 31.5–35.7)
MCV RBC AUTO: 92.2 FL (ref 79–97)
MONOCYTES # BLD AUTO: 0.53 10*3/MM3 (ref 0.1–0.9)
MONOCYTES NFR BLD AUTO: 7.6 % (ref 5–12)
NEUTROPHILS # BLD AUTO: 4.38 10*3/MM3 (ref 1.7–7)
NEUTROPHILS NFR BLD AUTO: 63.1 % (ref 42.7–76)
NRBC BLD AUTO-RTO: 0 /100 WBC (ref 0–0.2)
PLATELET # BLD AUTO: 303 10*3/MM3 (ref 140–450)
POTASSIUM SERPL-SCNC: 3.8 MMOL/L (ref 3.5–5.2)
PROT SERPL-MCNC: 6.2 G/DL (ref 6–8.5)
RBC # BLD AUTO: 4.11 10*6/MM3 (ref 3.77–5.28)
SODIUM SERPL-SCNC: 145 MMOL/L (ref 136–145)
T3RU NFR SERPL: 32 % (ref 24–39)
T4 SERPL-MCNC: 7.8 UG/DL (ref 4.5–12)
TRIGL SERPL-MCNC: 139 MG/DL (ref 0–150)
TSH SERPL DL<=0.005 MIU/L-ACNC: 1.61 UIU/ML (ref 0.45–4.5)
VIT B12 SERPL-MCNC: 543 PG/ML (ref 211–946)
VLDLC SERPL CALC-MCNC: 27.8 MG/DL
WBC # BLD AUTO: 6.95 10*3/MM3 (ref 3.4–10.8)

## 2019-07-14 NOTE — PATIENT INSTRUCTIONS
58 year old female who presents today for follow-up hypertension, hyperlipidemia, hypothyroidism, Vitamin D and B 12 deficiency, elevated liver function tests, leukocytosis, and depression with anxiety. She is tolerating medications well with no AE. She is taking her medications as directed. Patient to have fasting labs today. Call if no lab results in about a week. Follow back up 3-6 months pending lab results.     At previous visit in 2/2019, she had  from her  and had a protective order. She reported this improved her stress and symptoms. Since that time, he has started anger management classes and is no longer using drugs. She reports they are working to reconcile and that it is a healthy reconciliation. 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 Patient reports moods are well controlled.     She is no longer taking Mobic. She continues with knee pain, worse when she is on her feet more. She has seen orthopedic surgery and is undergoing injections. Patient will consider surgery due to ongoing knee pain and will follow up with Orthopedic surgery when she is ready to proceed.

## 2019-08-04 RX ORDER — METHOCARBAMOL 750 MG/1
TABLET, FILM COATED ORAL
Qty: 30 TABLET | Refills: 0 | Status: SHIPPED | OUTPATIENT
Start: 2019-08-04 | End: 2019-09-19 | Stop reason: SDUPTHER

## 2019-09-03 RX ORDER — LORATADINE 10 MG/1
TABLET ORAL
Qty: 90 TABLET | Refills: 0 | Status: SHIPPED | OUTPATIENT
Start: 2019-09-03 | End: 2019-12-10 | Stop reason: SDUPTHER

## 2019-09-19 RX ORDER — METHOCARBAMOL 750 MG/1
TABLET, FILM COATED ORAL
Qty: 30 TABLET | Refills: 0 | Status: SHIPPED | OUTPATIENT
Start: 2019-09-19 | End: 2019-11-08 | Stop reason: SDUPTHER

## 2019-09-24 ENCOUNTER — CLINICAL SUPPORT (OUTPATIENT)
Dept: ORTHOPEDIC SURGERY | Facility: CLINIC | Age: 59
End: 2019-09-24

## 2019-09-24 VITALS — HEIGHT: 61 IN | WEIGHT: 208 LBS | BODY MASS INDEX: 39.27 KG/M2

## 2019-09-24 DIAGNOSIS — M17.0 PRIMARY OSTEOARTHRITIS OF BOTH KNEES: Primary | ICD-10-CM

## 2019-09-24 PROCEDURE — 20610 DRAIN/INJ JOINT/BURSA W/O US: CPT | Performed by: NURSE PRACTITIONER

## 2019-09-24 RX ORDER — METHYLPREDNISOLONE ACETATE 80 MG/ML
80 INJECTION, SUSPENSION INTRA-ARTICULAR; INTRALESIONAL; INTRAMUSCULAR; SOFT TISSUE
Status: COMPLETED | OUTPATIENT
Start: 2019-09-24 | End: 2019-09-24

## 2019-09-24 RX ADMIN — METHYLPREDNISOLONE ACETATE 80 MG: 80 INJECTION, SUSPENSION INTRA-ARTICULAR; INTRALESIONAL; INTRAMUSCULAR; SOFT TISSUE at 07:58

## 2019-09-24 NOTE — PROGRESS NOTES
9/24/2019    Lydia Gutierrez is here today for worsening knee pain. Pt has undergone injection of the knee in the past with good resolution of symptoms. Pt is requesting a repeat injection.     KNEE Injection Procedure Note:    Large Joint Arthrocentesis: R knee  Date/Time: 9/24/2019 7:58 AM  Consent given by: patient  Site marked: site marked  Timeout: Immediately prior to procedure a time out was called to verify the correct patient, procedure, equipment, support staff and site/side marked as required   Supporting Documentation  Indications: pain and joint swelling   Procedure Details  Location: knee - R knee  Preparation: Patient was prepped and draped in the usual sterile fashion  Needle gauge: 21.  Approach: anterolateral  Medications administered: 80 mg methylPREDNISolone acetate 80 MG/ML; 4 mL lidocaine (cardiac)  Patient tolerance: patient tolerated the procedure well with no immediate complications    Large Joint Arthrocentesis: L knee  Date/Time: 9/24/2019 7:58 AM  Consent given by: patient  Site marked: site marked  Timeout: Immediately prior to procedure a time out was called to verify the correct patient, procedure, equipment, support staff and site/side marked as required   Supporting Documentation  Indications: pain and joint swelling   Procedure Details  Location: knee - L knee  Preparation: Patient was prepped and draped in the usual sterile fashion  Needle gauge: 21.  Approach: anterolateral  Medications administered: 4 mL lidocaine (cardiac); 80 mg methylPREDNISolone acetate 80 MG/ML  Patient tolerance: patient tolerated the procedure well with no immediate complications          At the conclusion of the injection I discussed the importance of continued quad strengthening exercises on a daily basis. I will see the patient back if the symptoms should fail to improve or worsen.    Anne Tejeda APRN  9/24/2019

## 2019-10-08 DIAGNOSIS — E53.8 B12 DEFICIENCY: ICD-10-CM

## 2019-10-08 DIAGNOSIS — E03.9 ACQUIRED HYPOTHYROIDISM: ICD-10-CM

## 2019-10-08 DIAGNOSIS — F41.8 MIXED ANXIETY DEPRESSIVE DISORDER: ICD-10-CM

## 2019-10-08 RX ORDER — AMLODIPINE BESYLATE 10 MG/1
10 TABLET ORAL DAILY
Qty: 30 TABLET | Refills: 2 | Status: SHIPPED | OUTPATIENT
Start: 2019-10-08 | End: 2020-01-13

## 2019-10-08 RX ORDER — UBIDECARENONE 75 MG
100 CAPSULE ORAL DAILY
Qty: 30 TABLET | Refills: 2 | OUTPATIENT
Start: 2019-10-08

## 2019-10-08 RX ORDER — ROSUVASTATIN CALCIUM 40 MG/1
40 TABLET, COATED ORAL NIGHTLY
Qty: 30 TABLET | Refills: 2 | Status: SHIPPED | OUTPATIENT
Start: 2019-10-08 | End: 2020-01-13

## 2019-10-08 RX ORDER — FLUOXETINE HYDROCHLORIDE 20 MG/1
40 CAPSULE ORAL DAILY
Qty: 60 CAPSULE | Refills: 2 | Status: SHIPPED | OUTPATIENT
Start: 2019-10-08 | End: 2020-01-13

## 2019-10-08 RX ORDER — PANTOPRAZOLE SODIUM 40 MG/1
40 TABLET, DELAYED RELEASE ORAL DAILY
Qty: 30 TABLET | Refills: 2 | Status: SHIPPED | OUTPATIENT
Start: 2019-10-08 | End: 2020-01-13

## 2019-10-08 RX ORDER — LEVOTHYROXINE SODIUM 137 UG/1
137 TABLET ORAL DAILY
Qty: 30 TABLET | Refills: 2 | Status: SHIPPED | OUTPATIENT
Start: 2019-10-08 | End: 2020-01-13

## 2019-10-08 RX ORDER — UBIDECARENONE 75 MG
100 CAPSULE ORAL DAILY
Qty: 30 TABLET | Refills: 2 | Status: SHIPPED | OUTPATIENT
Start: 2019-10-08 | End: 2020-03-06

## 2019-10-08 RX ORDER — LISINOPRIL AND HYDROCHLOROTHIAZIDE 12.5; 1 MG/1; MG/1
1 TABLET ORAL DAILY
Qty: 30 TABLET | Refills: 2 | Status: SHIPPED | OUTPATIENT
Start: 2019-10-08 | End: 2020-01-13

## 2019-11-11 RX ORDER — METHOCARBAMOL 750 MG/1
TABLET, FILM COATED ORAL
Qty: 30 TABLET | Refills: 0 | Status: SHIPPED | OUTPATIENT
Start: 2019-11-11 | End: 2019-12-10 | Stop reason: SDUPTHER

## 2019-12-02 ENCOUNTER — OFFICE VISIT (OUTPATIENT)
Dept: FAMILY MEDICINE CLINIC | Facility: CLINIC | Age: 59
End: 2019-12-02

## 2019-12-02 VITALS
RESPIRATION RATE: 16 BRPM | HEIGHT: 61 IN | BODY MASS INDEX: 38.51 KG/M2 | SYSTOLIC BLOOD PRESSURE: 122 MMHG | OXYGEN SATURATION: 98 % | DIASTOLIC BLOOD PRESSURE: 76 MMHG | TEMPERATURE: 98.5 F | HEART RATE: 87 BPM | WEIGHT: 204 LBS

## 2019-12-02 DIAGNOSIS — Z23 INFLUENZA VACCINE NEEDED: ICD-10-CM

## 2019-12-02 DIAGNOSIS — K21.9 GASTROESOPHAGEAL REFLUX DISEASE, ESOPHAGITIS PRESENCE NOT SPECIFIED: ICD-10-CM

## 2019-12-02 DIAGNOSIS — G89.29 CHRONIC PAIN OF BOTH KNEES: ICD-10-CM

## 2019-12-02 DIAGNOSIS — R79.89 ABNORMAL LIVER FUNCTION TESTS: ICD-10-CM

## 2019-12-02 DIAGNOSIS — D72.829 LEUKOCYTOSIS, UNSPECIFIED TYPE: ICD-10-CM

## 2019-12-02 DIAGNOSIS — M25.561 CHRONIC PAIN OF BOTH KNEES: ICD-10-CM

## 2019-12-02 DIAGNOSIS — E55.9 VITAMIN D DEFICIENCY: ICD-10-CM

## 2019-12-02 DIAGNOSIS — M25.562 CHRONIC PAIN OF BOTH KNEES: ICD-10-CM

## 2019-12-02 DIAGNOSIS — E03.9 ACQUIRED HYPOTHYROIDISM: ICD-10-CM

## 2019-12-02 DIAGNOSIS — J45.20 MILD INTERMITTENT ASTHMA WITHOUT COMPLICATION: ICD-10-CM

## 2019-12-02 DIAGNOSIS — R73.01 ELEVATED FASTING GLUCOSE: ICD-10-CM

## 2019-12-02 DIAGNOSIS — J30.9 ALLERGIC RHINITIS, UNSPECIFIED SEASONALITY, UNSPECIFIED TRIGGER: ICD-10-CM

## 2019-12-02 DIAGNOSIS — I10 BENIGN ESSENTIAL HYPERTENSION: Primary | ICD-10-CM

## 2019-12-02 DIAGNOSIS — E78.2 MIXED HYPERLIPIDEMIA: ICD-10-CM

## 2019-12-02 DIAGNOSIS — F41.8 MIXED ANXIETY DEPRESSIVE DISORDER: ICD-10-CM

## 2019-12-02 DIAGNOSIS — E53.8 B12 DEFICIENCY: ICD-10-CM

## 2019-12-02 DIAGNOSIS — G47.33 OBSTRUCTIVE SLEEP APNEA: ICD-10-CM

## 2019-12-02 PROCEDURE — 99214 OFFICE O/P EST MOD 30 MIN: CPT | Performed by: PHYSICIAN ASSISTANT

## 2019-12-02 PROCEDURE — 90674 CCIIV4 VAC NO PRSV 0.5 ML IM: CPT | Performed by: PHYSICIAN ASSISTANT

## 2019-12-02 PROCEDURE — 90471 IMMUNIZATION ADMIN: CPT | Performed by: PHYSICIAN ASSISTANT

## 2019-12-02 RX ORDER — FLUTICASONE PROPIONATE 50 MCG
2 SPRAY, SUSPENSION (ML) NASAL DAILY
Qty: 16 G | Refills: 5 | Status: SHIPPED | OUTPATIENT
Start: 2019-12-02 | End: 2020-08-12

## 2019-12-02 RX ORDER — ALBUTEROL SULFATE 90 UG/1
2 AEROSOL, METERED RESPIRATORY (INHALATION) EVERY 4 HOURS PRN
Qty: 18 G | Refills: 5 | Status: SHIPPED | OUTPATIENT
Start: 2019-12-02 | End: 2020-08-12

## 2019-12-02 NOTE — PROGRESS NOTES
Subjective   Lydia Gutierrez is a 59 y.o. female present today for medication management on hypertension, hyperlipidemia, hypothyroidism, vitamin D and vitamin B12 deficiencies, GERD, asthma, BREN, depression and anxiety, and arthritis.     History of Present Illness     Hypertension-has been stable on Norvasc 10 mg daily and lisinopril HCTZ 10/12.5 mg daily. Does not check at home.   Hyperlipidemia and blood sugars- taking Crestor 40 mg once daily and tolerating well.has not made changes for diet/ exercise.   B12- has been taking vitamin B12 100 mcg daily  Vitamin D- has not been taking 1000 IU daily. Ran out and didn't go back. Not sure she ran out.   Thyroid- no symptoms.  Feels has been controlled.  Taking Synthroid 137 mcg daily.  GERD- has been on Protonix 40 mg daily- stable.   Asthma- Singulair 10 mg at bedtime and Ventolin- does not use often. With climbing steps, has needed refill.   Allergic Rhinitis- ran out of Flonase - has been buying OTC but is too expensive.   BREN- not using CPAP nightly. Harness broke and has not been able to use for 1 year. Was seeing [facility], Dr Liza Whelan.   Depression and anxiety- has been stable on Prozac 20 mg daily. Less stress but she is working on remembering orders.  is back home- not working and will not leave the house- does not want to run into the people he was hanging out with. Still doing his classes but not helping around the house or working to help with bills.   Knee pain/arthritis- continues follow-up with orthopedic surgery.  She had her last injection in September. Will try to apply for disability for leg/ knee pain. States she can only stand for 6 hours before she has increased pain. They told her she had to lose weight to have knee surgery then they advised she was too young.     The following portions of the patient's history were reviewed and updated as appropriate: allergies, current medications, past family history, past medical history,  past social history, past surgical history and problem list.    Review of Systems   Constitutional: Positive for fatigue.   HENT: Positive for congestion, postnasal drip and rhinorrhea.    Eyes: Negative.    Respiratory: Positive for shortness of breath.    Cardiovascular: Negative.    Gastrointestinal: Negative.    Endocrine: Negative.    Genitourinary: Negative.    Musculoskeletal: Positive for arthralgias.   Skin: Negative.    Neurological: Negative.    Hematological: Negative.    Psychiatric/Behavioral: Negative.        Objective    Vitals:    12/02/19 1342   BP: 122/76   Pulse: 87   Resp: 16   Temp: 98.5 °F (36.9 °C)   SpO2: 98%     Body mass index is 38.57 kg/m².    Physical Exam   Constitutional: She is oriented to person, place, and time. She appears well-developed and well-nourished.   HENT:   Head: Normocephalic and atraumatic.   Right Ear: External ear normal.   Left Ear: External ear normal.   Nose: Nose normal.   Eyes: Conjunctivae and lids are normal.   Neck: Neck supple. Carotid bruit is not present.   Cardiovascular: Normal rate, regular rhythm, normal heart sounds and intact distal pulses. Exam reveals no gallop and no friction rub.   No murmur heard.  Pulmonary/Chest: Effort normal and breath sounds normal. No respiratory distress. She has no wheezes. She has no rhonchi. She has no rales.   Musculoskeletal: She exhibits no edema or deformity.   Neurological: She is alert and oriented to person, place, and time. Gait normal.   Skin: Skin is warm and dry.   Psychiatric: She has a normal mood and affect. Her speech is normal and behavior is normal. Judgment and thought content normal. Cognition and memory are normal.   Nursing note and vitals reviewed.      Assessment/Plan   Lydia was seen today for follow-up.    Diagnoses and all orders for this visit:    Benign essential hypertension  -     Comprehensive Metabolic Panel    Mixed hyperlipidemia  -     Comprehensive Metabolic Panel  -     CK  -      Lipid Panel With LDL / HDL Ratio    B12 deficiency  -     CBC & Differential  -     Vitamin B12 & Folate    Vitamin D deficiency  -     Comprehensive Metabolic Panel  -     Vitamin D 25 Hydroxy    Acquired hypothyroidism  -     CBC & Differential  -     Comprehensive Metabolic Panel  -     Lipid Panel With LDL / HDL Ratio  -     TSH  -     T4, free  -     T3, Free    Leukocytosis, unspecified type  -     CBC & Differential    Abnormal liver function tests  -     Comprehensive Metabolic Panel    Elevated fasting glucose  -     Comprehensive Metabolic Panel  -     Lipid Panel With LDL / HDL Ratio  -     Hemoglobin A1c    Obstructive sleep apnea  -     Ambulatory Referral to Sleep Medicine    Mild intermittent asthma without complication  -     VENTOLIN  (90 Base) MCG/ACT inhaler; Inhale 2 puffs Every 4 (Four) Hours As Needed for Wheezing.    Gastroesophageal reflux disease, esophagitis presence not specified    Chronic pain of both knees  -     diclofenac (VOLTAREN) 1 % gel gel; Apply 4 g topically to the appropriate area as directed 4 (Four) Times a Day As Needed (pain).    Mixed anxiety depressive disorder    Allergic rhinitis, unspecified seasonality, unspecified trigger  -     fluticasone (FLONASE) 50 MCG/ACT nasal spray; 2 sprays into the nostril(s) as directed by provider Daily.    Influenza vaccine needed  -     Cancel: Flucelvax Quad (Vial) =>4 yrs (0253-2744)  -     Flucelvax Quad=>4Years (4102-8774)      Patient Instructions   59 year old female who presents today for follow-up hypertension, hyperlipidemia, elevated fasting glucose, hypothyroidism, Vitamin D and B 12 deficiency, elevated liver function tests, leukocytosis, GERD, asthma, allergic rhinitis, BREN, arthritis, and depression with anxiety.  Blood pressure is stable today.  Continue Norvasc 10 mg once daily and lisinopril HCTZ 10/12.5 mg once daily.  She has not been working diligently on diet or exercise.  She has been taking medications as  prescribed.  She continues taking B12 but has not been taking vitamin D.  I will check labs when she is fasting. Call if no lab results in about a week. Stability of conditions, plan, follow up, and further recommendations pending labs. Follow back up 3-6 months pending lab results.     GERD is well controlled on Protonix, she will continue medication.  Asthma is relatively well-controlled on Singulair at bedtime, however, she is now living in an upstairs apartment.  With walking up the stairs, she feels she needs her albuterol inhaler.  I will refill this today.  If she is having to use this often, we will need to consider PFT and maintenance inhaler.  She also has had some increased congestion and rhinorrhea.  Her Flonase prescription  and she has been buying over-the-counter.  I will update her prescription today.  Patient has not been using her CPAP in over a year, reporting her mask broke.  She has not been seen by sleep medicine in over a year either.  I will refer back to sleep medicine and have them restart CPAP.     She is no longer taking Mobic but continues with knee pain, worse when she is on her feet more.  She reports she cannot work longer than 6 hours without increased pain.  Patient continues follow-up with orthopedic surgery and is undergoing injections.  She reports injections only last a couple weeks then pain recurs.  Patient wanted to consider surgery, however, she was initially told she needed to lose weight then was told she was really too young to consider surgery.  I will have her discuss this with the orthopedic surgeon for the best recommendations.  She had elevated blood pressure and liver function tests with NSAIDs, so these were stopped.  I will have her try Voltaren gel to see if this helps with her knee pain without abnormality labs or blood pressure.     Patient reports her depression and anxiety has been stable on Prozac 20 mg daily.  Her new job is less stress, and she is  working on remembering orders.  Patient's  continues living in the home and continues taking his classes.  However, he is not working and will not leave the house due to not wanting to run into the people he was hanging out with in the past.  He is also not helping around the house or working to help with bills.  We will continue Prozac 20 mg daily.  She should consider counseling to see if this helps her relationship and stressors at home.

## 2019-12-02 NOTE — PATIENT INSTRUCTIONS
59 year old female who presents today for follow-up hypertension, hyperlipidemia, elevated fasting glucose, hypothyroidism, Vitamin D and B 12 deficiency, elevated liver function tests, leukocytosis, GERD, asthma, allergic rhinitis, BREN, arthritis, and depression with anxiety.  Blood pressure is stable today.  Continue Norvasc 10 mg once daily and lisinopril HCTZ 10/12.5 mg once daily.  She has not been working diligently on diet or exercise.  She has been taking medications as prescribed.  She continues taking B12 but has not been taking vitamin D.  I will check labs when she is fasting. Call if no lab results in about a week. Stability of conditions, plan, follow up, and further recommendations pending labs. Follow back up 3-6 months pending lab results.     GERD is well controlled on Protonix, she will continue medication.  Asthma is relatively well-controlled on Singulair at bedtime, however, she is now living in an upstairs apartment.  With walking up the stairs, she feels she needs her albuterol inhaler.  I will refill this today.  If she is having to use this often, we will need to consider PFT and maintenance inhaler.  She also has had some increased congestion and rhinorrhea.  Her Flonase prescription  and she has been buying over-the-counter.  I will update her prescription today.  Patient has not been using her CPAP in over a year, reporting her mask broke.  She has not been seen by sleep medicine in over a year either.  I will refer back to sleep medicine and have them restart CPAP.     She is no longer taking Mobic but continues with knee pain, worse when she is on her feet more.  She reports she cannot work longer than 6 hours without increased pain.  Patient continues follow-up with orthopedic surgery and is undergoing injections.  She reports injections only last a couple weeks then pain recurs.  Patient wanted to consider surgery, however, she was initially told she needed to lose weight then  was told she was really too young to consider surgery.  I will have her discuss this with the orthopedic surgeon for the best recommendations.  She had elevated blood pressure and liver function tests with NSAIDs, so these were stopped.  I will have her try Voltaren gel to see if this helps with her knee pain without abnormality labs or blood pressure.     Patient reports her depression and anxiety has been stable on Prozac 20 mg daily.  Her new job is less stress, and she is working on remembering orders.  Patient's  continues living in the home and continues taking his classes.  However, he is not working and will not leave the house due to not wanting to run into the people he was hanging out with in the past.  He is also not helping around the house or working to help with bills.  We will continue Prozac 20 mg daily.  She should consider counseling to see if this helps her relationship and stressors at home.

## 2019-12-11 RX ORDER — METHOCARBAMOL 750 MG/1
TABLET, FILM COATED ORAL
Qty: 30 TABLET | Refills: 5 | Status: SHIPPED | OUTPATIENT
Start: 2019-12-11 | End: 2020-08-12

## 2019-12-11 RX ORDER — LORATADINE 10 MG/1
TABLET ORAL
Qty: 90 TABLET | Refills: 1 | Status: SHIPPED | OUTPATIENT
Start: 2019-12-11 | End: 2020-06-17

## 2020-01-10 DIAGNOSIS — F41.8 MIXED ANXIETY DEPRESSIVE DISORDER: ICD-10-CM

## 2020-01-10 DIAGNOSIS — E03.9 ACQUIRED HYPOTHYROIDISM: ICD-10-CM

## 2020-01-13 RX ORDER — LISINOPRIL AND HYDROCHLOROTHIAZIDE 12.5; 1 MG/1; MG/1
TABLET ORAL
Qty: 30 TABLET | Refills: 0 | Status: SHIPPED | OUTPATIENT
Start: 2020-01-13 | End: 2020-03-06

## 2020-01-13 RX ORDER — FLUOXETINE HYDROCHLORIDE 20 MG/1
40 CAPSULE ORAL DAILY
Qty: 60 CAPSULE | Refills: 0 | Status: SHIPPED | OUTPATIENT
Start: 2020-01-13 | End: 2020-03-06

## 2020-01-13 RX ORDER — LEVOTHYROXINE SODIUM 137 UG/1
TABLET ORAL
Qty: 30 TABLET | Refills: 0 | Status: SHIPPED | OUTPATIENT
Start: 2020-01-13 | End: 2020-03-06

## 2020-01-13 RX ORDER — ROSUVASTATIN CALCIUM 40 MG/1
TABLET, COATED ORAL
Qty: 30 TABLET | Refills: 0 | Status: SHIPPED | OUTPATIENT
Start: 2020-01-13 | End: 2020-03-06

## 2020-01-13 RX ORDER — AMLODIPINE BESYLATE 10 MG/1
TABLET ORAL
Qty: 30 TABLET | Refills: 0 | Status: SHIPPED | OUTPATIENT
Start: 2020-01-13 | End: 2020-03-06

## 2020-01-13 RX ORDER — PANTOPRAZOLE SODIUM 40 MG/1
TABLET, DELAYED RELEASE ORAL
Qty: 30 TABLET | Refills: 0 | Status: SHIPPED | OUTPATIENT
Start: 2020-01-13 | End: 2020-03-06

## 2020-02-11 ENCOUNTER — OFFICE VISIT (OUTPATIENT)
Dept: FAMILY MEDICINE CLINIC | Facility: CLINIC | Age: 60
End: 2020-02-11

## 2020-02-11 VITALS
SYSTOLIC BLOOD PRESSURE: 126 MMHG | WEIGHT: 207 LBS | BODY MASS INDEX: 39.08 KG/M2 | HEART RATE: 105 BPM | TEMPERATURE: 98.4 F | DIASTOLIC BLOOD PRESSURE: 80 MMHG | HEIGHT: 61 IN | OXYGEN SATURATION: 98 %

## 2020-02-11 DIAGNOSIS — E55.9 VITAMIN D DEFICIENCY: ICD-10-CM

## 2020-02-11 DIAGNOSIS — E78.2 MIXED HYPERLIPIDEMIA: ICD-10-CM

## 2020-02-11 DIAGNOSIS — R79.89 ABNORMAL LIVER FUNCTION TESTS: ICD-10-CM

## 2020-02-11 DIAGNOSIS — M25.561 CHRONIC PAIN OF BOTH KNEES: ICD-10-CM

## 2020-02-11 DIAGNOSIS — I10 BENIGN ESSENTIAL HYPERTENSION: ICD-10-CM

## 2020-02-11 DIAGNOSIS — F41.8 MIXED ANXIETY DEPRESSIVE DISORDER: ICD-10-CM

## 2020-02-11 DIAGNOSIS — R00.0 TACHYCARDIA: ICD-10-CM

## 2020-02-11 DIAGNOSIS — R06.02 SHORTNESS OF BREATH: ICD-10-CM

## 2020-02-11 DIAGNOSIS — G89.29 CHRONIC PAIN OF BOTH KNEES: ICD-10-CM

## 2020-02-11 DIAGNOSIS — M25.562 CHRONIC PAIN OF BOTH KNEES: ICD-10-CM

## 2020-02-11 DIAGNOSIS — E53.8 B12 DEFICIENCY: ICD-10-CM

## 2020-02-11 DIAGNOSIS — H53.9 VISUAL DISTURBANCE: ICD-10-CM

## 2020-02-11 DIAGNOSIS — R42 DIZZINESS: Primary | ICD-10-CM

## 2020-02-11 DIAGNOSIS — E03.9 ACQUIRED HYPOTHYROIDISM: ICD-10-CM

## 2020-02-11 DIAGNOSIS — R55 POSTURAL DIZZINESS WITH PRESYNCOPE: ICD-10-CM

## 2020-02-11 DIAGNOSIS — R42 POSTURAL DIZZINESS WITH PRESYNCOPE: ICD-10-CM

## 2020-02-11 DIAGNOSIS — R00.2 PALPITATIONS: ICD-10-CM

## 2020-02-11 DIAGNOSIS — D72.829 LEUKOCYTOSIS, UNSPECIFIED TYPE: ICD-10-CM

## 2020-02-11 PROCEDURE — 99214 OFFICE O/P EST MOD 30 MIN: CPT | Performed by: PHYSICIAN ASSISTANT

## 2020-02-11 NOTE — PROGRESS NOTES
"Subjective   Lydia Gutierrez is a 59 y.o. female presented today with dizziness vertigo, increased fatigue, exposed to black mold one month ago     History of Present Illness     Has been out of all medications for 1 month. States she has been out for a week at a time in the past. Just restarted medication a couple days ago.      States this time, she got sick and noticed black spots in the ceiling. States she moved furniture in her room, there was \"black mold\"- around the window and on her curtains and miniblinds. She called the landlord and they gave a bottle of bleach water and advised she wipe it down and that there was nothing they can do. They gave a sealant for the wall. When she is home, her head is stopped up and when she gets to work, it loosens up. Blurred vision, dizzy spells. States she had \"a spell\" a few nights ago getting dizzy in the bathroom taking a shower. States when she gets out of the shower, she usually has rapid heart beat and breathes hard. States she noticed that this time, she couldn't feel her whole body and she was breathing shallow.     The following portions of the patient's history were reviewed and updated as appropriate: allergies, current medications, past family history, past medical history, past social history, past surgical history and problem list.    Review of Systems   Constitutional: Positive for fatigue.   HENT: Positive for congestion.    Respiratory: Positive for shortness of breath.    Cardiovascular: Positive for palpitations.   Musculoskeletal: Positive for arthralgias.   Neurological: Positive for dizziness, weakness, light-headedness and numbness.   Psychiatric/Behavioral: Positive for dysphoric mood. The patient is nervous/anxious.        Objective    Vitals:    02/11/20 0751   BP: 126/80   Pulse: 105   Temp: 98.4 °F (36.9 °C)   SpO2: 98%     Body mass index is 39.13 kg/m².    Physical Exam   Constitutional: She is oriented to person, place, and time. Vital signs " are normal. She appears well-developed and well-nourished.   HENT:   Head: Normocephalic and atraumatic.   Right Ear: Tympanic membrane, external ear and ear canal normal.   Left Ear: Tympanic membrane, external ear and ear canal normal.   Nose: Nose normal.   Mouth/Throat: Uvula is midline, oropharynx is clear and moist and mucous membranes are normal.   Eyes: Pupils are equal, round, and reactive to light. Conjunctivae and EOM are normal.   Neck: Neck supple.   Cardiovascular: Normal rate, regular rhythm, normal heart sounds and intact distal pulses. Exam reveals no gallop and no friction rub.   No murmur heard.  Pulmonary/Chest: Effort normal and breath sounds normal. No respiratory distress. She has no wheezes. She has no rhonchi. She has no rales.   Musculoskeletal: She exhibits no edema or deformity.   Neurological: She is alert and oriented to person, place, and time. She has normal strength and normal reflexes. She is not disoriented. No cranial nerve deficit or sensory deficit.   Skin: Skin is warm and dry.   Psychiatric: She has a normal mood and affect. Her speech is normal and behavior is normal. Judgment and thought content normal. She is not actively hallucinating. Cognition and memory are normal. She is attentive.   Nursing note and vitals reviewed.    ECG 12 Lead  Date/Time: 2/12/2020 5:12 PM  Performed by: Joanna Sanabria PA  Authorized by: Joanna Sanabria PA   Comparison: compared with previous ECG from 5/11/2017  Similar to previous ECG  Rhythm: sinus rhythm  Rate: normal  Conduction: non-specific intraventricular conduction delay  Q waves: aVF    ST Segments: ST segments normal  T flattening: aVF and III  QRS axis: left    Clinical impression: abnormal EKG  Comments: Indication: Dizziness              Assessment/Plan   Lydia was seen today for dizziness, fatigue and nausea.    Diagnoses and all orders for this visit:    Dizziness  -     ECG 12 Lead  -     MRI Brain Without Contrast  -      "Ambulatory Referral to Cardiology    Tachycardia  -     ECG 12 Lead  -     MRI Brain Without Contrast  -     Ambulatory Referral to Cardiology    Palpitations  -     ECG 12 Lead  -     MRI Brain Without Contrast  -     Ambulatory Referral to Cardiology    Shortness of breath  -     ECG 12 Lead  -     MRI Brain Without Contrast  -     Ambulatory Referral to Cardiology    Postural dizziness with presyncope  -     ECG 12 Lead  -     MRI Brain Without Contrast  -     Ambulatory Referral to Cardiology    Benign essential hypertension  -     Ambulatory Referral to Cardiology    Mixed hyperlipidemia  -     Ambulatory Referral to Cardiology    B12 deficiency    Vitamin D deficiency    Acquired hypothyroidism    Chronic pain of both knees    Leukocytosis, unspecified type    Abnormal liver function tests    Mixed anxiety depressive disorder    Visual disturbance      Patient Instructions   Assessment and Plan  59 year old female who presents today with blurred vision, dizzy spells, and black spots on the ceiling. She had \"a spell\" a few nights ago getting dizzy in the bathroom while taking a shower. When she gets out of the shower, she usually has a rapid heart beat and breathes hard. However, with this episode, she couldn't feel her whole body and she was breathing shallow. This resolved with laying down for some time. When she is home, her head is stopped up and when she gets to work, it \"loosens up\". She is tachycardic today and EKG today was slightly abnormal. I will refer to cardiology for evaluation and further workup as they determine appropriate. I will also refer for MRI brain. She is overdue for labs due to having a lapse in insurance. Patient will have fasting labs. Call if no results in 1 week. Stability of conditions, plan, follow up, and further recommendations pending labs. Follow up pending labs.     Patient has been out of all medications for 1 month. She has been out for a week at a time in the past but " "never for 1 month. She just restarted medication a couple days ago.  I also advised that symptoms could be from running out of medication without weaning then abruptly restarting.    She also noticed black spots in the ceiling then moved furniture in her room,and found \"black mold\" around the window, on her curtains, and miniblinds. She called the landlord and they gave her a sealant for the wall, a bottle of bleach water and advised she wipe it down but stated there was nothing else they could do. I advised that she contact the health dept and have them come out and evaluate her home, mold, and complete air quality samples.         "

## 2020-02-12 LAB
25(OH)D3+25(OH)D2 SERPL-MCNC: 15.3 NG/ML (ref 30–100)
ALBUMIN SERPL-MCNC: 5 G/DL (ref 3.5–5.2)
ALBUMIN/GLOB SERPL: 1.8 G/DL
ALP SERPL-CCNC: 109 U/L (ref 39–117)
ALT SERPL-CCNC: 24 U/L (ref 1–33)
AST SERPL-CCNC: 16 U/L (ref 1–32)
BASOPHILS # BLD AUTO: 0.04 10*3/MM3 (ref 0–0.2)
BASOPHILS NFR BLD AUTO: 0.7 % (ref 0–1.5)
BILIRUB SERPL-MCNC: 0.3 MG/DL (ref 0.2–1.2)
BUN SERPL-MCNC: 22 MG/DL (ref 6–20)
BUN/CREAT SERPL: 27.8 (ref 7–25)
CALCIUM SERPL-MCNC: 9.7 MG/DL (ref 8.6–10.5)
CHLORIDE SERPL-SCNC: 100 MMOL/L (ref 98–107)
CHOLEST SERPL-MCNC: 241 MG/DL (ref 0–200)
CK SERPL-CCNC: 37 U/L (ref 20–180)
CO2 SERPL-SCNC: 25.7 MMOL/L (ref 22–29)
CREAT SERPL-MCNC: 0.79 MG/DL (ref 0.57–1)
EOSINOPHIL # BLD AUTO: 0.14 10*3/MM3 (ref 0–0.4)
EOSINOPHIL NFR BLD AUTO: 2.3 % (ref 0.3–6.2)
ERYTHROCYTE [DISTWIDTH] IN BLOOD BY AUTOMATED COUNT: 13.4 % (ref 12.3–15.4)
FOLATE SERPL-MCNC: 7.16 NG/ML (ref 4.78–24.2)
GLOBULIN SER CALC-MCNC: 2.8 GM/DL
GLUCOSE SERPL-MCNC: 110 MG/DL (ref 65–99)
HBA1C MFR BLD: 6.1 % (ref 4.8–5.6)
HCT VFR BLD AUTO: 42.8 % (ref 34–46.6)
HDLC SERPL-MCNC: 43 MG/DL (ref 40–60)
HGB BLD-MCNC: 14.3 G/DL (ref 12–15.9)
IMM GRANULOCYTES # BLD AUTO: 0.01 10*3/MM3 (ref 0–0.05)
IMM GRANULOCYTES NFR BLD AUTO: 0.2 % (ref 0–0.5)
LDLC SERPL CALC-MCNC: 145 MG/DL (ref 0–100)
LDLC/HDLC SERPL: 3.36 {RATIO}
LYMPHOCYTES # BLD AUTO: 1.77 10*3/MM3 (ref 0.7–3.1)
LYMPHOCYTES NFR BLD AUTO: 29.3 % (ref 19.6–45.3)
MCH RBC QN AUTO: 29.6 PG (ref 26.6–33)
MCHC RBC AUTO-ENTMCNC: 33.4 G/DL (ref 31.5–35.7)
MCV RBC AUTO: 88.6 FL (ref 79–97)
MONOCYTES # BLD AUTO: 0.5 10*3/MM3 (ref 0.1–0.9)
MONOCYTES NFR BLD AUTO: 8.3 % (ref 5–12)
NEUTROPHILS # BLD AUTO: 3.58 10*3/MM3 (ref 1.7–7)
NEUTROPHILS NFR BLD AUTO: 59.2 % (ref 42.7–76)
NRBC BLD AUTO-RTO: 0 /100 WBC (ref 0–0.2)
PLATELET # BLD AUTO: 345 10*3/MM3 (ref 140–450)
POTASSIUM SERPL-SCNC: 4.4 MMOL/L (ref 3.5–5.2)
PROT SERPL-MCNC: 7.8 G/DL (ref 6–8.5)
RBC # BLD AUTO: 4.83 10*6/MM3 (ref 3.77–5.28)
SODIUM SERPL-SCNC: 141 MMOL/L (ref 136–145)
T3FREE SERPL-MCNC: 3.2 PG/ML (ref 2–4.4)
T4 FREE SERPL-MCNC: 1.43 NG/DL (ref 0.93–1.7)
TRIGL SERPL-MCNC: 267 MG/DL (ref 0–150)
TSH SERPL DL<=0.005 MIU/L-ACNC: 4.47 UIU/ML (ref 0.27–4.2)
VIT B12 SERPL-MCNC: 557 PG/ML (ref 211–946)
VLDLC SERPL CALC-MCNC: 53.4 MG/DL
WBC # BLD AUTO: 6.04 10*3/MM3 (ref 3.4–10.8)

## 2020-02-12 PROCEDURE — 93000 ELECTROCARDIOGRAM COMPLETE: CPT | Performed by: PHYSICIAN ASSISTANT

## 2020-02-19 ENCOUNTER — TELEPHONE (OUTPATIENT)
Dept: FAMILY MEDICINE CLINIC | Facility: CLINIC | Age: 60
End: 2020-02-19

## 2020-02-19 DIAGNOSIS — R55 POSTURAL DIZZINESS WITH PRESYNCOPE: ICD-10-CM

## 2020-02-19 DIAGNOSIS — R42 POSTURAL DIZZINESS WITH PRESYNCOPE: ICD-10-CM

## 2020-02-19 DIAGNOSIS — R42 DIZZINESS: Primary | ICD-10-CM

## 2020-02-19 NOTE — TELEPHONE ENCOUNTER
Patient has been out of her meds due to insurance issues.  Has currently started back.  Will cont with the normal Vit D and will recheck in 3 months.        ----- Message from MIKA Cage sent at 2/18/2020  2:17 PM EST -----  Glucose remains elevated and A1C is much higher. Cholesterol, TG, and bad cholesterol are very elevated- up about 100 points. Please see if she was also out of her cholesterol medication. She needs to take cholesterol medication as directed. She also needs to be very cautious with diet and exercise.     BUN is elevated- this could be with fasting state. She needs to ensure proper hydration.     Vitamin D is low-please see if patient is taking vitamin D, and if so dosing and compliance.  I will make further recommendations pending current regimen. If she is not taking, to take 50,000IU once weekly.   Thyroid is also under medicated but this is from being out of medication and could be contributing to her symptoms.     Take all medication as directed, let me know about vitamin D, and follow up with me in 3 months, fasting.

## 2020-02-20 ENCOUNTER — APPOINTMENT (OUTPATIENT)
Dept: MRI IMAGING | Facility: HOSPITAL | Age: 60
End: 2020-02-20

## 2020-02-21 ENCOUNTER — TELEPHONE (OUTPATIENT)
Dept: FAMILY MEDICINE CLINIC | Facility: CLINIC | Age: 60
End: 2020-02-21

## 2020-02-21 NOTE — TELEPHONE ENCOUNTER
Patient symptoms are about the same.  She has her outpatient testing set up.  If worsening symptoms go to the er.        ----- Message from MIKA Cage sent at 2/19/2020 12:59 PM EST -----  This should not be related to lab abnormalities. I dicussed with her that this could have been due to being out of medications then restarting them. Are symptoms improving, worsening, or changing? Any new symptoms?  I also placed an order for cardiology and for an MRI brain at visit.  I just put in for carotid duplex as well.  If she is having worsening, new or changing symptoms, she is to go to the emergency department instead of waiting for outpatient testing.  She was told this as well at the visit.

## 2020-02-24 NOTE — PATIENT INSTRUCTIONS
"Assessment and Plan  59 year old female who presents today with blurred vision, dizzy spells, and black spots on the ceiling. She had \"a spell\" a few nights ago getting dizzy in the bathroom while taking a shower. When she gets out of the shower, she usually has a rapid heart beat and breathes hard. However, with this episode, she couldn't feel her whole body and she was breathing shallow. This resolved with laying down for some time. When she is home, her head is stopped up and when she gets to work, it \"loosens up\". She is tachycardic today and EKG today was slightly abnormal. I will refer to cardiology for evaluation and further workup as they determine appropriate. I will also refer for MRI brain. She is overdue for labs due to having a lapse in insurance. Patient will have fasting labs. Call if no results in 1 week. Stability of conditions, plan, follow up, and further recommendations pending labs. Follow up pending labs.     Patient has been out of all medications for 1 month. She has been out for a week at a time in the past but never for 1 month. She just restarted medication a couple days ago.  I also advised that symptoms could be from running out of medication without weaning then abruptly restarting.    She also noticed black spots in the ceiling then moved furniture in her room,and found \"black mold\" around the window, on her curtains, and miniblinds. She called the landlord and they gave her a sealant for the wall, a bottle of bleach water and advised she wipe it down but stated there was nothing else they could do. I advised that she contact the health dept and have them come out and evaluate her home, mold, and complete air quality samples.  "

## 2020-02-25 ENCOUNTER — TELEPHONE (OUTPATIENT)
Dept: FAMILY MEDICINE CLINIC | Facility: CLINIC | Age: 60
End: 2020-02-25

## 2020-02-25 NOTE — TELEPHONE ENCOUNTER
Patient's MRI brain was denied by her insurance, with them wanting her to have a formal vision exam and further testing before they would approve her brain imaging.  Unfortunately this needed to be completed pretty quickly due to the nature of her symptoms.  If she has persistent dizziness and feeling like she is going to pass out, as well as weakness with exertion or showering she needs to go to the emergency room department ASAP.

## 2020-02-26 NOTE — TELEPHONE ENCOUNTER
Patient informed and she is going to seek a formal vision exam as well.  She know to go to the ER as well and she will still have her duplex scans on Friday.

## 2020-02-27 ENCOUNTER — HOSPITAL ENCOUNTER (OUTPATIENT)
Dept: MRI IMAGING | Facility: HOSPITAL | Age: 60
Discharge: HOME | End: 2020-02-27

## 2020-02-28 ENCOUNTER — HOSPITAL ENCOUNTER (OUTPATIENT)
Dept: CARDIOLOGY | Facility: HOSPITAL | Age: 60
Discharge: HOME | End: 2020-02-28
Admitting: PHYSICIAN ASSISTANT

## 2020-02-28 LAB
BH CV XLRA MEAS LEFT DIST CCA EDV: 22 CM/SEC
BH CV XLRA MEAS LEFT DIST CCA PSV: 72 CM/SEC
BH CV XLRA MEAS LEFT DIST ICA EDV: 22 CM/SEC
BH CV XLRA MEAS LEFT DIST ICA PSV: 66 CM/SEC
BH CV XLRA MEAS LEFT ICA/CCA RATIO: 1
BH CV XLRA MEAS LEFT MID ICA EDV: 27 CM/SEC
BH CV XLRA MEAS LEFT MID ICA PSV: 76 CM/SEC
BH CV XLRA MEAS LEFT PROX CCA EDV: 22 CM/SEC
BH CV XLRA MEAS LEFT PROX CCA PSV: 88 CM/SEC
BH CV XLRA MEAS LEFT PROX ECA EDV: 13 CM/SEC
BH CV XLRA MEAS LEFT PROX ECA PSV: 79 CM/SEC
BH CV XLRA MEAS LEFT PROX ICA EDV: 25 CM/SEC
BH CV XLRA MEAS LEFT PROX ICA PSV: 72 CM/SEC
BH CV XLRA MEAS LEFT PROX SCLA EDV: 0 CM/SEC
BH CV XLRA MEAS LEFT PROX SCLA PSV: 156 CM/SEC
BH CV XLRA MEAS LEFT VERTEBRAL A EDV: 9 CM/SEC
BH CV XLRA MEAS LEFT VERTEBRAL A PSV: 35 CM/SEC
BH CV XLRA MEAS RIGHT DIST CCA EDV: 22 CM/SEC
BH CV XLRA MEAS RIGHT DIST CCA PSV: 75 CM/SEC
BH CV XLRA MEAS RIGHT DIST ICA EDV: 24 CM/SEC
BH CV XLRA MEAS RIGHT DIST ICA PSV: 56 CM/SEC
BH CV XLRA MEAS RIGHT ICA/CCA RATIO: 1.01
BH CV XLRA MEAS RIGHT MID ICA EDV: 25 CM/SEC
BH CV XLRA MEAS RIGHT MID ICA PSV: 66 CM/SEC
BH CV XLRA MEAS RIGHT PROX CCA EDV: 22 CM/SEC
BH CV XLRA MEAS RIGHT PROX CCA PSV: 78 CM/SEC
BH CV XLRA MEAS RIGHT PROX ECA EDV: 11 CM/SEC
BH CV XLRA MEAS RIGHT PROX ECA PSV: 125 CM/SEC
BH CV XLRA MEAS RIGHT PROX ICA EDV: 26 CM/SEC
BH CV XLRA MEAS RIGHT PROX ICA PSV: 76 CM/SEC
BH CV XLRA MEAS RIGHT PROX SCLA EDV: 0 CM/SEC
BH CV XLRA MEAS RIGHT PROX SCLA PSV: 164 CM/SEC
BH CV XLRA MEAS RIGHT VERTEBRAL A EDV: 6 CM/SEC
BH CV XLRA MEAS RIGHT VERTEBRAL A PSV: 32 CM/SEC
LEFT ARM BP: NORMAL MMHG
RIGHT ARM BP: NORMAL MMHG

## 2020-02-28 PROCEDURE — 93880 EXTRACRANIAL BILAT STUDY: CPT

## 2020-03-02 RX ORDER — ASPIRIN 81 MG/1
81 TABLET ORAL EVERY EVENING
COMMUNITY
End: 2020-12-08 | Stop reason: HOSPADM

## 2020-03-03 ENCOUNTER — OFFICE VISIT (OUTPATIENT)
Dept: CARDIOLOGY | Facility: CLINIC | Age: 60
End: 2020-03-03

## 2020-03-03 ENCOUNTER — CLINICAL SUPPORT (OUTPATIENT)
Dept: ORTHOPEDIC SURGERY | Facility: CLINIC | Age: 60
End: 2020-03-03

## 2020-03-03 VITALS
SYSTOLIC BLOOD PRESSURE: 122 MMHG | BODY MASS INDEX: 41.03 KG/M2 | HEIGHT: 60 IN | OXYGEN SATURATION: 98 % | DIASTOLIC BLOOD PRESSURE: 80 MMHG | HEART RATE: 93 BPM | WEIGHT: 209 LBS

## 2020-03-03 VITALS — TEMPERATURE: 98.2 F | WEIGHT: 209.8 LBS | HEIGHT: 62 IN | BODY MASS INDEX: 38.61 KG/M2

## 2020-03-03 DIAGNOSIS — G47.33 OBSTRUCTIVE SLEEP APNEA: ICD-10-CM

## 2020-03-03 DIAGNOSIS — M17.0 PRIMARY OSTEOARTHRITIS OF BOTH KNEES: Primary | ICD-10-CM

## 2020-03-03 DIAGNOSIS — R42 DIZZINESS: Primary | ICD-10-CM

## 2020-03-03 DIAGNOSIS — E78.2 MIXED HYPERLIPIDEMIA: ICD-10-CM

## 2020-03-03 DIAGNOSIS — I10 BENIGN ESSENTIAL HYPERTENSION: ICD-10-CM

## 2020-03-03 PROCEDURE — 93000 ELECTROCARDIOGRAM COMPLETE: CPT | Performed by: INTERNAL MEDICINE

## 2020-03-03 PROCEDURE — 20610 DRAIN/INJ JOINT/BURSA W/O US: CPT | Performed by: NURSE PRACTITIONER

## 2020-03-03 PROCEDURE — 99204 OFFICE O/P NEW MOD 45 MIN: CPT | Performed by: INTERNAL MEDICINE

## 2020-03-03 RX ORDER — METHYLPREDNISOLONE ACETATE 80 MG/ML
80 INJECTION, SUSPENSION INTRA-ARTICULAR; INTRALESIONAL; INTRAMUSCULAR; SOFT TISSUE
Status: COMPLETED | OUTPATIENT
Start: 2020-03-03 | End: 2020-03-03

## 2020-03-03 RX ADMIN — METHYLPREDNISOLONE ACETATE 80 MG: 80 INJECTION, SUSPENSION INTRA-ARTICULAR; INTRALESIONAL; INTRAMUSCULAR; SOFT TISSUE at 10:20

## 2020-03-03 RX ADMIN — METHYLPREDNISOLONE ACETATE 80 MG: 80 INJECTION, SUSPENSION INTRA-ARTICULAR; INTRALESIONAL; INTRAMUSCULAR; SOFT TISSUE at 10:19

## 2020-03-03 NOTE — PROGRESS NOTES
PATIENTINFORMATION    Date of Office Visit: 20  Encounter Provider: Doris Mitchell MD  Place of Service: [facility] CARDIOLOGY  Patient Name: Lydia Gutierrez  : 1960    Subjective:         Patient ID: Lydia Gutierrez is a 59 y.o. female.      History of Present Illness    This is a lady with a history of hypertension, hyperlipidemia, obstructive sleep apnea, and obesity. She comes in today cause for the last month she has been having “dizzy spells.” She says when she bends over, rolls over in bed, sits up, or lies down she feels like everything in front of her eyes is jittery for a few seconds and then it passes. She has had no presyncope or syncope. She has an occasional palpitation which is quick and not sustained and not associated with these dizzy symptoms. She does not do exercise. She works as a  and says she walks around the store but does not do any formal exercise. She is not currently wearing her CPAP. There is some insurance issue she is trying to work through with that.       Review of Systems   Constitution: Positive for decreased appetite and weight gain. Negative for fever, malaise/fatigue and weight loss.   HENT: Positive for hearing loss. Negative for ear pain, nosebleeds and sore throat.    Eyes: Positive for blurred vision. Negative for double vision, pain, vision loss in left eye and vision loss in right eye.   Cardiovascular: Positive for leg swelling.        See history of present illness.   Respiratory: Positive for snoring. Negative for cough, shortness of breath, sleep disturbances due to breathing and wheezing.    Endocrine: Negative for cold intolerance, heat intolerance and polyuria.   Skin: Positive for itching. Negative for poor wound healing and rash.   Musculoskeletal: Positive for joint pain, joint swelling and myalgias.   Gastrointestinal: Positive for nausea. Negative for abdominal pain, diarrhea, hematochezia and vomiting.    "  Genitourinary: Negative for hematuria and hesitancy.   Neurological: Positive for dizziness, headaches and light-headedness. Negative for numbness, paresthesias and seizures.   Psychiatric/Behavioral: Positive for depression. The patient is nervous/anxious.            ECG 12 Lead  Date/Time: 3/3/2020 11:45 AM  Performed by: Doris Mitchell MD  Authorized by: Doris Mitchell MD   Comparison: compared with previous ECG from 2/11/2020  Similar to previous ECG  Rhythm: sinus rhythm  BPM: 83  Conduction: conduction normal  ST Segments: ST segments normal  T Waves: T waves normal  Other findings: poor R wave progression    Clinical impression: normal ECG               Objective:     /80 (BP Location: Left arm)   Pulse 93   Ht 152.4 cm (60\")   Wt 94.8 kg (209 lb)   SpO2 98%   BMI 40.82 kg/m²  Body mass index is 40.82 kg/m².     Physical Exam   Constitutional: She appears well-developed.   HENT:   Head: Normocephalic and atraumatic.   Eyes: Pupils are equal, round, and reactive to light. Conjunctivae and lids are normal. Lids are everted and swept, no foreign bodies found.   Neck: Normal range of motion. No JVD present. Carotid bruit is not present. No tracheal deviation present. No thyroid mass present.   Cardiovascular: Normal rate, regular rhythm and normal heart sounds.   Pulses:       Dorsalis pedis pulses are 2+ on the right side, and 2+ on the left side.   Pulmonary/Chest: Effort normal and breath sounds normal.   Abdominal: Normal appearance and bowel sounds are normal.   Musculoskeletal: Normal range of motion.   Neurological: She is alert. She has normal strength.   Skin: Skin is warm, dry and intact.   Psychiatric: She has a normal mood and affect. Her behavior is normal.   Vitals reviewed.          Assessment/Plan:       1. Dizzy spells. This does not sound cardiac. This sounds more like something going on with her inner ear. I would consider sending her for some vestibular physical therapy. She " does not need any further cardiac testing at this time.   2. Palpitations. Benign. No further workup needed.  3.  Hypertension. Controlled.   4. Hyperlipidemia. On Crestor. Followed by her primary provider.   5. Obstructive sleep apnea. She is working through some insurance issues with that.   6. Obesity. I encouraged exercise and watching her diet.     I will see her back in the future if her symptoms change.      Orders Placed This Encounter   Procedures   • ECG 12 Lead     This order was created via procedure documentation        Discharge Medications           Accurate as of March 3, 2020 11:46 AM. If you have any questions, ask your nurse or doctor.               Continue These Medications      Instructions Start Date   amLODIPine 10 MG tablet  Commonly known as:  NORVASC   TAKE ONE TABLET BY MOUTH  EVERYDAY      aspirin 81 MG EC tablet   81 mg, Oral, Daily      cholecalciferol 25 MCG (1000 UT) tablet  Commonly known as:  VITAMIN D3   1,000 Units, Oral, Daily      diclofenac 1 % gel gel  Commonly known as:  VOLTAREN   4 g, Topical, 4 Times Daily PRN      FLUoxetine 20 MG capsule  Commonly known as:  PROzac   40 mg, Oral, Daily      fluticasone 50 MCG/ACT nasal spray  Commonly known as:  FLONASE   2 sprays, Nasal, Daily      levothyroxine 137 MCG tablet  Commonly known as:  SYNTHROID, LEVOTHROID   TAKE ONE TABLET BY MOUTH DAILY      lisinopril-hydrochlorothiazide 10-12.5 MG per tablet  Commonly known as:  PRINZIDE,ZESTORETIC   TAKE ONE TABLET BY MOUTH DAILY      loratadine 10 MG tablet  Commonly known as:  CLARITIN   TAKE 1 TABLET BY MOUTH DAILY AS NEEDED.      methocarbamol 750 MG tablet  Commonly known as:  ROBAXIN   TAKE 1 TABLET BY MOUTH EVERY NIGHT FOR MUSCLE SPASMS.      montelukast 10 MG tablet  Commonly known as:  SINGULAIR   TAKE 1 TABLET BY MOUTH AT BEDTIME.      pantoprazole 40 MG EC tablet  Commonly known as:  PROTONIX   TAKE ONE TABLET BY MOUTH DAILY      rosuvastatin 40 MG tablet  Commonly known  as:  CRESTOR   TAKE ONE TABLET BY MOUTH EVERY NIGHT      VENTOLIN  (90 Base) MCG/ACT inhaler  Generic drug:  albuterol sulfate HFA   2 puffs, Inhalation, Every 4 Hours PRN      vitamin B-12 100 MCG tablet  Commonly known as:  CYANOCOBALAMIN   100 mcg, Oral, Daily                    Doris Mitchell MD  03/03/20  11:46 AM

## 2020-03-03 NOTE — PROGRESS NOTES
3/3/2020    Lydia Gutierrez is here today for worsening knee pain. Pt has undergone injection of the knee in the past with good resolution of symptoms. Pt is requesting a repeat injection.     KNEE Injection Procedure Note:    Large Joint Arthrocentesis: R knee  Date/Time: 3/3/2020 10:19 AM  Consent given by: patient  Site marked: site marked  Timeout: Immediately prior to procedure a time out was called to verify the correct patient, procedure, equipment, support staff and site/side marked as required   Supporting Documentation  Indications: pain and joint swelling   Procedure Details  Location: knee - R knee  Preparation: Patient was prepped and draped in the usual sterile fashion  Needle size: 22 G  Approach: anterolateral  Medications administered: 80 mg methylPREDNISolone acetate 80 MG/ML; 2 mL lidocaine (cardiac)  Patient tolerance: patient tolerated the procedure well with no immediate complications    Large Joint Arthrocentesis: L knee  Date/Time: 3/3/2020 10:20 AM  Consent given by: patient  Site marked: site marked  Timeout: Immediately prior to procedure a time out was called to verify the correct patient, procedure, equipment, support staff and site/side marked as required   Supporting Documentation  Indications: pain and joint swelling   Procedure Details  Location: knee - L knee  Preparation: Patient was prepped and draped in the usual sterile fashion  Needle size: 22 G  Approach: anterolateral  Medications administered: 80 mg methylPREDNISolone acetate 80 MG/ML; 2 mL lidocaine (cardiac)  Patient tolerance: patient tolerated the procedure well with no immediate complications          At the conclusion of the injection I discussed the importance of continued quad strengthening exercises on a daily basis. I will see the patient back if the symptoms should fail to improve or worsen.    Anne Tejeda, APRN  3/3/2020

## 2020-03-06 DIAGNOSIS — E03.9 ACQUIRED HYPOTHYROIDISM: ICD-10-CM

## 2020-03-06 DIAGNOSIS — E53.8 B12 DEFICIENCY: ICD-10-CM

## 2020-03-06 DIAGNOSIS — F41.8 MIXED ANXIETY DEPRESSIVE DISORDER: ICD-10-CM

## 2020-03-06 RX ORDER — AMLODIPINE BESYLATE 10 MG/1
TABLET ORAL
Qty: 30 TABLET | Refills: 0 | Status: SHIPPED | OUTPATIENT
Start: 2020-03-06 | End: 2020-04-06

## 2020-03-06 RX ORDER — FLUOXETINE HYDROCHLORIDE 20 MG/1
40 CAPSULE ORAL DAILY
Qty: 60 CAPSULE | Refills: 0 | Status: SHIPPED | OUTPATIENT
Start: 2020-03-06 | End: 2020-04-06

## 2020-03-06 RX ORDER — PANTOPRAZOLE SODIUM 40 MG/1
TABLET, DELAYED RELEASE ORAL
Qty: 30 TABLET | Refills: 0 | Status: SHIPPED | OUTPATIENT
Start: 2020-03-06 | End: 2020-04-06

## 2020-03-06 RX ORDER — ROSUVASTATIN CALCIUM 40 MG/1
TABLET, COATED ORAL
Qty: 30 TABLET | Refills: 0 | Status: SHIPPED | OUTPATIENT
Start: 2020-03-06 | End: 2020-04-06

## 2020-03-06 RX ORDER — LEVOTHYROXINE SODIUM 137 UG/1
TABLET ORAL
Qty: 30 TABLET | Refills: 0 | Status: SHIPPED | OUTPATIENT
Start: 2020-03-06 | End: 2020-04-06

## 2020-03-06 RX ORDER — LISINOPRIL AND HYDROCHLOROTHIAZIDE 12.5; 1 MG/1; MG/1
TABLET ORAL
Qty: 30 TABLET | Refills: 0 | Status: SHIPPED | OUTPATIENT
Start: 2020-03-06 | End: 2020-04-06

## 2020-03-06 RX ORDER — UBIDECARENONE 75 MG
CAPSULE ORAL
Qty: 30 TABLET | Refills: 2 | Status: SHIPPED | OUTPATIENT
Start: 2020-03-06 | End: 2020-06-08

## 2020-04-04 DIAGNOSIS — F41.8 MIXED ANXIETY DEPRESSIVE DISORDER: ICD-10-CM

## 2020-04-06 DIAGNOSIS — E03.9 ACQUIRED HYPOTHYROIDISM: ICD-10-CM

## 2020-04-06 RX ORDER — MONTELUKAST SODIUM 10 MG/1
TABLET ORAL
Qty: 90 TABLET | Refills: 2 | Status: SHIPPED | OUTPATIENT
Start: 2020-04-06 | End: 2021-01-04

## 2020-04-06 RX ORDER — LISINOPRIL AND HYDROCHLOROTHIAZIDE 12.5; 1 MG/1; MG/1
TABLET ORAL
Qty: 30 TABLET | Refills: 0 | Status: SHIPPED | OUTPATIENT
Start: 2020-04-06 | End: 2020-05-05

## 2020-04-06 RX ORDER — ROSUVASTATIN CALCIUM 40 MG/1
TABLET, COATED ORAL
Qty: 30 TABLET | Refills: 0 | Status: SHIPPED | OUTPATIENT
Start: 2020-04-06 | End: 2020-05-05

## 2020-04-06 RX ORDER — AMLODIPINE BESYLATE 10 MG/1
TABLET ORAL
Qty: 30 TABLET | Refills: 0 | Status: SHIPPED | OUTPATIENT
Start: 2020-04-06 | End: 2020-05-05

## 2020-04-06 RX ORDER — FLUOXETINE HYDROCHLORIDE 20 MG/1
CAPSULE ORAL
Qty: 60 CAPSULE | Refills: 0 | Status: SHIPPED | OUTPATIENT
Start: 2020-04-06 | End: 2020-05-05

## 2020-04-06 RX ORDER — PANTOPRAZOLE SODIUM 40 MG/1
TABLET, DELAYED RELEASE ORAL
Qty: 30 TABLET | Refills: 0 | Status: SHIPPED | OUTPATIENT
Start: 2020-04-06 | End: 2020-05-05

## 2020-04-06 RX ORDER — LEVOTHYROXINE SODIUM 137 UG/1
TABLET ORAL
Qty: 30 TABLET | Refills: 0 | Status: SHIPPED | OUTPATIENT
Start: 2020-04-06 | End: 2020-05-05

## 2020-05-05 DIAGNOSIS — E03.9 ACQUIRED HYPOTHYROIDISM: ICD-10-CM

## 2020-05-05 DIAGNOSIS — F41.8 MIXED ANXIETY DEPRESSIVE DISORDER: ICD-10-CM

## 2020-05-05 RX ORDER — PANTOPRAZOLE SODIUM 40 MG/1
TABLET, DELAYED RELEASE ORAL
Qty: 30 TABLET | Refills: 0 | Status: SHIPPED | OUTPATIENT
Start: 2020-05-05 | End: 2020-06-08

## 2020-05-05 RX ORDER — LISINOPRIL AND HYDROCHLOROTHIAZIDE 12.5; 1 MG/1; MG/1
TABLET ORAL
Qty: 30 TABLET | Refills: 0 | Status: SHIPPED | OUTPATIENT
Start: 2020-05-05 | End: 2020-06-08

## 2020-05-05 RX ORDER — FLUOXETINE HYDROCHLORIDE 20 MG/1
CAPSULE ORAL
Qty: 60 CAPSULE | Refills: 0 | Status: SHIPPED | OUTPATIENT
Start: 2020-05-05 | End: 2020-06-08

## 2020-05-05 RX ORDER — LEVOTHYROXINE SODIUM 137 UG/1
TABLET ORAL
Qty: 30 TABLET | Refills: 0 | Status: SHIPPED | OUTPATIENT
Start: 2020-05-05 | End: 2020-06-08

## 2020-05-05 RX ORDER — ROSUVASTATIN CALCIUM 40 MG/1
TABLET, COATED ORAL
Qty: 30 TABLET | Refills: 0 | Status: SHIPPED | OUTPATIENT
Start: 2020-05-05 | End: 2020-06-08

## 2020-05-05 RX ORDER — AMLODIPINE BESYLATE 10 MG/1
TABLET ORAL
Qty: 30 TABLET | Refills: 0 | Status: SHIPPED | OUTPATIENT
Start: 2020-05-05 | End: 2020-06-08

## 2020-06-06 DIAGNOSIS — F41.8 MIXED ANXIETY DEPRESSIVE DISORDER: ICD-10-CM

## 2020-06-06 DIAGNOSIS — E53.8 B12 DEFICIENCY: ICD-10-CM

## 2020-06-06 DIAGNOSIS — E03.9 ACQUIRED HYPOTHYROIDISM: ICD-10-CM

## 2020-06-08 RX ORDER — AMLODIPINE BESYLATE 10 MG/1
TABLET ORAL
Qty: 30 TABLET | Refills: 0 | Status: SHIPPED | OUTPATIENT
Start: 2020-06-08 | End: 2020-06-17

## 2020-06-08 RX ORDER — LISINOPRIL AND HYDROCHLOROTHIAZIDE 12.5; 1 MG/1; MG/1
TABLET ORAL
Qty: 30 TABLET | Refills: 0 | Status: SHIPPED | OUTPATIENT
Start: 2020-06-08 | End: 2020-06-17

## 2020-06-08 RX ORDER — UBIDECARENONE 75 MG
CAPSULE ORAL
Qty: 30 TABLET | Refills: 2 | Status: SHIPPED | OUTPATIENT
Start: 2020-06-08 | End: 2020-08-12

## 2020-06-08 RX ORDER — FLUOXETINE HYDROCHLORIDE 20 MG/1
CAPSULE ORAL
Qty: 60 CAPSULE | Refills: 0 | Status: SHIPPED | OUTPATIENT
Start: 2020-06-08 | End: 2020-06-17

## 2020-06-08 RX ORDER — LEVOTHYROXINE SODIUM 137 UG/1
TABLET ORAL
Qty: 30 TABLET | Refills: 0 | Status: SHIPPED | OUTPATIENT
Start: 2020-06-08 | End: 2020-06-17

## 2020-06-08 RX ORDER — PANTOPRAZOLE SODIUM 40 MG/1
TABLET, DELAYED RELEASE ORAL
Qty: 30 TABLET | Refills: 0 | Status: SHIPPED | OUTPATIENT
Start: 2020-06-08 | End: 2020-06-17

## 2020-06-08 RX ORDER — ROSUVASTATIN CALCIUM 40 MG/1
TABLET, COATED ORAL
Qty: 30 TABLET | Refills: 0 | Status: SHIPPED | OUTPATIENT
Start: 2020-06-08 | End: 2020-06-17

## 2020-06-16 DIAGNOSIS — F41.8 MIXED ANXIETY DEPRESSIVE DISORDER: ICD-10-CM

## 2020-06-16 DIAGNOSIS — E03.9 ACQUIRED HYPOTHYROIDISM: ICD-10-CM

## 2020-06-17 RX ORDER — PANTOPRAZOLE SODIUM 40 MG/1
TABLET, DELAYED RELEASE ORAL
Qty: 30 TABLET | Refills: 0 | Status: SHIPPED | OUTPATIENT
Start: 2020-06-17 | End: 2020-07-27

## 2020-06-17 RX ORDER — ROSUVASTATIN CALCIUM 40 MG/1
TABLET, COATED ORAL
Qty: 30 TABLET | Refills: 0 | Status: SHIPPED | OUTPATIENT
Start: 2020-06-17 | End: 2020-07-27

## 2020-06-17 RX ORDER — AMLODIPINE BESYLATE 10 MG/1
TABLET ORAL
Qty: 30 TABLET | Refills: 0 | Status: SHIPPED | OUTPATIENT
Start: 2020-06-17 | End: 2020-07-27

## 2020-06-17 RX ORDER — LISINOPRIL AND HYDROCHLOROTHIAZIDE 12.5; 1 MG/1; MG/1
TABLET ORAL
Qty: 30 TABLET | Refills: 0 | Status: SHIPPED | OUTPATIENT
Start: 2020-06-17 | End: 2020-07-27

## 2020-06-17 RX ORDER — FLUOXETINE HYDROCHLORIDE 20 MG/1
CAPSULE ORAL
Qty: 60 CAPSULE | Refills: 0 | Status: SHIPPED | OUTPATIENT
Start: 2020-06-17 | End: 2020-07-27

## 2020-06-17 RX ORDER — LORATADINE 10 MG/1
TABLET ORAL
Qty: 90 TABLET | Refills: 1 | Status: SHIPPED | OUTPATIENT
Start: 2020-06-17 | End: 2021-01-04

## 2020-06-17 RX ORDER — LEVOTHYROXINE SODIUM 137 UG/1
TABLET ORAL
Qty: 30 TABLET | Refills: 0 | Status: SHIPPED | OUTPATIENT
Start: 2020-06-17 | End: 2020-07-27

## 2020-06-30 ENCOUNTER — CLINICAL SUPPORT (OUTPATIENT)
Dept: ORTHOPEDIC SURGERY | Facility: CLINIC | Age: 60
End: 2020-06-30

## 2020-06-30 VITALS — WEIGHT: 211 LBS | TEMPERATURE: 97.3 F | HEIGHT: 61 IN | BODY MASS INDEX: 39.84 KG/M2

## 2020-06-30 DIAGNOSIS — M17.0 PRIMARY OSTEOARTHRITIS OF BOTH KNEES: Primary | ICD-10-CM

## 2020-06-30 PROCEDURE — 20610 DRAIN/INJ JOINT/BURSA W/O US: CPT | Performed by: NURSE PRACTITIONER

## 2020-06-30 RX ORDER — METHYLPREDNISOLONE ACETATE 40 MG/ML
80 INJECTION, SUSPENSION INTRA-ARTICULAR; INTRALESIONAL; INTRAMUSCULAR; SOFT TISSUE
Status: COMPLETED | OUTPATIENT
Start: 2020-06-30 | End: 2020-06-30

## 2020-06-30 RX ADMIN — METHYLPREDNISOLONE ACETATE 80 MG: 40 INJECTION, SUSPENSION INTRA-ARTICULAR; INTRALESIONAL; INTRAMUSCULAR; SOFT TISSUE at 13:08

## 2020-06-30 NOTE — PROGRESS NOTES
Patient: Lydia Gutierrez  YOB: 1960 59 y.o. female  Medical Record Number: [id number]    Chief Complaints:   Chief Complaint   Patient presents with   • Left Knee - Follow-up, Pain   • Right Knee - Follow-up, Pain       History of Present Illness:Lydia Gutierrez is a 59 y.o. female who presents with complaints of bilateral knee pain.  She has had increased pain over the last several weeks.  Denies any injury.  Describes it as a severe constant sharp stabbing type pain with swelling, worse with standing only slightly better with rest    Allergies:   Allergies   Allergen Reactions   • Atorvastatin Other (See Comments)       Medications:   Current Outpatient Medications   Medication Sig Dispense Refill   • amLODIPine (NORVASC) 10 MG tablet TAKE ONE TABLET BY MOUTH  EVERYDAY 30 tablet 0   • aspirin 81 MG EC tablet Take 81 mg by mouth Daily.     • cholecalciferol (VITAMIN D3) 1000 units tablet Take 1 tablet by mouth Daily. 30 tablet 3   • diclofenac (VOLTAREN) 1 % gel gel Apply 4 g topically to the appropriate area as directed 4 (Four) Times a Day As Needed (pain). 100 g 0   • FLUoxetine (PROzac) 20 MG capsule TAKE 2 CAPSULES BY MOUTH ONCE DAILY 60 capsule 0   • fluticasone (FLONASE) 50 MCG/ACT nasal spray 2 sprays into the nostril(s) as directed by provider Daily. 16 g 5   • levothyroxine (SYNTHROID, LEVOTHROID) 137 MCG tablet TAKE ONE TABLET BY MOUTH DAILY 30 tablet 0   • lisinopril-hydrochlorothiazide (PRINZIDE,ZESTORETIC) 10-12.5 MG per tablet TAKE ONE TABLET BY MOUTH DAILY 30 tablet 0   • loratadine (CLARITIN) 10 MG tablet TAKE 1 TABLET BY MOUTH DAILY AS NEEDED. 90 tablet 1   • methocarbamol (ROBAXIN) 750 MG tablet TAKE 1 TABLET BY MOUTH EVERY NIGHT FOR MUSCLE SPASMS. 30 tablet 5   • montelukast (SINGULAIR) 10 MG tablet TAKE 1 TABLET BY MOUTH AT BEDTIME. 90 tablet 2   • pantoprazole (PROTONIX) 40 MG EC tablet TAKE ONE TABLET BY MOUTH DAILY 30 tablet 0   • rosuvastatin (CRESTOR) 40 MG tablet TAKE ONE  "TABLET BY MOUTH EVERY NIGHT 30 tablet 0   • VENTOLIN  (90 Base) MCG/ACT inhaler Inhale 2 puffs Every 4 (Four) Hours As Needed for Wheezing. 18 g 5   • vitamin B-12 (CYANOCOBALAMIN) 100 MCG tablet TAKE ONE TABLET BY MOUTH ONCE DAILY 30 tablet 2     No current facility-administered medications for this visit.          The following portions of the patient's history were reviewed and updated as appropriate: allergies, current medications, past family history, past medical history, past social history, past surgical history and problem list.    Review of Systems:   A 14 point review of systems was performed. All systems negative except pertinent positives/negative listed in HPI above    Physical Exam:   Vitals:    06/30/20 1307   Temp: 97.3 °F (36.3 °C)   Weight: 95.7 kg (211 lb)   Height: 154.9 cm (61\")   PainSc:   7       General: A and O x 3, ASA, NAD    SCLERA:    Normal    DENTITION:   Normal  Skin clear no unusual lesions noted  Bilateral knees no appreciable effusion limited range of motion secondary to pain calf soft nontender    Radiology:  Xrays previous x-rays bilateral knees were reviewed show significant arthritic changes    Assessment/Plan:  Osteoarthritis bilateral knees    Patient discussed treatment options, she would like to proceed with bilateral cortisone injections.  Prior to injections risks were discussed including pain infection.  Patient verbalized understanding would like to proceed with injections she will continue with physical therapy exercises we will see her back as needed    Large Joint Arthrocentesis: R knee  Date/Time: 6/30/2020 1:08 PM  Consent given by: patient  Site marked: site marked  Timeout: Immediately prior to procedure a time out was called to verify the correct patient, procedure, equipment, support staff and site/side marked as required   Supporting Documentation  Indications: pain   Procedure Details  Location: knee - R knee  Preparation: Patient was prepped and " draped in the usual sterile fashion  Needle gauge: 21g.  Approach: lateral  Medications administered: 2 mL lidocaine (cardiac); 80 mg methylPREDNISolone acetate 40 MG/ML  Patient tolerance: patient tolerated the procedure well with no immediate complications    Large Joint Arthrocentesis: L knee  Date/Time: 6/30/2020 1:08 PM  Consent given by: patient  Site marked: site marked  Timeout: Immediately prior to procedure a time out was called to verify the correct patient, procedure, equipment, support staff and site/side marked as required   Supporting Documentation  Indications: pain   Procedure Details  Location: knee - L knee  Preparation: Patient was prepped and draped in the usual sterile fashion  Needle gauge: 21g.  Approach: lateral  Medications administered: 2 mL lidocaine (cardiac); 80 mg methylPREDNISolone acetate 40 MG/ML  Patient tolerance: patient tolerated the procedure well with no immediate complications

## 2020-07-26 DIAGNOSIS — F41.8 MIXED ANXIETY DEPRESSIVE DISORDER: ICD-10-CM

## 2020-07-26 DIAGNOSIS — E03.9 ACQUIRED HYPOTHYROIDISM: ICD-10-CM

## 2020-07-27 RX ORDER — PANTOPRAZOLE SODIUM 40 MG/1
TABLET, DELAYED RELEASE ORAL
Qty: 30 TABLET | Refills: 0 | Status: SHIPPED | OUTPATIENT
Start: 2020-07-27 | End: 2020-08-12

## 2020-07-27 RX ORDER — LEVOTHYROXINE SODIUM 137 UG/1
TABLET ORAL
Qty: 30 TABLET | Refills: 0 | Status: SHIPPED | OUTPATIENT
Start: 2020-07-27 | End: 2020-08-12

## 2020-07-27 RX ORDER — FLUOXETINE HYDROCHLORIDE 20 MG/1
CAPSULE ORAL
Qty: 60 CAPSULE | Refills: 0 | Status: SHIPPED | OUTPATIENT
Start: 2020-07-27 | End: 2020-08-12

## 2020-07-27 RX ORDER — LISINOPRIL AND HYDROCHLOROTHIAZIDE 12.5; 1 MG/1; MG/1
TABLET ORAL
Qty: 30 TABLET | Refills: 0 | Status: SHIPPED | OUTPATIENT
Start: 2020-07-27 | End: 2020-08-12

## 2020-07-27 RX ORDER — AMLODIPINE BESYLATE 10 MG/1
TABLET ORAL
Qty: 30 TABLET | Refills: 0 | Status: SHIPPED | OUTPATIENT
Start: 2020-07-27 | End: 2020-08-12

## 2020-07-27 RX ORDER — ROSUVASTATIN CALCIUM 40 MG/1
TABLET, COATED ORAL
Qty: 30 TABLET | Refills: 0 | Status: SHIPPED | OUTPATIENT
Start: 2020-07-27 | End: 2020-08-12

## 2020-08-12 DIAGNOSIS — F41.8 MIXED ANXIETY DEPRESSIVE DISORDER: ICD-10-CM

## 2020-08-12 DIAGNOSIS — J45.20 MILD INTERMITTENT ASTHMA WITHOUT COMPLICATION: ICD-10-CM

## 2020-08-12 DIAGNOSIS — J30.9 ALLERGIC RHINITIS, UNSPECIFIED SEASONALITY, UNSPECIFIED TRIGGER: ICD-10-CM

## 2020-08-12 DIAGNOSIS — E53.8 B12 DEFICIENCY: ICD-10-CM

## 2020-08-12 DIAGNOSIS — E03.9 ACQUIRED HYPOTHYROIDISM: ICD-10-CM

## 2020-08-12 RX ORDER — PANTOPRAZOLE SODIUM 40 MG/1
TABLET, DELAYED RELEASE ORAL
Qty: 30 TABLET | Refills: 0 | Status: SHIPPED | OUTPATIENT
Start: 2020-08-12 | End: 2020-10-06

## 2020-08-12 RX ORDER — LISINOPRIL AND HYDROCHLOROTHIAZIDE 12.5; 1 MG/1; MG/1
TABLET ORAL
Qty: 30 TABLET | Refills: 0 | Status: SHIPPED | OUTPATIENT
Start: 2020-08-12 | End: 2020-10-06

## 2020-08-12 RX ORDER — FLUOXETINE HYDROCHLORIDE 20 MG/1
CAPSULE ORAL
Qty: 60 CAPSULE | Refills: 0 | Status: SHIPPED | OUTPATIENT
Start: 2020-08-12 | End: 2020-10-06

## 2020-08-12 RX ORDER — UBIDECARENONE 75 MG
CAPSULE ORAL
Qty: 30 TABLET | Refills: 2 | Status: SHIPPED | OUTPATIENT
Start: 2020-08-12 | End: 2020-12-03

## 2020-08-12 RX ORDER — FLUTICASONE PROPIONATE 50 MCG
SPRAY, SUSPENSION (ML) NASAL
Qty: 1 BOTTLE | Refills: 0 | Status: SHIPPED | OUTPATIENT
Start: 2020-08-12 | End: 2020-10-06

## 2020-08-12 RX ORDER — AMLODIPINE BESYLATE 10 MG/1
TABLET ORAL
Qty: 30 TABLET | Refills: 0 | Status: SHIPPED | OUTPATIENT
Start: 2020-08-12 | End: 2020-10-06

## 2020-08-12 RX ORDER — LEVOTHYROXINE SODIUM 137 UG/1
TABLET ORAL
Qty: 30 TABLET | Refills: 0 | Status: SHIPPED | OUTPATIENT
Start: 2020-08-12 | End: 2020-10-06

## 2020-08-12 RX ORDER — ALBUTEROL SULFATE 90 UG/1
AEROSOL, METERED RESPIRATORY (INHALATION)
Qty: 1 INHALER | Refills: 0 | Status: SHIPPED | OUTPATIENT
Start: 2020-08-12 | End: 2020-09-23

## 2020-08-12 RX ORDER — ROSUVASTATIN CALCIUM 40 MG/1
TABLET, COATED ORAL
Qty: 30 TABLET | Refills: 0 | Status: SHIPPED | OUTPATIENT
Start: 2020-08-12 | End: 2020-10-06

## 2020-08-12 RX ORDER — METHOCARBAMOL 750 MG/1
TABLET, FILM COATED ORAL
Qty: 30 TABLET | Refills: 5 | Status: SHIPPED | OUTPATIENT
Start: 2020-08-12 | End: 2021-01-04

## 2020-09-23 DIAGNOSIS — J45.20 MILD INTERMITTENT ASTHMA WITHOUT COMPLICATION: ICD-10-CM

## 2020-09-23 RX ORDER — ALBUTEROL SULFATE 90 UG/1
AEROSOL, METERED RESPIRATORY (INHALATION)
Qty: 18 G | Refills: 0 | Status: SHIPPED | OUTPATIENT
Start: 2020-09-23 | End: 2020-10-13

## 2020-09-29 ENCOUNTER — CLINICAL SUPPORT (OUTPATIENT)
Dept: ORTHOPEDIC SURGERY | Facility: CLINIC | Age: 60
End: 2020-09-29

## 2020-09-29 VITALS — BODY MASS INDEX: 39.56 KG/M2 | WEIGHT: 215 LBS | HEIGHT: 62 IN | TEMPERATURE: 98 F

## 2020-09-29 DIAGNOSIS — M25.561 PAIN IN BOTH KNEES, UNSPECIFIED CHRONICITY: ICD-10-CM

## 2020-09-29 DIAGNOSIS — M25.562 PAIN IN BOTH KNEES, UNSPECIFIED CHRONICITY: ICD-10-CM

## 2020-09-29 DIAGNOSIS — M17.0 BILATERAL PRIMARY OSTEOARTHRITIS OF KNEE: Primary | ICD-10-CM

## 2020-09-29 DIAGNOSIS — M17.12 PRIMARY OSTEOARTHRITIS OF LEFT KNEE: ICD-10-CM

## 2020-09-29 PROCEDURE — 73562 X-RAY EXAM OF KNEE 3: CPT | Performed by: NURSE PRACTITIONER

## 2020-09-29 PROCEDURE — 20610 DRAIN/INJ JOINT/BURSA W/O US: CPT | Performed by: NURSE PRACTITIONER

## 2020-09-29 RX ORDER — PREGABALIN 75 MG/1
150 CAPSULE ORAL ONCE
Status: CANCELLED | OUTPATIENT
Start: 2020-12-07 | End: 2020-09-29

## 2020-09-29 RX ORDER — CEFAZOLIN SODIUM 2 G/100ML
2 INJECTION, SOLUTION INTRAVENOUS ONCE
Status: CANCELLED | OUTPATIENT
Start: 2020-12-07 | End: 2020-09-29

## 2020-09-29 RX ORDER — MELOXICAM 15 MG/1
15 TABLET ORAL ONCE
Status: CANCELLED | OUTPATIENT
Start: 2020-12-07 | End: 2020-09-29

## 2020-09-29 RX ORDER — METHYLPREDNISOLONE ACETATE 80 MG/ML
80 INJECTION, SUSPENSION INTRA-ARTICULAR; INTRALESIONAL; INTRAMUSCULAR; SOFT TISSUE
Status: COMPLETED | OUTPATIENT
Start: 2020-09-29 | End: 2020-09-29

## 2020-09-29 RX ADMIN — METHYLPREDNISOLONE ACETATE 80 MG: 80 INJECTION, SUSPENSION INTRA-ARTICULAR; INTRALESIONAL; INTRAMUSCULAR; SOFT TISSUE at 08:19

## 2020-09-30 PROBLEM — M17.12 PRIMARY OSTEOARTHRITIS OF LEFT KNEE: Status: ACTIVE | Noted: 2020-09-30

## 2020-10-03 DIAGNOSIS — J30.9 ALLERGIC RHINITIS, UNSPECIFIED SEASONALITY, UNSPECIFIED TRIGGER: ICD-10-CM

## 2020-10-03 DIAGNOSIS — E03.9 ACQUIRED HYPOTHYROIDISM: ICD-10-CM

## 2020-10-03 DIAGNOSIS — F41.8 MIXED ANXIETY DEPRESSIVE DISORDER: ICD-10-CM

## 2020-10-06 RX ORDER — LISINOPRIL AND HYDROCHLOROTHIAZIDE 12.5; 1 MG/1; MG/1
TABLET ORAL
Qty: 30 TABLET | Refills: 0 | Status: SHIPPED | OUTPATIENT
Start: 2020-10-06 | End: 2020-11-05

## 2020-10-06 RX ORDER — LEVOTHYROXINE SODIUM 137 UG/1
TABLET ORAL
Qty: 30 TABLET | Refills: 0 | Status: SHIPPED | OUTPATIENT
Start: 2020-10-06 | End: 2020-11-05

## 2020-10-06 RX ORDER — ROSUVASTATIN CALCIUM 40 MG/1
TABLET, COATED ORAL
Qty: 30 TABLET | Refills: 0 | Status: SHIPPED | OUTPATIENT
Start: 2020-10-06 | End: 2020-11-05

## 2020-10-06 RX ORDER — AMLODIPINE BESYLATE 10 MG/1
TABLET ORAL
Qty: 30 TABLET | Refills: 0 | Status: SHIPPED | OUTPATIENT
Start: 2020-10-06 | End: 2020-11-05

## 2020-10-06 RX ORDER — PANTOPRAZOLE SODIUM 40 MG/1
TABLET, DELAYED RELEASE ORAL
Qty: 30 TABLET | Refills: 0 | Status: SHIPPED | OUTPATIENT
Start: 2020-10-06 | End: 2020-11-05

## 2020-10-06 RX ORDER — FLUTICASONE PROPIONATE 50 MCG
SPRAY, SUSPENSION (ML) NASAL
Qty: 1 BOTTLE | Refills: 0 | Status: SHIPPED | OUTPATIENT
Start: 2020-10-06 | End: 2020-11-05

## 2020-10-06 RX ORDER — FLUOXETINE HYDROCHLORIDE 20 MG/1
CAPSULE ORAL
Qty: 60 CAPSULE | Refills: 0 | Status: SHIPPED | OUTPATIENT
Start: 2020-10-06 | End: 2020-11-05

## 2020-10-13 DIAGNOSIS — J45.20 MILD INTERMITTENT ASTHMA WITHOUT COMPLICATION: ICD-10-CM

## 2020-10-13 RX ORDER — ALBUTEROL SULFATE 90 UG/1
AEROSOL, METERED RESPIRATORY (INHALATION)
Qty: 18 G | Refills: 0 | Status: SHIPPED | OUTPATIENT
Start: 2020-10-13 | End: 2020-11-02

## 2020-10-23 ENCOUNTER — OFFICE VISIT (OUTPATIENT)
Dept: FAMILY MEDICINE CLINIC | Facility: CLINIC | Age: 60
End: 2020-10-23

## 2020-10-23 VITALS
WEIGHT: 215 LBS | BODY MASS INDEX: 39.56 KG/M2 | HEART RATE: 98 BPM | TEMPERATURE: 98 F | DIASTOLIC BLOOD PRESSURE: 80 MMHG | HEIGHT: 62 IN | SYSTOLIC BLOOD PRESSURE: 124 MMHG | OXYGEN SATURATION: 98 %

## 2020-10-23 DIAGNOSIS — J45.20 MILD INTERMITTENT ASTHMA WITHOUT COMPLICATION: ICD-10-CM

## 2020-10-23 DIAGNOSIS — E53.8 B12 DEFICIENCY: ICD-10-CM

## 2020-10-23 DIAGNOSIS — R00.0 TACHYCARDIA: ICD-10-CM

## 2020-10-23 DIAGNOSIS — F41.8 MIXED ANXIETY DEPRESSIVE DISORDER: ICD-10-CM

## 2020-10-23 DIAGNOSIS — E03.9 ACQUIRED HYPOTHYROIDISM: ICD-10-CM

## 2020-10-23 DIAGNOSIS — E55.9 VITAMIN D DEFICIENCY: ICD-10-CM

## 2020-10-23 DIAGNOSIS — R00.2 PALPITATIONS: ICD-10-CM

## 2020-10-23 DIAGNOSIS — R79.89 ABNORMAL LIVER FUNCTION TESTS: ICD-10-CM

## 2020-10-23 DIAGNOSIS — R42 POSTURAL DIZZINESS WITH PRESYNCOPE: ICD-10-CM

## 2020-10-23 DIAGNOSIS — R42 DIZZINESS: ICD-10-CM

## 2020-10-23 DIAGNOSIS — R55 POSTURAL DIZZINESS WITH PRESYNCOPE: ICD-10-CM

## 2020-10-23 DIAGNOSIS — J30.9 ALLERGIC RHINITIS, UNSPECIFIED SEASONALITY, UNSPECIFIED TRIGGER: ICD-10-CM

## 2020-10-23 DIAGNOSIS — D72.829 LEUKOCYTOSIS, UNSPECIFIED TYPE: ICD-10-CM

## 2020-10-23 DIAGNOSIS — I10 BENIGN ESSENTIAL HYPERTENSION: Primary | ICD-10-CM

## 2020-10-23 DIAGNOSIS — R73.03 PREDIABETES: ICD-10-CM

## 2020-10-23 DIAGNOSIS — E78.2 MIXED HYPERLIPIDEMIA: ICD-10-CM

## 2020-10-23 DIAGNOSIS — R06.02 SHORTNESS OF BREATH: ICD-10-CM

## 2020-10-23 PROCEDURE — 99214 OFFICE O/P EST MOD 30 MIN: CPT | Performed by: PHYSICIAN ASSISTANT

## 2020-10-23 NOTE — PROGRESS NOTES
Subjective   Lydia Gutierrez is a 60 y.o. female who presents today in follow up of hypertension, hyperlipidemia, hypothyroidism, vitamin D and vitamin B12 deficiencies, GERD, asthma, BREN, depression and anxiety, and arthritis.     History of Present Illness     Hypertension-has been stable on Norvasc 10 mg daily and lisinopril HCTZ 10/12.5 mg daily. Does not check at home.   Hyperlipidemia and blood sugars- taking Crestor 40 mg once daily and tolerating well.has not made changes for diet/ exercise.   Thyroid- no symptoms.  Feels has been controlled.  Taking Synthroid 137 mcg daily.  B12- has been taking vitamin B12 100 mcg daily  Vitamin D- has not been taking 1000 IU daily. Ran out and didn't go back. Not sure she ran out.     GERD- has been on Protonix 40 mg daily- stable.   Asthma- Singulair 10 mg at bedtime and Ventolin as needed. Better out of her molded apartment   Allergic Rhinitis- controlled with Flonase as needed  BREN- not using CPAP nightly.    Depression and anxiety- has been stable on Prozac 20 mg daily. Back at the [employer] part time because they will work with her schedule. Working 2 days per week. Would like to have knee surgery to be able to work full tie again and avoid disability. Living in [place] with her  and son & is doing well. 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 Overheated and drove home from [place]. Driving son's car now.     Knee pain/arthritis- continues follow-up with orthopedic surgery. She has knee replacement for 12/7/2020. Reports it took a long time to get insurance to improvement. Working 2 days per week, 4 hours per day. Trying to avoid disability. Thinks if she gets her knee surgery, she may be able to work full time.    Laser surgery for pterygium on right eye. Couldn't see to drive or work but now has improved.     The following portions of the patient's history were reviewed and updated as appropriate: allergies, current  medications, past family history, past medical history, past social history, past surgical history and problem list.    Review of Systems   Constitutional: Positive for fatigue.   HENT: Negative.    Eyes: Negative.    Respiratory: Negative.    Cardiovascular: Negative.    Gastrointestinal: Negative.    Endocrine: Negative.    Genitourinary: Negative.    Musculoskeletal: Positive for arthralgias.   Skin: Negative.    Neurological: Negative.    Hematological: Negative.    Psychiatric/Behavioral: Negative.        Objective    Vitals:    10/23/20 1503   BP: 124/80   Pulse: 98   Temp: 98 °F (36.7 °C)   SpO2: 98%     Body mass index is 39.56 kg/m².    Physical Exam   Constitutional: She is oriented to person, place, and time. She appears well-developed. No distress.   HENT:   Head: Normocephalic and atraumatic.   Right Ear: External ear normal.   Left Ear: External ear normal.   Nose: Nose normal.   Eyes: Conjunctivae and lids are normal.   Neck: Neck supple. Carotid bruit is not present.   Cardiovascular: Normal rate, regular rhythm, normal heart sounds and normal pulses. Exam reveals no gallop and no friction rub.   No murmur heard.  Pulmonary/Chest: Effort normal and breath sounds normal. No respiratory distress. She has no wheezes. She has no rhonchi. She has no rales.   Abdominal: Normal appearance.   Musculoskeletal: No deformity.   Neurological: She is alert and oriented to person, place, and time. Gait normal.   Skin: Skin is warm and dry.   Psychiatric: Her speech is normal and behavior is normal. Mood, memory, affect, judgment and thought content normal.   Nursing note and vitals reviewed.      Assessment/Plan   Diagnoses and all orders for this visit:    1. Benign essential hypertension (Primary)  -     Comprehensive Metabolic Panel  -     Uric Acid  -     Urinalysis With Culture If Indicated -    2. Mixed hyperlipidemia  -     Comprehensive Metabolic Panel  -     CK  -     Lipid Panel With LDL / HDL Ratio  -      Uric Acid    3. Prediabetes  -     Comprehensive Metabolic Panel  -     Hemoglobin A1c  -     Uric Acid  -     Urinalysis With Culture If Indicated -    4. Acquired hypothyroidism  -     TSH  -     T4, free  -     T3, Free    5. B12 deficiency  -     CBC & Differential  -     Vitamin B12 & Folate    6. Vitamin D deficiency  -     Comprehensive Metabolic Panel  -     Vitamin D 25 Hydroxy    7. Leukocytosis, unspecified type  -     CBC & Differential    8. Abnormal liver function tests  -     Comprehensive Metabolic Panel    9. Mild intermittent asthma without complication    10. Allergic rhinitis, unspecified seasonality, unspecified trigger    11. Mixed anxiety depressive disorder    12. Dizziness    13. Postural dizziness with presyncope    14. Tachycardia    15. Palpitations    16. Shortness of breath    Other orders  -     Unable To Void       Assessment and Plan  60 y.o. female who presents today for follow-up hypertension, hyperlipidemia, elevated fasting glucose, hypothyroidism, Vitamin D and B 12 deficiency, elevated liver function tests, leukocytosis, GERD, asthma, allergic rhinitis, BREN, arthritis, and depression with anxiety.  Patient will have fasting labs. Call if no results in 1 week. Stability of conditions, plan, follow up, and further recommendations pending labs. Follow up in 3-6 months if labs are stable.     1. Hypertension- Blood pressure is stable today.  Continue Norvasc 10 mg once daily and lisinopril HCTZ 10/12.5 mg once daily.    2. Hyperlipidemia- She has not been working diligently on diet or exercise but is taking Crestor 40 mg at bedtime as prescribed. She is tolerating without AE.    3. Hypothyroidism- She has been stable on levothyroxine 137 mcg daily without signs or symptoms of hyperthyroidism or hypothyroidism.  4. B12 deficiency- She continues taking B12 100 mcg daily.   5. Vitamin D deficiency- Not taking vitamin D 1000 IU as directed. She ran out of medication and did not go  back to  again.     6. GERD- Symptoms are well controlled on Protonix. Continue medication.    7. Asthma- Well-controlled on Singulair at bedtime, especially being out of her molded apartment.    8. Allergic Rhinitis- Allergy symptoms improved with getting out of molded apartment. Controlled on Flonase 2 sprays in each nostril once daily.   9. BREN- Patient has not been using her CPAP in over a year, reporting her mask broke.  She has not been seen by sleep medicine in over a year either.  I will refer back to sleep medicine and have them restart CPAP.  10. OA knee- Patient continues follow-up with orthopedic surgery, underwent injections that only last a couple weeks before recurrance.  She will have surgery 12/7/2020. She had elevated blood pressure and liver function tests with NSAIDs, so these were stopped. She can Voltaren gel to see if this helps with her knee pain without abnormality labs or blood pressure.   11. Major Depressive Disorder and anxiety- Patient reports her depression and anxiety has been stable on Prozac 20 mg daily. She is working at [employer] again 2 days a week- they were willing to work with her schedule. She is living in a trailer with her  and son. She reports they are doing well with good relationships. Her  has remained sober. Patient's car broke down, so she is using her son's, which is stressful but ok. We will continue Prozac 20 mg daily. She should consider counseling to see if this helps her relationship and stressors at home.

## 2020-10-27 LAB
25(OH)D3+25(OH)D2 SERPL-MCNC: 47.4 NG/ML (ref 30–100)
ALBUMIN SERPL-MCNC: 4.3 G/DL (ref 3.5–5.2)
ALBUMIN/GLOB SERPL: 2.2 G/DL
ALP SERPL-CCNC: 108 U/L (ref 39–117)
ALT SERPL-CCNC: 22 U/L (ref 1–33)
AST SERPL-CCNC: 20 U/L (ref 1–32)
BASOPHILS # BLD AUTO: 0.05 10*3/MM3 (ref 0–0.2)
BASOPHILS NFR BLD AUTO: 0.9 % (ref 0–1.5)
BILIRUB SERPL-MCNC: 0.4 MG/DL (ref 0–1.2)
BUN SERPL-MCNC: 16 MG/DL (ref 6–20)
BUN/CREAT SERPL: 23.9 (ref 7–25)
CALCIUM SERPL-MCNC: 9.2 MG/DL (ref 8.6–10.5)
CHLORIDE SERPL-SCNC: 102 MMOL/L (ref 98–107)
CHOLEST SERPL-MCNC: 141 MG/DL (ref 0–200)
CK SERPL-CCNC: 57 U/L (ref 20–180)
CO2 SERPL-SCNC: 27.6 MMOL/L (ref 22–29)
CREAT SERPL-MCNC: 0.67 MG/DL (ref 0.57–1)
EOSINOPHIL # BLD AUTO: 0.27 10*3/MM3 (ref 0–0.4)
EOSINOPHIL NFR BLD AUTO: 4.8 % (ref 0.3–6.2)
ERYTHROCYTE [DISTWIDTH] IN BLOOD BY AUTOMATED COUNT: 13 % (ref 12.3–15.4)
FOLATE SERPL-MCNC: 6.02 NG/ML (ref 4.78–24.2)
GLOBULIN SER CALC-MCNC: 2 GM/DL
GLUCOSE SERPL-MCNC: 120 MG/DL (ref 65–99)
HBA1C MFR BLD: 6.3 % (ref 4.8–5.6)
HCT VFR BLD AUTO: 40.1 % (ref 34–46.6)
HDLC SERPL-MCNC: 38 MG/DL (ref 40–60)
HGB BLD-MCNC: 13.3 G/DL (ref 12–15.9)
IMM GRANULOCYTES # BLD AUTO: 0.02 10*3/MM3 (ref 0–0.05)
IMM GRANULOCYTES NFR BLD AUTO: 0.4 % (ref 0–0.5)
LDLC SERPL CALC-MCNC: 65 MG/DL (ref 0–100)
LDLC/HDLC SERPL: 1.48 {RATIO}
LYMPHOCYTES # BLD AUTO: 1.84 10*3/MM3 (ref 0.7–3.1)
LYMPHOCYTES NFR BLD AUTO: 32.7 % (ref 19.6–45.3)
MCH RBC QN AUTO: 29.3 PG (ref 26.6–33)
MCHC RBC AUTO-ENTMCNC: 33.2 G/DL (ref 31.5–35.7)
MCV RBC AUTO: 88.3 FL (ref 79–97)
MONOCYTES # BLD AUTO: 0.4 10*3/MM3 (ref 0.1–0.9)
MONOCYTES NFR BLD AUTO: 7.1 % (ref 5–12)
NEUTROPHILS # BLD AUTO: 3.04 10*3/MM3 (ref 1.7–7)
NEUTROPHILS NFR BLD AUTO: 54.1 % (ref 42.7–76)
NRBC BLD AUTO-RTO: 0 /100 WBC (ref 0–0.2)
PLATELET # BLD AUTO: 364 10*3/MM3 (ref 140–450)
POTASSIUM SERPL-SCNC: 3.9 MMOL/L (ref 3.5–5.2)
PROT SERPL-MCNC: 6.3 G/DL (ref 6–8.5)
RBC # BLD AUTO: 4.54 10*6/MM3 (ref 3.77–5.28)
SODIUM SERPL-SCNC: 139 MMOL/L (ref 136–145)
T3FREE SERPL-MCNC: 3 PG/ML (ref 2–4.4)
T4 FREE SERPL-MCNC: 1.33 NG/DL (ref 0.93–1.7)
TRIGL SERPL-MCNC: 233 MG/DL (ref 0–150)
TSH SERPL DL<=0.005 MIU/L-ACNC: 3.18 UIU/ML (ref 0.27–4.2)
UNABLE TO VOID: NORMAL
URATE SERPL-MCNC: 4.3 MG/DL (ref 2.4–5.7)
VIT B12 SERPL-MCNC: 648 PG/ML (ref 211–946)
VLDLC SERPL CALC-MCNC: 38 MG/DL (ref 5–40)
WBC # BLD AUTO: 5.62 10*3/MM3 (ref 3.4–10.8)

## 2020-10-31 DIAGNOSIS — J45.20 MILD INTERMITTENT ASTHMA WITHOUT COMPLICATION: ICD-10-CM

## 2020-11-02 RX ORDER — ALBUTEROL SULFATE 90 UG/1
AEROSOL, METERED RESPIRATORY (INHALATION)
Qty: 18 G | Refills: 0 | Status: SHIPPED | OUTPATIENT
Start: 2020-11-02 | End: 2021-01-04

## 2020-11-03 DIAGNOSIS — J30.9 ALLERGIC RHINITIS, UNSPECIFIED SEASONALITY, UNSPECIFIED TRIGGER: ICD-10-CM

## 2020-11-03 DIAGNOSIS — F41.8 MIXED ANXIETY DEPRESSIVE DISORDER: ICD-10-CM

## 2020-11-03 DIAGNOSIS — E03.9 ACQUIRED HYPOTHYROIDISM: ICD-10-CM

## 2020-11-05 RX ORDER — AMLODIPINE BESYLATE 10 MG/1
TABLET ORAL
Qty: 30 TABLET | Refills: 5 | Status: SHIPPED | OUTPATIENT
Start: 2020-11-05 | End: 2021-05-04

## 2020-11-05 RX ORDER — ROSUVASTATIN CALCIUM 40 MG/1
TABLET, COATED ORAL
Qty: 30 TABLET | Refills: 5 | Status: SHIPPED | OUTPATIENT
Start: 2020-11-05 | End: 2021-05-04

## 2020-11-05 RX ORDER — LISINOPRIL AND HYDROCHLOROTHIAZIDE 12.5; 1 MG/1; MG/1
TABLET ORAL
Qty: 30 TABLET | Refills: 5 | Status: SHIPPED | OUTPATIENT
Start: 2020-11-05 | End: 2021-05-04

## 2020-11-05 RX ORDER — PANTOPRAZOLE SODIUM 40 MG/1
TABLET, DELAYED RELEASE ORAL
Qty: 30 TABLET | Refills: 5 | Status: SHIPPED | OUTPATIENT
Start: 2020-11-05 | End: 2021-05-04

## 2020-11-05 RX ORDER — FLUTICASONE PROPIONATE 50 MCG
SPRAY, SUSPENSION (ML) NASAL
Qty: 18 G | Refills: 5 | Status: SHIPPED | OUTPATIENT
Start: 2020-11-05 | End: 2021-05-04

## 2020-11-05 RX ORDER — LEVOTHYROXINE SODIUM 137 UG/1
TABLET ORAL
Qty: 30 TABLET | Refills: 5 | Status: SHIPPED | OUTPATIENT
Start: 2020-11-05 | End: 2021-05-04

## 2020-11-05 RX ORDER — FLUOXETINE HYDROCHLORIDE 20 MG/1
CAPSULE ORAL
Qty: 60 CAPSULE | Refills: 5 | Status: SHIPPED | OUTPATIENT
Start: 2020-11-05 | End: 2021-05-04

## 2020-11-24 ENCOUNTER — TRANSCRIBE ORDERS (OUTPATIENT)
Dept: PREADMISSION TESTING | Facility: HOSPITAL | Age: 60
End: 2020-11-24

## 2020-11-24 DIAGNOSIS — Z01.818 OTHER SPECIFIED PRE-OPERATIVE EXAMINATION: Primary | ICD-10-CM

## 2020-11-27 ENCOUNTER — APPOINTMENT (OUTPATIENT)
Dept: PREADMISSION TESTING | Facility: HOSPITAL | Age: 60
End: 2020-11-27

## 2020-11-27 VITALS
HEART RATE: 83 BPM | RESPIRATION RATE: 20 BRPM | BODY MASS INDEX: 39.75 KG/M2 | TEMPERATURE: 98 F | SYSTOLIC BLOOD PRESSURE: 130 MMHG | WEIGHT: 216 LBS | OXYGEN SATURATION: 98 % | DIASTOLIC BLOOD PRESSURE: 81 MMHG | HEIGHT: 62 IN

## 2020-11-27 DIAGNOSIS — M17.12 PRIMARY OSTEOARTHRITIS OF LEFT KNEE: ICD-10-CM

## 2020-11-27 LAB
ANION GAP SERPL CALCULATED.3IONS-SCNC: 9.3 MMOL/L (ref 5–15)
BILIRUB UR QL STRIP: NEGATIVE
BUN SERPL-MCNC: 19 MG/DL (ref 6–20)
BUN/CREAT SERPL: 32.2 (ref 7–25)
CALCIUM SPEC-SCNC: 9.3 MG/DL (ref 8.6–10.5)
CHLORIDE SERPL-SCNC: 104 MMOL/L (ref 98–107)
CLARITY UR: CLEAR
CO2 SERPL-SCNC: 24.7 MMOL/L (ref 22–29)
COLOR UR: YELLOW
CREAT SERPL-MCNC: 0.59 MG/DL (ref 0.57–1)
DEPRECATED RDW RBC AUTO: 41.9 FL (ref 37–54)
ERYTHROCYTE [DISTWIDTH] IN BLOOD BY AUTOMATED COUNT: 12.8 % (ref 12.3–15.4)
GFR SERPL CREATININE-BSD FRML MDRD: 105 ML/MIN/1.73
GLUCOSE SERPL-MCNC: 110 MG/DL (ref 65–99)
GLUCOSE UR STRIP-MCNC: NEGATIVE MG/DL
HCT VFR BLD AUTO: 39.5 % (ref 34–46.6)
HGB BLD-MCNC: 12.9 G/DL (ref 12–15.9)
HGB UR QL STRIP.AUTO: NEGATIVE
KETONES UR QL STRIP: NEGATIVE
LEUKOCYTE ESTERASE UR QL STRIP.AUTO: NEGATIVE
MCH RBC QN AUTO: 29.1 PG (ref 26.6–33)
MCHC RBC AUTO-ENTMCNC: 32.7 G/DL (ref 31.5–35.7)
MCV RBC AUTO: 89 FL (ref 79–97)
NITRITE UR QL STRIP: NEGATIVE
PH UR STRIP.AUTO: 6 [PH] (ref 5–8)
PLATELET # BLD AUTO: 330 10*3/MM3 (ref 140–450)
PMV BLD AUTO: 9.2 FL (ref 6–12)
POTASSIUM SERPL-SCNC: 3.8 MMOL/L (ref 3.5–5.2)
PROT UR QL STRIP: NEGATIVE
QT INTERVAL: 410 MS
RBC # BLD AUTO: 4.44 10*6/MM3 (ref 3.77–5.28)
SODIUM SERPL-SCNC: 138 MMOL/L (ref 136–145)
SP GR UR STRIP: 1.02 (ref 1–1.03)
UROBILINOGEN UR QL STRIP: NORMAL
WBC # BLD AUTO: 5.79 10*3/MM3 (ref 3.4–10.8)

## 2020-11-27 PROCEDURE — 36415 COLL VENOUS BLD VENIPUNCTURE: CPT

## 2020-11-27 PROCEDURE — 81003 URINALYSIS AUTO W/O SCOPE: CPT

## 2020-11-27 PROCEDURE — 85027 COMPLETE CBC AUTOMATED: CPT

## 2020-11-27 PROCEDURE — 93005 ELECTROCARDIOGRAM TRACING: CPT

## 2020-11-27 PROCEDURE — 80048 BASIC METABOLIC PNL TOTAL CA: CPT

## 2020-11-27 PROCEDURE — 93010 ELECTROCARDIOGRAM REPORT: CPT | Performed by: INTERNAL MEDICINE

## 2020-11-27 RX ORDER — IBUPROFEN 800 MG/1
800 TABLET ORAL EVERY 6 HOURS PRN
COMMUNITY
End: 2020-12-08 | Stop reason: HOSPADM

## 2020-11-27 ASSESSMENT — KOOS JR
KOOS JR SCORE: 28.251
KOOS JR SCORE: 23

## 2020-12-03 ENCOUNTER — OFFICE VISIT (OUTPATIENT)
Dept: ORTHOPEDIC SURGERY | Facility: CLINIC | Age: 60
End: 2020-12-03

## 2020-12-03 VITALS — BODY MASS INDEX: 39.38 KG/M2 | HEIGHT: 62 IN | TEMPERATURE: 97.1 F | WEIGHT: 214 LBS

## 2020-12-03 DIAGNOSIS — E53.8 B12 DEFICIENCY: ICD-10-CM

## 2020-12-03 DIAGNOSIS — M25.562 LEFT KNEE PAIN, UNSPECIFIED CHRONICITY: Primary | ICD-10-CM

## 2020-12-03 PROCEDURE — 77077 JOINT SURVEY SINGLE VIEW: CPT | Performed by: ORTHOPAEDIC SURGERY

## 2020-12-03 PROCEDURE — S0260 H&P FOR SURGERY: HCPCS | Performed by: NURSE PRACTITIONER

## 2020-12-03 RX ORDER — UREA 10 %
LOTION (ML) TOPICAL
Qty: 90 TABLET | Refills: 0 | Status: SHIPPED | OUTPATIENT
Start: 2020-12-03 | End: 2021-01-30

## 2020-12-03 NOTE — H&P
Patient: Lydia Gutierrez    Date of Admission: 12/7/2020    YOB: 1960    Medical Record Number: [id number]    Admitting Physician: Dr. Juan Walsh    Reason for Admission: End Stage Left Knee OA    History of Present Illness: 60 y.o. female presents with severe end stage knee osteoarthritis which has not been responsive to the full compliment of conservative measures. Despite conservative attempts, there is still severe, constant activity limiting pain. Given the severity of the pain, the patient has elected to proceed with knee replacement.    Allergies:   Allergies   Allergen Reactions   • Atorvastatin Other (See Comments)     PATIENT CAN NOT REMEMBER         Current Medications:  Home Medications:    Current Outpatient Medications on File Prior to Visit   Medication Sig   • amLODIPine (NORVASC) 10 MG tablet TAKE ONE TABLET BY MOUTH  EVERYDAY (Patient taking differently: Take 10 mg by mouth Every Evening.)   • aspirin 81 MG EC tablet Take 81 mg by mouth Every Evening. INSTRUCTED PT TO FOLLOW MD INSTRUCTIONS REGARDING HOLDING FOR SURGERY   • CHLORHEXIDINE GLUCONATE CLOTH EX Apply  topically. AS DIRECTED PREOP   • cholecalciferol (VITAMIN D3) 1000 units tablet Take 1 tablet by mouth Daily.   • diclofenac (VOLTAREN) 1 % gel gel Apply 4 g topically to the appropriate area as directed 4 (Four) Times a Day As Needed (pain).   • FLUoxetine (PROzac) 20 MG capsule TAKE 2 CAPSULES BY MOUTH ONCE DAILY (Patient taking differently: Take 40 mg by mouth Every Evening.)   • fluticasone (FLONASE) 50 MCG/ACT nasal spray INSTILL 2 SPRAYS INTO EACH NOSTRIL AS DIRECTED BY PROVIDER DAILY.   • ibuprofen (ADVIL,MOTRIN) 800 MG tablet Take 800 mg by mouth Every 6 (Six) Hours As Needed for Mild Pain . INSTRUCTED PT TO FOLLOW MD INSTRUCTIONS REGARDING HOLDING FOR SURGERY   • levothyroxine (SYNTHROID, LEVOTHROID) 137 MCG tablet TAKE ONE TABLET BY MOUTH DAILY   • lisinopril-hydrochlorothiazide (PRINZIDE,ZESTORETIC) 10-12.5 MG per  tablet TAKE ONE TABLET BY MOUTH DAILY   • loratadine (CLARITIN) 10 MG tablet TAKE 1 TABLET BY MOUTH DAILY AS NEEDED. (Patient taking differently: Take 10 mg by mouth Daily.)   • methocarbamol (ROBAXIN) 750 MG tablet TAKE 1 TABLET BY MOUTH EVERY NIGHT FOR MUSCLE SPASMS.   • montelukast (SINGULAIR) 10 MG tablet TAKE 1 TABLET BY MOUTH AT BEDTIME.   • mupirocin (BACTROBAN) 2 % nasal ointment into the nostril(s) as directed by provider.   • pantoprazole (PROTONIX) 40 MG EC tablet TAKE ONE TABLET BY MOUTH DAILY   • rosuvastatin (CRESTOR) 40 MG tablet TAKE ONE TABLET BY MOUTH EVERY NIGHT   • Ventolin  (90 Base) MCG/ACT inhaler INHALE 2 PUFFS EVERY FOUR HOURS AS NEEDED FOR WHEEZING.   • [DISCONTINUED] vitamin B-12 (CYANOCOBALAMIN) 100 MCG tablet TAKE ONE TABLET BY MOUTH ONCE DAILY (Patient taking differently: HOLD PRIOR TO SURG)     Current Facility-Administered Medications on File Prior to Visit   Medication   • Chlorhexidine Gluconate 2 % pads 2 each     PRN Meds:.    PMH:     Past Medical History:   Diagnosis Date   • Anxiety disorder    • Arthritis    • Asthma    • Borderline diabetic    • Depression    • GERD (gastroesophageal reflux disease)    • History of kidney stones    • Hyperlipidemia    • Hypertension    • Hypothyroidism    • Left knee pain    • Sleep apnea     NO MACHINE       PF/Surg/Soc Hx:     Past Surgical History:   Procedure Laterality Date   •  SECTION     • CHOLECYSTECTOMY     • EYE SURGERY Left         Social History     Occupational History   • Not on file   Tobacco Use   • Smoking status: Never Smoker   • Smokeless tobacco: Never Used   Substance and Sexual Activity   • Alcohol use: No   • Drug use: No   • Sexual activity: Defer      Social History     Social History Narrative   • Not on file        Family History   Problem Relation Age of Onset   • Heart disease Mother    • Glaucoma Mother    • Cancer Mother    • Thyroid disease Sister    • Kidney disease Brother    • Glaucoma  "Brother    • Asthma Son    • Depression Son    • Bipolar disorder Son    • Hypertension Father    • Malig Hyperthermia Neg Hx          Review of Systems:   A 14 point review of systems was performed, pertinent positives discussed above, all other systems are negative    Physical Exam: 60 y.o. female  Vital Signs :   Vitals:    12/03/20 1401   Temp: 97.1 °F (36.2 °C)   Weight: 97.1 kg (214 lb)   Height: 156.2 cm (61.5\")   PainSc:   7     General: Alert and Oriented x 3, No acute distress.  Psych: mood and affect appropriate; recent and remote memory intact  Eyes: conjunctiva clear; pupils equally round and reactive, sclera nonicteric  CV: no peripheral edema  Resp: normal respiratory effort  Skin: no rashes or wounds; normal turgor  Musculosketetal; pain and crepitance with knee range of motion  Vascular: palpable distal pulses    Xrays:  -3 views (AP, lateral, and sunrise) were reviewed demonstrating end-stage OA with bone on bone articulation.  -A full length AP xray was ordered and reviewed today for purposes of operative alignment demonstrating end stage arthritic findings. There are no previous full length films for review    Assessment:  End-stage Left knee osteoarthritis. Conservative measures have failed.      Plan:  The plan is to proceed with Left Total Knee Replacement. The patient voiced understanding of the risks, benefits, and alternative forms of treatment that were discussed with Dr Walsh at the time of scheduling. 23     Anne Tejeda, APRN  12/3/2020        "

## 2020-12-03 NOTE — H&P (VIEW-ONLY)
Patient: Lydia Gutierrez    Date of Admission: 12/7/2020    YOB: 1960    Medical Record Number: [id number]    Admitting Physician: Dr. Juan Walsh    Reason for Admission: End Stage Left Knee OA    History of Present Illness: 60 y.o. female presents with severe end stage knee osteoarthritis which has not been responsive to the full compliment of conservative measures. Despite conservative attempts, there is still severe, constant activity limiting pain. Given the severity of the pain, the patient has elected to proceed with knee replacement.    Allergies:   Allergies   Allergen Reactions   • Atorvastatin Other (See Comments)     PATIENT CAN NOT REMEMBER         Current Medications:  Home Medications:    Current Outpatient Medications on File Prior to Visit   Medication Sig   • amLODIPine (NORVASC) 10 MG tablet TAKE ONE TABLET BY MOUTH  EVERYDAY (Patient taking differently: Take 10 mg by mouth Every Evening.)   • aspirin 81 MG EC tablet Take 81 mg by mouth Every Evening. INSTRUCTED PT TO FOLLOW MD INSTRUCTIONS REGARDING HOLDING FOR SURGERY   • CHLORHEXIDINE GLUCONATE CLOTH EX Apply  topically. AS DIRECTED PREOP   • cholecalciferol (VITAMIN D3) 1000 units tablet Take 1 tablet by mouth Daily.   • diclofenac (VOLTAREN) 1 % gel gel Apply 4 g topically to the appropriate area as directed 4 (Four) Times a Day As Needed (pain).   • FLUoxetine (PROzac) 20 MG capsule TAKE 2 CAPSULES BY MOUTH ONCE DAILY (Patient taking differently: Take 40 mg by mouth Every Evening.)   • fluticasone (FLONASE) 50 MCG/ACT nasal spray INSTILL 2 SPRAYS INTO EACH NOSTRIL AS DIRECTED BY PROVIDER DAILY.   • ibuprofen (ADVIL,MOTRIN) 800 MG tablet Take 800 mg by mouth Every 6 (Six) Hours As Needed for Mild Pain . INSTRUCTED PT TO FOLLOW MD INSTRUCTIONS REGARDING HOLDING FOR SURGERY   • levothyroxine (SYNTHROID, LEVOTHROID) 137 MCG tablet TAKE ONE TABLET BY MOUTH DAILY   • lisinopril-hydrochlorothiazide (PRINZIDE,ZESTORETIC) 10-12.5 MG per  tablet TAKE ONE TABLET BY MOUTH DAILY   • loratadine (CLARITIN) 10 MG tablet TAKE 1 TABLET BY MOUTH DAILY AS NEEDED. (Patient taking differently: Take 10 mg by mouth Daily.)   • methocarbamol (ROBAXIN) 750 MG tablet TAKE 1 TABLET BY MOUTH EVERY NIGHT FOR MUSCLE SPASMS.   • montelukast (SINGULAIR) 10 MG tablet TAKE 1 TABLET BY MOUTH AT BEDTIME.   • mupirocin (BACTROBAN) 2 % nasal ointment into the nostril(s) as directed by provider.   • pantoprazole (PROTONIX) 40 MG EC tablet TAKE ONE TABLET BY MOUTH DAILY   • rosuvastatin (CRESTOR) 40 MG tablet TAKE ONE TABLET BY MOUTH EVERY NIGHT   • Ventolin  (90 Base) MCG/ACT inhaler INHALE 2 PUFFS EVERY FOUR HOURS AS NEEDED FOR WHEEZING.   • [DISCONTINUED] vitamin B-12 (CYANOCOBALAMIN) 100 MCG tablet TAKE ONE TABLET BY MOUTH ONCE DAILY (Patient taking differently: HOLD PRIOR TO SURG)     Current Facility-Administered Medications on File Prior to Visit   Medication   • Chlorhexidine Gluconate 2 % pads 2 each     PRN Meds:.    PMH:     Past Medical History:   Diagnosis Date   • Anxiety disorder    • Arthritis    • Asthma    • Borderline diabetic    • Depression    • GERD (gastroesophageal reflux disease)    • History of kidney stones    • Hyperlipidemia    • Hypertension    • Hypothyroidism    • Left knee pain    • Sleep apnea     NO MACHINE       PF/Surg/Soc Hx:     Past Surgical History:   Procedure Laterality Date   •  SECTION     • CHOLECYSTECTOMY     • EYE SURGERY Left         Social History     Occupational History   • Not on file   Tobacco Use   • Smoking status: Never Smoker   • Smokeless tobacco: Never Used   Substance and Sexual Activity   • Alcohol use: No   • Drug use: No   • Sexual activity: Defer      Social History     Social History Narrative   • Not on file        Family History   Problem Relation Age of Onset   • Heart disease Mother    • Glaucoma Mother    • Cancer Mother    • Thyroid disease Sister    • Kidney disease Brother    • Glaucoma  "Brother    • Asthma Son    • Depression Son    • Bipolar disorder Son    • Hypertension Father    • Malig Hyperthermia Neg Hx          Review of Systems:   A 14 point review of systems was performed, pertinent positives discussed above, all other systems are negative    Physical Exam: 60 y.o. female  Vital Signs :   Vitals:    12/03/20 1401   Temp: 97.1 °F (36.2 °C)   Weight: 97.1 kg (214 lb)   Height: 156.2 cm (61.5\")   PainSc:   7     General: Alert and Oriented x 3, No acute distress.  Psych: mood and affect appropriate; recent and remote memory intact  Eyes: conjunctiva clear; pupils equally round and reactive, sclera nonicteric  CV: no peripheral edema  Resp: normal respiratory effort  Skin: no rashes or wounds; normal turgor  Musculosketetal; pain and crepitance with knee range of motion  Vascular: palpable distal pulses    Xrays:  -3 views (AP, lateral, and sunrise) were reviewed demonstrating end-stage OA with bone on bone articulation.  -A full length AP xray was ordered and reviewed today for purposes of operative alignment demonstrating end stage arthritic findings. There are no previous full length films for review    Assessment:  End-stage Left knee osteoarthritis. Conservative measures have failed.      Plan:  The plan is to proceed with Left Total Knee Replacement. The patient voiced understanding of the risks, benefits, and alternative forms of treatment that were discussed with Dr Walsh at the time of scheduling. 23     Anne Tejeda, APRN  12/3/2020        "

## 2020-12-04 ENCOUNTER — LAB (OUTPATIENT)
Dept: LAB | Facility: HOSPITAL | Age: 60
End: 2020-12-04

## 2020-12-04 DIAGNOSIS — Z01.818 OTHER SPECIFIED PRE-OPERATIVE EXAMINATION: ICD-10-CM

## 2020-12-04 PROCEDURE — U0004 COV-19 TEST NON-CDC HGH THRU: HCPCS

## 2020-12-04 PROCEDURE — C9803 HOPD COVID-19 SPEC COLLECT: HCPCS

## 2020-12-05 LAB — SARS-COV-2 RNA RESP QL NAA+PROBE: NOT DETECTED

## 2020-12-07 ENCOUNTER — HOSPITAL ENCOUNTER (OUTPATIENT)
Facility: HOSPITAL | Age: 60
Discharge: HOME HEALTH | End: 2020-12-08
Attending: ORTHOPAEDIC SURGERY | Admitting: ORTHOPAEDIC SURGERY

## 2020-12-07 ENCOUNTER — ANESTHESIA (OUTPATIENT)
Dept: PERIOP | Facility: HOSPITAL | Age: 60
End: 2020-12-07

## 2020-12-07 ENCOUNTER — ANESTHESIA EVENT (OUTPATIENT)
Dept: PERIOP | Facility: HOSPITAL | Age: 60
End: 2020-12-07

## 2020-12-07 ENCOUNTER — APPOINTMENT (OUTPATIENT)
Dept: GENERAL RADIOLOGY | Facility: HOSPITAL | Age: 60
End: 2020-12-07

## 2020-12-07 DIAGNOSIS — Z96.652 S/P TOTAL KNEE ARTHROPLASTY, LEFT: Primary | ICD-10-CM

## 2020-12-07 DIAGNOSIS — M17.12 PRIMARY OSTEOARTHRITIS OF LEFT KNEE: ICD-10-CM

## 2020-12-07 LAB — GLUCOSE BLDC GLUCOMTR-MCNC: 122 MG/DL (ref 70–130)

## 2020-12-07 PROCEDURE — G0378 HOSPITAL OBSERVATION PER HR: HCPCS

## 2020-12-07 PROCEDURE — C1776 JOINT DEVICE (IMPLANTABLE): HCPCS | Performed by: ORTHOPAEDIC SURGERY

## 2020-12-07 PROCEDURE — 25010000002 PHENYLEPHRINE PER 1 ML: Performed by: NURSE ANESTHETIST, CERTIFIED REGISTERED

## 2020-12-07 PROCEDURE — 25010000002 PROPOFOL 10 MG/ML EMULSION: Performed by: NURSE ANESTHETIST, CERTIFIED REGISTERED

## 2020-12-07 PROCEDURE — 25010000003 CEFAZOLIN IN DEXTROSE 2-4 GM/100ML-% SOLUTION: Performed by: NURSE PRACTITIONER

## 2020-12-07 PROCEDURE — 73560 X-RAY EXAM OF KNEE 1 OR 2: CPT

## 2020-12-07 PROCEDURE — 25010000002 MIDAZOLAM PER 1 MG: Performed by: NURSE ANESTHETIST, CERTIFIED REGISTERED

## 2020-12-07 PROCEDURE — C9290 INJ, BUPIVACAINE LIPOSOME: HCPCS | Performed by: ORTHOPAEDIC SURGERY

## 2020-12-07 PROCEDURE — 25010000003 BUPIVACAINE LIPOSOME 1.3 % SUSPENSION 20 ML VIAL: Performed by: ORTHOPAEDIC SURGERY

## 2020-12-07 PROCEDURE — 25010000002 FENTANYL CITRATE (PF) 100 MCG/2ML SOLUTION: Performed by: NURSE ANESTHETIST, CERTIFIED REGISTERED

## 2020-12-07 PROCEDURE — C1713 ANCHOR/SCREW BN/BN,TIS/BN: HCPCS | Performed by: ORTHOPAEDIC SURGERY

## 2020-12-07 PROCEDURE — 25010000002 ONDANSETRON PER 1 MG: Performed by: NURSE ANESTHETIST, CERTIFIED REGISTERED

## 2020-12-07 PROCEDURE — 82962 GLUCOSE BLOOD TEST: CPT

## 2020-12-07 PROCEDURE — 97110 THERAPEUTIC EXERCISES: CPT

## 2020-12-07 PROCEDURE — 25010000002 KETOROLAC TROMETHAMINE PER 15 MG: Performed by: NURSE PRACTITIONER

## 2020-12-07 PROCEDURE — 27447 TOTAL KNEE ARTHROPLASTY: CPT | Performed by: ORTHOPAEDIC SURGERY

## 2020-12-07 PROCEDURE — 25010000002 MIDAZOLAM PER 1 MG: Performed by: ANESTHESIOLOGY

## 2020-12-07 PROCEDURE — 25010000002 DEXAMETHASONE PER 1 MG: Performed by: NURSE ANESTHETIST, CERTIFIED REGISTERED

## 2020-12-07 PROCEDURE — 25010000002 VANCOMYCIN 10 G RECONSTITUTED SOLUTION: Performed by: NURSE PRACTITIONER

## 2020-12-07 PROCEDURE — 97162 PT EVAL MOD COMPLEX 30 MIN: CPT

## 2020-12-07 DEVICE — DEV CONTRL TISS STRATAFIX SYMM PDS PLUS VIL CT-1 60CM: Type: IMPLANTABLE DEVICE | Site: KNEE | Status: FUNCTIONAL

## 2020-12-07 DEVICE — LEGION CRUCIATE RETAINING OXINIUM                                    FEMORAL SIZE 4 LEFT
Type: IMPLANTABLE DEVICE | Site: KNEE | Status: FUNCTIONAL
Brand: LEGION

## 2020-12-07 DEVICE — IMPLANTABLE DEVICE: Type: IMPLANTABLE DEVICE | Site: KNEE | Status: FUNCTIONAL

## 2020-12-07 DEVICE — DEV CONTRL TISS STRATAFIX SPIRAL MNCRYL UD 3/0 PLS 30CM: Type: IMPLANTABLE DEVICE | Site: KNEE | Status: FUNCTIONAL

## 2020-12-07 DEVICE — GENESIS II BICONVEX PATELLA 26MM
Type: IMPLANTABLE DEVICE | Site: KNEE | Status: FUNCTIONAL
Brand: GENESIS II

## 2020-12-07 DEVICE — LEGION CRUCIATE RETAINING HIGH                                    FLEX HIGHLY CROSS LINKED                                    POLYETHYLENE SIZE 3-4 9MM
Type: IMPLANTABLE DEVICE | Site: KNEE | Status: FUNCTIONAL
Brand: LEGION

## 2020-12-07 DEVICE — PALACOS® R IS A FAST-CURING, RADIOPAQUE, POLY(METHYL METHACRYLATE)-BASED BONE CEMENT.PALACOS ® R CONTAINS THE X-RAY CONTRAST MEDIUM ZIRCONIUM DIOXIDE. TO IMPROVE VISIBILITY IN THE SURGICAL FIELD PALACOS ® R HAS BEEN COLOURED WITH CHLOROPHYLL (E141). THE BONE CEMENT IS PREPARED DIRECTLY BEFORE USE BY MIXING A POLYMER POWDER COMPONENT WITH A LIQUID MONOMER COMPONENT. A DUCTILE DOUGH FORMS WHICH CURES WITHIN A FEW MINUTES.
Type: IMPLANTABLE DEVICE | Site: KNEE | Status: FUNCTIONAL
Brand: PALACOS®

## 2020-12-07 DEVICE — GENESIS II NON-POROUS TIBIAL                                    BASEPLATE SIZE 3 LEFT
Type: IMPLANTABLE DEVICE | Site: KNEE | Status: FUNCTIONAL
Brand: GENESIS II

## 2020-12-07 RX ORDER — ASPIRIN 81 MG/1
81 TABLET ORAL EVERY 12 HOURS SCHEDULED
Status: DISCONTINUED | OUTPATIENT
Start: 2020-12-08 | End: 2020-12-08 | Stop reason: HOSPADM

## 2020-12-07 RX ORDER — ONDANSETRON 2 MG/ML
4 INJECTION INTRAMUSCULAR; INTRAVENOUS ONCE AS NEEDED
Status: DISCONTINUED | OUTPATIENT
Start: 2020-12-07 | End: 2020-12-07 | Stop reason: HOSPADM

## 2020-12-07 RX ORDER — TRANEXAMIC ACID 100 MG/ML
INJECTION, SOLUTION INTRAVENOUS AS NEEDED
Status: DISCONTINUED | OUTPATIENT
Start: 2020-12-07 | End: 2020-12-07 | Stop reason: SURG

## 2020-12-07 RX ORDER — WOUND DRESSING ADHESIVE - LIQUID
LIQUID MISCELLANEOUS AS NEEDED
Status: DISCONTINUED | OUTPATIENT
Start: 2020-12-07 | End: 2020-12-07 | Stop reason: HOSPADM

## 2020-12-07 RX ORDER — MELOXICAM 15 MG/1
15 TABLET ORAL DAILY
Status: DISCONTINUED | OUTPATIENT
Start: 2020-12-08 | End: 2020-12-08 | Stop reason: HOSPADM

## 2020-12-07 RX ORDER — FAMOTIDINE 10 MG/ML
20 INJECTION, SOLUTION INTRAVENOUS ONCE
Status: COMPLETED | OUTPATIENT
Start: 2020-12-07 | End: 2020-12-07

## 2020-12-07 RX ORDER — DIPHENHYDRAMINE HYDROCHLORIDE 50 MG/ML
12.5 INJECTION INTRAMUSCULAR; INTRAVENOUS
Status: DISCONTINUED | OUTPATIENT
Start: 2020-12-07 | End: 2020-12-07 | Stop reason: HOSPADM

## 2020-12-07 RX ORDER — PREGABALIN 75 MG/1
150 CAPSULE ORAL ONCE
Status: COMPLETED | OUTPATIENT
Start: 2020-12-07 | End: 2020-12-07

## 2020-12-07 RX ORDER — DIPHENHYDRAMINE HCL 25 MG
25 CAPSULE ORAL
Status: DISCONTINUED | OUTPATIENT
Start: 2020-12-07 | End: 2020-12-07 | Stop reason: HOSPADM

## 2020-12-07 RX ORDER — SODIUM CHLORIDE, SODIUM LACTATE, POTASSIUM CHLORIDE, CALCIUM CHLORIDE 600; 310; 30; 20 MG/100ML; MG/100ML; MG/100ML; MG/100ML
9 INJECTION, SOLUTION INTRAVENOUS CONTINUOUS
Status: DISCONTINUED | OUTPATIENT
Start: 2020-12-07 | End: 2020-12-07 | Stop reason: HOSPADM

## 2020-12-07 RX ORDER — SODIUM CHLORIDE 0.9 % (FLUSH) 0.9 %
3-10 SYRINGE (ML) INJECTION AS NEEDED
Status: DISCONTINUED | OUTPATIENT
Start: 2020-12-07 | End: 2020-12-07 | Stop reason: HOSPADM

## 2020-12-07 RX ORDER — ONDANSETRON 4 MG/1
4 TABLET, FILM COATED ORAL EVERY 6 HOURS PRN
Qty: 10 TABLET | Refills: 0 | Status: SHIPPED | OUTPATIENT
Start: 2020-12-07 | End: 2021-07-29

## 2020-12-07 RX ORDER — HYDROMORPHONE HYDROCHLORIDE 1 MG/ML
0.5 INJECTION, SOLUTION INTRAMUSCULAR; INTRAVENOUS; SUBCUTANEOUS
Status: DISCONTINUED | OUTPATIENT
Start: 2020-12-07 | End: 2020-12-07 | Stop reason: HOSPADM

## 2020-12-07 RX ORDER — PROMETHAZINE HYDROCHLORIDE 25 MG/1
25 TABLET ORAL ONCE AS NEEDED
Status: DISCONTINUED | OUTPATIENT
Start: 2020-12-07 | End: 2020-12-07 | Stop reason: HOSPADM

## 2020-12-07 RX ORDER — METHOCARBAMOL 750 MG/1
750 TABLET, FILM COATED ORAL NIGHTLY
Status: DISCONTINUED | OUTPATIENT
Start: 2020-12-07 | End: 2020-12-08 | Stop reason: HOSPADM

## 2020-12-07 RX ORDER — ACETAMINOPHEN 10 MG/ML
1000 INJECTION, SOLUTION INTRAVENOUS ONCE
Status: COMPLETED | OUTPATIENT
Start: 2020-12-07 | End: 2020-12-07

## 2020-12-07 RX ORDER — ONDANSETRON 2 MG/ML
INJECTION INTRAMUSCULAR; INTRAVENOUS AS NEEDED
Status: DISCONTINUED | OUTPATIENT
Start: 2020-12-07 | End: 2020-12-07 | Stop reason: SURG

## 2020-12-07 RX ORDER — NALOXONE HCL 0.4 MG/ML
0.2 VIAL (ML) INJECTION AS NEEDED
Status: DISCONTINUED | OUTPATIENT
Start: 2020-12-07 | End: 2020-12-07 | Stop reason: HOSPADM

## 2020-12-07 RX ORDER — MELOXICAM 15 MG/1
15 TABLET ORAL ONCE
Status: COMPLETED | OUTPATIENT
Start: 2020-12-07 | End: 2020-12-07

## 2020-12-07 RX ORDER — CEFAZOLIN SODIUM 2 G/100ML
2 INJECTION, SOLUTION INTRAVENOUS EVERY 8 HOURS
Status: COMPLETED | OUTPATIENT
Start: 2020-12-07 | End: 2020-12-08

## 2020-12-07 RX ORDER — MIDAZOLAM HYDROCHLORIDE 1 MG/ML
1 INJECTION INTRAMUSCULAR; INTRAVENOUS
Status: DISCONTINUED | OUTPATIENT
Start: 2020-12-07 | End: 2020-12-07 | Stop reason: HOSPADM

## 2020-12-07 RX ORDER — EPHEDRINE SULFATE 50 MG/ML
5 INJECTION, SOLUTION INTRAVENOUS ONCE AS NEEDED
Status: DISCONTINUED | OUTPATIENT
Start: 2020-12-07 | End: 2020-12-07 | Stop reason: HOSPADM

## 2020-12-07 RX ORDER — LEVOTHYROXINE SODIUM 137 UG/1
137 TABLET ORAL
Status: DISCONTINUED | OUTPATIENT
Start: 2020-12-07 | End: 2020-12-08 | Stop reason: HOSPADM

## 2020-12-07 RX ORDER — POLYETHYLENE GLYCOL 3350 17 G/17G
17 POWDER, FOR SOLUTION ORAL 2 TIMES DAILY
Qty: 510 G | Refills: 0 | Status: SHIPPED | OUTPATIENT
Start: 2020-12-07 | End: 2020-12-23

## 2020-12-07 RX ORDER — ONDANSETRON 2 MG/ML
4 INJECTION INTRAMUSCULAR; INTRAVENOUS EVERY 6 HOURS PRN
Status: DISCONTINUED | OUTPATIENT
Start: 2020-12-07 | End: 2020-12-08 | Stop reason: HOSPADM

## 2020-12-07 RX ORDER — HYDROCODONE BITARTRATE AND ACETAMINOPHEN 7.5; 325 MG/1; MG/1
1 TABLET ORAL EVERY 4 HOURS PRN
Status: DISCONTINUED | OUTPATIENT
Start: 2020-12-07 | End: 2020-12-08 | Stop reason: HOSPADM

## 2020-12-07 RX ORDER — HYDROCODONE BITARTRATE AND ACETAMINOPHEN 7.5; 325 MG/1; MG/1
TABLET ORAL
Qty: 60 TABLET | Refills: 0 | Status: SHIPPED | OUTPATIENT
Start: 2020-12-07 | End: 2020-12-22 | Stop reason: SDUPTHER

## 2020-12-07 RX ORDER — DEXAMETHASONE SODIUM PHOSPHATE 4 MG/ML
INJECTION, SOLUTION INTRA-ARTICULAR; INTRALESIONAL; INTRAMUSCULAR; INTRAVENOUS; SOFT TISSUE AS NEEDED
Status: DISCONTINUED | OUTPATIENT
Start: 2020-12-07 | End: 2020-12-07 | Stop reason: SURG

## 2020-12-07 RX ORDER — AMLODIPINE BESYLATE 10 MG/1
10 TABLET ORAL EVERY EVENING
Status: DISCONTINUED | OUTPATIENT
Start: 2020-12-07 | End: 2020-12-08 | Stop reason: HOSPADM

## 2020-12-07 RX ORDER — MAGNESIUM HYDROXIDE 1200 MG/15ML
LIQUID ORAL AS NEEDED
Status: DISCONTINUED | OUTPATIENT
Start: 2020-12-07 | End: 2020-12-07 | Stop reason: HOSPADM

## 2020-12-07 RX ORDER — ACETAMINOPHEN 325 MG/1
325 TABLET ORAL EVERY 4 HOURS PRN
Status: DISCONTINUED | OUTPATIENT
Start: 2020-12-07 | End: 2020-12-08 | Stop reason: HOSPADM

## 2020-12-07 RX ORDER — BUPIVACAINE HYDROCHLORIDE 7.5 MG/ML
INJECTION, SOLUTION EPIDURAL; RETROBULBAR AS NEEDED
Status: DISCONTINUED | OUTPATIENT
Start: 2020-12-07 | End: 2020-12-07 | Stop reason: SURG

## 2020-12-07 RX ORDER — FLUMAZENIL 0.1 MG/ML
0.2 INJECTION INTRAVENOUS AS NEEDED
Status: DISCONTINUED | OUTPATIENT
Start: 2020-12-07 | End: 2020-12-07 | Stop reason: HOSPADM

## 2020-12-07 RX ORDER — OXYCODONE AND ACETAMINOPHEN 7.5; 325 MG/1; MG/1
1 TABLET ORAL ONCE AS NEEDED
Status: DISCONTINUED | OUTPATIENT
Start: 2020-12-07 | End: 2020-12-07 | Stop reason: HOSPADM

## 2020-12-07 RX ORDER — SODIUM CHLORIDE 0.9 % (FLUSH) 0.9 %
3 SYRINGE (ML) INJECTION EVERY 12 HOURS SCHEDULED
Status: DISCONTINUED | OUTPATIENT
Start: 2020-12-07 | End: 2020-12-07 | Stop reason: HOSPADM

## 2020-12-07 RX ORDER — PROPOFOL 10 MG/ML
VIAL (ML) INTRAVENOUS CONTINUOUS PRN
Status: DISCONTINUED | OUTPATIENT
Start: 2020-12-07 | End: 2020-12-07 | Stop reason: SURG

## 2020-12-07 RX ORDER — ASPIRIN 81 MG/1
81 TABLET ORAL EVERY 12 HOURS SCHEDULED
Qty: 60 TABLET | Refills: 0 | Status: SHIPPED | OUTPATIENT
Start: 2020-12-08 | End: 2021-01-07

## 2020-12-07 RX ORDER — ALBUTEROL SULFATE 90 UG/1
2 AEROSOL, METERED RESPIRATORY (INHALATION) EVERY 4 HOURS PRN
Status: DISCONTINUED | OUTPATIENT
Start: 2020-12-07 | End: 2020-12-07 | Stop reason: CLARIF

## 2020-12-07 RX ORDER — FLUOXETINE HYDROCHLORIDE 20 MG/1
40 CAPSULE ORAL EVERY EVENING
Status: DISCONTINUED | OUTPATIENT
Start: 2020-12-07 | End: 2020-12-08 | Stop reason: HOSPADM

## 2020-12-07 RX ORDER — POLYETHYLENE GLYCOL 3350 17 G/17G
17 POWDER, FOR SOLUTION ORAL 2 TIMES DAILY
Status: DISCONTINUED | OUTPATIENT
Start: 2020-12-07 | End: 2020-12-08 | Stop reason: HOSPADM

## 2020-12-07 RX ORDER — MIDAZOLAM HYDROCHLORIDE 1 MG/ML
INJECTION INTRAMUSCULAR; INTRAVENOUS AS NEEDED
Status: DISCONTINUED | OUTPATIENT
Start: 2020-12-07 | End: 2020-12-07 | Stop reason: SURG

## 2020-12-07 RX ORDER — HYDROCODONE BITARTRATE AND ACETAMINOPHEN 7.5; 325 MG/1; MG/1
2 TABLET ORAL EVERY 4 HOURS PRN
Status: DISCONTINUED | OUTPATIENT
Start: 2020-12-07 | End: 2020-12-08 | Stop reason: HOSPADM

## 2020-12-07 RX ORDER — ONDANSETRON 4 MG/1
4 TABLET, FILM COATED ORAL EVERY 6 HOURS PRN
Status: DISCONTINUED | OUTPATIENT
Start: 2020-12-07 | End: 2020-12-08 | Stop reason: HOSPADM

## 2020-12-07 RX ORDER — LIDOCAINE HYDROCHLORIDE 10 MG/ML
0.5 INJECTION, SOLUTION EPIDURAL; INFILTRATION; INTRACAUDAL; PERINEURAL ONCE AS NEEDED
Status: DISCONTINUED | OUTPATIENT
Start: 2020-12-07 | End: 2020-12-07 | Stop reason: HOSPADM

## 2020-12-07 RX ORDER — CEFAZOLIN SODIUM 2 G/100ML
2 INJECTION, SOLUTION INTRAVENOUS ONCE
Status: COMPLETED | OUTPATIENT
Start: 2020-12-07 | End: 2020-12-07

## 2020-12-07 RX ORDER — FENTANYL CITRATE 50 UG/ML
50 INJECTION, SOLUTION INTRAMUSCULAR; INTRAVENOUS
Status: DISCONTINUED | OUTPATIENT
Start: 2020-12-07 | End: 2020-12-07 | Stop reason: HOSPADM

## 2020-12-07 RX ORDER — FENTANYL CITRATE 50 UG/ML
INJECTION, SOLUTION INTRAMUSCULAR; INTRAVENOUS AS NEEDED
Status: DISCONTINUED | OUTPATIENT
Start: 2020-12-07 | End: 2020-12-07 | Stop reason: SURG

## 2020-12-07 RX ORDER — MONTELUKAST SODIUM 10 MG/1
10 TABLET ORAL DAILY
Status: DISCONTINUED | OUTPATIENT
Start: 2020-12-07 | End: 2020-12-08 | Stop reason: HOSPADM

## 2020-12-07 RX ORDER — MELOXICAM 15 MG/1
15 TABLET ORAL DAILY
Qty: 30 TABLET | Refills: 0 | Status: SHIPPED | OUTPATIENT
Start: 2020-12-08 | End: 2021-01-07

## 2020-12-07 RX ORDER — ALBUTEROL SULFATE 2.5 MG/3ML
2.5 SOLUTION RESPIRATORY (INHALATION) EVERY 4 HOURS PRN
Status: DISCONTINUED | OUTPATIENT
Start: 2020-12-07 | End: 2020-12-08 | Stop reason: HOSPADM

## 2020-12-07 RX ORDER — PROMETHAZINE HYDROCHLORIDE 25 MG/1
25 SUPPOSITORY RECTAL ONCE AS NEEDED
Status: DISCONTINUED | OUTPATIENT
Start: 2020-12-07 | End: 2020-12-07 | Stop reason: HOSPADM

## 2020-12-07 RX ORDER — PANTOPRAZOLE SODIUM 40 MG/1
40 TABLET, DELAYED RELEASE ORAL
Status: DISCONTINUED | OUTPATIENT
Start: 2020-12-08 | End: 2020-12-08 | Stop reason: HOSPADM

## 2020-12-07 RX ORDER — HYDROCODONE BITARTRATE AND ACETAMINOPHEN 7.5; 325 MG/1; MG/1
1 TABLET ORAL ONCE AS NEEDED
Status: DISCONTINUED | OUTPATIENT
Start: 2020-12-07 | End: 2020-12-07 | Stop reason: HOSPADM

## 2020-12-07 RX ORDER — UREA 10 %
3 LOTION (ML) TOPICAL NIGHTLY PRN
Status: DISCONTINUED | OUTPATIENT
Start: 2020-12-07 | End: 2020-12-08 | Stop reason: HOSPADM

## 2020-12-07 RX ORDER — KETOROLAC TROMETHAMINE 30 MG/ML
30 INJECTION, SOLUTION INTRAMUSCULAR; INTRAVENOUS EVERY 8 HOURS
Status: DISCONTINUED | OUTPATIENT
Start: 2020-12-07 | End: 2020-12-08 | Stop reason: HOSPADM

## 2020-12-07 RX ADMIN — TRANEXAMIC ACID 1000 MG: 100 INJECTION, SOLUTION INTRAVENOUS at 09:58

## 2020-12-07 RX ADMIN — TRANEXAMIC ACID 1000 MG: 100 INJECTION, SOLUTION INTRAVENOUS at 10:36

## 2020-12-07 RX ADMIN — ONDANSETRON HYDROCHLORIDE 4 MG: 2 SOLUTION INTRAMUSCULAR; INTRAVENOUS at 10:30

## 2020-12-07 RX ADMIN — PREGABALIN 150 MG: 75 CAPSULE ORAL at 07:54

## 2020-12-07 RX ADMIN — PHENYLEPHRINE HYDROCHLORIDE 100 MCG: 10 INJECTION INTRAVENOUS at 09:42

## 2020-12-07 RX ADMIN — SODIUM CHLORIDE, POTASSIUM CHLORIDE, SODIUM LACTATE AND CALCIUM CHLORIDE: 600; 310; 30; 20 INJECTION, SOLUTION INTRAVENOUS at 10:58

## 2020-12-07 RX ADMIN — PROPOFOL 75 MCG/KG/MIN: 10 INJECTION, EMULSION INTRAVENOUS at 09:29

## 2020-12-07 RX ADMIN — DEXAMETHASONE SODIUM PHOSPHATE 8 MG: 4 INJECTION INTRA-ARTICULAR; INTRALESIONAL; INTRAMUSCULAR; INTRAVENOUS; SOFT TISSUE at 09:30

## 2020-12-07 RX ADMIN — ACETAMINOPHEN 1000 MG: 10 INJECTION, SOLUTION INTRAVENOUS at 09:37

## 2020-12-07 RX ADMIN — FAMOTIDINE 20 MG: 10 INJECTION INTRAVENOUS at 08:01

## 2020-12-07 RX ADMIN — FENTANYL CITRATE 50 MCG: 50 INJECTION INTRAMUSCULAR; INTRAVENOUS at 09:17

## 2020-12-07 RX ADMIN — KETOROLAC TROMETHAMINE 30 MG: 30 INJECTION, SOLUTION INTRAMUSCULAR at 14:48

## 2020-12-07 RX ADMIN — MUPIROCIN 1 APPLICATION: 20 OINTMENT TOPICAL at 21:01

## 2020-12-07 RX ADMIN — CEFAZOLIN SODIUM 2 G: 2 INJECTION, SOLUTION INTRAVENOUS at 16:52

## 2020-12-07 RX ADMIN — CEFAZOLIN SODIUM 2 G: 2 INJECTION, SOLUTION INTRAVENOUS at 09:24

## 2020-12-07 RX ADMIN — SODIUM CHLORIDE, POTASSIUM CHLORIDE, SODIUM LACTATE AND CALCIUM CHLORIDE 9 ML/HR: 600; 310; 30; 20 INJECTION, SOLUTION INTRAVENOUS at 07:25

## 2020-12-07 RX ADMIN — MIDAZOLAM 1 MG: 1 INJECTION INTRAMUSCULAR; INTRAVENOUS at 08:59

## 2020-12-07 RX ADMIN — BUPIVACAINE HYDROCHLORIDE 12 MG: 7.5 INJECTION, SOLUTION EPIDURAL; RETROBULBAR at 09:20

## 2020-12-07 RX ADMIN — MIDAZOLAM 2 MG: 1 INJECTION INTRAMUSCULAR; INTRAVENOUS at 09:24

## 2020-12-07 RX ADMIN — METHOCARBAMOL TABLETS 750 MG: 750 TABLET, COATED ORAL at 21:04

## 2020-12-07 RX ADMIN — KETOROLAC TROMETHAMINE 30 MG: 30 INJECTION, SOLUTION INTRAMUSCULAR at 21:00

## 2020-12-07 RX ADMIN — AMLODIPINE BESYLATE 10 MG: 10 TABLET ORAL at 21:04

## 2020-12-07 RX ADMIN — FLUOXETINE HYDROCHLORIDE 40 MG: 20 CAPSULE ORAL at 21:04

## 2020-12-07 RX ADMIN — MELOXICAM 15 MG: 15 TABLET ORAL at 07:54

## 2020-12-07 RX ADMIN — POLYETHYLENE GLYCOL 3350 17 G: 17 POWDER, FOR SOLUTION ORAL at 21:01

## 2020-12-07 RX ADMIN — VANCOMYCIN HYDROCHLORIDE 1500 MG: 10 INJECTION, POWDER, LYOPHILIZED, FOR SOLUTION INTRAVENOUS at 07:33

## 2020-12-07 NOTE — SIGNIFICANT NOTE
S/w Arslan, pts , Advised that pt has left for OR, and he will receive an update via telephone post-procedure from the surgeon. Verbalized understanding.

## 2020-12-07 NOTE — PROGRESS NOTES
Discharge Planning Assessment  [facility]     Patient Name: yLdia Gutierrez  MRN: [id number]  Today's Date: 12/7/2020    Admit Date: 12/7/2020    Discharge Needs Assessment     Row Name 12/07/20 1428       Living Environment    Lives With  child(tomasz), adult;spouse    Current Living Arrangements  home/apartment/condo    Potentially Unsafe Housing Conditions  other (see comments) no concerns    Primary Care Provided by  self    Provides Primary Care For  no one    Family Caregiver if Needed  spouse;child(tomasz), adult    Quality of Family Relationships  helpful;involved;supportive    Able to Return to Prior Arrangements  yes       Resource/Environmental Concerns    Resource/Environmental Concerns  none    Transportation Concerns  car, none       Transition Planning    Patient/Family Anticipates Transition to  home    Patient/Family Anticipated Services at Transition  home health care    Transportation Anticipated  family or friend will provide       Discharge Needs Assessment    Current Outpatient/Agency/Support Group  homecare agency    Equipment Currently Used at Home  cane, straight;walker, rolling    Concerns to be Addressed  no discharge needs identified;denies needs/concerns at this time    Anticipated Changes Related to Illness  none    Equipment Needed After Discharge  none    Outpatient/Agency/Support Group Needs  homecare agency    Discharge Facility/Level of Care Needs  home with home health        Discharge Plan     Row Name 12/07/20 1430       Plan    Plan  Home with [facility]    Patient/Family in Agreement with Plan  yes    Plan Comments  CCP spoke with patient. CCP role explained and face sheet verified. Patient lives with her spouse and son. Patient has 4-5 steps (handrails present) to the entrance of her single story home. Patient has a cane and walker at home. Patient has not used home health or been to SNF. Patient states her son is able to assist her as needed at home. Patient plans to return home. CCP  discussed home health; patient agreeable. CCP discussed insurance ([employer]) and few home health agencies being able to accept. Patient agreeable to [facility]. Referral placed in [facility]. Clarice STEINBERG        Continued Care and Services - Admitted Since 12/7/2020    Coordination has not been started for this encounter.         Demographic Summary     Row Name 12/07/20 1428       General Information    Admission Type  observation    Referral Source  admission list    Reason for Consult  discharge planning    Preferred Language  English     Used During This Interaction  no        Functional Status     Row Name 12/07/20 1428       Functional Status    Usual Activity Tolerance  good    Current Activity Tolerance  good       Functional Status, IADL    Medications  independent    Meal Preparation  independent    Housekeeping  independent    Laundry  independent    Shopping  independent       Mental Status    General Appearance WDL  WDL       Mental Status Summary    Recent Changes in Mental Status/Cognitive Functioning  no changes        Psychosocial    No documentation.       Abuse/Neglect    No documentation.       Legal    No documentation.       Substance Abuse    No documentation.       Patient Forms    No documentation.           MARAYN Brown

## 2020-12-07 NOTE — DISCHARGE PLACEMENT REQUEST
"Mandy Gutierrez (60 y.o. Female)     Date of Birth Social Security Number Address Home Phone MRN    1960  [address] [phone] [id number]    Alevism Marital Status          Orthodox        Admission Date Admission Type Admitting Provider Attending Provider Department, Room/Bed    12/7/20 Elective Juan Walsh MD Brown, Reid B, MD [address], P781/1    Discharge Date Discharge Disposition Discharge Destination                       Attending Provider: Juan Walsh MD    Allergies: Atorvastatin    Isolation: None   Infection: None   Code Status: CPR    Ht: 157.5 cm (62\")   Wt: 97 kg (213 lb 12.8 oz)    Admission Cmt: None   Principal Problem: Primary osteoarthritis of left knee [M17.12] More...                 Active Insurance as of 12/7/2020     Primary Coverage     Payor Plan Insurance Group Employer/Plan Group    [facility] "[employer] [facility] "[employer]      Payor Plan Address Payor Plan Phone Number Payor Plan Fax Number Effective Dates    [address] [address]   1/1/2014 - None Entered    [place] [address]       Subscriber Name Subscriber Birth Date Member ID       MANDY GUTIERREZ 1960 [id number]                 Emergency Contacts      (Rel.) Home Phone Work Phone Mobile Phone    ANTONRACHELLE (Son) [phone] -- [phone]    AntonHarvinder (Spouse) [phone] -- --              "

## 2020-12-07 NOTE — PLAN OF CARE
Goal Outcome Evaluation:  Plan of Care Reviewed With: patient     Outcome Summary: POD 0 L TKR presents with impaired functional mobility, dec strength and ROM L knee, she will benefit from PT to address. Pt did well this afternoon of surgery, able to walk in frost and perform ex without difficulty.  Pt will have family support at home and has a rwx. Anticipate home with assist and home health tomorrow  Patient was intermittently wearing a face mask during this therapy encounter. Therapist used appropriate personal protective equipment including eye protection, mask, and gloves.  Mask used was standard procedure mask. Appropriate PPE was worn during the entire therapy session. Hand hygiene was completed before and after therapy session. Patient is not in enhanced droplet precautions.

## 2020-12-07 NOTE — THERAPY EVALUATION
Patient Name: Lydia Gutierrez  : 1960    MRN: [id number]                              Today's Date: 2020       Admit Date: 2020    Visit Dx:     ICD-10-CM ICD-9-CM   1. Primary osteoarthritis of left knee  M17.12 715.16     Patient Active Problem List   Diagnosis   • Abdominal mass   • Abnormal liver function tests   • Ultrasound scan abnormal   • Allergic rhinitis   • Asthma   • Benign essential hypertension   • Bladder problem   • Breast lump   • Dense breast tissue   • Breast tenderness   • Mixed anxiety depressive disorder   • Dyspareunia   • Abnormal C-reactive protein   • Leukocytosis   • Gastroesophageal reflux disease   • Fatigue   • Headache   • Herpes simplex virus (HSV) infection   • Hip pain, bilateral   • Hypothyroidism   • Low back pain   • Lumbar radiculopathy   • Mixed hyperlipidemia   • Oligomenorrhea   • Periodic limb movement disorder   • Obstructive sleep apnea   • Visual disturbance   • Vitamin D deficiency   • Bilateral knee pain   • Cervicalgia   • B12 deficiency   • Primary osteoarthritis of left knee     Past Medical History:   Diagnosis Date   • Anxiety disorder    • Arthritis    • Asthma    • Borderline diabetic    • Depression    • GERD (gastroesophageal reflux disease)    • History of kidney stones    • Hyperlipidemia    • Hypertension    • Hypothyroidism    • Left knee pain    • Sleep apnea     NO MACHINE     Past Surgical History:   Procedure Laterality Date   •  SECTION     • CHOLECYSTECTOMY     • EYE SURGERY Left      General Information     Row Name 20 1537          Physical Therapy Time and Intention    Document Type  evaluation  -PC     Mode of Treatment  physical therapy  -PC     Row Name 20 1537          General Information    Patient Profile Reviewed  yes  -PC     Prior Level of Function  independent:  -PC     Row Name 20 1537          Home Main Entrance    Number of Stairs, Main Entrance  four  -PC     Stair Railings, Main Entrance   railings safe and in good condition  -PC     Row Name 12/07/20 1537          Stairs Within Home, Primary    Number of Stairs, Within Home, Primary  none  -PC     Row Name 12/07/20 1537          Cognition    Orientation Status (Cognition)  oriented x 4  -PC     Row Name 12/07/20 1537          Safety Issues, Functional Mobility    Impairments Affecting Function (Mobility)  endurance/activity tolerance;strength;pain;range of motion (ROM)  -PC       User Key  (r) = Recorded By, (t) = Taken By, (c) = Cosigned By    Initials Name Provider Type    PC Dai Rhoades, PT Physical Therapist        Mobility     Row Name 12/07/20 1538          Bed Mobility    Bed Mobility  supine-sit  -PC     Supine-Sit [place] (Bed Mobility)  supervision  -PC     Assistive Device (Bed Mobility)  bed rails  -PC     Row Name 12/07/20 1538          Sit-Stand Transfer    Sit-Stand [place] (Transfers)  contact guard  -PC     Assistive Device (Sit-Stand Transfers)  walker, front-wheeled  -PC     Row Name 12/07/20 1538          Gait/Stairs (Locomotion)    [place] Level (Gait)  contact guard  -PC     Assistive Device (Gait)  walker, front-wheeled  -PC     Distance in Feet (Gait)  180 ft  -PC     Deviations/Abnormal Patterns (Gait)  antalgic;stride length decreased;gamaliel decreased;gait speed decreased  -PC     Bilateral Gait Deviations  forward flexed posture;heel strike decreased  -PC       User Key  (r) = Recorded By, (t) = Taken By, (c) = Cosigned By    Initials Name Provider Type    PC Dai Rhoades, PT Physical Therapist        Obj/Interventions     Row Name 12/07/20 1540          Range of Motion Comprehensive    Comment, General Range of Motion  WFL x L knee  -PC     Row Name 12/07/20 1540          Strength Comprehensive (MMT)    Comment, General Manual Muscle Testing (MMT) Assessment  WNL x L LE  -PC     Row Name 12/07/20 1540          Motor Skills    Therapeutic Exercise  -- 10 reps TKR ex  -PC     Row Name 12/07/20 1540           Sensory Assessment (Somatosensory)    Sensory Assessment (Somatosensory)  sensation intact  -PC       User Key  (r) = Recorded By, (t) = Taken By, (c) = Cosigned By    Initials Name Provider Type    PC Dai Rhoades PT Physical Therapist        Goals/Plan     Row Name 12/07/20 1543          Transfer Goal 1 (PT)    Activity/Assistive Device (Transfer Goal 1, PT)  sit-to-stand/stand-to-sit  -PC     [place] Level/Cues Needed (Transfer Goal 1, PT)  standby assist  -PC     Time Frame (Transfer Goal 1, PT)  1 week  -PC     Row Name 12/07/20 1543          Gait Training Goal 1 (PT)    Activity/Assistive Device (Gait Training Goal 1, PT)  gait (walking locomotion);assistive device use  -PC     [place] Level (Gait Training Goal 1, PT)  standby assist  -PC     Distance (Gait Training Goal 1, PT)  200 ft  -PC     Time Frame (Gait Training Goal 1, PT)  1 week  -PC     Row Name 12/07/20 1543          ROM Goal 1 (PT)    ROM Goal 1 (PT)  5-90  -PC     Time Frame (ROM Goal 1, PT)  1 week  -PC     Row Name 12/07/20 1543          Stairs Goal 1 (PT)    Activity/Assistive Device (Stairs Goal 1, PT)  ascending stairs;descending stairs  -PC     [place] Level/Cues Needed (Stairs Goal 1, PT)  contact guard assist  -PC     Number of Stairs (Stairs Goal 1, PT)  4  -PC     Time Frame (Stairs Goal 1, PT)  1 week  -PC       User Key  (r) = Recorded By, (t) = Taken By, (c) = Cosigned By    Initials Name Provider Type    PC Dai Rhoades PT Physical Therapist        Clinical Impression     Row Name 12/07/20 1541          Pain    Additional Documentation  Pain Scale: FACES Pre/Post-Treatment (Group)  -PC     Row Name 12/07/20 1541          Pain Scale: FACES Pre/Post-Treatment    Pain: FACES Scale, Pretreatment  4-->hurts little more  -PC     Posttreatment Pain Rating  4-->hurts little more  -PC     Pain Location - Side  Left  -PC     Pain Location  knee  -PC     Row Name 12/07/20 1541          Plan of Care Review    Plan  of Care Reviewed With  patient  -PC     Outcome Summary  POD 0 L TKR presents with impaired functional mobility, dec strength and ROM L knee, she will benefit from PT to address. Pt did well this afternoon of surgery, able to walk in frost and perform ex without difficulty.  Pt will have family support at home and has a rwx. Anticipate home with assist and home health tomorrow  -PC     Row Name 12/07/20 1541          Therapy Assessment/Plan (PT)    Rehab Potential (PT)  good, to achieve stated therapy goals  -PC     Criteria for Skilled Interventions Met (PT)  yes;skilled treatment is necessary  -PC     Row Name 12/07/20 1541          Positioning and Restraints    Pre-Treatment Position  in bed  -PC     Post Treatment Position  chair  -PC     In Chair  reclined;call light within reach;encouraged to call for assist;exit alarm on  -PC       User Key  (r) = Recorded By, (t) = Taken By, (c) = Cosigned By    Initials Name Provider Type    PC Dai Rhoades PT Physical Therapist        Outcome Measures     Row Name 12/07/20 1544          How much help from another person do you currently need...    Turning from your back to your side while in flat bed without using bedrails?  3  -PC     Moving from lying on back to sitting on the side of a flat bed without bedrails?  3  -PC     Moving to and from a bed to a chair (including a wheelchair)?  3  -PC     Standing up from a chair using your arms (e.g., wheelchair, bedside chair)?  3  -PC     Climbing 3-5 steps with a railing?  2  -PC     To walk in hospital room?  3  -PC     AM-PAC 6 Clicks Score (PT)  17  -PC     Row Name 12/07/20 1544          Functional Assessment    Outcome Measure Options  AM-PAC 6 Clicks Basic Mobility (PT)  -PC       User Key  (r) = Recorded By, (t) = Taken By, (c) = Cosigned By    Initials Name Provider Type    PC Dai Rhoades PT Physical Therapist        Physical Therapy Education                 Title: PT OT SLP Therapies (Done)     Topic:  Physical Therapy (Done)     Point: Mobility training (Done)     Learning Progress Summary           Patient Acceptance, E,D, NR,DU by PC at 12/7/2020 1544                   Point: Home exercise program (Done)     Learning Progress Summary           Patient Acceptance, E,D, NR,DU by PC at 12/7/2020 1544                   Point: Body mechanics (Done)     Learning Progress Summary           Patient Acceptance, E,D, NR,DU by PC at 12/7/2020 1544                   Point: Precautions (Done)     Learning Progress Summary           Patient Acceptance, E,D, NR,DU by PC at 12/7/2020 1544                               User Key     Initials Effective Dates Name Provider Type Discipline     04/03/18 -  Dai Rhoades PT Physical Therapist PT              PT Recommendation and Plan  Planned Therapy Interventions (PT): bed mobility training, gait training, transfer training, ROM (range of motion), strengthening, stair training  Plan of Care Reviewed With: patient  Outcome Summary: POD 0 L TKR presents with impaired functional mobility, dec strength and ROM L knee, she will benefit from PT to address. Pt did well this afternoon of surgery, able to walk in frost and perform ex without difficulty.  Pt will have family support at home and has a rwx. Anticipate home with assist and home health tomorrow     Time Calculation:   PT Charges     Row Name 12/07/20 1546             Time Calculation    Start Time  1506  -PC      Stop Time  1533  -PC      Time Calculation (min)  27 min  -PC      PT Received On  12/07/20  -PC      PT - Next Appointment  12/08/20  -      PT Goal Re-Cert Due Date  12/14/20  -        User Key  (r) = Recorded By, (t) = Taken By, (c) = Cosigned By    Initials Name Provider Type    PC Dai Rhoades PT Physical Therapist        Therapy Charges for Today     Code Description Service Date Service Provider Modifiers Qty    91187302448 HC PT EVAL MOD COMPLEXITY 2 12/7/2020 Dai Rhoades PT GP 1    42238759343  HC PT THER PROC EA 15 MIN 12/7/2020 Dai Rhoades, PT GP 1          PT G-Codes  Outcome Measure Options: AM-PAC 6 Clicks Basic Mobility (PT)  AM-PAC 6 Clicks Score (PT): 17    Dai Rhoades, PT  12/7/2020

## 2020-12-07 NOTE — ANESTHESIA PROCEDURE NOTES
Spinal Block      Patient location during procedure: OR  Indication:procedure for pain  Performed By  CRNA: Tejal Ramirez CRNA  Preanesthetic Checklist  Completed: patient identified, site marked, surgical consent, pre-op evaluation, timeout performed, IV checked, risks and benefits discussed and monitors and equipment checked  Spinal Block Prep:  Patient Position:sitting  Sterile Tech:cap, gloves, gown, mask and sterile barriers  Prep:Chloraprep  Patient Monitoring:blood pressure monitoring, continuous pulse oximetry and EKG  Spinal Block Procedure  Approach:midline  Guidance:palpation technique  Location:L3-L4  Needle Type:Sprotte  Needle Gauge:24  Placement of Spinal needle event:cerebrospinal fluid aspirated  Paresthesia: no  Fluid Appearance:clear     Post Assessment  Patient Tolerance:patient tolerated the procedure well with no apparent complications  Complications no

## 2020-12-07 NOTE — OP NOTE
Name: Lydia Gutierrez  YOB: 1960    DATE OF SURGERY: 12/7/2020    PREOPERATIVE DIAGNOSIS: Left knee end-stage osteoarthritis    POSTOPERATIVE DIAGNOSIS: Left knee end-stage osteoarthritis    PROCEDURE PERFORMED: Left total knee replacement    SURGEON: Juan Walsh M.D.    ASSISTANT: KG MCCLELLAN    IMPLANTS: [employer]:     Implant Name Type Inv. Item Serial No.  Lot No. LRB No. Used Action   CMT BONE PALACOS R HI/VISC 1X40 - [id number] Implant CMT BONE PALACOS R HI/VISC 1X40  [facility] [id number] Left 1 Implanted   SUT CONTRL TISS STRATAFIX SPIRAL MNCRYL UD 3/0 PLS 30CM - [id number] Implant SUT CONTRL TISS STRATAFIX SPIRAL MNCRYL UD 3/0 PLS 30CM  [employer]  DIV OF J AND J [id number] Left 1 Implanted   SUT CONTRL TISS STRATAFIX SYMM PDS PLUS DIEGO CT-1 60CM - [id number] Implant SUT CONTRL TISS STRATAFIX SYMM PDS PLUS DIEGO CT-1 60CM  [employer]  DIV OF J AND J [id number] Left 1 Implanted   COMP FEM LEGION OXINIUM CR SZ4 LT - [id number] Implant COMP FEM LEGION OXINIUM CR SZ4 LT  [employer] [id number] Left 1 Implanted   BASE TIB/KN GEN2 NONPOR TI SZ3 LT - [id number] Implant BASE TIB/KN GEN2 NONPOR TI SZ3 LT  [employer] [id number] Left 1 Implanted   PAT GEN2 BICONVEX [id number] - [id number] Implant PAT GEN2 BICONVEX [id number]  [employer] [id number] Left 1 Implanted   INSRT ART LEGION CR HF XLPE SZ3TO4 9MM - [id number] Implant INSRT ART LEGION CR HF XLPE SZ3TO4 9MM  [employer] [id number] Left 1 Implanted       Estimated Blood Loss: 200cc  Specimens : none  Complications: none    DESCRIPTION OF PROCEDURE: The patient was taken to the operating room and placed in the supine position. A sequential compression device was carefully placed on the non-operative leg. Preoperative antibiotics were administered. Surgical time out was performed. After adequate induction of anesthesia, the leg was prepped and draped in the usual sterile fashion, exsanguinated with an  Esmarch bandage and the tourniquet inflated to 250 mmHg. A midline incision was performed followed by a medial parapatellar arthrotomy. The patella was subluxed laterally.  A portion of the fat pad, ACL, and anterior horns of the meniscus were excised. The drill hole was placed in the distal femur and the canal was the irrigated and suctioned. The IM juana was placed and a 5 degree distal valgus cut was performed on the femur. The femur was then sized with a sizing guide. The femoral cutting block was placed and all femoral cuts were performed. The proximal tibia was exposed. We used the extramedullary tibial cutting guide set for removal of 9mm of bone off the high side. The tibial cut was performed. The posterior horns of the menisci were excised. The posterior osteophytes were removed. Flexion extension blocks were then used to balance the knee. The tibial cut surface was then sized with the sizing templates and the tibial and femoral trial were then placed. The knee was placed in full extension and then the tibial tray rotation was then matched to the femoral rotation and marked.    Attention was then placed to the patella. The patella was noted to track centrally through range of motion. The patella was then sized with the trials. The thickness of the patella was then measured. The patella was resurfaced and the surrounding osteophytes were removed. The preoperative thickness was reproduced. The patella tracked centrally through range of motion.   At this point all trial components were removed, the knee was copiously irrigated with pulsed lavage, and the knee was injected with anesthetic cocktail solution. The cut surfaces were then dried with clean lap sponges, and the components were cemented tibia, followed by femur, then patella. The knee was held in full extension and all excess cement was removed. The knee was held still until the cement had completely hardened. We then placed the trial polyethylene spacer  which resulted in full extension and excellent flexion-extension balance. We placed the final polyethylene spacer.   The knee was then copiously irrigated. The tourniquet was then released. There was excellent hemostasis. We placed a one-eighth inch Hemovac drain. We closed the knee in multiple layers in standard fashion. Sterile dressing were applied. At the end of the case, the sponge and needle counts were reported as being correct. There were no known complications. The patient was then transported to the recovery room.      Juan Walsh M.D.

## 2020-12-07 NOTE — ANESTHESIA PREPROCEDURE EVALUATION
Anesthesia Evaluation     NPO Solid Status: > 8 hours             Airway   Mallampati: I  TM distance: >3 FB  Neck ROM: full  Dental - normal exam     Pulmonary - normal exam   (+) sleep apnea (stopped using CPAP years ago),   Cardiovascular - normal exam    (+) hypertension, hyperlipidemia,   (-) past MI      Neuro/Psych  GI/Hepatic/Renal/Endo    (+) morbid obesity, GERD,      Musculoskeletal     (+) back pain,   Abdominal    Substance History      OB/GYN          Other   arthritis,                      Anesthesia Plan    ASA 3     spinal   (Consented for spinal, with GA as backup plan)    Anesthetic plan, all risks, benefits, and alternatives have been provided, discussed and informed consent has been obtained with: patient.

## 2020-12-07 NOTE — ANESTHESIA POSTPROCEDURE EVALUATION
Patient: Lydia Gutierrez    Procedure Summary     Date: 12/07/20 Room / Location: [facility] OR  / [facility] MAIN OR    Anesthesia Start: 0913 Anesthesia Stop: 1103    Procedure: TOTAL KNEE ARTHROPLASTY (Left Knee) Diagnosis:       Primary osteoarthritis of left knee      (Primary osteoarthritis of left knee [M17.12])    Surgeon: Juan Walsh MD Provider: Stanislaw Weldon MD    Anesthesia Type: spinal ASA Status: 3          Anesthesia Type: spinal    Vitals  Vitals Value Taken Time   /71 12/07/20 1115   Temp 36.8 °C (98.2 °F) 12/07/20 1100   Pulse 81 12/07/20 1118   Resp 14 12/07/20 1115   SpO2 97 % 12/07/20 1118   Vitals shown include unvalidated device data.        Post Anesthesia Care and Evaluation    Patient location during evaluation: PACU  Patient participation: complete - patient participated  Level of consciousness: awake and alert  Pain management: adequate  Airway patency: patent  Anesthetic complications: No anesthetic complications    Cardiovascular status: acceptable  Respiratory status: acceptable  Hydration status: acceptable    Comments: --------------------            12/07/20               1115     --------------------   BP:       101/71     Pulse:      83       Resp:       14       Temp:                SpO2:      96%      --------------------

## 2020-12-07 NOTE — PLAN OF CARE
Goal Outcome Evaluation:  Plan of Care Reviewed With: patient  Progress: improving  Outcome Summary: Pt is 61 y/o female, s/p left TKA. Dressing is cdi, vss, n/v intact. Pt ambulated with physical therapy in hallway. Pain controlled with IV Toradol. Educated pt on pain medication administration. Will continue to monitor bp r/t history of HTN. Possible d/c home tomorrow.

## 2020-12-08 VITALS
DIASTOLIC BLOOD PRESSURE: 60 MMHG | WEIGHT: 213.8 LBS | BODY MASS INDEX: 39.34 KG/M2 | TEMPERATURE: 97.8 F | OXYGEN SATURATION: 98 % | HEIGHT: 62 IN | SYSTOLIC BLOOD PRESSURE: 105 MMHG | RESPIRATION RATE: 16 BRPM | HEART RATE: 82 BPM

## 2020-12-08 LAB
HCT VFR BLD AUTO: 29.6 % (ref 34–46.6)
HGB BLD-MCNC: 9.9 G/DL (ref 12–15.9)

## 2020-12-08 PROCEDURE — 25010000003 CEFAZOLIN IN DEXTROSE 2-4 GM/100ML-% SOLUTION: Performed by: NURSE PRACTITIONER

## 2020-12-08 PROCEDURE — 85014 HEMATOCRIT: CPT | Performed by: NURSE PRACTITIONER

## 2020-12-08 PROCEDURE — 97530 THERAPEUTIC ACTIVITIES: CPT

## 2020-12-08 PROCEDURE — 25010000002 KETOROLAC TROMETHAMINE PER 15 MG: Performed by: NURSE PRACTITIONER

## 2020-12-08 PROCEDURE — 85018 HEMOGLOBIN: CPT | Performed by: NURSE PRACTITIONER

## 2020-12-08 RX ADMIN — LEVOTHYROXINE SODIUM 137 MCG: 137 TABLET ORAL at 04:33

## 2020-12-08 RX ADMIN — HYDROCODONE BITARTRATE AND ACETAMINOPHEN 1 TABLET: 7.5; 325 TABLET ORAL at 08:25

## 2020-12-08 RX ADMIN — MONTELUKAST SODIUM 10 MG: 10 TABLET, FILM COATED ORAL at 08:27

## 2020-12-08 RX ADMIN — LISINOPRIL: 10 TABLET ORAL at 08:27

## 2020-12-08 RX ADMIN — HYDROCODONE BITARTRATE AND ACETAMINOPHEN 1 TABLET: 7.5; 325 TABLET ORAL at 02:20

## 2020-12-08 RX ADMIN — KETOROLAC TROMETHAMINE 30 MG: 30 INJECTION, SOLUTION INTRAMUSCULAR at 04:33

## 2020-12-08 RX ADMIN — MELOXICAM 15 MG: 15 TABLET ORAL at 08:24

## 2020-12-08 RX ADMIN — POLYETHYLENE GLYCOL 3350 17 G: 17 POWDER, FOR SOLUTION ORAL at 08:25

## 2020-12-08 RX ADMIN — PANTOPRAZOLE SODIUM 40 MG: 40 TABLET, DELAYED RELEASE ORAL at 04:33

## 2020-12-08 RX ADMIN — MUPIROCIN 1 APPLICATION: 20 OINTMENT TOPICAL at 08:24

## 2020-12-08 RX ADMIN — CEFAZOLIN SODIUM 2 G: 2 INJECTION, SOLUTION INTRAVENOUS at 00:31

## 2020-12-08 RX ADMIN — ASPIRIN 81 MG: 81 TABLET, COATED ORAL at 08:24

## 2020-12-08 NOTE — PLAN OF CARE
Goal Outcome Evaluation:  Plan of Care Reviewed With: patient  Progress: improving   Pt amb 250' with rwx sba. Pt asc/desc 4 step with qc and railing no lob or unsteadiness noted throughout. Pt appropriate for d/c home with HHPT.

## 2020-12-08 NOTE — DISCHARGE SUMMARY
Patient Name: Lydia Gutierrez  Patient YOB: 1960    Date of Admission:  12/7/2020  Date of Discharge:  12/8/2020  Discharge Diagnosis: [place] TOTAL KNEE ARTHROPLASTY [93320] (TOTAL KNEE ARTHROPLASTY)  Presenting Problem/History of Present Illness: Primary osteoarthritis of left knee [M17.12]  Primary osteoarthritis of left knee [M17.12]  Admitting Physician: Dr Juan Walsh  Consults:   Consults     No orders found for last 30 day(s).          DETAILS OF HOSPITAL STAY:  Patient is a 60 y.o. female was admitted to the floor following the above procedure and underwent an uncomplicated hospital stay.  Patient did well with physical therapy and was ambulating well without problems.  On the day of discharge the wound was clean, dry and intact and calf was soft and non tender and Homans sign was negative.  Patient was tolerating  without problems.  Patient will be discharged home.    Condition on Discharge:  Stable    Vital Signs  Temp:  [97.7 °F (36.5 °C)-98.8 °F (37.1 °C)] 97.8 °F (36.6 °C)  Heart Rate:  [] 82  Resp:  [14-16] 16  BP: ()/(60-82) 105/60    LABS:      Admission on 12/07/2020   Component Date Value Ref Range Status   • Glucose 12/07/2020 122  70 - 130 mg/dL Final   • Hemoglobin 12/08/2020 9.9* 12.0 - 15.9 g/dL Final   • Hematocrit 12/08/2020 29.6* 34.0 - 46.6 % Final       No results found.    Discharge Medications     Discharge Medications      New Medications      Instructions Start Date   HYDROcodone-acetaminophen 7.5-325 MG per tablet  Commonly known as: NORCO   Take 1-2 tablets by mouth every 4-6 hours as needed for pain. Take 2 only for severe pain.      meloxicam 15 MG tablet  Commonly known as: MOBIC   15 mg, Oral, Daily      ondansetron 4 MG tablet  Commonly known as: ZOFRAN   4 mg, Oral, Every 6 Hours PRN      polyethylene glycol 17 GM/SCOOP powder  Commonly known as: MIRALAX   Mix 17 g (1 capful) and drink by mouth 2 (Two) Times a Day for 28 doses.         Changes to  Medications      Instructions Start Date   amLODIPine 10 MG tablet  Commonly known as: NORVASC  What changed: See the new instructions.   TAKE ONE TABLET BY MOUTH  EVERYDAY      aspirin 81 MG EC tablet  What changed:   · when to take this  · additional instructions   81 mg, Oral, Every 12 Hours Scheduled      FLUoxetine 20 MG capsule  Commonly known as: PROzac  What changed: when to take this   TAKE 2 CAPSULES BY MOUTH ONCE DAILY      loratadine 10 MG tablet  Commonly known as: CLARITIN  What changed: when to take this   TAKE 1 TABLET BY MOUTH DAILY AS NEEDED.         Continue These Medications      Instructions Start Date   cholecalciferol 25 MCG (1000 UT) tablet  Commonly known as: VITAMIN D3   1,000 Units, Oral, Daily      fluticasone 50 MCG/ACT nasal spray  Commonly known as: FLONASE   INSTILL 2 SPRAYS INTO EACH NOSTRIL AS DIRECTED BY PROVIDER DAILY.      levothyroxine 137 MCG tablet  Commonly known as: SYNTHROID, LEVOTHROID   TAKE ONE TABLET BY MOUTH DAILY      lisinopril-hydrochlorothiazide 10-12.5 MG per tablet  Commonly known as: PRINZIDE,ZESTORETIC   TAKE ONE TABLET BY MOUTH DAILY      methocarbamol 750 MG tablet  Commonly known as: ROBAXIN   TAKE 1 TABLET BY MOUTH EVERY NIGHT FOR MUSCLE SPASMS.      montelukast 10 MG tablet  Commonly known as: SINGULAIR   TAKE 1 TABLET BY MOUTH AT BEDTIME.      pantoprazole 40 MG EC tablet  Commonly known as: PROTONIX   TAKE ONE TABLET BY MOUTH DAILY      rosuvastatin 40 MG tablet  Commonly known as: CRESTOR   TAKE ONE TABLET BY MOUTH EVERY NIGHT      Ventolin  (90 Base) MCG/ACT inhaler  Generic drug: albuterol sulfate HFA   INHALE 2 PUFFS EVERY FOUR HOURS AS NEEDED FOR WHEEZING.      vitamin B-12 100 MCG tablet  Commonly known as: CYANOCOBALAMIN   TAKE ONE TABLET BY MOUTH ONCE DAILY         Stop These Medications    CHLORHEXIDINE GLUCONATE CLOTH EX     diclofenac 1 % gel gel  Commonly known as: VOLTAREN     ibuprofen 800 MG tablet  Commonly known as: ADVILMOTRIN      mupirocin 2 % nasal ointment  Commonly known as: BACTROBAN            Activity at Discharge:     Discharge Instructions: Patient is to continue with physical therapy exercises daily and continue working with the physical therapist as ordered. Patient may weight bear as tolerated. Apply ice regularly. Patient may ice for long periods of time as long as ice is not directly on the skin. Patient instructed on frequent calf pumping exercises.  Patient also instructed on incentive spirometer during hospitalization and encouraged to continue to use at home regularly.    Dressing: The dressing is designed to be left in place until you return to the office in 2 weeks.  The suction unit should stop functioning at 7 days and the green light will switch to yellow.  At that point the suction unit and tubing can be disconnected at the port closest to the dressing.  The suction unit and tubing may be discarded.  You may shower immediately upon return home, you will need to turn the pump off by depressing the orange button once and then you may disconnect the pump and tubing at the connection port.  After showering, shake off the excess water and reattach the tubing.  Restart the pump by depressing the orange button one time and you will notice the green light flashing again.  If the dressing becomes disloged or saturated it should be changed. Please refer to the SHAYLA information sheet if you have any questions about the dressing.  If you have a home health nurse or therapist they can be contacted to assist with dressing change or repair. You may also call the SHAYLA dressing hotline for questions related to the dressing ([phone]). If there still other problems or questions related to the dressing despite these measures then you can contact Susan at our office [phone].  If for some reason the SHAYLA dressing is removed, after 7 days the wound can be gently cleaned with antibacterial soap then allowed to dry and covered with  a dry sterile dressing. The wound should be covered at all times except while showering.  Patient may change dressings daily and prn using sterile 4x4 and paper tape, and should call if any unusual drainage, redness or swelling.*  Follow up appointment in 2 weeks - patient to call the office at [phone] to schedule.  Patient will be discharged on aspirin 81mg BID x weeks, then daily x 4weeks    Discharge Diagnosis:    Patient Active Problem List   Diagnosis   • Abdominal mass   • Abnormal liver function tests   • Ultrasound scan abnormal   • Allergic rhinitis   • Asthma   • Benign essential hypertension   • Bladder problem   • Breast lump   • Dense breast tissue   • Breast tenderness   • Mixed anxiety depressive disorder   • Dyspareunia   • Abnormal C-reactive protein   • Leukocytosis   • Gastroesophageal reflux disease   • Fatigue   • Headache   • Herpes simplex virus (HSV) infection   • Hip pain, bilateral   • Hypothyroidism   • Low back pain   • Lumbar radiculopathy   • Mixed hyperlipidemia   • Oligomenorrhea   • Periodic limb movement disorder   • Obstructive sleep apnea   • Visual disturbance   • Vitamin D deficiency   • Bilateral knee pain   • Cervicalgia   • B12 deficiency   • Primary osteoarthritis of left knee       Follow-up Appointments  Future Appointments   Date Time Provider Department Center   12/22/2020  9:40 AM Mendel Walsh MD [facility] [address] [facility]     Additional Instructions for the Follow-ups that You Need to Schedule     Ambulatory Referral to Home Health   As directed      Face to Face Visit Date: 12/7/2020    Follow-up provider for Plan of Care?: I will be treating the patient on an ongoing basis.  Please send me the Plan of Care for signature.    Follow-up provider: MENDEL WALSH [[id number]]    Reason/Clinical Findings: knee surgery    Describe mobility limitations that make leaving home difficult: s/p left total knee arthroplasty    Nursing/Therapeutic Services Requested: Physical Therapy    PT  orders: Total joint pathway    Frequency: 1 Week 1                  KELLY Mike  12/08/20  08:01 EST

## 2020-12-08 NOTE — PROGRESS NOTES
Case Management Discharge Note      Final Note: Home with [facility]. Clarice Guzman CSW         Selected Continued Care - Discharged on 12/8/2020 Admission date: 12/7/2020 - Discharge disposition: Home-Health Care c    Destination    No services have been selected for the patient.              Durable Medical Equipment    No services have been selected for the patient.              Dialysis/Infusion    No services have been selected for the patient.              Home Medical Care Coordination complete    Service Provider Selected Services Address Phone Fax Patient Preferred    [facility] [place]  [facility] [address] [place] [id number] [phone] --          Therapy    No services have been selected for the patient.              Community Resources    No services have been selected for the patient.                       Final Discharge Disposition Code: 06 - home with home health care

## 2020-12-08 NOTE — PLAN OF CARE
Goal Outcome Evaluation:  Plan of Care Reviewed With: patient  Progress: improving  Outcome Summary: patient resting comfortably between care, ambulating with assistance to bathroom and in hallwya, pain controlled at this time, voiding without difficulty, educated on b/p monitoring and possible need for O2 during night r/t hx BREN and no CPAP

## 2020-12-08 NOTE — THERAPY TREATMENT NOTE
Patient Name: Lydia Gutierrez  : 1960    MRN: [id number]                              Today's Date: 2020       Admit Date: 2020    Visit Dx:     ICD-10-CM ICD-9-CM   1. S/P total knee arthroplasty, left  Z96.652 V43.65   2. Primary osteoarthritis of left knee  M17.12 715.16     Patient Active Problem List   Diagnosis   • Abdominal mass   • Abnormal liver function tests   • Ultrasound scan abnormal   • Allergic rhinitis   • Asthma   • Benign essential hypertension   • Bladder problem   • Breast lump   • Dense breast tissue   • Breast tenderness   • Mixed anxiety depressive disorder   • Dyspareunia   • Abnormal C-reactive protein   • Leukocytosis   • Gastroesophageal reflux disease   • Fatigue   • Headache   • Herpes simplex virus (HSV) infection   • Hip pain, bilateral   • Hypothyroidism   • Low back pain   • Lumbar radiculopathy   • Mixed hyperlipidemia   • Oligomenorrhea   • Periodic limb movement disorder   • Obstructive sleep apnea   • Visual disturbance   • Vitamin D deficiency   • Bilateral knee pain   • Cervicalgia   • B12 deficiency   • Primary osteoarthritis of left knee     Past Medical History:   Diagnosis Date   • Anxiety disorder    • Arthritis    • Asthma    • Borderline diabetic    • Depression    • GERD (gastroesophageal reflux disease)    • History of kidney stones    • Hyperlipidemia    • Hypertension    • Hypothyroidism    • Left knee pain    • Sleep apnea     NO MACHINE     Past Surgical History:   Procedure Laterality Date   •  SECTION     • CHOLECYSTECTOMY     • EYE SURGERY Left    • TOTAL KNEE ARTHROPLASTY Left 2020    Procedure: TOTAL KNEE ARTHROPLASTY;  Surgeon: Juan Walsh MD;  Location: [facility];  Service: Orthopedics;  Laterality: Left;     General Information     Row Name 20 1008          Physical Therapy Time and Intention    Document Type  therapy note (daily note)  -SV     Mode of Treatment  individual therapy;physical therapy  -SV     Row  Name 12/08/20 1008          General Information    Patient Profile Reviewed  yes  -SV       User Key  (r) = Recorded By, (t) = Taken By, (c) = Cosigned By    Initials Name Provider Type    Sara Serrano, PT Physical Therapist        Mobility     Row Name 12/08/20 1355          Bed Mobility    Supine-Sit [place] (Bed Mobility)  not tested  -SV     Row Name 12/08/20 1355          Sit-Stand Transfer    Sit-Stand [place] (Transfers)  standby assist  -SV     Assistive Device (Sit-Stand Transfers)  walker, front-wheeled  -SV     Row Name 12/08/20 1355          Gait/Stairs (Locomotion)    [place] Level (Gait)  standby assist  -SV     Assistive Device (Gait)  walker, front-wheeled  -SV     Distance in Feet (Gait)  250' slow antalgic gait, no unsteadiness noted  -SV     [place] Level (Stairs)  stand by assist  -SV     Assistive Device (Stairs)  cane, quad  -SV     Number of Steps (Stairs)  4  -SV     Ascending Technique (Stairs)  step-to-step  -SV     Descending Technique (Stairs)  step-to-step  -SV     Comment (Gait/Stairs)  no sunteadiness noted, good sequence with cane  -SV       User Key  (r) = Recorded By, (t) = Taken By, (c) = Cosigned By    Initials Name Provider Type    Sara Serrano, PT Physical Therapist        Obj/Interventions     Row Name 12/08/20 1357          Motor Skills    Therapeutic Exercise  -- pt with need for bathroom post steps  -SV       User Key  (r) = Recorded By, (t) = Taken By, (c) = Cosigned By    Initials Name Provider Type    Sara Serrano, PT Physical Therapist        Goals/Plan    No documentation.       Clinical Impression     Row Name 12/08/20 1358          Pain Scale: Numbers Pre/Post-Treatment    Pretreatment Pain Rating  2/10  -SV     Posttreatment Pain Rating  2/10  -SV     Pain Location  knee  -SV       User Key  (r) = Recorded By, (t) = Taken By, (c) = Cosigned By    Initials Name Provider Type    Sara Serrano, PT Physical Therapist         Outcome Measures     Row Name 12/08/20 1358          How much help from another person do you currently need...    Turning from your back to your side while in flat bed without using bedrails?  4  -SV     Moving from lying on back to sitting on the side of a flat bed without bedrails?  4  -SV     Moving to and from a bed to a chair (including a wheelchair)?  4  -SV     Standing up from a chair using your arms (e.g., wheelchair, bedside chair)?  4  -SV     Climbing 3-5 steps with a railing?  4  -SV     To walk in hospital room?  4  -SV     AM-PAC 6 Clicks Score (PT)  24  -SV       User Key  (r) = Recorded By, (t) = Taken By, (c) = Cosigned By    Initials Name Provider Type     Sara Easley, PT Physical Therapist        Physical Therapy Education                 Title: PT OT SLP Therapies (Resolved)     Topic: Physical Therapy (Resolved)     Point: Mobility training (Resolved)     Learning Progress Summary           Patient Acceptance, E,D, NR,DU by PC at 12/7/2020 1544                   Point: Home exercise program (Resolved)     Learning Progress Summary           Patient Acceptance, E,D, NR,DU by PC at 12/7/2020 1544                   Point: Body mechanics (Resolved)     Learning Progress Summary           Patient Acceptance, E,D, NR,DU by PC at 12/7/2020 1544                   Point: Precautions (Resolved)     Learning Progress Summary           Patient Acceptance, E,D, NR,DU by PC at 12/7/2020 1544                               User Key     Initials Effective Dates Name Provider Type Discipline    PC 04/03/18 -  Dai Rhoades PT Physical Therapist PT              PT Recommendation and Plan           Time Calculation:   PT Charges     Row Name 12/08/20 1400             Time Calculation    Start Time  1008  -SV      Stop Time  1029  -SV      Time Calculation (min)  21 min  -SV      PT Received On  12/08/20  -SV        User Key  (r) = Recorded By, (t) = Taken By, (c) = Cosigned By    Initials Name  Provider Type     Sara Easley, PT Physical Therapist        Therapy Charges for Today     Code Description Service Date Service Provider Modifiers Qty    51224721996 HC PT THERAPEUTIC ACT EA 15 MIN 12/8/2020 Sara Easley, PT GP 1          PT G-Codes  Outcome Measure Options: AM-PAC 6 Clicks Basic Mobility (PT)  AM-PAC 6 Clicks Score (PT): 24    Sara Easley, NICOLE  12/8/2020

## 2020-12-22 ENCOUNTER — OFFICE VISIT (OUTPATIENT)
Dept: ORTHOPEDIC SURGERY | Facility: CLINIC | Age: 60
End: 2020-12-22

## 2020-12-22 VITALS — TEMPERATURE: 97.2 F | WEIGHT: 213 LBS | HEIGHT: 62 IN | BODY MASS INDEX: 39.2 KG/M2

## 2020-12-22 DIAGNOSIS — Z96.652 STATUS POST LEFT KNEE REPLACEMENT: Primary | ICD-10-CM

## 2020-12-22 DIAGNOSIS — Z96.652 S/P TOTAL KNEE ARTHROPLASTY, LEFT: ICD-10-CM

## 2020-12-22 PROCEDURE — 99024 POSTOP FOLLOW-UP VISIT: CPT | Performed by: ORTHOPAEDIC SURGERY

## 2020-12-22 RX ORDER — HYDROCODONE BITARTRATE AND ACETAMINOPHEN 7.5; 325 MG/1; MG/1
TABLET ORAL
Qty: 40 TABLET | Refills: 0 | Status: SHIPPED | OUTPATIENT
Start: 2020-12-22 | End: 2021-05-20

## 2020-12-22 NOTE — PROGRESS NOTES
Lydia Gutierrez : 1960 MRN: [id number] DATE: 2020    DIAGNOSIS: 2 week follow up left total knee      SUBJECTIVE:Patient returns today for 2 week follow up of left total knee replacement. Patient reports doing well with no unusual complaints. Appears to be progressing appropriately.    OBJECTIVE:   Exam:. The incision is healing appropriately. No sign of infection. Range of motion is progressing as expected. The calf is soft and nontender with a negative Homans sign.    ASSESSMENT: 2 week status post left knee replacement.    PLAN: 1) Staples removed and steri strips applied   2) Order given for PT   3) Discontinue KANIKA hose   4) Continue ice PRN   5) aspirin 81 mg orally every day for 1 month   6) Follow up in 6 weeks with repeat Xrays of left knee (3views)    Juan Walsh MD  2020

## 2021-01-02 DIAGNOSIS — J45.20 MILD INTERMITTENT ASTHMA WITHOUT COMPLICATION: ICD-10-CM

## 2021-01-04 RX ORDER — LORATADINE 10 MG/1
10 TABLET ORAL DAILY
Qty: 90 TABLET | Refills: 1 | Status: SHIPPED | OUTPATIENT
Start: 2021-01-04 | End: 2021-05-04

## 2021-01-04 RX ORDER — METHOCARBAMOL 750 MG/1
TABLET, FILM COATED ORAL
Qty: 90 TABLET | Refills: 1 | Status: SHIPPED | OUTPATIENT
Start: 2021-01-04 | End: 2021-06-25

## 2021-01-04 RX ORDER — ALBUTEROL SULFATE 90 UG/1
AEROSOL, METERED RESPIRATORY (INHALATION)
Qty: 18 G | Refills: 1 | Status: SHIPPED | OUTPATIENT
Start: 2021-01-04 | End: 2021-02-05

## 2021-01-04 RX ORDER — MONTELUKAST SODIUM 10 MG/1
TABLET ORAL
Qty: 90 TABLET | Refills: 1 | Status: SHIPPED | OUTPATIENT
Start: 2021-01-04 | End: 2021-05-04

## 2021-01-06 ENCOUNTER — HOSPITAL ENCOUNTER (OUTPATIENT)
Dept: PHYSICAL THERAPY | Facility: HOSPITAL | Age: 61
Setting detail: THERAPIES SERIES
Discharge: HOME | End: 2021-01-06

## 2021-01-06 DIAGNOSIS — Z78.9 DIFFICULTY NAVIGATING STAIRS: ICD-10-CM

## 2021-01-06 DIAGNOSIS — Z96.652 AFTERCARE FOLLOWING LEFT KNEE JOINT REPLACEMENT SURGERY: ICD-10-CM

## 2021-01-06 DIAGNOSIS — Z47.1 AFTERCARE FOLLOWING LEFT KNEE JOINT REPLACEMENT SURGERY: ICD-10-CM

## 2021-01-06 DIAGNOSIS — R26.89 IMPAIRED GAIT AND MOBILITY: ICD-10-CM

## 2021-01-06 DIAGNOSIS — M25.562 ACUTE PAIN OF LEFT KNEE: Primary | ICD-10-CM

## 2021-01-06 PROCEDURE — 97110 THERAPEUTIC EXERCISES: CPT | Performed by: PHYSICAL THERAPIST

## 2021-01-06 PROCEDURE — 97162 PT EVAL MOD COMPLEX 30 MIN: CPT | Performed by: PHYSICAL THERAPIST

## 2021-01-06 NOTE — THERAPY EVALUATION
Outpatient Physical Therapy Ortho Initial Evaluation  [facility]     Patient Name: Lydia Gutierrez  : 1960  MRN: [id number]  Today's Date: 2021      Visit Date: 2021    Patient Active Problem List   Diagnosis   • Abdominal mass   • Abnormal liver function tests   • Ultrasound scan abnormal   • Allergic rhinitis   • Asthma   • Benign essential hypertension   • Bladder problem   • Breast lump   • Dense breast tissue   • Breast tenderness   • Mixed anxiety depressive disorder   • Dyspareunia   • Abnormal C-reactive protein   • Leukocytosis   • Gastroesophageal reflux disease   • Fatigue   • Headache   • Herpes simplex virus (HSV) infection   • Hip pain, bilateral   • Hypothyroidism   • Low back pain   • Lumbar radiculopathy   • Mixed hyperlipidemia   • Oligomenorrhea   • Periodic limb movement disorder   • Obstructive sleep apnea   • Visual disturbance   • Vitamin D deficiency   • Bilateral knee pain   • Cervicalgia   • B12 deficiency   • Primary osteoarthritis of left knee        Past Medical History:   Diagnosis Date   • Anxiety disorder    • Arthritis    • Asthma    • Borderline diabetic    • Depression    • GERD (gastroesophageal reflux disease)    • History of kidney stones    • Hyperlipidemia    • Hypertension    • Hypothyroidism    • Left knee pain    • Sleep apnea     NO MACHINE        Past Surgical History:   Procedure Laterality Date   •  SECTION     • CHOLECYSTECTOMY     • EYE SURGERY Left    • TOTAL KNEE ARTHROPLASTY Left 2020    Procedure: TOTAL KNEE ARTHROPLASTY;  Surgeon: Juan Walsh MD;  Location: [facility];  Service: Orthopedics;  Laterality: Left;       Visit Dx:     ICD-10-CM ICD-9-CM   1. Acute pain of left knee  M25.562 719.46   2. Aftercare following left knee joint replacement surgery  Z47.1 V54.81    Z96.652 V43.65   3. Impaired gait and mobility  R26.89 781.2   4. Difficulty navigating stairs  Z78.9 V49.89         Patient History     Row Name 21  1100 01/04/21 1037          History    Chief Complaint  Joint stiffness;Pain;Difficulty Walking;Difficulty with daily activities  -RA  Joint stiffness  (Pended)   (Patient-Rptd)   -patient     Type of Pain  Knee pain  -RA  Knee pain  (Pended)   (Patient-Rptd)   -patient     Date Current Problem(s) Began  12/22/20 MD referral date  -RA  --     Brief Description of Current Complaint  Patent with chronic L knee pain s/p L KA 12/7/2021. Also has issues with R knee but not sure she wants to have surgery on it  -RA  --     Previous treatment for THIS PROBLEM  Injections;Medication;Surgery  -RA  --     Patient/Caregiver Goals  Improve strength;Improve mobility;Return to prior level of function  -RA  Improve strength  (Pended)   (Patient-Rptd)   -patient     Occupation/sports/leisure activities   in retail, part time - currenlty off work    -RA  --     Patient seeing anyone else for problem(s)?  ortho   -RA  --     How has patient tried to help current problem?  meds, ice  -RA  --     What clinical tests have you had for this problem?  X-ray  -RA  --     Results of Clinical Tests  severe arthritis  -RA  --     Are you or can you be pregnant  No  -RA  No  (Pended)   (Patient-Rptd)   -patient        Pain     Pain Location  Knee  -RA  --     Pain at Present  6  -RA  --     Pain at Best  2;3  -RA  --     Pain at Worst  8  -RA  --     Pain Frequency  Constant/continuous  -RA  --     Pain Description  Aching;Tightness;Stabbing;Sore  -RA  --     What Performance Factors Make the Current Problem(s) WORSE?  WB activity, lying   -RA  --     What Performance Factors Make the Current Problem(s) BETTER?  slight knee bend when sitting  -RA  --     Tolerance Time- Standing  10 miin  -RA  --     Tolerance Time- Sitting  30 miin  -RA  --     Tolerance Time- Walking  5 min  -RA  --     Is your sleep disturbed?  Yes  -RA  --     What position do you sleep in?  Supine;Right sidelying;Left sidelying  -RA  --     Difficulties at work?   not currently working  -RA  --     Difficulties with ADL's?  independent with pain/modification  -RA  --        Fall Risk Assessment    Any falls in the past year:  No  -RA  No  (Pended)   (Patient-Rptd)   -patient        Services    Prior Rehab/Home Health Experiences  Yes  -RA  No  (Pended)   (Patient-Rptd)   -patient     When was the prior experience with Rehab/Home Health  post op x 2 weeks  -RA  --     Where was the prior experience with Rehab/Home Health  [facility]  -RA  --     Are you currently receiving Home Health services  No  -RA  No  (Pended)   (Patient-Rptd)   -patient     Do you plan to receive Home Health services in the near future  No  -RA  No  (Pended)   (Patient-Rptd)   -patient        Daily Activities    Primary Language  English  -RA  English  (Pended)   (Patient-Rptd)   -patient     Are you able to read  Yes  -RA  Other (comment)  (Pended)   (Patient-Rptd)   -patient     Are you able to write  Yes  -RA  Other (comment)  (Pended)   (Patient-Rptd)   -patient     Teaching needs identified  Home Exercise Program;Management of Condition  -RA  --     Patient is concerned about/has problems with  Flexibility;Climbing Stairs;Performing home management (household chores, shopping, care of dependents);Performing job responsibilities/community activities (work, school,;Sitting;Standing;Transfers (getting out of a chair, bed);Walking  -RA  --     Does patient have problems with the following?  Anxiety;Depression;Panic Attack  -RA  --     Barriers to learning  None  -RA  --     Explanation of Functional Status Problem  independent with pain and/or modification  -RA  --     Pt Participated in POC and Goals  Yes  -RA  --        Safety    Are you being hurt, hit, or frightened by anyone at home or in your life?  No  -RA  No  (Pended)   (Patient-Rptd)   -patient     Are you being neglected by a caregiver  No  -RA  No  (Pended)   (Patient-Rptd)   -patient     Have you had any of the following issues with   Depression;Anxiety;Panic Attacks  -RA  Depression;Anxiety;Panic Attacks  (Pended)   (Patient-Rptd)   -patient       User Key  (r) = Recorded By, (t) = Taken By, (c) = Cosigned By    Initials Name Provider Type    RA Tiara Francois, PT Physical Therapist    patient Lydia Gutierrez --          PT Ortho     Row Name 01/06/21 1100       Posture/Observations    Observations  Incision healing;Edema  -RA       Myotomal Screen- Lower Quarter Clearing    Hip flexion (L2)  Left:;3 (Fair);3+ (Fair +);Right:;4- (Good -)  -RA    Knee extension (L3)  Right:;4 (Good);Left:;3+ (Fair +)  -RA    Ankle DF (L4)  Right:;4+ (Good +);Left:;4 (Good)  -RA    Knee flexion (S2)  Right:;4 (Good);Left:;3+ (Fair +)  -RA       Special Tests/Palpation    Special Tests/Palpation  Knee  -RA       Knee Palpation    Knee Palpation?  Yes  -RA       Patellar Accessory Motions    Patellar Accessory Motions Tested?  Yes decreased sup/inf  -RA       Right Lower Ext    Rt Knee Extension/Flexion AROM  2 to 115 deg (sitting)  -RA    RT Lower Extremity Comments  Mild pain with both  -RA       Left Lower Ext    Lt Knee Extension/Flexion AROM  27 to 95 deg (sitting)  -RA    Lt Knee Extension/Flexion PROM  10 deg (supine) to 100 deg (sitting)  -RA    LT Lower Extremity Comments  Pain extension > flexion   -RA       MMT (Manual Muscle Testing)    General MMT Comments  hip abd/ext grossly 4-/5  -RA       Sensation    Additional Comments  L knee - reported numbness lateral to incision, pins and needles medial to incision  -RA       Flexibility    Flexibility Tested?  Lower Extremity  -RA       Lower Extremity Flexibility    LE Flexibility Comments  tightness of HS/calf tissues  -RA       Transfers    Comment (Transfers)  decreased L LE WB, dependent on UE assist   -RA       Gait/Stairs (Locomotion)    Comment (Gait/Stairs)  antalgic/compensated with SPC, lacks TKE, NR stairs w/ cane and HR support  -RA      User Key  (r) = Recorded By, (t) = Taken By, (c) = Cosigned  By    Initials Name Provider Type    Tiara Noriega PT Physical Therapist                      Therapy Education  Given: HEP, Symptoms/condition management, Pain management, Mobility training  Program: New  How Provided: Verbal, Demonstration, Written  Provided to: Patient  Level of Understanding: Teach back education performed, Verbalized     PT OP Goals     Row Name 01/06/21 1400          PT Short Term Goals    STG 1  Patient will be independent with initial HEP as well as education on symptom management, joint protection, and strategies to minimize stress on affected tissues.  -RA     STG 2  Patient will have improved L knee ext </= 5 degrees for TKE/normalization heel-toe gait pattern.  -RA     STG 3  Patient will increase left knee  flexion ROM to >/= 110 degrees for increased ease with ADLs/transitional movements  -RA     STG 4  Patient will report >/= 25% improvement in quality/quantity of sleep  -RA        Long Term Goals    LTG 1  Patient will be independent with established HEP for strength/stabilization to facilitate return to desired function.  -RA     LTG 2  Patient will have L knee strength >/= 4/5 for greater ease/tolerance with sit to stand, standing and walking   -RA     LTG 3  Patient will demonstrate necessary hip/knee strength/stability to go up/down stairs reciprocally with minimal compensation and HR assist as needed.  -RA     LTG 4  Patient will report reduced functional limitations on KOS Activities of Daily Living Scale with score >/= 60/80.   -RA     LTG 5  Patient will demonstrate nearly normal gait without assistive device for return to community mobility with minimal to no increased pain  -RA        Time Calculation    PT Goal Re-Cert Due Date  04/04/21  -RA       User Key  (r) = Recorded By, (t) = Taken By, (c) = Cosigned By    Initials Name Provider Type    Tiara Noriega PT Physical Therapist          PT Assessment/Plan     Row Name 01/06/21 1400          PT Assessment     Functional Limitations  Impaired gait;Limitation in home management;Limitations in community activities;Performance in leisure activities;Performance in work activities  -RA     Impairments  Gait;Balance;Impaired flexibility;Joint mobility;Muscle strength;Pain;Range of motion  -RA     Assessment Comments  Patient is a 65yo F referred to therapy for evolving condition of L knee pain s/p L TKA 12/7/2020.  She has comorbidities/personal factors including hx of back pain w/ radiculopathy, B hip pain, contralateral knee pain (with crepitus), anxiety/depression, and has a job (as ) which requires extended standing (when she returns to work) that may affect her therapy plan of care.  She has L knee post op swelling and her incision is healing well.  She demonstrates antalgic/compensated gait using SPC.  She has L knee ROM/strength deficits, HS/calf muscle tightness, decreased patellar mobility, difficulty with transitional movements.  She reports numbness/pins and needles around incision scar, impaired functional mobility, and overall decreased activity tolerance.  She would benefit from skilled therapy for education, ROM/flexibility, strength/stabilization, and manual/modalities prn to help reduce pain, improve function, and facilitate self-management of symptoms/condition with return to desired activity level.  -RA     Please refer to paper survey for additional self-reported information  Yes  -RA     Rehab Potential  Good  -RA     Patient/caregiver participated in establishment of treatment plan and goals  Yes  -RA     Patient would benefit from skilled therapy intervention  Yes  -RA        PT Plan    PT Frequency  2x/week  -RA     Predicted Duration of Therapy Intervention (PT)  6-8 weeks  -RA     Planned CPT's?  PT EVAL MOD COMPLELITY: 97653;PT THER PROC EA 15 MIN: 13305;PT THER ACT EA 15 MIN: 26983;PT MANUAL THERAPY EA 15 MIN: 80894;PT RE-EVAL: 80675;PT NEUROMUSC RE-EDUCATION EA 15 MIN: 45097;PT GAIT TRAINING  EA 15 MIN: 23814;PT AQUATIC THERAPY EA 15 MIN: 67451;PT SELF CARE/HOME MGMT/TRAIN EA 15: 06967;PT HOT OR COLD PACK TREAT MCARE  -RA     PT Plan Comments  Skilled PT for L knee post op rehab.  Work on knee ROM, LE flexibility, strength/stabilization, functional mobility, and gait.  Manual/modalities prn.  Warm up on NuStep, progress ex for ROM/flexibility and strength/stabilization per patient tolerance  -RA       User Key  (r) = Recorded By, (t) = Taken By, (c) = Cosigned By    Initials Name Provider Type    Tiara Noriega, PT Physical Therapist            OP Exercises     Row Name 01/06/21 1500             Total Minutes    38492 - PT Therapeutic Exercise Minutes  20  -RA         Exercise 1    Exercise Name 1  QS   -RA      Cueing 1  Verbal;Tactile  -RA      Reps 1  10  -RA      Time 1  5 sec  -RA      Additional Comments  towel roll behind knee  -RA         Exercise 2    Exercise Name 2  SLR  -RA      Cueing 2  Verbal;Demo  -RA      Reps 2  7  -RA         Exercise 3    Exercise Name 3  heel slides   -RA      Cueing 3  Verbal;Demo  -RA      Reps 3  10  -RA      Time 3  3 sec  -RA      Additional Comments  reviewed AAROM heel slide with belt  -RA         Exercise 4    Exercise Name 4  seated knee flexion AROM   -RA      Reps 4  10  -RA      Time 4  5 sec  -RA         Exercise 5    Exercise Name 5  seated LAQ  -RA      Reps 5  10  -RA      Time 5  3 sec  -RA         Exercise 6    Exercise Name 6  HS stretch seated EOB   -RA      Reps 6  2  -RA      Time 6  20 sec  -RA         Exercise 7    Exercise Name 7  Also reviewed other standing ex she has been doing at home  -RA        User Key  (r) = Recorded By, (t) = Taken By, (c) = Cosigned By    Initials Name Provider Type    Tiara Noriega, PT Physical Therapist                        Outcome Measure Options: Knee Outcome Score- ADL  Knee Outcome Score  Knee Outcome Score Comments: 41/80 or 51.25% (80/80 = no disability)      Time Calculation:     Start Time:  1136  Stop Time: 1220  Time Calculation (min): 44 min     Therapy Charges for Today     Code Description Service Date Service Provider Modifiers Qty    68233150973 HC PT THER PROC EA 15 MIN 1/6/2021 Tiara Francois, PT GP 1    14026198130 HC PT EVAL MOD COMPLEXITY 2 1/6/2021 Tiara Francois, PT GP 1          PT G-Codes  Outcome Measure Options: Knee Outcome Score- ADL         Tiara Francois, PT  1/6/2021

## 2021-01-08 ENCOUNTER — HOSPITAL ENCOUNTER (OUTPATIENT)
Dept: PHYSICAL THERAPY | Facility: HOSPITAL | Age: 61
Setting detail: THERAPIES SERIES
Discharge: HOME | End: 2021-01-08

## 2021-01-08 DIAGNOSIS — Z96.652 AFTERCARE FOLLOWING LEFT KNEE JOINT REPLACEMENT SURGERY: ICD-10-CM

## 2021-01-08 DIAGNOSIS — R26.89 IMPAIRED GAIT AND MOBILITY: ICD-10-CM

## 2021-01-08 DIAGNOSIS — M25.562 ACUTE PAIN OF LEFT KNEE: Primary | ICD-10-CM

## 2021-01-08 DIAGNOSIS — Z78.9 DIFFICULTY NAVIGATING STAIRS: ICD-10-CM

## 2021-01-08 DIAGNOSIS — Z47.1 AFTERCARE FOLLOWING LEFT KNEE JOINT REPLACEMENT SURGERY: ICD-10-CM

## 2021-01-08 PROCEDURE — 97110 THERAPEUTIC EXERCISES: CPT

## 2021-01-08 NOTE — THERAPY TREATMENT NOTE
Outpatient Physical Therapy Ortho Treatment Note  [facility]     Patient Name: Lydia Gutierrez  : 1960  MRN: [id number]  Today's Date: 2021      Visit Date: 2021    Visit Dx:    ICD-10-CM ICD-9-CM   1. Acute pain of left knee  M25.562 719.46   2. Aftercare following left knee joint replacement surgery  Z47.1 V54.81    Z96.652 V43.65   3. Impaired gait and mobility  R26.89 781.2   4. Difficulty navigating stairs  Z78.9 V49.89       Patient Active Problem List   Diagnosis   • Abdominal mass   • Abnormal liver function tests   • Ultrasound scan abnormal   • Allergic rhinitis   • Asthma   • Benign essential hypertension   • Bladder problem   • Breast lump   • Dense breast tissue   • Breast tenderness   • Mixed anxiety depressive disorder   • Dyspareunia   • Abnormal C-reactive protein   • Leukocytosis   • Gastroesophageal reflux disease   • Fatigue   • Headache   • Herpes simplex virus (HSV) infection   • Hip pain, bilateral   • Hypothyroidism   • Low back pain   • Lumbar radiculopathy   • Mixed hyperlipidemia   • Oligomenorrhea   • Periodic limb movement disorder   • Obstructive sleep apnea   • Visual disturbance   • Vitamin D deficiency   • Bilateral knee pain   • Cervicalgia   • B12 deficiency   • Primary osteoarthritis of left knee        Past Medical History:   Diagnosis Date   • Anxiety disorder    • Arthritis    • Asthma    • Borderline diabetic    • Depression    • GERD (gastroesophageal reflux disease)    • History of kidney stones    • Hyperlipidemia    • Hypertension    • Hypothyroidism    • Left knee pain    • Sleep apnea     NO MACHINE        Past Surgical History:   Procedure Laterality Date   •  SECTION     • CHOLECYSTECTOMY     • EYE SURGERY Left    • TOTAL KNEE ARTHROPLASTY Left 2020    Procedure: TOTAL KNEE ARTHROPLASTY;  Surgeon: Juan Walsh MD;  Location: [facility] OR;  Service: Orthopedics;  Laterality: Left;                       PT Assessment/Plan     Row  Name 01/08/21 1148          PT Assessment    Assessment Comments  Patient ambulating with SC. Tolerated 2# with LAQ.  -WS       User Key  (r) = Recorded By, (t) = Taken By, (c) = Cosigned By    Initials Name Provider Type    WS Richard Woods PTA Physical Therapy Assistant            OP Exercises     Row Name 01/08/21 1115             Subjective Comments    Subjective Comments  left knee pain  -WS         Subjective Pain    Able to rate subjective pain?  yes  -WS      Pre-Treatment Pain Level  4  -WS      Subjective Pain Comment  MD Fenaz 4  -WS         Total Minutes    36805 - PT Therapeutic Exercise Minutes  30  -WS         Exercise 1    Exercise Name 1  QS   -WS      Cueing 1  Verbal;Tactile  -WS      Reps 1  15  -WS      Time 1  5 sec  -WS      Additional Comments  towell  -WS         Exercise 2    Exercise Name 2  SLR  -WS      Cueing 2  Verbal;Demo  -WS      Reps 2  13  -WS         Exercise 3    Exercise Name 3  heel slides   -WS      Cueing 3  Verbal;Demo  -WS      Reps 3  10  -WS      Time 3  3 sec  -WS      Additional Comments  with belt  -WS         Exercise 4    Exercise Name 4  seated knee flexion AROM   -WS      Reps 4  10  -WS      Time 4  5 sec  -WS         Exercise 5    Exercise Name 5  seated LAQ  -WS      Reps 5  15  -WS      Time 5  3 sec  -WS         Exercise 6    Exercise Name 6  HS stretch seated EOB   -WS      Reps 6  2  -WS      Time 6  20 sec  -WS         Exercise 7    Exercise Name 7  calf raise  -WS      Reps 7  15  -WS         Exercise 8    Exercise Name 8  Nustep LE L3  -WS      Time 8  5 min  -WS         Exercise 9    Exercise Name 9  HS curl  -WS      Reps 9  15  -WS         Exercise 10    Exercise Name 10  standing hip abd  -WS      Reps 10  15  -WS         Exercise 11    Exercise Name 11  stair strech  -WS      Reps 11  3  -WS      Time 11  20 sec  -WS         Exercise 12    Exercise Name 12  A/P 95/105  -WS        User Key  (r) = Recorded By, (t) = Taken By, (c) = Cosigned By     Initials Name Provider Type    Richard Huerta PTA Physical Therapy Assistant                       PT OP Goals     Row Name 01/08/21 1100          PT Short Term Goals    STG 1  Patient will be independent with initial HEP as well as education on symptom management, joint protection, and strategies to minimize stress on affected tissues.  -WS     STG 1 Progress  Ongoing  -WS     STG 2  Patient will have improved L knee ext </= 5 degrees for TKE/normalization heel-toe gait pattern.  -WS     STG 2 Progress  Ongoing  -WS     STG 3  Patient will increase left knee  flexion ROM to >/= 110 degrees for increased ease with ADLs/transitional movements  -WS     STG 3 Progress  Ongoing  -WS     STG 4  Patient will report >/= 25% improvement in quality/quantity of sleep  -WS     STG 4 Progress  Ongoing  -WS        Long Term Goals    LTG 1  Patient will be independent with established HEP for strength/stabilization to facilitate return to desired function.  -WS     LTG 2  Patient will have L knee strength >/= 4/5 for greater ease/tolerance with sit to stand, standing and walking   -WS     LTG 3  Patient will demonstrate necessary hip/knee strength/stability to go up/down stairs reciprocally with minimal compensation and HR assist as needed.  -WS     LTG 4  Patient will report reduced functional limitations on KOS Activities of Daily Living Scale with score >/= 60/80.   -     LTG 5  Patient will demonstrate nearly normal gait without assistive device for return to community mobility with minimal to no increased pain  -       User Key  (r) = Recorded By, (t) = Taken By, (c) = Cosigned By    Initials Name Provider Type    Richard Huerta PTA Physical Therapy Assistant          Therapy Education  Given: HEP, Symptoms/condition management, Pain management, Posture/body mechanics  Program: Reinforced  How Provided: Verbal, Demonstration  Provided to: Patient              Time Calculation:   Start Time: 1115  Stop Time:  1145  Time Calculation (min): 30 min  Therapy Charges for Today     Code Description Service Date Service Provider Modifiers Qty    18509647706 HC PT THER PROC EA 15 MIN 1/8/2021 Richard Woods, CHAVEZ GP 2                    Richard Woods PTA  1/8/2021

## 2021-01-13 ENCOUNTER — HOSPITAL ENCOUNTER (OUTPATIENT)
Dept: PHYSICAL THERAPY | Facility: HOSPITAL | Age: 61
Setting detail: THERAPIES SERIES
Discharge: HOME | End: 2021-01-13

## 2021-01-13 DIAGNOSIS — R26.89 IMPAIRED GAIT AND MOBILITY: ICD-10-CM

## 2021-01-13 DIAGNOSIS — Z96.652 AFTERCARE FOLLOWING LEFT KNEE JOINT REPLACEMENT SURGERY: ICD-10-CM

## 2021-01-13 DIAGNOSIS — Z47.1 AFTERCARE FOLLOWING LEFT KNEE JOINT REPLACEMENT SURGERY: ICD-10-CM

## 2021-01-13 DIAGNOSIS — M25.562 ACUTE PAIN OF LEFT KNEE: Primary | ICD-10-CM

## 2021-01-13 DIAGNOSIS — Z78.9 DIFFICULTY NAVIGATING STAIRS: ICD-10-CM

## 2021-01-13 PROCEDURE — 97110 THERAPEUTIC EXERCISES: CPT

## 2021-01-13 NOTE — THERAPY TREATMENT NOTE
Outpatient Physical Therapy Ortho Treatment Note  [facility]     Patient Name: Lydia Gutierrez  : 1960  MRN: [id number]  Today's Date: 2021      Visit Date: 2021    Visit Dx:    ICD-10-CM ICD-9-CM   1. Acute pain of left knee  M25.562 719.46   2. Aftercare following left knee joint replacement surgery  Z47.1 V54.81    Z96.652 V43.65   3. Impaired gait and mobility  R26.89 781.2   4. Difficulty navigating stairs  Z78.9 V49.89       Patient Active Problem List   Diagnosis   • Abdominal mass   • Abnormal liver function tests   • Ultrasound scan abnormal   • Allergic rhinitis   • Asthma   • Benign essential hypertension   • Bladder problem   • Breast lump   • Dense breast tissue   • Breast tenderness   • Mixed anxiety depressive disorder   • Dyspareunia   • Abnormal C-reactive protein   • Leukocytosis   • Gastroesophageal reflux disease   • Fatigue   • Headache   • Herpes simplex virus (HSV) infection   • Hip pain, bilateral   • Hypothyroidism   • Low back pain   • Lumbar radiculopathy   • Mixed hyperlipidemia   • Oligomenorrhea   • Periodic limb movement disorder   • Obstructive sleep apnea   • Visual disturbance   • Vitamin D deficiency   • Bilateral knee pain   • Cervicalgia   • B12 deficiency   • Primary osteoarthritis of left knee        Past Medical History:   Diagnosis Date   • Anxiety disorder    • Arthritis    • Asthma    • Borderline diabetic    • Depression    • GERD (gastroesophageal reflux disease)    • History of kidney stones    • Hyperlipidemia    • Hypertension    • Hypothyroidism    • Left knee pain    • Sleep apnea     NO MACHINE        Past Surgical History:   Procedure Laterality Date   •  SECTION     • CHOLECYSTECTOMY     • EYE SURGERY Left    • TOTAL KNEE ARTHROPLASTY Left 2020    Procedure: TOTAL KNEE ARTHROPLASTY;  Surgeon: Juan Walsh MD;  Location: [facility] OR;  Service: Orthopedics;  Laterality: Left;                       PT Assessment/Plan     Row  Name 01/13/21 1227          PT Assessment    Assessment Comments  Patient ambulating with SC. Tolerated added weights with several exercises. Will add heel prop next session for knee extension  -WS       User Key  (r) = Recorded By, (t) = Taken By, (c) = Cosigned By    Initials Name Provider Type    WS Richard Woods PTA Physical Therapy Assistant            OP Exercises     Row Name 01/13/21 1200             Subjective Comments    Subjective Comments  left knee is stiff  -WS         Subjective Pain    Able to rate subjective pain?  yes  -WS      Pre-Treatment Pain Level  4  -WS         Total Minutes    19241 - PT Therapeutic Exercise Minutes  30  -WS         Exercise 1    Exercise Name 1  QS   -WS      Cueing 1  Verbal;Tactile  -WS      Reps 1  15  -WS      Time 1  5 sec  -WS      Additional Comments  towel  -WS         Exercise 2    Exercise Name 2  SLR  -WS      Cueing 2  Verbal;Demo  -WS      Reps 2  12  -WS         Exercise 3    Exercise Name 3  heel slides   -WS      Cueing 3  Verbal;Demo  -WS      Reps 3  10  -WS      Time 3  3 sec  -WS      Additional Comments  with belt  -WS         Exercise 4    Exercise Name 4  seated knee flexion AROM   -WS      Reps 4  10  -WS      Time 4  5 sec  -WS         Exercise 5    Exercise Name 5  seated LAQ  -WS      Reps 5  15  -WS      Time 5  3 sec  -WS      Additional Comments  3#  -WS         Exercise 6    Exercise Name 6  HS stretch seated EOB   -WS      Reps 6  3  -WS      Time 6  20 sec  -WS         Exercise 7    Exercise Name 7  calf raise  -WS      Reps 7  15  -WS         Exercise 8    Exercise Name 8  Nustep LE L5  -WS      Time 8  5 min  -WS         Exercise 9    Exercise Name 9  HS curl  -WS      Reps 9  15  -WS      Additional Comments  2#  -WS         Exercise 10    Exercise Name 10  standing hip abd  -WS      Reps 10  15  -WS      Additional Comments  2#  -WS         Exercise 11    Exercise Name 11  stair strech  -WS      Reps 11  3  -WS      Time 11  20 sec   -         Exercise 13    Exercise Name 13  add heel prop  -WS        User Key  (r) = Recorded By, (t) = Taken By, (c) = Cosigned By    Initials Name Provider Type    Richard Huerta PTA Physical Therapy Assistant                       PT OP Goals     Row Name 01/13/21 1200          PT Short Term Goals    STG 1  Patient will be independent with initial HEP as well as education on symptom management, joint protection, and strategies to minimize stress on affected tissues.  -WS     STG 1 Progress  Ongoing  -WS     STG 2  Patient will have improved L knee ext </= 5 degrees for TKE/normalization heel-toe gait pattern.  -WS     STG 2 Progress  Ongoing  -WS     STG 3  Patient will increase left knee  flexion ROM to >/= 110 degrees for increased ease with ADLs/transitional movements  -WS     STG 3 Progress  Ongoing  -WS     STG 4  Patient will report >/= 25% improvement in quality/quantity of sleep  -WS     STG 4 Progress  Ongoing  -WS        Long Term Goals    LTG 1  Patient will be independent with established HEP for strength/stabilization to facilitate return to desired function.  -WS     LTG 2  Patient will have L knee strength >/= 4/5 for greater ease/tolerance with sit to stand, standing and walking   -WS     LTG 3  Patient will demonstrate necessary hip/knee strength/stability to go up/down stairs reciprocally with minimal compensation and HR assist as needed.  -WS     LTG 4  Patient will report reduced functional limitations on KOS Activities of Daily Living Scale with score >/= 60/80.   -WS     LTG 5  Patient will demonstrate nearly normal gait without assistive device for return to community mobility with minimal to no increased pain  -WS       User Key  (r) = Recorded By, (t) = Taken By, (c) = Cosigned By    Initials Name Provider Type    Richard Huerta PTA Physical Therapy Assistant          Therapy Education  Given: HEP, Symptoms/condition management, Pain management  Program: Reinforced  How  Provided: Verbal  Provided to: Patient              Time Calculation:   Start Time: 1200  Stop Time: 1230  Time Calculation (min): 30 min  Therapy Charges for Today     Code Description Service Date Service Provider Modifiers Qty    [id number]  PT THER PROC EA 15 MIN 1/13/2021 Richard Woods, CHAVEZ GP 2                    Richard Woods PTA  1/13/2021

## 2021-01-15 ENCOUNTER — HOSPITAL ENCOUNTER (OUTPATIENT)
Dept: PHYSICAL THERAPY | Facility: HOSPITAL | Age: 61
Setting detail: THERAPIES SERIES
Discharge: HOME | End: 2021-01-15

## 2021-01-15 DIAGNOSIS — Z47.1 AFTERCARE FOLLOWING LEFT KNEE JOINT REPLACEMENT SURGERY: ICD-10-CM

## 2021-01-15 DIAGNOSIS — M25.562 ACUTE PAIN OF LEFT KNEE: Primary | ICD-10-CM

## 2021-01-15 DIAGNOSIS — Z78.9 DIFFICULTY NAVIGATING STAIRS: ICD-10-CM

## 2021-01-15 DIAGNOSIS — Z96.652 AFTERCARE FOLLOWING LEFT KNEE JOINT REPLACEMENT SURGERY: ICD-10-CM

## 2021-01-15 DIAGNOSIS — R26.89 IMPAIRED GAIT AND MOBILITY: ICD-10-CM

## 2021-01-15 PROCEDURE — 97110 THERAPEUTIC EXERCISES: CPT

## 2021-01-15 NOTE — THERAPY TREATMENT NOTE
Outpatient Physical Therapy Ortho Treatment Note  [facility]     Patient Name: Lydia Gutierrez  : 1960  MRN: [id number]  Today's Date: 1/15/2021      Visit Date: 01/15/2021    Visit Dx:    ICD-10-CM ICD-9-CM   1. Acute pain of left knee  M25.562 719.46   2. Aftercare following left knee joint replacement surgery  Z47.1 V54.81    Z96.652 V43.65   3. Impaired gait and mobility  R26.89 781.2   4. Difficulty navigating stairs  Z78.9 V49.89       Patient Active Problem List   Diagnosis   • Abdominal mass   • Abnormal liver function tests   • Ultrasound scan abnormal   • Allergic rhinitis   • Asthma   • Benign essential hypertension   • Bladder problem   • Breast lump   • Dense breast tissue   • Breast tenderness   • Mixed anxiety depressive disorder   • Dyspareunia   • Abnormal C-reactive protein   • Leukocytosis   • Gastroesophageal reflux disease   • Fatigue   • Headache   • Herpes simplex virus (HSV) infection   • Hip pain, bilateral   • Hypothyroidism   • Low back pain   • Lumbar radiculopathy   • Mixed hyperlipidemia   • Oligomenorrhea   • Periodic limb movement disorder   • Obstructive sleep apnea   • Visual disturbance   • Vitamin D deficiency   • Bilateral knee pain   • Cervicalgia   • B12 deficiency   • Primary osteoarthritis of left knee        Past Medical History:   Diagnosis Date   • Anxiety disorder    • Arthritis    • Asthma    • Borderline diabetic    • Depression    • GERD (gastroesophageal reflux disease)    • History of kidney stones    • Hyperlipidemia    • Hypertension    • Hypothyroidism    • Left knee pain    • Sleep apnea     NO MACHINE        Past Surgical History:   Procedure Laterality Date   •  SECTION     • CHOLECYSTECTOMY     • EYE SURGERY Left    • TOTAL KNEE ARTHROPLASTY Left 2020    Procedure: TOTAL KNEE ARTHROPLASTY;  Surgeon: Juan Walsh MD;  Location: [facility] OR;  Service: Orthopedics;  Laterality: Left;                       PT Assessment/Plan     Row  Name 01/15/21 1148          PT Assessment    Assessment Comments  Patient progresssing with strength. Added heel prop  -WS       User Key  (r) = Recorded By, (t) = Taken By, (c) = Cosigned By    Initials Name Provider Type    Richard Huerta PTA Physical Therapy Assistant            OP Exercises     Row Name 01/15/21 1115             Subjective Comments    Subjective Comments  left knee real stiff today  -WS         Subjective Pain    Able to rate subjective pain?  yes  -WS      Pre-Treatment Pain Level  4  -WS         Total Minutes    91759 - PT Therapeutic Exercise Minutes  30  -WS         Exercise 1    Exercise Name 1  QS   -WS      Cueing 1  Verbal;Tactile  -WS      Reps 1  15  -WS      Time 1  5 sec  -WS      Additional Comments  towel  -WS         Exercise 2    Exercise Name 2  SLR  -WS      Cueing 2  Verbal;Demo  -WS      Reps 2  12  -WS         Exercise 3    Exercise Name 3  heel slides   -WS      Cueing 3  Verbal;Demo  -WS      Reps 3  10  -WS      Time 3  3 sec  -WS      Additional Comments  with belt  -WS         Exercise 4    Exercise Name 4  seated knee flexion AROM   -WS      Reps 4  10  -WS      Time 4  5 sec  -WS         Exercise 5    Exercise Name 5  seated LAQ  -WS      Reps 5  15  -WS      Time 5  3 sec  -WS      Additional Comments  3#  -WS         Exercise 6    Exercise Name 6  HS stretch seated EOB   -WS      Reps 6  3  -WS      Time 6  20 sec  -WS         Exercise 7    Exercise Name 7  calf raise  -WS      Reps 7  15  -WS         Exercise 8    Exercise Name 8  Nustep LE L5  -WS      Time 8  5 min  -WS         Exercise 9    Exercise Name 9  HS curl  -WS      Reps 9  15  -WS      Additional Comments  2#  -WS         Exercise 10    Exercise Name 10  standing hip abd  -WS      Reps 10  15  -WS      Additional Comments  2#  -WS         Exercise 11    Exercise Name 11  stair strech  -WS      Reps 11  3  -WS      Time 11  20 sec  -WS         Exercise 13    Exercise Name 13   heel prop  -WS         User Key  (r) = Recorded By, (t) = Taken By, (c) = Cosigned By    Initials Name Provider Type    Richard Huerta PTA Physical Therapy Assistant                       PT OP Goals     Row Name 01/15/21 1100          PT Short Term Goals    STG 1  Patient will be independent with initial HEP as well as education on symptom management, joint protection, and strategies to minimize stress on affected tissues.  -WS     STG 1 Progress  Ongoing  -WS     STG 2  Patient will have improved L knee ext </= 5 degrees for TKE/normalization heel-toe gait pattern.  -WS     STG 2 Progress  Ongoing  -WS     STG 3  Patient will increase left knee  flexion ROM to >/= 110 degrees for increased ease with ADLs/transitional movements  -WS     STG 3 Progress  Ongoing  -WS     STG 4  Patient will report >/= 25% improvement in quality/quantity of sleep  -WS     STG 4 Progress  Ongoing  -        Long Term Goals    LTG 1  Patient will be independent with established HEP for strength/stabilization to facilitate return to desired function.  -     LTG 2  Patient will have L knee strength >/= 4/5 for greater ease/tolerance with sit to stand, standing and walking   -     LTG 3  Patient will demonstrate necessary hip/knee strength/stability to go up/down stairs reciprocally with minimal compensation and HR assist as needed.  -     LTG 4  Patient will report reduced functional limitations on KOS Activities of Daily Living Scale with score >/= 60/80.   -     LTG 5  Patient will demonstrate nearly normal gait without assistive device for return to community mobility with minimal to no increased pain  -       User Key  (r) = Recorded By, (t) = Taken By, (c) = Cosigned By    Initials Name Provider Type    Richard Huerta PTA Physical Therapy Assistant          Therapy Education  Given: HEP, Symptoms/condition management, Pain management  Program: Reinforced  How Provided: Verbal  Provided to: Patient              Time Calculation:    Start Time: 1115  Stop Time: 1145  Time Calculation (min): 30 min  Therapy Charges for Today     Code Description Service Date Service Provider Modifiers Qty    [id number] HC PT THER PROC EA 15 MIN 1/15/2021 Richard Woods, CHAVEZ GP 2                    Richard Woods PTA  1/15/2021

## 2021-01-20 ENCOUNTER — APPOINTMENT (OUTPATIENT)
Dept: PHYSICAL THERAPY | Facility: HOSPITAL | Age: 61
End: 2021-01-20

## 2021-01-22 ENCOUNTER — HOSPITAL ENCOUNTER (OUTPATIENT)
Dept: PHYSICAL THERAPY | Facility: HOSPITAL | Age: 61
Setting detail: THERAPIES SERIES
Discharge: HOME | End: 2021-01-22

## 2021-01-22 DIAGNOSIS — Z47.1 AFTERCARE FOLLOWING LEFT KNEE JOINT REPLACEMENT SURGERY: ICD-10-CM

## 2021-01-22 DIAGNOSIS — Z78.9 DIFFICULTY NAVIGATING STAIRS: ICD-10-CM

## 2021-01-22 DIAGNOSIS — M25.562 ACUTE PAIN OF LEFT KNEE: Primary | ICD-10-CM

## 2021-01-22 DIAGNOSIS — Z96.652 AFTERCARE FOLLOWING LEFT KNEE JOINT REPLACEMENT SURGERY: ICD-10-CM

## 2021-01-22 DIAGNOSIS — R26.89 IMPAIRED GAIT AND MOBILITY: ICD-10-CM

## 2021-01-22 PROCEDURE — 97110 THERAPEUTIC EXERCISES: CPT

## 2021-01-22 NOTE — THERAPY TREATMENT NOTE
Outpatient Physical Therapy Ortho Treatment Note  [facility]     Patient Name: Lydia Gutierrez  : 1960  MRN: [id number]  Today's Date: 2021      Visit Date: 2021    Visit Dx:    ICD-10-CM ICD-9-CM   1. Acute pain of left knee  M25.562 719.46   2. Aftercare following left knee joint replacement surgery  Z47.1 V54.81    Z96.652 V43.65   3. Impaired gait and mobility  R26.89 781.2   4. Difficulty navigating stairs  Z78.9 V49.89       Patient Active Problem List   Diagnosis   • Abdominal mass   • Abnormal liver function tests   • Ultrasound scan abnormal   • Allergic rhinitis   • Asthma   • Benign essential hypertension   • Bladder problem   • Breast lump   • Dense breast tissue   • Breast tenderness   • Mixed anxiety depressive disorder   • Dyspareunia   • Abnormal C-reactive protein   • Leukocytosis   • Gastroesophageal reflux disease   • Fatigue   • Headache   • Herpes simplex virus (HSV) infection   • Hip pain, bilateral   • Hypothyroidism   • Low back pain   • Lumbar radiculopathy   • Mixed hyperlipidemia   • Oligomenorrhea   • Periodic limb movement disorder   • Obstructive sleep apnea   • Visual disturbance   • Vitamin D deficiency   • Bilateral knee pain   • Cervicalgia   • B12 deficiency   • Primary osteoarthritis of left knee        Past Medical History:   Diagnosis Date   • Anxiety disorder    • Arthritis    • Asthma    • Borderline diabetic    • Depression    • GERD (gastroesophageal reflux disease)    • History of kidney stones    • Hyperlipidemia    • Hypertension    • Hypothyroidism    • Left knee pain    • Sleep apnea     NO MACHINE        Past Surgical History:   Procedure Laterality Date   •  SECTION     • CHOLECYSTECTOMY     • EYE SURGERY Left    • TOTAL KNEE ARTHROPLASTY Left 2020    Procedure: TOTAL KNEE ARTHROPLASTY;  Surgeon: Juan Walsh MD;  Location: [facility] OR;  Service: Orthopedics;  Laterality: Left;                       PT Assessment/Plan     Row  Name 01/22/21 1142          PT Assessment    Assessment Comments  Patient ambulating with no AD with limp. Improved A/P flexion 5 degrees each. Tolerated increased resistance  -WS       User Key  (r) = Recorded By, (t) = Taken By, (c) = Cosigned By    Initials Name Provider Type    WS Richard Woods PTA Physical Therapy Assistant            OP Exercises     Row Name 01/22/21 1115             Subjective Comments    Subjective Comments  waked a little yesterday  -WS         Subjective Pain    Able to rate subjective pain?  yes  -WS      Pre-Treatment Pain Level  6  -WS         Total Minutes    53901 - PT Therapeutic Exercise Minutes  30  -WS         Exercise 1    Exercise Name 1  QS   -WS      Cueing 1  Verbal;Tactile  -WS      Reps 1  15  -WS      Time 1  5 sec  -WS      Additional Comments  towel  -WS         Exercise 2    Exercise Name 2  SLR  -WS      Cueing 2  Verbal;Demo  -WS      Reps 2  12  -WS         Exercise 3    Exercise Name 3  heel slides   -WS      Cueing 3  Verbal;Demo  -WS      Reps 3  10  -WS      Time 3  3 sec  -WS      Additional Comments  with belt  -WS         Exercise 4    Exercise Name 4  seated knee flexion AROM   -WS      Reps 4  10  -WS      Time 4  5 sec  -WS      Additional Comments  in chair  -WS         Exercise 5    Exercise Name 5  seated LAQ  -WS      Reps 5  15  -WS      Time 5  3 sec  -WS      Additional Comments  4#  -WS         Exercise 6    Exercise Name 6  HS stretch seated EOB   -WS      Reps 6  3  -WS      Time 6  20 sec  -WS         Exercise 7    Exercise Name 7  calf raise  -WS      Reps 7  15  -WS         Exercise 8    Exercise Name 8  Nustep LE L5  -WS      Time 8  5 min  -WS         Exercise 9    Exercise Name 9  HS curl  -WS      Reps 9  15  -WS      Additional Comments  3#  -WS         Exercise 10    Exercise Name 10  standing hip abd  -WS      Reps 10  15  -WS      Additional Comments  3#  -WS         Exercise 11    Exercise Name 11  stair strech  -WS      Reps 11   3  -WS      Time 11  20 sec  -WS         Exercise 12    Exercise Name 12  A/P flex 100/110  -WS         Exercise 13    Exercise Name 13   heel prop  -WS      Time 13  1 min  -WS        User Key  (r) = Recorded By, (t) = Taken By, (c) = Cosigned By    Initials Name Provider Type    Richard Huerta PTA Physical Therapy Assistant                       PT OP Goals     Row Name 01/22/21 1100          PT Short Term Goals    STG 1  Patient will be independent with initial HEP as well as education on symptom management, joint protection, and strategies to minimize stress on affected tissues.  -WS     STG 1 Progress  Ongoing  -WS     STG 2  Patient will have improved L knee ext </= 5 degrees for TKE/normalization heel-toe gait pattern.  -WS     STG 2 Progress  Ongoing  -WS     STG 3  Patient will increase left knee  flexion ROM to >/= 110 degrees for increased ease with ADLs/transitional movements  -WS     STG 3 Progress  Ongoing  -WS     STG 3 Progress Comments  passive flexion to 110  -WS     STG 4  Patient will report >/= 25% improvement in quality/quantity of sleep  -WS     STG 4 Progress  Ongoing  -WS        Long Term Goals    LTG 1  Patient will be independent with established HEP for strength/stabilization to facilitate return to desired function.  -WS     LTG 2  Patient will have L knee strength >/= 4/5 for greater ease/tolerance with sit to stand, standing and walking   -WS     LTG 3  Patient will demonstrate necessary hip/knee strength/stability to go up/down stairs reciprocally with minimal compensation and HR assist as needed.  -WS     LTG 4  Patient will report reduced functional limitations on KOS Activities of Daily Living Scale with score >/= 60/80.   -WS     LTG 5  Patient will demonstrate nearly normal gait without assistive device for return to community mobility with minimal to no increased pain  -WS       User Key  (r) = Recorded By, (t) = Taken By, (c) = Cosigned By    Initials Name Provider Type     WS Richard Woods PTA Physical Therapy Assistant          Therapy Education  Given: HEP, Posture/body mechanics, Symptoms/condition management  Program: Reinforced  How Provided: Verbal  Provided to: Patient              Time Calculation:   Start Time: 1115  Stop Time: 1145  Time Calculation (min): 30 min  Therapy Charges for Today     Code Description Service Date Service Provider Modifiers Qty    [id number] HC PT THER PROC EA 15 MIN 1/22/2021 Richard Woods PTA GP 2                    Richard Woods PTA  1/22/2021

## 2021-01-27 ENCOUNTER — HOSPITAL ENCOUNTER (OUTPATIENT)
Dept: PHYSICAL THERAPY | Facility: HOSPITAL | Age: 61
Setting detail: THERAPIES SERIES
Discharge: HOME | End: 2021-01-27

## 2021-01-27 DIAGNOSIS — Z47.1 AFTERCARE FOLLOWING LEFT KNEE JOINT REPLACEMENT SURGERY: ICD-10-CM

## 2021-01-27 DIAGNOSIS — Z96.652 AFTERCARE FOLLOWING LEFT KNEE JOINT REPLACEMENT SURGERY: ICD-10-CM

## 2021-01-27 DIAGNOSIS — Z78.9 DIFFICULTY NAVIGATING STAIRS: ICD-10-CM

## 2021-01-27 DIAGNOSIS — R26.89 IMPAIRED GAIT AND MOBILITY: ICD-10-CM

## 2021-01-27 DIAGNOSIS — M25.562 ACUTE PAIN OF LEFT KNEE: Primary | ICD-10-CM

## 2021-01-27 PROCEDURE — 97110 THERAPEUTIC EXERCISES: CPT

## 2021-01-27 NOTE — THERAPY TREATMENT NOTE
Outpatient Physical Therapy Ortho Treatment Note  [facility]     Patient Name: Lydia Gutierrez  : 1960  MRN: [id number]  Today's Date: 2021      Visit Date: 2021    Visit Dx:    ICD-10-CM ICD-9-CM   1. Acute pain of left knee  M25.562 719.46   2. Aftercare following left knee joint replacement surgery  Z47.1 V54.81    Z96.652 V43.65   3. Impaired gait and mobility  R26.89 781.2   4. Difficulty navigating stairs  Z78.9 V49.89       Patient Active Problem List   Diagnosis   • Abdominal mass   • Abnormal liver function tests   • Ultrasound scan abnormal   • Allergic rhinitis   • Asthma   • Benign essential hypertension   • Bladder problem   • Breast lump   • Dense breast tissue   • Breast tenderness   • Mixed anxiety depressive disorder   • Dyspareunia   • Abnormal C-reactive protein   • Leukocytosis   • Gastroesophageal reflux disease   • Fatigue   • Headache   • Herpes simplex virus (HSV) infection   • Hip pain, bilateral   • Hypothyroidism   • Low back pain   • Lumbar radiculopathy   • Mixed hyperlipidemia   • Oligomenorrhea   • Periodic limb movement disorder   • Obstructive sleep apnea   • Visual disturbance   • Vitamin D deficiency   • Bilateral knee pain   • Cervicalgia   • B12 deficiency   • Primary osteoarthritis of left knee        Past Medical History:   Diagnosis Date   • Anxiety disorder    • Arthritis    • Asthma    • Borderline diabetic    • Depression    • GERD (gastroesophageal reflux disease)    • History of kidney stones    • Hyperlipidemia    • Hypertension    • Hypothyroidism    • Left knee pain    • Sleep apnea     NO MACHINE        Past Surgical History:   Procedure Laterality Date   •  SECTION     • CHOLECYSTECTOMY     • EYE SURGERY Left    • TOTAL KNEE ARTHROPLASTY Left 2020    Procedure: TOTAL KNEE ARTHROPLASTY;  Surgeon: Juan Walsh MD;  Location: [facility] OR;  Service: Orthopedics;  Laterality: Left;                       PT Assessment/Plan     Row  Name 01/27/21 1141          PT Assessment    Assessment Comments  Patient ambulating with out AD with decreased gamaliel. Left knee TTP. Discussed strengthening only 1 x a day vs 2x. Pain continue to be high. If pain continue to be high change strengthening to every other day  -WS       User Key  (r) = Recorded By, (t) = Taken By, (c) = Cosigned By    Initials Name Provider Type    WS Richard Woods PTA Physical Therapy Assistant            OP Exercises     Row Name 01/27/21 1110             Subjective Comments    Subjective Comments  knee continue to be stiff  -WS         Subjective Pain    Able to rate subjective pain?  yes  -WS      Pre-Treatment Pain Level  7  -WS         Total Minutes    37168 - PT Therapeutic Exercise Minutes  30  -WS         Exercise 1    Exercise Name 1  QS   -WS      Cueing 1  Verbal;Tactile  -WS      Reps 1  15  -WS      Time 1  5 sec  -WS      Additional Comments  towel  -WS         Exercise 2    Exercise Name 2  SLR  -WS      Cueing 2  Verbal;Demo  -WS      Reps 2  12  -WS         Exercise 3    Exercise Name 3  heel slides   -WS      Cueing 3  Verbal;Demo  -WS      Reps 3  10  -WS      Time 3  3 sec  -WS      Additional Comments  with belt  -WS         Exercise 4    Exercise Name 4  seated knee flexion AROM   -WS      Reps 4  10  -WS      Time 4  5 sec  -WS      Additional Comments  in chair  -WS         Exercise 5    Exercise Name 5  seated LAQ  -WS      Reps 5  15  -WS      Time 5  3 sec  -WS      Additional Comments  4#  -WS         Exercise 6    Exercise Name 6  HS stretch seated EOB   -WS      Reps 6  3  -WS      Time 6  20 sec  -WS      Additional Comments  in chair  -WS         Exercise 7    Exercise Name 7  calf raise  -WS      Reps 7  15  -WS         Exercise 8    Exercise Name 8  Nustep LE L5  -WS      Time 8  5 min  -WS         Exercise 9    Exercise Name 9  HS curl  -WS      Reps 9  15  -WS      Additional Comments  3#  -WS         Exercise 10    Exercise Name 10  standing  hip abd  -WS      Reps 10  15  -WS      Additional Comments  3#  -WS         Exercise 11    Exercise Name 11  stair strech  -WS      Reps 11  3  -WS      Time 11  20 sec  -WS         Exercise 12    Exercise Name 12  A/P flex 100/110  -WS         Exercise 13    Exercise Name 13   heel prop  -WS      Time 13  1 min  -WS        User Key  (r) = Recorded By, (t) = Taken By, (c) = Cosigned By    Initials Name Provider Type    Richard Huerta PTA Physical Therapy Assistant                       PT OP Goals     Row Name 01/27/21 1100          PT Short Term Goals    STG 1  Patient will be independent with initial HEP as well as education on symptom management, joint protection, and strategies to minimize stress on affected tissues.  -WS     STG 1 Progress  Met  -WS     STG 2  Patient will have improved L knee ext </= 5 degrees for TKE/normalization heel-toe gait pattern.  -WS     STG 2 Progress  Ongoing  -WS     STG 3  Patient will increase left knee  flexion ROM to >/= 110 degrees for increased ease with ADLs/transitional movements  -WS     STG 3 Progress  Ongoing  -WS     STG 4  Patient will report >/= 25% improvement in quality/quantity of sleep  -WS     STG 4 Progress  Ongoing  -WS        Long Term Goals    LTG 1  Patient will be independent with established HEP for strength/stabilization to facilitate return to desired function.  -WS     LTG 2  Patient will have L knee strength >/= 4/5 for greater ease/tolerance with sit to stand, standing and walking   -WS     LTG 3  Patient will demonstrate necessary hip/knee strength/stability to go up/down stairs reciprocally with minimal compensation and HR assist as needed.  -     LTG 4  Patient will report reduced functional limitations on KOS Activities of Daily Living Scale with score >/= 60/80.   -     LTG 5  Patient will demonstrate nearly normal gait without assistive device for return to community mobility with minimal to no increased pain  -       User Key  (r)  = Recorded By, (t) = Taken By, (c) = Cosigned By    Initials Name Provider Type    WS Richard Woods PTA Physical Therapy Assistant          Therapy Education  Given: HEP, Symptoms/condition management, Pain management  Program: Reinforced  How Provided: Verbal  Provided to: Patient              Time Calculation:   Start Time: 1215  Stop Time: 1245  Time Calculation (min): 30 min  Therapy Charges for Today     Code Description Service Date Service Provider Modifiers Qty    05017532032 HC PT THER PROC EA 15 MIN 1/27/2021 Richard Woods PTA GP 2                    Richard Woods PTA  1/27/2021

## 2021-01-29 DIAGNOSIS — E53.8 B12 DEFICIENCY: ICD-10-CM

## 2021-01-30 RX ORDER — UBIDECARENONE 75 MG
CAPSULE ORAL
Qty: 90 TABLET | Refills: 0 | Status: SHIPPED | OUTPATIENT
Start: 2021-01-30 | End: 2021-02-11 | Stop reason: SDUPTHER

## 2021-02-02 ENCOUNTER — OFFICE VISIT (OUTPATIENT)
Dept: FAMILY MEDICINE CLINIC | Facility: CLINIC | Age: 61
End: 2021-02-02

## 2021-02-02 VITALS
WEIGHT: 210 LBS | DIASTOLIC BLOOD PRESSURE: 70 MMHG | TEMPERATURE: 98 F | HEART RATE: 97 BPM | OXYGEN SATURATION: 98 % | BODY MASS INDEX: 38.64 KG/M2 | SYSTOLIC BLOOD PRESSURE: 124 MMHG | HEIGHT: 62 IN

## 2021-02-02 DIAGNOSIS — G89.29 CHRONIC PAIN OF BOTH KNEES: ICD-10-CM

## 2021-02-02 DIAGNOSIS — E53.8 B12 DEFICIENCY: ICD-10-CM

## 2021-02-02 DIAGNOSIS — M25.562 CHRONIC PAIN OF BOTH KNEES: ICD-10-CM

## 2021-02-02 DIAGNOSIS — E78.2 MIXED HYPERLIPIDEMIA: ICD-10-CM

## 2021-02-02 DIAGNOSIS — F41.8 MIXED ANXIETY DEPRESSIVE DISORDER: ICD-10-CM

## 2021-02-02 DIAGNOSIS — D72.829 LEUKOCYTOSIS, UNSPECIFIED TYPE: ICD-10-CM

## 2021-02-02 DIAGNOSIS — R79.89 ABNORMAL LIVER FUNCTION TESTS: ICD-10-CM

## 2021-02-02 DIAGNOSIS — E03.9 ACQUIRED HYPOTHYROIDISM: ICD-10-CM

## 2021-02-02 DIAGNOSIS — I10 BENIGN ESSENTIAL HYPERTENSION: Primary | ICD-10-CM

## 2021-02-02 DIAGNOSIS — J30.9 ALLERGIC RHINITIS, UNSPECIFIED SEASONALITY, UNSPECIFIED TRIGGER: ICD-10-CM

## 2021-02-02 DIAGNOSIS — E55.9 VITAMIN D DEFICIENCY: ICD-10-CM

## 2021-02-02 DIAGNOSIS — J45.20 MILD INTERMITTENT ASTHMA WITHOUT COMPLICATION: ICD-10-CM

## 2021-02-02 DIAGNOSIS — M25.561 CHRONIC PAIN OF BOTH KNEES: ICD-10-CM

## 2021-02-02 DIAGNOSIS — R73.03 PREDIABETES: ICD-10-CM

## 2021-02-02 DIAGNOSIS — K21.9 GASTROESOPHAGEAL REFLUX DISEASE, UNSPECIFIED WHETHER ESOPHAGITIS PRESENT: ICD-10-CM

## 2021-02-02 DIAGNOSIS — G47.33 OBSTRUCTIVE SLEEP APNEA: ICD-10-CM

## 2021-02-02 PROCEDURE — 99214 OFFICE O/P EST MOD 30 MIN: CPT | Performed by: PHYSICIAN ASSISTANT

## 2021-02-02 NOTE — PROGRESS NOTES
Subjective   Lydia Gutierrez is a 60 y.o. female who presents today in follow up of hypertension, hyperlipidemia, hypothyroidism, vitamin D and vitamin B12 deficiencies, GERD, asthma, allergic rhinitis, BREN, depression and anxiety, and arthritis.     History of Present Illness     Hypertension-has been stable on Norvasc and lisinopril HCTZ. Does not check at home.   Hyperlipidemia and blood sugars- taking Crestor and tolerating well. She has not made changes for diet/ exercise.   Thyroid- no symptoms.  Feels has been controlled on levothyroxine. No signs or symptoms of hyperthyroidism or hypothyroidism.  B12- has been taking vitamin B12 100 mcg daily  Vitamin D- has not been taking 1000 IU daily. Ran out and didn't go back. Not sure when she ran out.     GERD- controlled on Protonix without breakthrough symptoms.   Asthma- continues Singulair daily and Ventolin as needed. She has been better since moving out of previous molded apartment   Allergic Rhinitis- controlled with Flonase twice daily.   BREN- not using CPAP nightly.    Depression and anxiety- Moods are controlled on Prozac. Feels she is on the correct dose.   · She went back to work at the [employer] part time because they will work with her schedule- working 2 days per week.   · She underwent knee surgery to be able to work full time again and avoid disability.   · Living in [facility] with her  and son & is doing well. 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 Driving son's car now.     Knee pain/arthritis- has not had improvement in pain following knee surgery- she is doing PT. Aching at night makes it hard to sleep. Follow up in 2 days   Total knee replacement 12/7/2020- Dr Juan Walsh.     Laser surgery for pterygium on right eye. Couldn't see to drive or work but now has improved.     The following portions of the patient's history were reviewed and updated as appropriate: allergies, current medications, past  family history, past medical history, past social history, past surgical history and problem list.    Review of Systems   Constitutional: Negative.    HENT: Negative.    Eyes: Negative.    Respiratory: Negative.    Cardiovascular: Negative.    Gastrointestinal: Negative.    Endocrine: Negative.    Genitourinary: Negative.    Musculoskeletal: Positive for arthralgias.   Skin: Negative.    Neurological: Negative.    Hematological: Negative.    Psychiatric/Behavioral: Negative.        Objective    Vitals:    02/02/21 0814   BP: 124/70   Pulse: 97   Temp: 98 °F (36.7 °C)   SpO2: 98%     Body mass index is 38.4 kg/m².    Physical Exam   Constitutional: She is oriented to person, place, and time. She appears well-developed. No distress.   HENT:   Head: Normocephalic and atraumatic.   Right Ear: External ear normal.   Left Ear: External ear normal.   Nose: Nose normal.   Eyes: Conjunctivae and lids are normal.   Neck: Neck supple. Carotid bruit is not present.   Cardiovascular: Normal rate, regular rhythm, normal heart sounds and normal pulses. Exam reveals no gallop and no friction rub.   No murmur heard.  Pulmonary/Chest: Effort normal and breath sounds normal. No respiratory distress. She has no wheezes. She has no rhonchi. She has no rales.   Abdominal: Normal appearance.   Musculoskeletal: No deformity.   Neurological: She is alert and oriented to person, place, and time. Gait normal.   Skin: Skin is warm and dry.   Psychiatric: Her speech is normal and behavior is normal. Mood, affect and thought content normal.   Nursing note and vitals reviewed.      Assessment/Plan   Diagnoses and all orders for this visit:    1. Benign essential hypertension (Primary)  -     Comprehensive Metabolic Panel  -     Urinalysis With Culture If Indicated -    2. Mixed hyperlipidemia  -     Comprehensive Metabolic Panel  -     Hemoglobin A1c  -     CK  -     Lipid Panel With LDL / HDL Ratio  -     Uric Acid    3. Prediabetes  -      Comprehensive Metabolic Panel  -     Hemoglobin A1c  -     Uric Acid    4. Acquired hypothyroidism  -     TSH  -     T4, free  -     T3, Free  -     Uric Acid    5. Vitamin D deficiency  -     Comprehensive Metabolic Panel  -     Vitamin D 25 Hydroxy    6. B12 deficiency  -     CBC & Differential  -     Vitamin B12 & Folate    7. Leukocytosis, unspecified type  -     CBC & Differential    8. Abnormal liver function tests  -     Comprehensive Metabolic Panel    9. Gastroesophageal reflux disease, unspecified whether esophagitis present    10. Mild intermittent asthma without complication    11. Allergic rhinitis, unspecified seasonality, unspecified trigger    12. Obstructive sleep apnea    13. Mixed anxiety depressive disorder    14. Chronic pain of both knees    Other orders  -     Microscopic Examination -  -     Urine culture, Comprehensive - ,       Assessment and Plan  Patient will have fasting labs. Call if no results in 1 week. Stability of conditions, plan, follow up, and further recommendations pending labs. Follow up in 3-6 months if labs are stable.     · Hypertension- Blood pressure is stable today.  Continue Norvasc 10 mg once daily and lisinopril HCTZ 10/12.5 mg once daily.    · Hyperlipidemia- Continue Crestor 40 mg at bedtime and work diligently on diet and exercise.     · Hypothyroidism- Thyroid is stable. Continue levothyroxine 137 mcg daily. Further recommendations pending labs.  · B12 deficiency- Continue B12 100 mcg daily. Await labs for further recommendations.   · Vitamin D deficiency- Dosing recommendations pending labs.     · GERD- Symptoms are controlled. Continue Protonix 40 mg once daily. Consider GI follow up with EGD if worsening, new, or changing symptoms.     · Asthma- Controlled breathing and symptoms. Continue Singulair 10 mg at bedtime. We will need to complete PFT when we are able with limitations from Covid 19 pandemic.    · Allergic Rhinitis- Allergy symptoms improved with  getting out of molded apartment. Continue Flonase 1 sprays in each nostril twice daily.   · BREN- Patient has not been using her CPAP, reporting her mask broke.  She has not been seen by sleep medicine in over a year either.  I referred back to sleep medicine but she did not go for appt. She will need follow up with sleep medicine.   · OA knee s/p TKR- Continue physical therapy and follow up with orthopedic surgery as directed by them.   · Major Depressive Disorder and anxiety- Moods are controlled. Continue Prozac 20 mg daily. She should consider counseling to see if this helps her relationship and stressors at home. To be seen ASAP if worsening moods or AE with medication.     I spent 30 minutes caring for Lydia Gutierrez on this date of service. This time includes time spent by me in the following activities: preparing for the visit, reviewing tests, specialists records, and previous visits, obtaining and/or reviewing a separately obtained history, performing a medically appropriate examination and/or evaluation, counseling and educating the patient/family/caregiver, referring and/or communicating with other health care professionals as necessary, documenting information in the medical record, independently interpreting results and communicating that information with the patient/family/caregiver, and developing a medically appropriate treatment plan with consideration of other conditions, medications, and treatments.

## 2021-02-03 ENCOUNTER — HOSPITAL ENCOUNTER (OUTPATIENT)
Dept: PHYSICAL THERAPY | Facility: HOSPITAL | Age: 61
Setting detail: THERAPIES SERIES
Discharge: HOME | End: 2021-02-03

## 2021-02-03 DIAGNOSIS — Z96.652 AFTERCARE FOLLOWING LEFT KNEE JOINT REPLACEMENT SURGERY: ICD-10-CM

## 2021-02-03 DIAGNOSIS — M25.562 ACUTE PAIN OF LEFT KNEE: Primary | ICD-10-CM

## 2021-02-03 DIAGNOSIS — R26.89 IMPAIRED GAIT AND MOBILITY: ICD-10-CM

## 2021-02-03 DIAGNOSIS — Z47.1 AFTERCARE FOLLOWING LEFT KNEE JOINT REPLACEMENT SURGERY: ICD-10-CM

## 2021-02-03 DIAGNOSIS — Z78.9 DIFFICULTY NAVIGATING STAIRS: ICD-10-CM

## 2021-02-03 PROCEDURE — 97110 THERAPEUTIC EXERCISES: CPT

## 2021-02-04 ENCOUNTER — OFFICE VISIT (OUTPATIENT)
Dept: ORTHOPEDIC SURGERY | Facility: CLINIC | Age: 61
End: 2021-02-04

## 2021-02-04 VITALS — HEIGHT: 62 IN | TEMPERATURE: 98.3 F | BODY MASS INDEX: 38.66 KG/M2 | WEIGHT: 210.1 LBS

## 2021-02-04 DIAGNOSIS — M17.11 PRIMARY OSTEOARTHRITIS OF RIGHT KNEE: ICD-10-CM

## 2021-02-04 DIAGNOSIS — R52 PAIN: Primary | ICD-10-CM

## 2021-02-04 LAB
25(OH)D3+25(OH)D2 SERPL-MCNC: 44.2 NG/ML (ref 30–100)
ALBUMIN SERPL-MCNC: 4.8 G/DL (ref 3.5–5.2)
ALBUMIN/GLOB SERPL: 1.8 G/DL
ALP SERPL-CCNC: 110 U/L (ref 39–117)
ALT SERPL-CCNC: 17 U/L (ref 1–33)
APPEARANCE UR: ABNORMAL
AST SERPL-CCNC: 19 U/L (ref 1–32)
BACTERIA #/AREA URNS HPF: ABNORMAL /HPF
BACTERIA UR CULT: NORMAL
BACTERIA UR CULT: NORMAL
BASOPHILS # BLD AUTO: 0.07 10*3/MM3 (ref 0–0.2)
BASOPHILS NFR BLD AUTO: 0.9 % (ref 0–1.5)
BILIRUB SERPL-MCNC: 0.2 MG/DL (ref 0–1.2)
BILIRUB UR QL STRIP: NEGATIVE
BUN SERPL-MCNC: 18 MG/DL (ref 6–20)
BUN/CREAT SERPL: 29.5 (ref 7–25)
CALCIUM SERPL-MCNC: 9.8 MG/DL (ref 8.6–10.5)
CHLORIDE SERPL-SCNC: 102 MMOL/L (ref 98–107)
CHOLEST SERPL-MCNC: 157 MG/DL (ref 0–200)
CK SERPL-CCNC: 35 U/L (ref 20–180)
CO2 SERPL-SCNC: 27.1 MMOL/L (ref 22–29)
COLOR UR: YELLOW
CREAT SERPL-MCNC: 0.61 MG/DL (ref 0.57–1)
EOSINOPHIL # BLD AUTO: 0.23 10*3/MM3 (ref 0–0.4)
EOSINOPHIL NFR BLD AUTO: 2.8 % (ref 0.3–6.2)
EPI CELLS #/AREA URNS HPF: ABNORMAL /HPF (ref 0–10)
ERYTHROCYTE [DISTWIDTH] IN BLOOD BY AUTOMATED COUNT: 12.4 % (ref 12.3–15.4)
FOLATE SERPL-MCNC: 4.49 NG/ML (ref 4.78–24.2)
GLOBULIN SER CALC-MCNC: 2.6 GM/DL
GLUCOSE SERPL-MCNC: 115 MG/DL (ref 65–99)
GLUCOSE UR QL: NEGATIVE
HBA1C MFR BLD: 6.1 % (ref 4.8–5.6)
HCT VFR BLD AUTO: 40.7 % (ref 34–46.6)
HDLC SERPL-MCNC: 45 MG/DL (ref 40–60)
HGB BLD-MCNC: 13.8 G/DL (ref 12–15.9)
HGB UR QL STRIP: NEGATIVE
IMM GRANULOCYTES # BLD AUTO: 0.08 10*3/MM3 (ref 0–0.05)
IMM GRANULOCYTES NFR BLD AUTO: 1 % (ref 0–0.5)
KETONES UR QL STRIP: ABNORMAL
LDLC SERPL CALC-MCNC: 79 MG/DL (ref 0–100)
LDLC/HDLC SERPL: 1.62 {RATIO}
LEUKOCYTE ESTERASE UR QL STRIP: ABNORMAL
LYMPHOCYTES # BLD AUTO: 2.06 10*3/MM3 (ref 0.7–3.1)
LYMPHOCYTES NFR BLD AUTO: 25.5 % (ref 19.6–45.3)
MCH RBC QN AUTO: 29.3 PG (ref 26.6–33)
MCHC RBC AUTO-ENTMCNC: 33.9 G/DL (ref 31.5–35.7)
MCV RBC AUTO: 86.4 FL (ref 79–97)
MICRO URNS: ABNORMAL
MONOCYTES # BLD AUTO: 0.59 10*3/MM3 (ref 0.1–0.9)
MONOCYTES NFR BLD AUTO: 7.3 % (ref 5–12)
MUCOUS THREADS URNS QL MICRO: PRESENT /HPF
NEUTROPHILS # BLD AUTO: 5.05 10*3/MM3 (ref 1.7–7)
NEUTROPHILS NFR BLD AUTO: 62.5 % (ref 42.7–76)
NITRITE UR QL STRIP: NEGATIVE
NRBC BLD AUTO-RTO: 0 /100 WBC (ref 0–0.2)
PH UR STRIP: 5.5 [PH] (ref 5–7.5)
PLATELET # BLD AUTO: 406 10*3/MM3 (ref 140–450)
POTASSIUM SERPL-SCNC: 4.1 MMOL/L (ref 3.5–5.2)
PROT SERPL-MCNC: 7.4 G/DL (ref 6–8.5)
PROT UR QL STRIP: ABNORMAL
RBC # BLD AUTO: 4.71 10*6/MM3 (ref 3.77–5.28)
RBC #/AREA URNS HPF: ABNORMAL /HPF (ref 0–2)
SODIUM SERPL-SCNC: 141 MMOL/L (ref 136–145)
SP GR UR: 1.03 (ref 1–1.03)
T3FREE SERPL-MCNC: 3.3 PG/ML (ref 2–4.4)
T4 FREE SERPL-MCNC: 1.48 NG/DL (ref 0.93–1.7)
TRIGL SERPL-MCNC: 196 MG/DL (ref 0–150)
TSH SERPL DL<=0.005 MIU/L-ACNC: 0.44 UIU/ML (ref 0.27–4.2)
URATE SERPL-MCNC: 3.6 MG/DL (ref 2.4–5.7)
URINALYSIS REFLEX: ABNORMAL
UROBILINOGEN UR STRIP-MCNC: 1 MG/DL (ref 0.2–1)
VIT B12 SERPL-MCNC: 464 PG/ML (ref 211–946)
VLDLC SERPL CALC-MCNC: 33 MG/DL (ref 5–40)
WBC # BLD AUTO: 8.08 10*3/MM3 (ref 3.4–10.8)
WBC #/AREA URNS HPF: ABNORMAL /HPF (ref 0–5)

## 2021-02-04 PROCEDURE — 20610 DRAIN/INJ JOINT/BURSA W/O US: CPT | Performed by: NURSE PRACTITIONER

## 2021-02-04 PROCEDURE — 73562 X-RAY EXAM OF KNEE 3: CPT | Performed by: NURSE PRACTITIONER

## 2021-02-04 PROCEDURE — 99024 POSTOP FOLLOW-UP VISIT: CPT | Performed by: NURSE PRACTITIONER

## 2021-02-04 RX ORDER — METHYLPREDNISOLONE ACETATE 80 MG/ML
80 INJECTION, SUSPENSION INTRA-ARTICULAR; INTRALESIONAL; INTRAMUSCULAR; SOFT TISSUE
Status: COMPLETED | OUTPATIENT
Start: 2021-02-04 | End: 2021-02-04

## 2021-02-04 RX ADMIN — METHYLPREDNISOLONE ACETATE 80 MG: 80 INJECTION, SUSPENSION INTRA-ARTICULAR; INTRALESIONAL; INTRAMUSCULAR; SOFT TISSUE at 13:32

## 2021-02-04 NOTE — THERAPY RE-EVALUATION
Outpatient Physical Therapy Ortho Re-Assessment  [facility]     Patient Name: Lydia Gutierrez  : 1960  MRN: [id number]  Today's Date: 2021      Visit Date: 2021    Patient Active Problem List   Diagnosis   • Abdominal mass   • Abnormal liver function tests   • Ultrasound scan abnormal   • Allergic rhinitis   • Asthma   • Benign essential hypertension   • Bladder problem   • Breast lump   • Dense breast tissue   • Breast tenderness   • Mixed anxiety depressive disorder   • Dyspareunia   • Abnormal C-reactive protein   • Leukocytosis   • Gastroesophageal reflux disease   • Fatigue   • Headache   • Herpes simplex virus (HSV) infection   • Hip pain, bilateral   • Hypothyroidism   • Low back pain   • Lumbar radiculopathy   • Mixed hyperlipidemia   • Oligomenorrhea   • Periodic limb movement disorder   • Obstructive sleep apnea   • Visual disturbance   • Vitamin D deficiency   • Bilateral knee pain   • Cervicalgia   • B12 deficiency   • Primary osteoarthritis of left knee        Past Medical History:   Diagnosis Date   • Anxiety disorder    • Arthritis    • Asthma    • Borderline diabetic    • Depression    • GERD (gastroesophageal reflux disease)    • History of kidney stones    • Hyperlipidemia    • Hypertension    • Hypothyroidism    • Left knee pain    • Sleep apnea     NO MACHINE        Past Surgical History:   Procedure Laterality Date   •  SECTION     • CHOLECYSTECTOMY     • EYE SURGERY Left    • JOINT REPLACEMENT Left    • TOTAL KNEE ARTHROPLASTY Left 2020    Procedure: TOTAL KNEE ARTHROPLASTY;  Surgeon: Juan Walsh MD;  Location: [facility] OR;  Service: Orthopedics;  Laterality: Left;       Visit Dx:     ICD-10-CM ICD-9-CM   1. Acute pain of left knee  M25.562 719.46   2. Aftercare following left knee joint replacement surgery  Z47.1 V54.81    Z96.652 V43.65   3. Impaired gait and mobility  R26.89 781.2   4. Difficulty navigating stairs  Z78.9 V49.89                     Assessment: Patient is s/p L TKA 12/7/2020 and has now attended 7 sessions of skilled PT.  A/PROM flexion is 100/100 degrees respecitively with grossly 10 degree extension lag. She is ambulating with a straight cane and would benefit from continued PT for 8 visits to address her remaining LTGs.     See goals in Epic flowsheet.                        OP Exercises     Row Name 02/03/21 1115             Subjective Comments    Subjective Comments  left knee pain  -WS         Subjective Pain    Able to rate subjective pain?  yes  -WS      Pre-Treatment Pain Level  3  -WS      Subjective Pain Comment  MD tomorrow  -WS         Total Minutes    06149 - PT Therapeutic Exercise Minutes  30  -WS         Exercise 1    Exercise Name 1  QS   -WS      Cueing 1  Verbal;Tactile  -WS      Reps 1  15  -WS      Time 1  5 sec  -WS      Additional Comments  towel  -WS         Exercise 2    Exercise Name 2  SLR  -WS      Cueing 2  Verbal;Demo  -WS      Reps 2  12  -WS         Exercise 3    Exercise Name 3  heel slides   -WS      Cueing 3  Verbal;Demo  -WS      Reps 3  10  -WS      Time 3  3 sec  -WS      Additional Comments  with belt  -WS         Exercise 4    Exercise Name 4  seated knee flexion AROM   -WS      Reps 4  10  -WS      Time 4  5 sec  -WS      Additional Comments  in chair  -WS         Exercise 5    Exercise Name 5  seated LAQ  -WS      Reps 5  20  -WS      Time 5  3 sec  -WS      Additional Comments  5#  -WS         Exercise 6    Exercise Name 6  HS stretch seated EOB   -WS      Reps 6  3  -WS      Time 6  20 sec  -WS      Additional Comments  in chair  -WS         Exercise 7    Exercise Name 7  calf raise  -WS      Reps 7  20  -WS         Exercise 8    Exercise Name 8  Nustep LE L5  -WS      Time 8  5 min  -WS         Exercise 9    Exercise Name 9  HS curl  -WS      Reps 9  20  -WS      Additional Comments  4#  -WS         Exercise 10    Exercise Name 10  standing hip abd  -WS      Reps 10  20  -WS      Additional Comments   4#  -WS         Exercise 11    Exercise Name 11  stair strech  -WS      Reps 11  3  -WS      Time 11  20 sec  -WS         Exercise 13    Exercise Name 13   heel prop  -WS      Time 13  1 min  -WS      Additional Comments  next prone  -WS        User Key  (r) = Recorded By, (t) = Taken By, (c) = Cosigned By    Initials Name Provider Type    Richard Huerta PTA Physical Therapy Assistant                                  Time Calculation: Charges entered by Richard Woods on 2/3/21- 2 units of [id number] for 30 min treatment.                        Richard Woods PTA  2/4/2021

## 2021-02-04 NOTE — PROGRESS NOTES
Lydia Gutierrez : 1960 MRN: [id number] DATE: 2021    DIAGNOSIS: 8 week follow up left total knee      SUBJECTIVE:Patient returns today for 8 week follow up of left total knee replacement. Patient reports doing well with no unusual complaints. Appears to be progressing appropriately. Reports her right knee is more painful and needs another steroid injection today.    OBJECTIVE:   Exam:. The incision is well healed. No sign of infection. Range of motion is measured at 3 to 115. The calf is soft and nontender with a negative Homans sign. Strength is progressing and the patient is ambulating appropriately.    DIAGNOSTIC STUDIES  Xrays: 3 views of the left knee (AP, lateral, and sunrise) were ordered and reviewed for evaluation of recent knee replacement. They demonstrate a well positioned, well aligned knee replacement without complicating factors noted. In comparison with previous films there has been no change. Right knee if bone on bone varus deformity.    ASSESSMENT: 8 week status post left knee replacement.    PLAN: 1) Continue with PT exercises as prescribed   2) Follow up 10 months    Large Joint Arthrocentesis: R knee  Date/Time: 2021 1:32 PM  Consent given by: patient  Site marked: site marked  Timeout: Immediately prior to procedure a time out was called to verify the correct patient, procedure, equipment, support staff and site/side marked as required   Supporting Documentation  Indications: pain and joint swelling   Procedure Details  Location: knee - R knee  Preparation: Patient was prepped and draped in the usual sterile fashion  Needle size: 22 G  Approach: anterolateral  Medications administered: 80 mg methylPREDNISolone acetate 80 MG/ML; 2 mL lidocaine (cardiac)  Patient tolerance: patient tolerated the procedure well with no immediate complications            KELLY Mike  2021

## 2021-02-05 ENCOUNTER — HOSPITAL ENCOUNTER (OUTPATIENT)
Dept: PHYSICAL THERAPY | Facility: HOSPITAL | Age: 61
Setting detail: THERAPIES SERIES
Discharge: HOME | End: 2021-02-05

## 2021-02-05 DIAGNOSIS — J45.20 MILD INTERMITTENT ASTHMA WITHOUT COMPLICATION: ICD-10-CM

## 2021-02-05 DIAGNOSIS — Z96.652 AFTERCARE FOLLOWING LEFT KNEE JOINT REPLACEMENT SURGERY: ICD-10-CM

## 2021-02-05 DIAGNOSIS — Z47.1 AFTERCARE FOLLOWING LEFT KNEE JOINT REPLACEMENT SURGERY: ICD-10-CM

## 2021-02-05 DIAGNOSIS — Z78.9 DIFFICULTY NAVIGATING STAIRS: ICD-10-CM

## 2021-02-05 DIAGNOSIS — R26.89 IMPAIRED GAIT AND MOBILITY: ICD-10-CM

## 2021-02-05 DIAGNOSIS — M25.562 ACUTE PAIN OF LEFT KNEE: Primary | ICD-10-CM

## 2021-02-05 PROCEDURE — 97110 THERAPEUTIC EXERCISES: CPT

## 2021-02-05 RX ORDER — ALBUTEROL SULFATE 90 UG/1
AEROSOL, METERED RESPIRATORY (INHALATION)
Qty: 54 G | Refills: 1 | Status: SHIPPED | OUTPATIENT
Start: 2021-02-05 | End: 2021-05-13

## 2021-02-05 NOTE — THERAPY PROGRESS REPORT/RE-CERT
Outpatient Physical Therapy Ortho Re-Assessment  [facility]     Patient Name: Lydia Gutierrez  : 1960  MRN: [id number]  Today's Date: 2021      Visit Date: 2021    Patient Active Problem List   Diagnosis   • Abdominal mass   • Abnormal liver function tests   • Ultrasound scan abnormal   • Allergic rhinitis   • Asthma   • Benign essential hypertension   • Bladder problem   • Breast lump   • Dense breast tissue   • Breast tenderness   • Mixed anxiety depressive disorder   • Dyspareunia   • Abnormal C-reactive protein   • Leukocytosis   • Gastroesophageal reflux disease   • Fatigue   • Headache   • Herpes simplex virus (HSV) infection   • Hip pain, bilateral   • Hypothyroidism   • Low back pain   • Lumbar radiculopathy   • Mixed hyperlipidemia   • Oligomenorrhea   • Periodic limb movement disorder   • Obstructive sleep apnea   • Visual disturbance   • Vitamin D deficiency   • Bilateral knee pain   • Cervicalgia   • B12 deficiency   • Primary osteoarthritis of left knee        Past Medical History:   Diagnosis Date   • Anxiety disorder    • Arthritis    • Asthma    • Borderline diabetic    • Depression    • GERD (gastroesophageal reflux disease)    • History of kidney stones    • Hyperlipidemia    • Hypertension    • Hypothyroidism    • Left knee pain    • Sleep apnea     NO MACHINE        Past Surgical History:   Procedure Laterality Date   •  SECTION     • CHOLECYSTECTOMY     • EYE SURGERY Left    • JOINT REPLACEMENT Left    • TOTAL KNEE ARTHROPLASTY Left 2020    Procedure: TOTAL KNEE ARTHROPLASTY;  Surgeon: Juan Walsh MD;  Location: [facility] OR;  Service: Orthopedics;  Laterality: Left;       Visit Dx:     ICD-10-CM ICD-9-CM   1. Acute pain of left knee  M25.562 719.46   2. Aftercare following left knee joint replacement surgery  Z47.1 V54.81    Z96.652 V43.65   3. Impaired gait and mobility  R26.89 781.2   4. Difficulty navigating stairs  Z78.9 V49.89             PT Ortho      Row Name 02/05/21 1200       Left Lower Ext    Lt Knee Extension/Flexion AROM  lacking   -RS    Lt Knee Extension/Flexion PROM  lacking 4-105  -RS      User Key  (r) = Recorded By, (t) = Taken By, (c) = Cosigned By    Initials Name Provider Type    RS Shahida Arrington, PT Physical Therapist                            PT OP Goals     Row Name 02/05/21 1200          PT Short Term Goals    STG 1  Patient will be independent with initial HEP as well as education on symptom management, joint protection, and strategies to minimize stress on affected tissues.  -RS     STG 1 Progress  Met  -RS     STG 2  Patient will have improved L knee ext </= 5 degrees for TKE/normalization heel-toe gait pattern.  -RS     STG 2 Progress  Progressing;Partially Met  -RS     STG 2 Progress Comments  see objective  -RS     STG 3  Patient will increase left knee  flexion ROM to >/= 110 degrees for increased ease with ADLs/transitional movements  -RS     STG 3 Progress  Progressing;Partially Met  -RS     STG 3 Progress Comments  see objective  -RS     STG 4  Patient will report >/= 25% improvement in quality/quantity of sleep  -RS     STG 4 Progress  Met  -RS     STG 4 Progress Comments  50% better  -RS        Long Term Goals    LTG 1  Patient will be independent with established HEP for strength/stabilization to facilitate return to desired function.  -RS     LTG 1 Progress  Progressing  -RS     LTG 2  Patient will have L knee strength >/= 4/5 for greater ease/tolerance with sit to stand, standing and walking   -RS     LTG 2 Progress  Ongoing;Progressing  -RS     LTG 2 Progress Comments  see objective  -RS     LTG 3  Patient will demonstrate necessary hip/knee strength/stability to go up/down stairs reciprocally with minimal compensation and HR assist as needed.  -RS     LTG 3 Progress  Ongoing;Progressing  -RS     LTG 3 Progress Comments  able to perform reciprocally ascending, step to down  -RS     LTG 4  Patient will report  reduced functional limitations on KOS Activities of Daily Living Scale with score >/= 60/80.   -RS     LTG 4 Progress  Ongoing;Progressing  -RS     LTG 4 Progress Comments  53/80  -RS     LTG 5  Patient will demonstrate nearly normal gait without assistive device for return to community mobility with minimal to no increased pain  -RS     LTG 5 Progress  Ongoing;Progressing  -RS     LTG 5 Progress Comments  no AD however slight lateral lean, dec knee flex at L terminal stance/swing  -RS       User Key  (r) = Recorded By, (t) = Taken By, (c) = Cosigned By    Initials Name Provider Type    RS Shahida Arrington, PT Physical Therapist          PT Assessment/Plan     Row Name 02/05/21 1300          PT Assessment    Functional Limitations  Impaired gait;Limitation in home management;Limitations in community activities;Performance in leisure activities;Performance in work activities  -RS     Impairments  Gait;Balance;Impaired flexibility;Joint mobility;Muscle strength;Pain;Range of motion  -RS     Assessment Comments  Ms. Gutierrez has been seen by skilled PT s/p L TKA on 12/7/20. She has met 2/4 STG and 0/5 LTG. She demonstrates improved L knee active and passive mobility compared to initial eval, she continues to be limited in terminal knee extension as well as knee flexion. Initiated stair training, lateral hip strengthening with side steps/monster walk, and discussed gait training with improved roll through/L knee flex swing/pre swing phase of gait. Pt remains appropriate for skilled PT to address the above outlined limitations.  -RS     Please refer to paper survey for additional self-reported information  Yes  -RS     Rehab Potential  Good  -RS     Patient/caregiver participated in establishment of treatment plan and goals  Yes  -RS     Patient would benefit from skilled therapy intervention  Yes  -RS        PT Plan    PT Plan Comments  Continue 2x a week for 4-6 weeks forcused on functional strengthening and standing  tolerance, pt returns to [employer] job soon.  -RS       User Key  (r) = Recorded By, (t) = Taken By, (c) = Cosigned By    Initials Name Provider Type    RS Shahida Arrington, PT Physical Therapist            OP Exercises     Row Name 02/05/21 1300             Subjective Comments    Subjective Comments  Feeling better overall, R knee cortisone injection helped  -RS         Subjective Pain    Able to rate subjective pain?  yes  -RS      Pre-Treatment Pain Level  3  -RS         Total Minutes    63613 - PT Therapeutic Exercise Minutes  40  -RS         Exercise 1    Exercise Name 1  QS   -RS      Cueing 1  Verbal;Tactile  -RS      Reps 1  15  -RS      Time 1  5 sec  -RS      Additional Comments  towel  -RS         Exercise 2    Exercise Name 2  SLR  -RS      Cueing 2  Verbal;Demo  -RS      Reps 2  12  -RS         Exercise 3    Exercise Name 3  step ups  -RS      Cueing 3  Verbal;Demo  -RS      Reps 3  10  -RS      Additional Comments  4 inch  -RS         Exercise 4    Exercise Name 4  seated knee flexion AROM   -RS      Reps 4  10  -RS      Time 4  5 sec  -RS         Exercise 5    Exercise Name 5  step up/over  -RS      Cueing 5  Verbal;Demo  -RS      Reps 5  10  -RS      Time 5  --  -RS      Additional Comments  4 inch  -RS         Exercise 6    Exercise Name 6  HS stretch seated EOB   -RS      Reps 6  3  -RS      Time 6  20 sec  -RS      Additional Comments  in chair  -RS         Exercise 7    Exercise Name 7  side steps  -RS      Cueing 7  Verbal;Demo  -RS      Reps 7  3 laps  -RS      Additional Comments  YTB ankles  -RS         Exercise 8    Exercise Name 8  Nustep LE L5  -RS      Time 8  5 min  -RS         Exercise 9    Exercise Name 9  monster walk  -RS      Cueing 9  Verbal;Demo  -RS      Reps 9  2 laps  -RS      Additional Comments  YTB ankles-forward only  -RS         Exercise 10    Exercise Name 10  discussed gait training- roll through, L knee flex during swing  -RS      Reps 10  --  -RS         Exercise 11     Exercise Name 11  stair strech  -RS      Reps 11  3  -RS      Time 11  20 sec  -RS         Exercise 13    Exercise Name 13  prone knee extension stretch  -RS      Time 13  2 min  -RS      Additional Comments  towel roll under thigh  -RS        User Key  (r) = Recorded By, (t) = Taken By, (c) = Cosigned By    Initials Name Provider Type    RS Shahida Arrington, PT Physical Therapist                        Outcome Measure Options: Knee Outcome Score- ADL  Knee Outcome Score  Knee Outcome Score Comments: 66% ability      Time Calculation:     Start Time: 1215  Stop Time: 1300  Time Calculation (min): 45 min     Therapy Charges for Today     Code Description Service Date Service Provider Modifiers Qty    94746810353 HC PT THER PROC EA 15 MIN 2/5/2021 Shahida Arrington, PT GP 3          PT G-Codes  Outcome Measure Options: Knee Outcome Score- ADL         Shahida Arrington PT  2/5/2021

## 2021-02-11 DIAGNOSIS — E53.8 B12 DEFICIENCY: ICD-10-CM

## 2021-02-11 DIAGNOSIS — E53.8 FOLATE DEFICIENCY: Primary | ICD-10-CM

## 2021-02-11 RX ORDER — CYANOCOBALAMIN (VITAMIN B-12) 500 MCG
500 TABLET ORAL DAILY
Qty: 90 TABLET | Refills: 0 | Status: SHIPPED | OUTPATIENT
Start: 2021-02-11 | End: 2021-05-04

## 2021-02-11 RX ORDER — LANOLIN ALCOHOL/MO/W.PET/CERES
400 CREAM (GRAM) TOPICAL DAILY
Qty: 90 TABLET | Refills: 0 | Status: SHIPPED | OUTPATIENT
Start: 2021-02-11 | End: 2021-05-04

## 2021-02-12 ENCOUNTER — APPOINTMENT (OUTPATIENT)
Dept: PHYSICAL THERAPY | Facility: HOSPITAL | Age: 61
End: 2021-02-12

## 2021-02-17 ENCOUNTER — APPOINTMENT (OUTPATIENT)
Dept: PHYSICAL THERAPY | Facility: HOSPITAL | Age: 61
End: 2021-02-17

## 2021-02-19 ENCOUNTER — APPOINTMENT (OUTPATIENT)
Dept: PHYSICAL THERAPY | Facility: HOSPITAL | Age: 61
End: 2021-02-19

## 2021-02-24 ENCOUNTER — HOSPITAL ENCOUNTER (OUTPATIENT)
Dept: PHYSICAL THERAPY | Facility: HOSPITAL | Age: 61
Setting detail: THERAPIES SERIES
Discharge: HOME | End: 2021-02-24

## 2021-02-24 DIAGNOSIS — Z47.1 AFTERCARE FOLLOWING LEFT KNEE JOINT REPLACEMENT SURGERY: ICD-10-CM

## 2021-02-24 DIAGNOSIS — R26.89 IMPAIRED GAIT AND MOBILITY: ICD-10-CM

## 2021-02-24 DIAGNOSIS — M25.562 ACUTE PAIN OF LEFT KNEE: Primary | ICD-10-CM

## 2021-02-24 DIAGNOSIS — Z96.652 AFTERCARE FOLLOWING LEFT KNEE JOINT REPLACEMENT SURGERY: ICD-10-CM

## 2021-02-24 DIAGNOSIS — Z78.9 DIFFICULTY NAVIGATING STAIRS: ICD-10-CM

## 2021-02-24 PROCEDURE — 97110 THERAPEUTIC EXERCISES: CPT

## 2021-02-24 NOTE — THERAPY TREATMENT NOTE
Outpatient Physical Therapy Ortho Treatment Note  [facility]     Patient Name: Lydia Gutierrez  : 1960  MRN: [id number]  Today's Date: 2021      Visit Date: 2021    Visit Dx:    ICD-10-CM ICD-9-CM   1. Acute pain of left knee  M25.562 719.46   2. Aftercare following left knee joint replacement surgery  Z47.1 V54.81    Z96.652 V43.65   3. Impaired gait and mobility  R26.89 781.2   4. Difficulty navigating stairs  Z78.9 V49.89       Patient Active Problem List   Diagnosis   • Abdominal mass   • Abnormal liver function tests   • Ultrasound scan abnormal   • Allergic rhinitis   • Asthma   • Benign essential hypertension   • Bladder problem   • Breast lump   • Dense breast tissue   • Breast tenderness   • Mixed anxiety depressive disorder   • Dyspareunia   • Abnormal C-reactive protein   • Leukocytosis   • Gastroesophageal reflux disease   • Fatigue   • Headache   • Herpes simplex virus (HSV) infection   • Hip pain, bilateral   • Hypothyroidism   • Low back pain   • Lumbar radiculopathy   • Mixed hyperlipidemia   • Oligomenorrhea   • Periodic limb movement disorder   • Obstructive sleep apnea   • Visual disturbance   • Vitamin D deficiency   • Bilateral knee pain   • Cervicalgia   • B12 deficiency   • Primary osteoarthritis of left knee        Past Medical History:   Diagnosis Date   • Anxiety disorder    • Arthritis    • Asthma    • Borderline diabetic    • Depression    • GERD (gastroesophageal reflux disease)    • History of kidney stones    • Hyperlipidemia    • Hypertension    • Hypothyroidism    • Left knee pain    • Sleep apnea     NO MACHINE        Past Surgical History:   Procedure Laterality Date   •  SECTION     • CHOLECYSTECTOMY     • EYE SURGERY Left    • JOINT REPLACEMENT Left    • TOTAL KNEE ARTHROPLASTY Left 2020    Procedure: TOTAL KNEE ARTHROPLASTY;  Surgeon: Juan Walsh MD;  Location: [facility] OR;  Service: Orthopedics;  Laterality: Left;                        PT Assessment/Plan     Row Name 02/24/21 1228          PT Assessment    Assessment Comments  Patient s/p left TKA currently ambulating with no AD. Has reurned to work 4 hours every other day. Strength continue to improve.  -WS       User Key  (r) = Recorded By, (t) = Taken By, (c) = Cosigned By    Initials Name Provider Type    WS Richard Woods PTA Physical Therapy Assistant            OP Exercises     Row Name 02/24/21 1200             Subjective Comments    Subjective Comments  Knee feels tight  -WS         Subjective Pain    Able to rate subjective pain?  yes  -WS      Pre-Treatment Pain Level  2  -WS      Subjective Pain Comment  worked 4 hours yeserday  -WS         Total Minutes    35014 - PT Therapeutic Exercise Minutes  40  -WS         Exercise 1    Exercise Name 1  QS   -WS      Cueing 1  Verbal;Tactile  -WS      Reps 1  15  -WS      Time 1  5 sec  -WS      Additional Comments  towel  -WS         Exercise 2    Exercise Name 2  SLR  -WS      Cueing 2  Verbal;Demo  -WS      Reps 2  12  -WS         Exercise 3    Exercise Name 3  step ups  -WS      Cueing 3  Verbal;Demo  -WS      Reps 3  10  -WS      Additional Comments  4 inch  -WS         Exercise 4    Exercise Name 4  seated knee flexion AROM   -WS      Reps 4  10  -WS      Time 4  5 sec  -WS      Additional Comments  in chair  -WS         Exercise 5    Exercise Name 5  step up/over  -WS      Cueing 5  Verbal;Demo  -WS      Reps 5  10  -WS      Additional Comments  4 inch  -WS         Exercise 6    Exercise Name 6  HS stretch seated EOB   -WS      Reps 6  3  -WS      Time 6  20 sec  -WS      Additional Comments  in chair  -WS         Exercise 7    Exercise Name 7  side steps  -WS      Cueing 7  Verbal;Demo  -WS      Reps 7  3 laps  -WS      Additional Comments  YTB at ankles  -WS         Exercise 8    Exercise Name 8  Nustep LE L5  -WS      Time 8  5 min  -WS         Exercise 9    Exercise Name 9  monster walk  -WS      Cueing 9   Verbal;Demo  -WS      Reps 9  2 laps  -WS      Additional Comments  YTB ankles  -WS         Exercise 10    Exercise Name 10  HS curl, hip abd  -WS      Reps 10  20  -WS      Additional Comments  4#  -WS         Exercise 11    Exercise Name 11  stair strech  -WS      Reps 11  3  -WS      Time 11  20 sec  -WS         Exercise 13    Exercise Name 13  prone knee extension stretch  -WS      Time 13  2 min  -WS      Additional Comments  towel roll under thigh  -        User Key  (r) = Recorded By, (t) = Taken By, (c) = Cosigned By    Initials Name Provider Type    Richard Huerta PTA Physical Therapy Assistant                       PT OP Goals     Row Name 02/24/21 1200          PT Short Term Goals    STG 1  Patient will be independent with initial HEP as well as education on symptom management, joint protection, and strategies to minimize stress on affected tissues.  -     STG 1 Progress  Met  -     STG 2  Patient will have improved L knee ext </= 5 degrees for TKE/normalization heel-toe gait pattern.  -WS     STG 2 Progress  Progressing;Partially Met  -     STG 3  Patient will increase left knee  flexion ROM to >/= 110 degrees for increased ease with ADLs/transitional movements  -WS     STG 3 Progress  Progressing;Partially Met  -     STG 4  Patient will report >/= 25% improvement in quality/quantity of sleep  -     STG 4 Progress  Met  -        Long Term Goals    LTG 1  Patient will be independent with established HEP for strength/stabilization to facilitate return to desired function.  -     LTG 1 Progress  Progressing  -     LTG 2  Patient will have L knee strength >/= 4/5 for greater ease/tolerance with sit to stand, standing and walking   -     LTG 2 Progress  Ongoing;Progressing  -     LTG 3  Patient will demonstrate necessary hip/knee strength/stability to go up/down stairs reciprocally with minimal compensation and HR assist as needed.  -     LTG 3 Progress  Ongoing;Progressing  -      LTG 4  Patient will report reduced functional limitations on KOS Activities of Daily Living Scale with score >/= 60/80.   -WS     LTG 4 Progress  Ongoing;Progressing  -WS     LTG 5  Patient will demonstrate nearly normal gait without assistive device for return to community mobility with minimal to no increased pain  -WS     LTG 5 Progress  Ongoing;Progressing  -WS       User Key  (r) = Recorded By, (t) = Taken By, (c) = Cosigned By    Initials Name Provider Type    Richard Huerta PTA Physical Therapy Assistant          Therapy Education  Given: HEP, Symptoms/condition management, Posture/body mechanics  Program: Reinforced  How Provided: Verbal  Provided to: Patient  Level of Understanding: Teach back education performed              Time Calculation:   Start Time: 1200  Stop Time: 1240  Time Calculation (min): 40 min  Therapy Charges for Today     Code Description Service Date Service Provider Modifiers Qty    42565793701 HC PT THER PROC EA 15 MIN 2/24/2021 Richard Woods PTA GP 3                    Richard Woods PTA  2/24/2021

## 2021-02-26 ENCOUNTER — APPOINTMENT (OUTPATIENT)
Dept: PHYSICAL THERAPY | Facility: HOSPITAL | Age: 61
End: 2021-02-26

## 2021-03-03 ENCOUNTER — HOSPITAL ENCOUNTER (OUTPATIENT)
Dept: PHYSICAL THERAPY | Facility: HOSPITAL | Age: 61
Setting detail: THERAPIES SERIES
Discharge: HOME | End: 2021-03-03

## 2021-03-03 DIAGNOSIS — R26.89 IMPAIRED GAIT AND MOBILITY: ICD-10-CM

## 2021-03-03 DIAGNOSIS — Z47.1 AFTERCARE FOLLOWING LEFT KNEE JOINT REPLACEMENT SURGERY: ICD-10-CM

## 2021-03-03 DIAGNOSIS — M25.562 ACUTE PAIN OF LEFT KNEE: Primary | ICD-10-CM

## 2021-03-03 DIAGNOSIS — Z96.652 AFTERCARE FOLLOWING LEFT KNEE JOINT REPLACEMENT SURGERY: ICD-10-CM

## 2021-03-03 DIAGNOSIS — Z78.9 DIFFICULTY NAVIGATING STAIRS: ICD-10-CM

## 2021-03-03 PROCEDURE — 97110 THERAPEUTIC EXERCISES: CPT

## 2021-03-03 NOTE — THERAPY DISCHARGE NOTE
Outpatient Physical Therapy Ortho Treatment Note/Discharge Summary  [facility]     Patient Name: Lydia Gutierrez  : 1960  MRN: [id number]  Today's Date: 3/3/2021      Visit Date: 2021    Visit Dx:    ICD-10-CM ICD-9-CM   1. Acute pain of left knee  M25.562 719.46   2. Aftercare following left knee joint replacement surgery  Z47.1 V54.81    Z96.652 V43.65   3. Impaired gait and mobility  R26.89 781.2   4. Difficulty navigating stairs  Z78.9 V49.89       Patient Active Problem List   Diagnosis   • Abdominal mass   • Abnormal liver function tests   • Ultrasound scan abnormal   • Allergic rhinitis   • Asthma   • Benign essential hypertension   • Bladder problem   • Breast lump   • Dense breast tissue   • Breast tenderness   • Mixed anxiety depressive disorder   • Dyspareunia   • Abnormal C-reactive protein   • Leukocytosis   • Gastroesophageal reflux disease   • Fatigue   • Headache   • Herpes simplex virus (HSV) infection   • Hip pain, bilateral   • Hypothyroidism   • Low back pain   • Lumbar radiculopathy   • Mixed hyperlipidemia   • Oligomenorrhea   • Periodic limb movement disorder   • Obstructive sleep apnea   • Visual disturbance   • Vitamin D deficiency   • Bilateral knee pain   • Cervicalgia   • B12 deficiency   • Primary osteoarthritis of left knee        Past Medical History:   Diagnosis Date   • Anxiety disorder    • Arthritis    • Asthma    • Borderline diabetic    • Depression    • GERD (gastroesophageal reflux disease)    • History of kidney stones    • Hyperlipidemia    • Hypertension    • Hypothyroidism    • Left knee pain    • Sleep apnea     NO MACHINE        Past Surgical History:   Procedure Laterality Date   •  SECTION     • CHOLECYSTECTOMY     • EYE SURGERY Left    • JOINT REPLACEMENT Left    • TOTAL KNEE ARTHROPLASTY Left 2020    Procedure: TOTAL KNEE ARTHROPLASTY;  Surgeon: Juan Walsh MD;  Location: [facility] OR;  Service: Orthopedics;  Laterality: Left;  "                      PT Assessment/Plan     Row Name 03/03/21 1313          PT Assessment    Assessment Comments  Patient ambulating with no AD with normal gait. Able to walk up/down stairs with reciprocal gait. Patient independent with progressive HEP  -WS       User Key  (r) = Recorded By, (t) = Taken By, (c) = Cosigned By    Initials Name Provider Type    WS Richard Woods PTA Physical Therapy Assistant              OP Exercises     Row Name 03/03/21 1250             Subjective Comments    Subjective Comments  work wearing me out  -WS         Subjective Pain    Able to rate subjective pain?  yes  -WS      Pre-Treatment Pain Level  2  -WS         Total Minutes    64229 - PT Therapeutic Exercise Minutes  30  -WS         Exercise 1    Exercise Name 1  QS   -WS      Cueing 1  Verbal;Tactile  -WS      Reps 1  15  -WS      Time 1  5 sec  -WS      Additional Comments  towel  -WS         Exercise 2    Exercise Name 2  SLR  -WS      Cueing 2  Verbal;Demo  -WS      Reps 2  12  -WS         Exercise 3    Exercise Name 3  step ups  -WS      Cueing 3  Verbal;Demo  -WS      Reps 3  10  -WS      Additional Comments  6\"  -WS         Exercise 4    Exercise Name 4  seated knee flexion AROM   -WS      Reps 4  10  -WS      Time 4  5 sec  -WS      Additional Comments  in chair  -WS         Exercise 5    Exercise Name 5  step up/over  -WS      Cueing 5  Verbal;Demo  -WS      Reps 5  10  -WS      Additional Comments  4 inch  -WS         Exercise 6    Exercise Name 6  HS stretch seated EOB   -WS      Reps 6  3  -WS      Time 6  20 sec  -WS      Additional Comments  in chair  -WS         Exercise 7    Exercise Name 7  side steps  -WS      Cueing 7  Verbal;Demo  -WS      Reps 7  3 laps  -WS      Additional Comments  YTB at ankles  -WS         Exercise 8    Exercise Name 8  Nustep LE L5  -WS      Time 8  5 min  -WS         Exercise 9    Exercise Name 9  monster walk  -WS      Cueing 9  Verbal;Demo  -WS      Reps 9  2 laps  -WS      " Additional Comments  YTB at ankles  -WS         Exercise 10    Exercise Name 10  HS curl, hip abd  -WS      Reps 10  20  -WS      Additional Comments  4#  -WS         Exercise 11    Exercise Name 11  stair strech  -WS      Reps 11  3  -WS      Time 11  20 sec  -WS         Exercise 13    Exercise Name 13  prone knee extension stretch  -WS      Time 13  2 min  -WS      Additional Comments  towel under thigh  -        User Key  (r) = Recorded By, (t) = Taken By, (c) = Cosigned By    Initials Name Provider Type    Richard Huerta PTA Physical Therapy Assistant                         PT OP Goals     Row Name 03/03/21 1300          PT Short Term Goals    STG 1  Patient will be independent with initial HEP as well as education on symptom management, joint protection, and strategies to minimize stress on affected tissues.  -     STG 1 Progress  Met  -WS     STG 2  Patient will have improved L knee ext </= 5 degrees for TKE/normalization heel-toe gait pattern.  -WS     STG 2 Progress  Met  -     STG 2 Progress Comments  passive ext 5  -WS     STG 3  Patient will increase left knee  flexion ROM to >/= 110 degrees for increased ease with ADLs/transitional movements  -WS     STG 3 Progress  Met  -     STG 3 Progress Comments  passive flex 115  -WS     STG 4  Patient will report >/= 25% improvement in quality/quantity of sleep  -     STG 4 Progress  Met  -        Long Term Goals    LTG 1  Patient will be independent with established HEP for strength/stabilization to facilitate return to desired function.  -     LTG 1 Progress  Met  -WS     LTG 2  Patient will have L knee strength >/= 4/5 for greater ease/tolerance with sit to stand, standing and walking   -     LTG 2 Progress  Met  -     LTG 3  Patient will demonstrate necessary hip/knee strength/stability to go up/down stairs reciprocally with minimal compensation and HR assist as needed.  -     LTG 3 Progress  Met  -     LTG 4  Patient will report  reduced functional limitations on KOS Activities of Daily Living Scale with score >/= 60/80.   -     LTG 5  Patient will demonstrate nearly normal gait without assistive device for return to community mobility with minimal to no increased pain  -     LTG 5 Progress  Met  -       User Key  (r) = Recorded By, (t) = Taken By, (c) = Cosigned By    Initials Name Provider Type    Richard Huerta PTA Physical Therapy Assistant          Therapy Education  Given: HEP, Symptoms/condition management, Posture/body mechanics  Program: Reinforced  How Provided: Verbal  Provided to: Patient              Time Calculation:   Start Time: 1250  Stop Time: 1320  Time Calculation (min): 30 min  Therapy Charges for Today     Code Description Service Date Service Provider Modifiers Qty    18341062794 HC PT THER PROC EA 15 MIN 3/3/2021 Richard Woods PTA GP 2                       Richard Woods PTA  3/3/2021

## 2021-05-03 DIAGNOSIS — E53.8 B12 DEFICIENCY: ICD-10-CM

## 2021-05-03 DIAGNOSIS — E53.8 FOLATE DEFICIENCY: ICD-10-CM

## 2021-05-03 DIAGNOSIS — F41.8 MIXED ANXIETY DEPRESSIVE DISORDER: ICD-10-CM

## 2021-05-03 DIAGNOSIS — J30.9 ALLERGIC RHINITIS, UNSPECIFIED SEASONALITY, UNSPECIFIED TRIGGER: ICD-10-CM

## 2021-05-03 DIAGNOSIS — E03.9 ACQUIRED HYPOTHYROIDISM: ICD-10-CM

## 2021-05-04 ENCOUNTER — CLINICAL SUPPORT (OUTPATIENT)
Dept: ORTHOPEDIC SURGERY | Facility: CLINIC | Age: 61
End: 2021-05-04

## 2021-05-04 VITALS — BODY MASS INDEX: 38.64 KG/M2 | HEIGHT: 62 IN | TEMPERATURE: 98.2 F | WEIGHT: 210 LBS

## 2021-05-04 DIAGNOSIS — M17.11 PRIMARY OSTEOARTHRITIS OF RIGHT KNEE: Primary | ICD-10-CM

## 2021-05-04 PROCEDURE — 99214 OFFICE O/P EST MOD 30 MIN: CPT | Performed by: NURSE PRACTITIONER

## 2021-05-04 PROCEDURE — 20610 DRAIN/INJ JOINT/BURSA W/O US: CPT | Performed by: NURSE PRACTITIONER

## 2021-05-04 RX ORDER — LEVOTHYROXINE SODIUM 137 UG/1
TABLET ORAL
Qty: 90 TABLET | Refills: 1 | Status: SHIPPED | OUTPATIENT
Start: 2021-05-04 | End: 2021-11-01

## 2021-05-04 RX ORDER — METHYLPREDNISOLONE ACETATE 80 MG/ML
80 INJECTION, SUSPENSION INTRA-ARTICULAR; INTRALESIONAL; INTRAMUSCULAR; SOFT TISSUE
Status: COMPLETED | OUTPATIENT
Start: 2021-05-04 | End: 2021-05-04

## 2021-05-04 RX ORDER — ROSUVASTATIN CALCIUM 40 MG/1
TABLET, COATED ORAL
Qty: 90 TABLET | Refills: 1 | Status: SHIPPED | OUTPATIENT
Start: 2021-05-04 | End: 2021-11-01

## 2021-05-04 RX ORDER — LANOLIN ALCOHOL/MO/W.PET/CERES
CREAM (GRAM) TOPICAL
Qty: 90 TABLET | Refills: 1 | Status: SHIPPED | OUTPATIENT
Start: 2021-05-04 | End: 2021-08-10 | Stop reason: HOSPADM

## 2021-05-04 RX ORDER — CEFAZOLIN SODIUM 2 G/100ML
2 INJECTION, SOLUTION INTRAVENOUS ONCE
Status: CANCELLED | OUTPATIENT
Start: 2021-08-09 | End: 2021-05-04

## 2021-05-04 RX ORDER — AMLODIPINE BESYLATE 10 MG/1
TABLET ORAL
Qty: 90 TABLET | Refills: 1 | Status: SHIPPED | OUTPATIENT
Start: 2021-05-04 | End: 2021-11-01

## 2021-05-04 RX ORDER — FLUTICASONE PROPIONATE 50 MCG
SPRAY, SUSPENSION (ML) NASAL
Qty: 16 G | Refills: 5 | Status: SHIPPED | OUTPATIENT
Start: 2021-05-04 | End: 2021-11-01

## 2021-05-04 RX ORDER — FLUOXETINE HYDROCHLORIDE 20 MG/1
40 CAPSULE ORAL EVERY EVENING
Qty: 180 CAPSULE | Refills: 1 | Status: SHIPPED | OUTPATIENT
Start: 2021-05-04 | End: 2021-05-14 | Stop reason: SDUPTHER

## 2021-05-04 RX ORDER — LISINOPRIL AND HYDROCHLOROTHIAZIDE 12.5; 1 MG/1; MG/1
TABLET ORAL
Qty: 90 TABLET | Refills: 1 | Status: SHIPPED | OUTPATIENT
Start: 2021-05-04 | End: 2021-11-01

## 2021-05-04 RX ORDER — PANTOPRAZOLE SODIUM 40 MG/1
TABLET, DELAYED RELEASE ORAL
Qty: 90 TABLET | Refills: 1 | Status: SHIPPED | OUTPATIENT
Start: 2021-05-04 | End: 2021-08-06

## 2021-05-04 RX ORDER — LANOLIN ALCOHOL/MO/W.PET/CERES
400 CREAM (GRAM) TOPICAL DAILY
Qty: 90 TABLET | Refills: 1 | Status: SHIPPED | OUTPATIENT
Start: 2021-05-04 | End: 2021-08-10 | Stop reason: HOSPADM

## 2021-05-04 RX ORDER — LORATADINE 10 MG/1
TABLET ORAL
Qty: 90 TABLET | Refills: 1 | Status: SHIPPED | OUTPATIENT
Start: 2021-05-04 | End: 2021-10-04

## 2021-05-04 RX ORDER — PREGABALIN 75 MG/1
150 CAPSULE ORAL ONCE
Status: CANCELLED | OUTPATIENT
Start: 2021-08-09 | End: 2021-05-04

## 2021-05-04 RX ORDER — MONTELUKAST SODIUM 10 MG/1
TABLET ORAL
Qty: 90 TABLET | Refills: 1 | Status: SHIPPED | OUTPATIENT
Start: 2021-05-04 | End: 2021-11-01

## 2021-05-04 RX ORDER — MELOXICAM 15 MG/1
15 TABLET ORAL ONCE
Status: CANCELLED | OUTPATIENT
Start: 2021-08-09 | End: 2021-05-04

## 2021-05-04 RX ORDER — CHLORHEXIDINE GLUCONATE 500 MG/1
CLOTH TOPICAL 2 TIMES DAILY
Status: CANCELLED | OUTPATIENT
Start: 2021-05-04

## 2021-05-04 RX ADMIN — METHYLPREDNISOLONE ACETATE 80 MG: 80 INJECTION, SUSPENSION INTRA-ARTICULAR; INTRALESIONAL; INTRAMUSCULAR; SOFT TISSUE at 14:44

## 2021-05-04 NOTE — PROGRESS NOTES
Patient: Lydia Gutierrez  YOB: 1960 60 y.o. female  Medical Record Number: [id number]    Chief Complaints:   Chief Complaint   Patient presents with   • Right Knee - Follow-up, Pain       History of Present Illness:Lydia Gutierrez is a 60 y.o. female who presents with complaints of severe right knee pain.  She has known history of significant osteoarthritis, pain has gotten significantly worse.  She now describes it as a severe constant dull type pain worse with walking going up and down stairs prolonged standing only slightly better with injections and anti-inflammatories    Allergies:   Allergies   Allergen Reactions   • Atorvastatin Other (See Comments)     PATIENT CAN NOT REMEMBER       Medications:   Current Outpatient Medications   Medication Sig Dispense Refill   • Allergy Relief 10 MG tablet TAKE 1 TABLET BY MOUTH DAILY. 90 tablet 1   • amLODIPine (NORVASC) 10 MG tablet TAKE ONE TABLET BY MOUTH  EVERYDAY 90 tablet 1   • cholecalciferol (VITAMIN D3) 1000 units tablet Take 1 tablet by mouth Daily. 30 tablet 3   • FLUoxetine (PROzac) 20 MG capsule Take 2 capsules by mouth Every Evening. 180 capsule 1   • fluticasone (FLONASE) 50 MCG/ACT nasal spray INSTILL 2 SPRAYS INTO EACH NOSTRIL AS DIRECTED BY PROVIDER DAILY. 16 g 5   • folic acid (FOLVITE) 400 MCG tablet TAKE 1 TABLET BY MOUTH DAILY. 90 tablet 1   • levothyroxine (SYNTHROID, LEVOTHROID) 137 MCG tablet TAKE ONE TABLET BY MOUTH DAILY 90 tablet 1   • lisinopril-hydrochlorothiazide (PRINZIDE,ZESTORETIC) 10-12.5 MG per tablet TAKE ONE TABLET BY MOUTH DAILY 90 tablet 1   • methocarbamol (ROBAXIN) 750 MG tablet TAKE 1 TABLET BY MOUTH EVERY NIGHT FOR MUSCLE SPASMS. 90 tablet 1   • montelukast (SINGULAIR) 10 MG tablet TAKE 1 TABLET BY MOUTH AT BEDTIME. 90 tablet 1   • ondansetron (ZOFRAN) 4 MG tablet Take 1 tablet by mouth Every 6 (Six) Hours As Needed for Nausea or Vomiting for up to 10 doses. 10 tablet 0   • pantoprazole (PROTONIX) 40 MG EC tablet TAKE  "ONE TABLET BY MOUTH DAILY 90 tablet 1   • rosuvastatin (CRESTOR) 40 MG tablet TAKE ONE TABLET BY MOUTH EVERY NIGHT 90 tablet 1   • Ventolin  (90 Base) MCG/ACT inhaler INHALE 1-2 PUFFS BY MOUTH EVERY 4-6 HOURS AS NEEDED AND AS DIRECTED. 54 g 1   • Vitamin B12 (CYANOCOBALAMIN) 500 MCG tablet tablet TAKE 1 TABLET BY MOUTH DAILY. 90 tablet 1   • HYDROcodone-acetaminophen (NORCO) 7.5-325 MG per tablet Take 1-2 tablets by mouth every 4-6 hours as needed for pain. Take 2 only for severe pain. 40 tablet 0     No current facility-administered medications for this visit.     Facility-Administered Medications Ordered in Other Visits   Medication Dose Route Frequency Provider Last Rate Last Admin   • Chlorhexidine Gluconate 2 % pads 2 each  2 pad Apply externally BID Anne Tejeda APRN             The following portions of the patient's history were reviewed and updated as appropriate: allergies, current medications, past family history, past medical history, past social history, past surgical history and problem list.    Review of Systems:   A 14 point review of systems was performed. All systems negative except pertinent positives/negative listed in HPI above    Physical Exam:   Vitals:    05/04/21 1411   Temp: 98.2 °F (36.8 °C)   Weight: 95.3 kg (210 lb)   Height: 157.5 cm (62\")   PainSc:   4       General: A and O x 3, ASA, NAD    SCLERA:    Normal    DENTITION:   Normal  Skin clear no unusual lesions noted  Right knee patient has trace amount of effusion noted with 120 degrees flexion neutral extension with a positive Yuni negative Lockman calf soft and nontender       Radiology:  Xrays 3views (ap,lateral, sunrise) right knee were reviewed today secondary to pain show bone-on-bone end-stage osteoarthritis.    Assessment/Plan: End-stage osteoarthritis right knee    Patient I discussed options, she would like to proceed with right total knee replacement.  In the meantime she would like right knee cortisone " injection, she understands she needs to wait for surgery for at least 3 months, patient verbalized understanding, she will continue with physical therapy exercises and we will proceed with surgery    Continuation of conservative management vs. TKA discussed.  The patient wishes to proceed with total knee replacement.  At this point the patient has failed the full compliment of conservative treatment and stating complete understanding of the risks/benefits/ anternatives wishes to proceed with surgical treatment.    Risk and benefits of surgery were reviewed.  Including, but not limited to, blood clots or pulmonary embolism, anesthesia risk, infection, fracture, skin/leg numbness, persistent pain/crepitance/popping/catching, failure of the implant, need for future surgeries, hematoma, possible nerve or blood vessel injury, need for transfusion, and potential risk of stroke,heart attack or death, among others.  The patient understands and wishes to proceed.     It was explained that if tissue has been repaired or reconstructed, there is also an increased chance of failure which may require further management.  Following the completion of the discussion, the patient expressed understanding of this planned course of care, all their questions were answered and consent will be obtained preoperatively.    Operative Plan: [facility] Total Knee Replacement an overnight staywith home health rehab      Large Joint Arthrocentesis: R knee  Date/Time: 5/4/2021 2:44 PM  Consent given by: patient  Site marked: site marked  Timeout: Immediately prior to procedure a time out was called to verify the correct patient, procedure, equipment, support staff and site/side marked as required   Supporting Documentation  Indications: pain and joint swelling   Procedure Details  Location: knee - R knee  Preparation: Patient was prepped and draped in the usual sterile fashion  Needle size: 22 G  Approach: anterolateral  Medications  administered: 80 mg methylPREDNISolone acetate 80 MG/ML; 2 mL lidocaine (cardiac)  Patient tolerance: patient tolerated the procedure well with no immediate complications            Anne Tejeda, APRN  5/4/2021

## 2021-05-05 PROBLEM — M17.11 PRIMARY OSTEOARTHRITIS OF RIGHT KNEE: Status: ACTIVE | Noted: 2021-05-05

## 2021-05-12 DIAGNOSIS — J45.20 MILD INTERMITTENT ASTHMA WITHOUT COMPLICATION: ICD-10-CM

## 2021-05-13 RX ORDER — ALBUTEROL SULFATE 90 UG/1
AEROSOL, METERED RESPIRATORY (INHALATION)
Qty: 18 G | Refills: 2 | Status: SHIPPED | OUTPATIENT
Start: 2021-05-13 | End: 2021-06-25

## 2021-05-14 ENCOUNTER — OFFICE VISIT (OUTPATIENT)
Dept: FAMILY MEDICINE CLINIC | Facility: CLINIC | Age: 61
End: 2021-05-14

## 2021-05-14 VITALS
WEIGHT: 212 LBS | RESPIRATION RATE: 18 BRPM | OXYGEN SATURATION: 97 % | DIASTOLIC BLOOD PRESSURE: 60 MMHG | TEMPERATURE: 97.7 F | HEART RATE: 88 BPM | HEIGHT: 62 IN | SYSTOLIC BLOOD PRESSURE: 110 MMHG | BODY MASS INDEX: 39.01 KG/M2

## 2021-05-14 DIAGNOSIS — J30.9 ALLERGIC RHINITIS, UNSPECIFIED SEASONALITY, UNSPECIFIED TRIGGER: ICD-10-CM

## 2021-05-14 DIAGNOSIS — E55.9 VITAMIN D DEFICIENCY: ICD-10-CM

## 2021-05-14 DIAGNOSIS — R00.2 PALPITATIONS: ICD-10-CM

## 2021-05-14 DIAGNOSIS — G89.29 CHRONIC PAIN OF BOTH KNEES: ICD-10-CM

## 2021-05-14 DIAGNOSIS — R73.03 PREDIABETES: ICD-10-CM

## 2021-05-14 DIAGNOSIS — E53.8 FOLATE DEFICIENCY: ICD-10-CM

## 2021-05-14 DIAGNOSIS — I65.23 BILATERAL CAROTID ARTERY STENOSIS: ICD-10-CM

## 2021-05-14 DIAGNOSIS — E03.9 ACQUIRED HYPOTHYROIDISM: ICD-10-CM

## 2021-05-14 DIAGNOSIS — E53.8 B12 DEFICIENCY: ICD-10-CM

## 2021-05-14 DIAGNOSIS — R42 DIZZINESS: ICD-10-CM

## 2021-05-14 DIAGNOSIS — F41.8 MIXED ANXIETY DEPRESSIVE DISORDER: ICD-10-CM

## 2021-05-14 DIAGNOSIS — M25.561 CHRONIC PAIN OF BOTH KNEES: ICD-10-CM

## 2021-05-14 DIAGNOSIS — E78.2 MIXED HYPERLIPIDEMIA: ICD-10-CM

## 2021-05-14 DIAGNOSIS — R22.1 SENSATION OF LUMP IN THROAT: ICD-10-CM

## 2021-05-14 DIAGNOSIS — R68.84 JAW PAIN: ICD-10-CM

## 2021-05-14 DIAGNOSIS — J45.20 MILD INTERMITTENT ASTHMA WITHOUT COMPLICATION: ICD-10-CM

## 2021-05-14 DIAGNOSIS — R00.0 TACHYCARDIA: ICD-10-CM

## 2021-05-14 DIAGNOSIS — G47.33 OBSTRUCTIVE SLEEP APNEA: ICD-10-CM

## 2021-05-14 DIAGNOSIS — I10 BENIGN ESSENTIAL HYPERTENSION: Primary | ICD-10-CM

## 2021-05-14 DIAGNOSIS — R79.89 ABNORMAL LIVER FUNCTION TESTS: ICD-10-CM

## 2021-05-14 DIAGNOSIS — D72.829 LEUKOCYTOSIS, UNSPECIFIED TYPE: ICD-10-CM

## 2021-05-14 DIAGNOSIS — M25.562 CHRONIC PAIN OF BOTH KNEES: ICD-10-CM

## 2021-05-14 DIAGNOSIS — K21.9 GASTROESOPHAGEAL REFLUX DISEASE, UNSPECIFIED WHETHER ESOPHAGITIS PRESENT: ICD-10-CM

## 2021-05-14 PROCEDURE — 99214 OFFICE O/P EST MOD 30 MIN: CPT | Performed by: PHYSICIAN ASSISTANT

## 2021-05-14 RX ORDER — FLUOXETINE HYDROCHLORIDE 20 MG/1
60 CAPSULE ORAL EVERY EVENING
Qty: 270 CAPSULE | Refills: 1 | Status: SHIPPED | OUTPATIENT
Start: 2021-05-14 | End: 2022-02-16 | Stop reason: SDUPTHER

## 2021-05-14 RX ORDER — FAMOTIDINE 20 MG/1
20 TABLET, FILM COATED ORAL
Qty: 30 TABLET | Refills: 0 | Status: SHIPPED | OUTPATIENT
Start: 2021-05-14 | End: 2021-06-14

## 2021-05-15 LAB
25(OH)D3+25(OH)D2 SERPL-MCNC: 52.2 NG/ML (ref 30–100)
ALBUMIN SERPL-MCNC: 4.8 G/DL (ref 3.5–5.2)
ALBUMIN/GLOB SERPL: 2.2 G/DL
ALP SERPL-CCNC: 110 U/L (ref 39–117)
ALT SERPL-CCNC: 16 U/L (ref 1–33)
APPEARANCE UR: CLEAR
AST SERPL-CCNC: 15 U/L (ref 1–32)
BACTERIA #/AREA URNS HPF: NORMAL /HPF
BASOPHILS # BLD AUTO: 0.06 10*3/MM3 (ref 0–0.2)
BASOPHILS NFR BLD AUTO: 0.9 % (ref 0–1.5)
BILIRUB SERPL-MCNC: 0.4 MG/DL (ref 0–1.2)
BILIRUB UR QL STRIP: NEGATIVE
BUN SERPL-MCNC: 23 MG/DL (ref 6–20)
BUN/CREAT SERPL: 38.3 (ref 7–25)
CALCIUM SERPL-MCNC: 9.9 MG/DL (ref 8.6–10.5)
CASTS URNS QL MICRO: NORMAL /LPF
CHLORIDE SERPL-SCNC: 102 MMOL/L (ref 98–107)
CHOLEST SERPL-MCNC: 149 MG/DL (ref 0–200)
CK SERPL-CCNC: 36 U/L (ref 20–180)
CO2 SERPL-SCNC: 24.7 MMOL/L (ref 22–29)
COLOR UR: YELLOW
CREAT SERPL-MCNC: 0.6 MG/DL (ref 0.57–1)
EOSINOPHIL # BLD AUTO: 0.18 10*3/MM3 (ref 0–0.4)
EOSINOPHIL NFR BLD AUTO: 2.6 % (ref 0.3–6.2)
EPI CELLS #/AREA URNS HPF: NORMAL /HPF (ref 0–10)
ERYTHROCYTE [DISTWIDTH] IN BLOOD BY AUTOMATED COUNT: 14.5 % (ref 12.3–15.4)
FOLATE SERPL-MCNC: >20 NG/ML (ref 4.78–24.2)
GLOBULIN SER CALC-MCNC: 2.2 GM/DL
GLUCOSE SERPL-MCNC: 107 MG/DL (ref 65–99)
GLUCOSE UR QL: NEGATIVE
HBA1C MFR BLD: 6.3 % (ref 4.8–5.6)
HCT VFR BLD AUTO: 41.1 % (ref 34–46.6)
HDLC SERPL-MCNC: 43 MG/DL (ref 40–60)
HGB BLD-MCNC: 13.3 G/DL (ref 12–15.9)
HGB UR QL STRIP: NEGATIVE
IMM GRANULOCYTES # BLD AUTO: 0.03 10*3/MM3 (ref 0–0.05)
IMM GRANULOCYTES NFR BLD AUTO: 0.4 % (ref 0–0.5)
KETONES UR QL STRIP: NEGATIVE
LDLC SERPL CALC-MCNC: 75 MG/DL (ref 0–100)
LDLC/HDLC SERPL: 1.6 {RATIO}
LEUKOCYTE ESTERASE UR QL STRIP: NEGATIVE
LYMPHOCYTES # BLD AUTO: 1.83 10*3/MM3 (ref 0.7–3.1)
LYMPHOCYTES NFR BLD AUTO: 26.1 % (ref 19.6–45.3)
MCH RBC QN AUTO: 27.8 PG (ref 26.6–33)
MCHC RBC AUTO-ENTMCNC: 32.4 G/DL (ref 31.5–35.7)
MCV RBC AUTO: 86 FL (ref 79–97)
MICRO URNS: NORMAL
MICRO URNS: NORMAL
MONOCYTES # BLD AUTO: 0.53 10*3/MM3 (ref 0.1–0.9)
MONOCYTES NFR BLD AUTO: 7.6 % (ref 5–12)
NEUTROPHILS # BLD AUTO: 4.38 10*3/MM3 (ref 1.7–7)
NEUTROPHILS NFR BLD AUTO: 62.4 % (ref 42.7–76)
NITRITE UR QL STRIP: NEGATIVE
NRBC BLD AUTO-RTO: 0 /100 WBC (ref 0–0.2)
PH UR STRIP: 5.5 [PH] (ref 5–7.5)
PLATELET # BLD AUTO: 375 10*3/MM3 (ref 140–450)
POTASSIUM SERPL-SCNC: 4.4 MMOL/L (ref 3.5–5.2)
PROT SERPL-MCNC: 7 G/DL (ref 6–8.5)
PROT UR QL STRIP: NEGATIVE
RBC # BLD AUTO: 4.78 10*6/MM3 (ref 3.77–5.28)
RBC #/AREA URNS HPF: NORMAL /HPF (ref 0–2)
SODIUM SERPL-SCNC: 138 MMOL/L (ref 136–145)
SP GR UR: 1.02 (ref 1–1.03)
T3FREE SERPL-MCNC: 3.1 PG/ML (ref 2–4.4)
T4 FREE SERPL-MCNC: 1.72 NG/DL (ref 0.93–1.7)
TRIGL SERPL-MCNC: 186 MG/DL (ref 0–150)
TSH SERPL DL<=0.005 MIU/L-ACNC: 2.3 UIU/ML (ref 0.27–4.2)
URATE SERPL-MCNC: 4 MG/DL (ref 2.4–5.7)
URINALYSIS REFLEX: NORMAL
UROBILINOGEN UR STRIP-MCNC: 0.2 MG/DL (ref 0.2–1)
VIT B12 SERPL-MCNC: 880 PG/ML (ref 211–946)
VLDLC SERPL CALC-MCNC: 31 MG/DL (ref 5–40)
WBC # BLD AUTO: 7.01 10*3/MM3 (ref 3.4–10.8)
WBC #/AREA URNS HPF: NORMAL /HPF (ref 0–5)

## 2021-05-20 ENCOUNTER — OFFICE VISIT (OUTPATIENT)
Dept: CARDIOLOGY | Facility: CLINIC | Age: 61
End: 2021-05-20

## 2021-05-20 VITALS
BODY MASS INDEX: 39.31 KG/M2 | SYSTOLIC BLOOD PRESSURE: 137 MMHG | DIASTOLIC BLOOD PRESSURE: 90 MMHG | HEIGHT: 62 IN | HEART RATE: 84 BPM | WEIGHT: 213.6 LBS

## 2021-05-20 DIAGNOSIS — R07.89 CHEST PAIN, ATYPICAL: ICD-10-CM

## 2021-05-20 DIAGNOSIS — I10 BENIGN ESSENTIAL HYPERTENSION: Primary | ICD-10-CM

## 2021-05-20 PROCEDURE — 99214 OFFICE O/P EST MOD 30 MIN: CPT | Performed by: NURSE PRACTITIONER

## 2021-05-20 PROCEDURE — 93000 ELECTROCARDIOGRAM COMPLETE: CPT | Performed by: NURSE PRACTITIONER

## 2021-05-20 NOTE — PROGRESS NOTES
Date of Office Visit: 21  Encounter Provider: KELLY Rice  Place of Service: [facility] CARDIOLOGY  Patient Name: Lydia Gutierrez  :1960    Chief Complaint   Patient presents with   • Hypertension   :     HPI: Lydia Gutierrez is a 60 y.o. female  with history of hypertension, hyperlipidemia, obstructive sleep apnea, obesity, prediabetes, and dizziness.  She has family history of coronary artery disease involving her mom who reportedly had a heart attack in her 60s.  Her mom is still living.      She is followed by Dr. Mitchell.  I will visit with her for the first time and have reviewed her medical record.    In 2020 she complained of some dizzy spells with position changes.  She reported a brief palpitation that was nonlimiting and very quick.  She was not wearing her CPAP.  Her dizziness was felt to be noncardiac.    She now presents with complaint of heartburn at night.  She was started on Pepcid by her PCP about a week ago.  The heartburn was so bad it caused her to be nauseated.  That is slightly better since starting Pepcid however she also reports intermittent neck pain radiating around the left shoulder and up into her jaw.  She would have that while working.  She worked as a  at [employer] and states that job was so stressful for her she recently quit.  She continues to have an occasional flutter palpitation sensation which is short-lived and nonlimiting.  She is not currently using her CPAP.  She walks daily by staying active.  She does no structured exercise but reports no neck pain radiating to the shoulder and jaw with routine walking.  She expressed interest in the COVID-19 vaccine today.    Allergies   Allergen Reactions   • Atorvastatin Other (See Comments)     PATIENT CAN NOT REMEMBER           Family and social history reviewed.     ROS  All other systems were reviewed and are negative          Objective:     Vitals:    21 0919   BP: 137/90  "  Pulse: 84   Weight: 96.9 kg (213 lb 9.6 oz)   Height: 157.5 cm (62\")     Body mass index is 39.07 kg/m².    PHYSICAL EXAM:  Constitutional:       General: Not in acute distress.     Appearance: Well-developed. Not diaphoretic.   HENT:      Head: Normocephalic.   Pulmonary:      Effort: Pulmonary effort is normal. No respiratory distress.      Breath sounds: Normal breath sounds. No wheezing. No rhonchi. No rales.   Cardiovascular:      Normal rate. Regular rhythm.   Pulses:     Radial: 2+ bilaterally.  Skin:     General: Skin is warm and dry. There is no cyanosis.      Findings: No rash.   Neurological:      Mental Status: Alert and oriented to person, place, and time.   Psychiatric:         Behavior: Behavior normal.         Thought Content: Thought content normal.         Judgment: Judgment normal.           ECG 12 Lead    Date/Time: 5/20/2021 9:29 AM  Performed by: Liz Vee APRN  Authorized by: Liz Vee APRN   Comparison: compared with previous ECG   Similar to previous ECG  Rhythm: sinus rhythm  Rate: normal    Clinical impression: normal ECG              Current Outpatient Medications   Medication Sig Dispense Refill   • Allergy Relief 10 MG tablet TAKE 1 TABLET BY MOUTH DAILY. 90 tablet 1   • amLODIPine (NORVASC) 10 MG tablet TAKE ONE TABLET BY MOUTH  EVERYDAY 90 tablet 1   • cholecalciferol (VITAMIN D3) 1000 units tablet Take 1 tablet by mouth Daily. 30 tablet 3   • famotidine (Pepcid) 20 MG tablet Take 1 tablet by mouth Daily Before Supper. 30 tablet 0   • FLUoxetine (PROzac) 20 MG capsule Take 3 capsules by mouth Every Evening. 270 capsule 1   • fluticasone (FLONASE) 50 MCG/ACT nasal spray INSTILL 2 SPRAYS INTO EACH NOSTRIL AS DIRECTED BY PROVIDER DAILY. 16 g 5   • folic acid (FOLVITE) 400 MCG tablet TAKE 1 TABLET BY MOUTH DAILY. 90 tablet 1   • levothyroxine (SYNTHROID, LEVOTHROID) 137 MCG tablet TAKE ONE TABLET BY MOUTH DAILY 90 tablet 1   • lisinopril-hydrochlorothiazide " (PRINZIDE,ZESTORETIC) 10-12.5 MG per tablet TAKE ONE TABLET BY MOUTH DAILY 90 tablet 1   • methocarbamol (ROBAXIN) 750 MG tablet TAKE 1 TABLET BY MOUTH EVERY NIGHT FOR MUSCLE SPASMS. 90 tablet 1   • montelukast (SINGULAIR) 10 MG tablet TAKE 1 TABLET BY MOUTH AT BEDTIME. 90 tablet 1   • ondansetron (ZOFRAN) 4 MG tablet Take 1 tablet by mouth Every 6 (Six) Hours As Needed for Nausea or Vomiting for up to 10 doses. 10 tablet 0   • pantoprazole (PROTONIX) 40 MG EC tablet TAKE ONE TABLET BY MOUTH DAILY 90 tablet 1   • rosuvastatin (CRESTOR) 40 MG tablet TAKE ONE TABLET BY MOUTH EVERY NIGHT 90 tablet 1   • Ventolin  (90 Base) MCG/ACT inhaler INHALE 1-2 PUFFS BY MOUTH EVERY 4-6 HOURS AS NEEDED AND AS DIRECTED. 18 g 2   • Vitamin B12 (CYANOCOBALAMIN) 500 MCG tablet tablet TAKE 1 TABLET BY MOUTH DAILY. 90 tablet 1     No current facility-administered medications for this visit.     Facility-Administered Medications Ordered in Other Visits   Medication Dose Route Frequency Provider Last Rate Last Admin   • Chlorhexidine Gluconate 2 % pads 2 each  2 pad Apply externally BID Anne Tejeda, KELLY         Assessment:      No diagnosis found.     No orders of the defined types were placed in this encounter.        Plan:       1.  60-year-old female with atypical symptoms involving neck pain radiating to the shoulder and jaw.  This is stress related but nonexertional.  She is to have another knee operation in August.  We will have her return for echocardiogram and perfusion stress test to rule out ischemia.  This will be nonwalking protocol.  I have asked for PET due to body mass index  2.  Hypertension diastolic blood pressure is little elevated today but other values in the system within range follow clinically  3.  Hyperlipidemia on Crestor  4.  Obesity  5.  Obstructive sleep apnea not being treated at this time  6.  History of palpitations felt to be benign  7.  Mild bilateral carotid artery stenosis last duplex  February 2020 followed by PCP-she is to have a repeat in a week or 2      Further recommendation pending results of echo and stress test    She is to be vaccinated today        It has been a pleasure to participate in this patient's care.      Thank you,  KELLY Rice      **I used Dragon to dictate this note:**

## 2021-05-27 ENCOUNTER — HOSPITAL ENCOUNTER (OUTPATIENT)
Dept: CARDIOLOGY | Facility: HOSPITAL | Age: 61
Discharge: HOME | End: 2021-05-27
Admitting: PHYSICIAN ASSISTANT

## 2021-05-27 LAB
BH CV XLRA MEAS LEFT DIST CCA EDV: -33.9 CM/SEC
BH CV XLRA MEAS LEFT DIST CCA PSV: -89.1 CM/SEC
BH CV XLRA MEAS LEFT DIST ICA EDV: -23.2 CM/SEC
BH CV XLRA MEAS LEFT DIST ICA PSV: -69.7 CM/SEC
BH CV XLRA MEAS LEFT ICA/CCA RATIO: 1.2
BH CV XLRA MEAS LEFT MID ICA EDV: -30 CM/SEC
BH CV XLRA MEAS LEFT MID ICA PSV: -82.3 CM/SEC
BH CV XLRA MEAS LEFT PROX CCA EDV: 27.1 CM/SEC
BH CV XLRA MEAS LEFT PROX CCA PSV: 93 CM/SEC
BH CV XLRA MEAS LEFT PROX ECA EDV: -17.4 CM/SEC
BH CV XLRA MEAS LEFT PROX ECA PSV: -70.7 CM/SEC
BH CV XLRA MEAS LEFT PROX ICA EDV: 33.9 CM/SEC
BH CV XLRA MEAS LEFT PROX ICA PSV: 103.6 CM/SEC
BH CV XLRA MEAS LEFT PROX SCLA EDV: 4.8 CM/SEC
BH CV XLRA MEAS LEFT PROX SCLA PSV: 181.1 CM/SEC
BH CV XLRA MEAS LEFT VERTEBRAL A EDV: 19.9 CM/SEC
BH CV XLRA MEAS LEFT VERTEBRAL A PSV: 41.6 CM/SEC
BH CV XLRA MEAS RIGHT DIST CCA EDV: -24.2 CM/SEC
BH CV XLRA MEAS RIGHT DIST CCA PSV: -65.2 CM/SEC
BH CV XLRA MEAS RIGHT DIST ICA EDV: -39.2 CM/SEC
BH CV XLRA MEAS RIGHT DIST ICA PSV: -97.4 CM/SEC
BH CV XLRA MEAS RIGHT ICA/CCA RATIO: 1.5
BH CV XLRA MEAS RIGHT MID ICA EDV: -36.4 CM/SEC
BH CV XLRA MEAS RIGHT MID ICA PSV: -86.2 CM/SEC
BH CV XLRA MEAS RIGHT PROX CCA EDV: 24.2 CM/SEC
BH CV XLRA MEAS RIGHT PROX CCA PSV: 77 CM/SEC
BH CV XLRA MEAS RIGHT PROX ECA EDV: -28.7 CM/SEC
BH CV XLRA MEAS RIGHT PROX ECA PSV: -107.2 CM/SEC
BH CV XLRA MEAS RIGHT PROX ICA EDV: 30.8 CM/SEC
BH CV XLRA MEAS RIGHT PROX ICA PSV: 82.7 CM/SEC
BH CV XLRA MEAS RIGHT PROX SCLA PSV: 144.3 CM/SEC
BH CV XLRA MEAS RIGHT VERTEBRAL A EDV: 14.9 CM/SEC
BH CV XLRA MEAS RIGHT VERTEBRAL A PSV: 40.4 CM/SEC
LEFT ARM BP: NORMAL MMHG
RIGHT ARM BP: NORMAL MMHG

## 2021-05-27 PROCEDURE — 93880 EXTRACRANIAL BILAT STUDY: CPT

## 2021-06-03 ENCOUNTER — HOSPITAL ENCOUNTER (OUTPATIENT)
Dept: CARDIOLOGY | Facility: HOSPITAL | Age: 61
Discharge: HOME | End: 2021-06-03

## 2021-06-03 VITALS
SYSTOLIC BLOOD PRESSURE: 137 MMHG | HEART RATE: 68 BPM | WEIGHT: 213 LBS | DIASTOLIC BLOOD PRESSURE: 90 MMHG | HEIGHT: 62 IN | BODY MASS INDEX: 39.2 KG/M2

## 2021-06-03 DIAGNOSIS — R07.89 CHEST PAIN, ATYPICAL: ICD-10-CM

## 2021-06-03 LAB
BH CV NUCLEAR PRIOR STUDY: 3
BH CV REST NUCLEAR ISOTOPE DOSE: 60 MCI
BH CV STRESS BP STAGE 1: NORMAL
BH CV STRESS COMMENTS STAGE 1: NORMAL
BH CV STRESS DOSE REGADENOSON STAGE 1: 0.4
BH CV STRESS DURATION MIN STAGE 1: 0
BH CV STRESS DURATION SEC STAGE 1: 10
BH CV STRESS HR STAGE 1: 104
BH CV STRESS NUCLEAR ISOTOPE DOSE: 60 MCI
BH CV STRESS PROTOCOL 1: NORMAL
BH CV STRESS RECOVERY BP: NORMAL MMHG
BH CV STRESS RECOVERY HR: 87 BPM
BH CV STRESS STAGE 1: 1
LV EF NUC BP: 82 %
MAXIMAL PREDICTED HEART RATE: 162 BPM
PERCENT MAX PREDICTED HR: 64.2 %
STRESS BASELINE BP: NORMAL MMHG
STRESS BASELINE HR: 76 BPM
STRESS PERCENT HR: 76 %
STRESS POST EXERCISE DUR SEC: 10 SEC
STRESS POST PEAK BP: NORMAL MMHG
STRESS POST PEAK HR: 104 BPM
STRESS TARGET HR: 138 BPM

## 2021-06-03 PROCEDURE — 93306 TTE W/DOPPLER COMPLETE: CPT

## 2021-06-03 PROCEDURE — 78492 MYOCRD IMG PET MLT RST&STRS: CPT | Performed by: INTERNAL MEDICINE

## 2021-06-03 PROCEDURE — 93306 TTE W/DOPPLER COMPLETE: CPT | Performed by: INTERNAL MEDICINE

## 2021-06-03 PROCEDURE — 93016 CV STRESS TEST SUPVJ ONLY: CPT | Performed by: INTERNAL MEDICINE

## 2021-06-03 PROCEDURE — 93017 CV STRESS TEST TRACING ONLY: CPT

## 2021-06-03 PROCEDURE — 93018 CV STRESS TEST I&R ONLY: CPT | Performed by: INTERNAL MEDICINE

## 2021-06-03 PROCEDURE — 25010000002 REGADENOSON 0.4 MG/5ML SOLUTION: Performed by: NURSE PRACTITIONER

## 2021-06-03 PROCEDURE — 0 RUBIDIUM CHLORIDE: Performed by: NURSE PRACTITIONER

## 2021-06-03 PROCEDURE — 78492 MYOCRD IMG PET MLT RST&STRS: CPT

## 2021-06-03 PROCEDURE — A9555 RB82 RUBIDIUM: HCPCS | Performed by: NURSE PRACTITIONER

## 2021-06-03 RX ADMIN — REGADENOSON 0.4 MG: 0.08 INJECTION, SOLUTION INTRAVENOUS at 14:35

## 2021-06-04 ENCOUNTER — TELEPHONE (OUTPATIENT)
Dept: CARDIOLOGY | Facility: CLINIC | Age: 61
End: 2021-06-04

## 2021-06-04 LAB
AORTIC ARCH: 2.5 CM
ASCENDING AORTA: 3.2 CM
BH CV ECHO MEAS - ACS: 1.9 CM
BH CV ECHO MEAS - AO MAX PG (FULL): 3 MMHG
BH CV ECHO MEAS - AO MAX PG: 5.6 MMHG
BH CV ECHO MEAS - AO MEAN PG (FULL): 1.2 MMHG
BH CV ECHO MEAS - AO MEAN PG: 2.5 MMHG
BH CV ECHO MEAS - AO ROOT AREA (BSA CORRECTED): 1.5
BH CV ECHO MEAS - AO ROOT AREA: 6.4 CM^2
BH CV ECHO MEAS - AO ROOT DIAM: 2.9 CM
BH CV ECHO MEAS - AO V2 MAX: 117.9 CM/SEC
BH CV ECHO MEAS - AO V2 MEAN: 69.4 CM/SEC
BH CV ECHO MEAS - AO V2 VTI: 24 CM
BH CV ECHO MEAS - ASC AORTA: 3.2 CM
BH CV ECHO MEAS - AVA(I,A): 2 CM^2
BH CV ECHO MEAS - AVA(I,D): 2 CM^2
BH CV ECHO MEAS - AVA(V,A): 2 CM^2
BH CV ECHO MEAS - AVA(V,D): 2 CM^2
BH CV ECHO MEAS - BSA(HAYCOCK): 2.1 M^2
BH CV ECHO MEAS - BSA: 2 M^2
BH CV ECHO MEAS - BZI_BMI: 39 KILOGRAMS/M^2
BH CV ECHO MEAS - BZI_METRIC_HEIGHT: 157.5 CM
BH CV ECHO MEAS - BZI_METRIC_WEIGHT: 96.6 KG
BH CV ECHO MEAS - EDV(MOD-SP2): 48 ML
BH CV ECHO MEAS - EDV(MOD-SP4): 54 ML
BH CV ECHO MEAS - EDV(TEICH): 75.7 ML
BH CV ECHO MEAS - EF(CUBED): 77.7 %
BH CV ECHO MEAS - EF(MOD-BP): 61.6 %
BH CV ECHO MEAS - EF(MOD-SP2): 60.4 %
BH CV ECHO MEAS - EF(MOD-SP4): 61.1 %
BH CV ECHO MEAS - EF(TEICH): 70.3 %
BH CV ECHO MEAS - ESV(MOD-SP2): 19 ML
BH CV ECHO MEAS - ESV(MOD-SP4): 21 ML
BH CV ECHO MEAS - ESV(TEICH): 22.5 ML
BH CV ECHO MEAS - FS: 39.3 %
BH CV ECHO MEAS - IVS/LVPW: 0.98
BH CV ECHO MEAS - IVSD: 0.98 CM
BH CV ECHO MEAS - LAT PEAK E' VEL: 8.2 CM/SEC
BH CV ECHO MEAS - LV DIASTOLIC VOL/BSA (35-75): 27.5 ML/M^2
BH CV ECHO MEAS - LV MASS(C)D: 131.9 GRAMS
BH CV ECHO MEAS - LV MASS(C)DI: 67.2 GRAMS/M^2
BH CV ECHO MEAS - LV MAX PG: 2.5 MMHG
BH CV ECHO MEAS - LV MEAN PG: 1.3 MMHG
BH CV ECHO MEAS - LV SYSTOLIC VOL/BSA (12-30): 10.7 ML/M^2
BH CV ECHO MEAS - LV V1 MAX: 79.7 CM/SEC
BH CV ECHO MEAS - LV V1 MEAN: 52.9 CM/SEC
BH CV ECHO MEAS - LV V1 VTI: 16.4 CM
BH CV ECHO MEAS - LVIDD: 4.1 CM
BH CV ECHO MEAS - LVIDS: 2.5 CM
BH CV ECHO MEAS - LVLD AP2: 7.4 CM
BH CV ECHO MEAS - LVLD AP4: 6.8 CM
BH CV ECHO MEAS - LVLS AP2: 6.1 CM
BH CV ECHO MEAS - LVLS AP4: 5.7 CM
BH CV ECHO MEAS - LVOT AREA (M): 2.8 CM^2
BH CV ECHO MEAS - LVOT AREA: 2.9 CM^2
BH CV ECHO MEAS - LVOT DIAM: 1.9 CM
BH CV ECHO MEAS - LVPWD: 1 CM
BH CV ECHO MEAS - MED PEAK E' VEL: 9.7 CM/SEC
BH CV ECHO MEAS - MV A DUR: 0.12 SEC
BH CV ECHO MEAS - MV A MAX VEL: 82.3 CM/SEC
BH CV ECHO MEAS - MV DEC SLOPE: 223.1 CM/SEC^2
BH CV ECHO MEAS - MV DEC TIME: 0.2 SEC
BH CV ECHO MEAS - MV E MAX VEL: 46.3 CM/SEC
BH CV ECHO MEAS - MV E/A: 0.56
BH CV ECHO MEAS - MV MAX PG: 3.3 MMHG
BH CV ECHO MEAS - MV MEAN PG: 1.1 MMHG
BH CV ECHO MEAS - MV P1/2T MAX VEL: 50.1 CM/SEC
BH CV ECHO MEAS - MV P1/2T: 65.8 MSEC
BH CV ECHO MEAS - MV V2 MAX: 90.8 CM/SEC
BH CV ECHO MEAS - MV V2 MEAN: 47.7 CM/SEC
BH CV ECHO MEAS - MV V2 VTI: 13.6 CM
BH CV ECHO MEAS - MVA P1/2T LCG: 4.4 CM^2
BH CV ECHO MEAS - MVA(P1/2T): 3.3 CM^2
BH CV ECHO MEAS - MVA(VTI): 3.5 CM^2
BH CV ECHO MEAS - PA MAX PG (FULL): 1.8 MMHG
BH CV ECHO MEAS - PA MAX PG: 3.5 MMHG
BH CV ECHO MEAS - PA V2 MAX: 93.7 CM/SEC
BH CV ECHO MEAS - PULM A REVS DUR: 0.11 SEC
BH CV ECHO MEAS - PULM A REVS VEL: 36.3 CM/SEC
BH CV ECHO MEAS - PULM DIAS VEL: 33.1 CM/SEC
BH CV ECHO MEAS - PULM S/D: 1.4
BH CV ECHO MEAS - PULM SYS VEL: 47.3 CM/SEC
BH CV ECHO MEAS - PVA(V,A): 3.1 CM^2
BH CV ECHO MEAS - PVA(V,D): 3.1 CM^2
BH CV ECHO MEAS - QP/QS: 1.3
BH CV ECHO MEAS - RV MAX PG: 1.7 MMHG
BH CV ECHO MEAS - RV MEAN PG: 0.91 MMHG
BH CV ECHO MEAS - RV V1 MAX: 65.6 CM/SEC
BH CV ECHO MEAS - RV V1 MEAN: 43.7 CM/SEC
BH CV ECHO MEAS - RV V1 VTI: 14.3 CM
BH CV ECHO MEAS - RVOT AREA: 4.4 CM^2
BH CV ECHO MEAS - RVOT DIAM: 2.4 CM
BH CV ECHO MEAS - SI(AO): 77.9 ML/M^2
BH CV ECHO MEAS - SI(CUBED): 28 ML/M^2
BH CV ECHO MEAS - SI(LVOT): 24.4 ML/M^2
BH CV ECHO MEAS - SI(MOD-SP2): 14.8 ML/M^2
BH CV ECHO MEAS - SI(MOD-SP4): 16.8 ML/M^2
BH CV ECHO MEAS - SI(TEICH): 27.1 ML/M^2
BH CV ECHO MEAS - SUP REN AO DIAM: 2.3 CM
BH CV ECHO MEAS - SV(AO): 152.9 ML
BH CV ECHO MEAS - SV(CUBED): 54.9 ML
BH CV ECHO MEAS - SV(LVOT): 47.8 ML
BH CV ECHO MEAS - SV(MOD-SP2): 29 ML
BH CV ECHO MEAS - SV(MOD-SP4): 33 ML
BH CV ECHO MEAS - SV(RVOT): 63.6 ML
BH CV ECHO MEAS - SV(TEICH): 53.2 ML
BH CV ECHO MEAS - TAPSE (>1.6): 2.1 CM
BH CV ECHO MEASUREMENTS AVERAGE E/E' RATIO: 5.17
BH CV XLRA - RV BASE: 3.8 CM
BH CV XLRA - RV LENGTH: 6.8 CM
BH CV XLRA - RV MID: 2.3 CM
BH CV XLRA - TDI S': 11.3 CM/SEC
LEFT ATRIUM VOLUME INDEX: 18 ML/M2
LV EF 2D ECHO EST: 62 %
MAXIMAL PREDICTED HEART RATE: 162 BPM
SINUS: 2.8 CM
STJ: 2.7 CM
STRESS TARGET HR: 138 BPM

## 2021-06-06 DIAGNOSIS — E78.2 MIXED HYPERLIPIDEMIA: ICD-10-CM

## 2021-06-06 DIAGNOSIS — D72.829 LEUKOCYTOSIS, UNSPECIFIED TYPE: ICD-10-CM

## 2021-06-06 DIAGNOSIS — I10 BENIGN ESSENTIAL HYPERTENSION: Primary | ICD-10-CM

## 2021-06-06 DIAGNOSIS — R79.89 ABNORMAL LIVER FUNCTION TESTS: ICD-10-CM

## 2021-06-06 DIAGNOSIS — E03.9 ACQUIRED HYPOTHYROIDISM: ICD-10-CM

## 2021-06-06 DIAGNOSIS — E53.8 FOLATE DEFICIENCY: ICD-10-CM

## 2021-06-06 DIAGNOSIS — E55.9 VITAMIN D DEFICIENCY: ICD-10-CM

## 2021-06-06 DIAGNOSIS — E53.8 B12 DEFICIENCY: ICD-10-CM

## 2021-06-06 DIAGNOSIS — R73.03 PREDIABETES: ICD-10-CM

## 2021-06-10 ENCOUNTER — IMMUNIZATION (OUTPATIENT)
Dept: VACCINE CLINIC | Facility: HOSPITAL | Age: 61
End: 2021-06-10

## 2021-06-10 PROCEDURE — 0002A: CPT | Performed by: INTERNAL MEDICINE

## 2021-06-10 PROCEDURE — 91300 HC SARSCOV02 VAC 30MCG/0.3ML IM: CPT | Performed by: INTERNAL MEDICINE

## 2021-06-10 NOTE — PROGRESS NOTES
Patient notified of results and recommendations and verbalized understanding    No longer experiencing chest pain  Amanda Lynn RN  Triage nurse

## 2021-06-12 DIAGNOSIS — K21.9 GASTROESOPHAGEAL REFLUX DISEASE, UNSPECIFIED WHETHER ESOPHAGITIS PRESENT: ICD-10-CM

## 2021-06-14 RX ORDER — FAMOTIDINE 20 MG/1
20 TABLET, FILM COATED ORAL
Qty: 90 TABLET | Refills: 1 | Status: SHIPPED | OUTPATIENT
Start: 2021-06-14 | End: 2021-11-01

## 2021-06-24 DIAGNOSIS — J45.20 MILD INTERMITTENT ASTHMA WITHOUT COMPLICATION: ICD-10-CM

## 2021-06-25 RX ORDER — ALBUTEROL SULFATE 90 UG/1
AEROSOL, METERED RESPIRATORY (INHALATION)
Qty: 18 G | Refills: 0 | Status: SHIPPED | OUTPATIENT
Start: 2021-06-25 | End: 2021-07-19

## 2021-06-25 RX ORDER — METHOCARBAMOL 750 MG/1
TABLET, FILM COATED ORAL
Qty: 90 TABLET | Refills: 0 | Status: SHIPPED | OUTPATIENT
Start: 2021-06-25 | End: 2021-11-07

## 2021-06-29 ENCOUNTER — TELEPHONE (OUTPATIENT)
Dept: FAMILY MEDICINE CLINIC | Facility: CLINIC | Age: 61
End: 2021-06-29

## 2021-06-29 NOTE — TELEPHONE ENCOUNTER
Pharmacist called     Patients insurance changed coverage of inhaler to ProAir - rejecting the brandname Ventolin  - which was previously in their brand coverage and paid for - however no PA approval on Ventolin now.     Can she switch to the ProAir?

## 2021-06-29 NOTE — TELEPHONE ENCOUNTER
Please ensure with the patient that she has not failed ProAir.  See if she has tried any other albuterol inhalers and how she did with the other brands.  If she has not failed ProAir, we can change Ventolin to ProAir.

## 2021-07-18 DIAGNOSIS — J45.20 MILD INTERMITTENT ASTHMA WITHOUT COMPLICATION: ICD-10-CM

## 2021-07-23 ENCOUNTER — TRANSCRIBE ORDERS (OUTPATIENT)
Dept: PREADMISSION TESTING | Facility: HOSPITAL | Age: 61
End: 2021-07-23

## 2021-07-23 DIAGNOSIS — Z01.818 OTHER SPECIFIED PRE-OPERATIVE EXAMINATION: Primary | ICD-10-CM

## 2021-07-29 ENCOUNTER — PRE-ADMISSION TESTING (OUTPATIENT)
Dept: PREADMISSION TESTING | Facility: HOSPITAL | Age: 61
End: 2021-07-29

## 2021-07-29 VITALS
HEART RATE: 84 BPM | TEMPERATURE: 97.9 F | OXYGEN SATURATION: 96 % | HEIGHT: 61 IN | RESPIRATION RATE: 18 BRPM | WEIGHT: 214 LBS | BODY MASS INDEX: 40.4 KG/M2 | SYSTOLIC BLOOD PRESSURE: 121 MMHG | DIASTOLIC BLOOD PRESSURE: 81 MMHG

## 2021-07-29 DIAGNOSIS — M17.11 PRIMARY OSTEOARTHRITIS OF RIGHT KNEE: ICD-10-CM

## 2021-07-29 LAB
ANION GAP SERPL CALCULATED.3IONS-SCNC: 11.4 MMOL/L (ref 5–15)
BACTERIA UR QL AUTO: ABNORMAL /HPF
BILIRUB UR QL STRIP: NEGATIVE
BUN SERPL-MCNC: 17 MG/DL (ref 6–20)
BUN/CREAT SERPL: 24.6 (ref 7–25)
CALCIUM SPEC-SCNC: 9.2 MG/DL (ref 8.6–10.5)
CHLORIDE SERPL-SCNC: 102 MMOL/L (ref 98–107)
CLARITY UR: CLEAR
CO2 SERPL-SCNC: 23.6 MMOL/L (ref 22–29)
COLOR UR: YELLOW
CREAT SERPL-MCNC: 0.69 MG/DL (ref 0.57–1)
DEPRECATED RDW RBC AUTO: 44.1 FL (ref 37–54)
ERYTHROCYTE [DISTWIDTH] IN BLOOD BY AUTOMATED COUNT: 13.6 % (ref 12.3–15.4)
GFR SERPL CREATININE-BSD FRML MDRD: 87 ML/MIN/1.73
GLUCOSE SERPL-MCNC: 117 MG/DL (ref 65–99)
GLUCOSE UR STRIP-MCNC: NEGATIVE MG/DL
HCT VFR BLD AUTO: 39.7 % (ref 34–46.6)
HGB BLD-MCNC: 13.2 G/DL (ref 12–15.9)
HGB UR QL STRIP.AUTO: NEGATIVE
HYALINE CASTS UR QL AUTO: ABNORMAL /LPF
KETONES UR QL STRIP: NEGATIVE
LEUKOCYTE ESTERASE UR QL STRIP.AUTO: ABNORMAL
MCH RBC QN AUTO: 29 PG (ref 26.6–33)
MCHC RBC AUTO-ENTMCNC: 33.2 G/DL (ref 31.5–35.7)
MCV RBC AUTO: 87.3 FL (ref 79–97)
NITRITE UR QL STRIP: NEGATIVE
PH UR STRIP.AUTO: 5.5 [PH] (ref 5–8)
PLATELET # BLD AUTO: 349 10*3/MM3 (ref 140–450)
PMV BLD AUTO: 9.3 FL (ref 6–12)
POTASSIUM SERPL-SCNC: 3.8 MMOL/L (ref 3.5–5.2)
PROT UR QL STRIP: NEGATIVE
RBC # BLD AUTO: 4.55 10*6/MM3 (ref 3.77–5.28)
RBC # UR: ABNORMAL /HPF
REF LAB TEST METHOD: ABNORMAL
SODIUM SERPL-SCNC: 137 MMOL/L (ref 136–145)
SP GR UR STRIP: 1.03 (ref 1–1.03)
SQUAMOUS #/AREA URNS HPF: ABNORMAL /HPF
UROBILINOGEN UR QL STRIP: ABNORMAL
WBC # BLD AUTO: 5.89 10*3/MM3 (ref 3.4–10.8)
WBC UR QL AUTO: ABNORMAL /HPF

## 2021-07-29 PROCEDURE — 36415 COLL VENOUS BLD VENIPUNCTURE: CPT

## 2021-07-29 PROCEDURE — 81001 URINALYSIS AUTO W/SCOPE: CPT

## 2021-07-29 PROCEDURE — 80048 BASIC METABOLIC PNL TOTAL CA: CPT

## 2021-07-29 PROCEDURE — 85027 COMPLETE CBC AUTOMATED: CPT

## 2021-07-29 RX ORDER — CHLORHEXIDINE GLUCONATE 500 MG/1
CLOTH TOPICAL TAKE AS DIRECTED
COMMUNITY
End: 2021-08-10 | Stop reason: HOSPADM

## 2021-07-29 RX ORDER — CHLORHEXIDINE GLUCONATE 500 MG/1
CLOTH TOPICAL 2 TIMES DAILY
Status: ACTIVE | OUTPATIENT
Start: 2021-07-29

## 2021-07-29 ASSESSMENT — KOOS JR
KOOS JR SCORE: 19
KOOS JR SCORE: 39.625

## 2021-08-05 ENCOUNTER — OFFICE VISIT (OUTPATIENT)
Dept: ORTHOPEDIC SURGERY | Facility: CLINIC | Age: 61
End: 2021-08-05

## 2021-08-05 VITALS
WEIGHT: 213 LBS | BODY MASS INDEX: 40.22 KG/M2 | HEIGHT: 61 IN | TEMPERATURE: 96.4 F | DIASTOLIC BLOOD PRESSURE: 78 MMHG | SYSTOLIC BLOOD PRESSURE: 132 MMHG

## 2021-08-05 DIAGNOSIS — M17.11 PRIMARY OSTEOARTHRITIS OF RIGHT KNEE: Primary | ICD-10-CM

## 2021-08-05 PROCEDURE — S0260 H&P FOR SURGERY: HCPCS | Performed by: NURSE PRACTITIONER

## 2021-08-05 PROCEDURE — 77077 JOINT SURVEY SINGLE VIEW: CPT | Performed by: ORTHOPAEDIC SURGERY

## 2021-08-05 PROCEDURE — 73562 X-RAY EXAM OF KNEE 3: CPT | Performed by: NURSE PRACTITIONER

## 2021-08-05 NOTE — H&P
Patient: Lydia Gutierrez    Date of Admission: 8/9/2021    YOB: 1960    Medical Record Number: [id number]    Admitting Physician: Dr. Juan Walsh    Reason for Admission: End Stage Right Knee OA    History of Present Illness: 60 y.o. female presents with severe end stage knee osteoarthritis which has not been responsive to the full compliment of conservative measures. Despite conservative attempts, there is still severe, constant activity limiting pain. Given the severity of the pain, the patient has elected to proceed with knee replacement.    Allergies:   Allergies   Allergen Reactions   • Atorvastatin Myalgia         Current Medications:  Home Medications:    Current Outpatient Medications on File Prior to Visit   Medication Sig   • Allergy Relief 10 MG tablet TAKE 1 TABLET BY MOUTH DAILY. (Patient taking differently: Take 10 mg by mouth Daily.)   • amLODIPine (NORVASC) 10 MG tablet TAKE ONE TABLET BY MOUTH  EVERYDAY (Patient taking differently: Take 10 mg by mouth Every Night.)   • cholecalciferol (VITAMIN D3) 1000 units tablet Take 1 tablet by mouth Daily.   • famotidine (PEPCID) 20 MG tablet TAKE 1 TABLET BY MOUTH DAILY BEFORE SUPPER.   • FLUoxetine (PROzac) 20 MG capsule Take 3 capsules by mouth Every Evening.   • fluticasone (FLONASE) 50 MCG/ACT nasal spray INSTILL 2 SPRAYS INTO EACH NOSTRIL AS DIRECTED BY PROVIDER DAILY. (Patient taking differently: 2 sprays into the nostril(s) as directed by provider Daily.)   • folic acid (FOLVITE) 400 MCG tablet TAKE 1 TABLET BY MOUTH DAILY.   • levothyroxine (SYNTHROID, LEVOTHROID) 137 MCG tablet TAKE ONE TABLET BY MOUTH DAILY (Patient taking differently: Take 137 mcg by mouth Daily. Take on empty stomach.)   • lisinopril-hydrochlorothiazide (PRINZIDE,ZESTORETIC) 10-12.5 MG per tablet TAKE ONE TABLET BY MOUTH DAILY (Patient taking differently: Take 1 tablet by mouth Daily.)   • montelukast (SINGULAIR) 10 MG tablet TAKE 1 TABLET BY MOUTH AT BEDTIME. (Patient  taking differently: Take 10 mg by mouth Every Night.)   • pantoprazole (PROTONIX) 40 MG EC tablet TAKE ONE TABLET BY MOUTH DAILY (Patient taking differently: Take 40 mg by mouth Daily.)   • ProAir  (90 Base) MCG/ACT inhaler INHALE 1-2 PUFFS BY MOUTH EVERY 4-6 HOURS AS NEEDED AND AS DIRECTED. (Patient taking differently: Inhale 1 puff Every 4 (Four) Hours As Needed.)   • rosuvastatin (CRESTOR) 40 MG tablet TAKE ONE TABLET BY MOUTH EVERY NIGHT (Patient taking differently: Take 40 mg by mouth Every Night.)   • Vitamin B12 (CYANOCOBALAMIN) 500 MCG tablet tablet TAKE 1 TABLET BY MOUTH DAILY. (Patient taking differently: 500 mcg Daily.)   • Chlorhexidine Gluconate Cloth 2 % pads Apply  topically Take As Directed.   • methocarbamol (ROBAXIN) 750 MG tablet TAKE 1 TABLET BY MOUTH EVERY NIGHT FOR MUSCLE SPASMS. (Patient taking differently: Take 750 mg by mouth Every Night.)   • mupirocin (BACTROBAN) 2 % nasal ointment into the nostril(s) as directed by provider Take As Directed.     Current Facility-Administered Medications on File Prior to Visit   Medication   • Chlorhexidine Gluconate 2 % pads 2 each   • Chlorhexidine Gluconate Cloth 2 % pads     PRN Meds:.    PMH:     Past Medical History:   Diagnosis Date   • Anxiety disorder    • Arthritis    • Asthma    • Borderline diabetic    • Depression    • GERD (gastroesophageal reflux disease)    • History of kidney stones    • Hyperlipidemia    • Hypertension    • Hypothyroidism    • Right knee pain    • Sleep apnea     NO MACHINE       PF/Surg/Soc Hx:     Past Surgical History:   Procedure Laterality Date   •  SECTION     • CHOLECYSTECTOMY     • EYE SURGERY Left    • JOINT REPLACEMENT Left    • TOTAL KNEE ARTHROPLASTY Left 2020    Procedure: TOTAL KNEE ARTHROPLASTY;  Surgeon: Juan Walsh MD;  Location: [facility] OR;  Service: Orthopedics;  Laterality: Left;        Social History     Occupational History   • Not on file   Tobacco Use   • Smoking status:  "Never Smoker   • Smokeless tobacco: Never Used   Vaping Use   • Vaping Use: Never used   Substance and Sexual Activity   • Alcohol use: No   • Drug use: No   • Sexual activity: Defer      Social History     Social History Narrative   • Not on file        Family History   Problem Relation Age of Onset   • Heart disease Mother    • Glaucoma Mother    • Cancer Mother    • Thyroid disease Sister    • Kidney disease Brother    • Glaucoma Brother    • Asthma Son    • Depression Son    • Bipolar disorder Son    • Hypertension Father    • Malig Hyperthermia Neg Hx          Review of Systems:   A 14 point review of systems was performed, pertinent positives discussed above, all other systems are negative    Physical Exam: 60 y.o. female  Vital Signs :   Vitals:    08/05/21 1323   BP: 132/78   BP Location: Left arm   Patient Position: Sitting   Temp: 96.4 °F (35.8 °C)   Weight: 96.6 kg (213 lb)   Height: 154.9 cm (61\")     General: Alert and Oriented x 3, No acute distress.  Psych: mood and affect appropriate; recent and remote memory intact  Eyes: conjunctiva clear; pupils equally round and reactive, sclera nonicteric  CV: no peripheral edema  Resp: normal respiratory effort  Skin: no rashes or wounds; normal turgor  Musculosketetal; pain and crepitance with knee range of motion  Vascular: palpable distal pulses    Xrays:  -3 views (AP, lateral, and sunrise) were ordered and reviewed demonstrating end-stage OA with bone on bone articulation.  -A full length AP xray was ordered and reviewed today for purposes of operative alignment demonstrating end stage arthritic findings. There are no previous full length films for review    Assessment:  End-stage Right knee osteoarthritis. Conservative measures have failed.      Plan:  The plan is to proceed with Right Total Knee Replacement. The patient voiced understanding of the risks, benefits, and alternative forms of treatment that were discussed with Dr Walsh at the time of " scheduling. 23     Anne Tejeda, APRN  8/5/2021

## 2021-08-05 NOTE — H&P (VIEW-ONLY)
Patient: Lydia Gutierrez    Date of Admission: 8/9/2021    YOB: 1960    Medical Record Number: [id number]    Admitting Physician: Dr. Juan Walsh    Reason for Admission: End Stage Right Knee OA    History of Present Illness: 60 y.o. female presents with severe end stage knee osteoarthritis which has not been responsive to the full compliment of conservative measures. Despite conservative attempts, there is still severe, constant activity limiting pain. Given the severity of the pain, the patient has elected to proceed with knee replacement.    Allergies:   Allergies   Allergen Reactions   • Atorvastatin Myalgia         Current Medications:  Home Medications:    Current Outpatient Medications on File Prior to Visit   Medication Sig   • Allergy Relief 10 MG tablet TAKE 1 TABLET BY MOUTH DAILY. (Patient taking differently: Take 10 mg by mouth Daily.)   • amLODIPine (NORVASC) 10 MG tablet TAKE ONE TABLET BY MOUTH  EVERYDAY (Patient taking differently: Take 10 mg by mouth Every Night.)   • cholecalciferol (VITAMIN D3) 1000 units tablet Take 1 tablet by mouth Daily.   • famotidine (PEPCID) 20 MG tablet TAKE 1 TABLET BY MOUTH DAILY BEFORE SUPPER.   • FLUoxetine (PROzac) 20 MG capsule Take 3 capsules by mouth Every Evening.   • fluticasone (FLONASE) 50 MCG/ACT nasal spray INSTILL 2 SPRAYS INTO EACH NOSTRIL AS DIRECTED BY PROVIDER DAILY. (Patient taking differently: 2 sprays into the nostril(s) as directed by provider Daily.)   • folic acid (FOLVITE) 400 MCG tablet TAKE 1 TABLET BY MOUTH DAILY.   • levothyroxine (SYNTHROID, LEVOTHROID) 137 MCG tablet TAKE ONE TABLET BY MOUTH DAILY (Patient taking differently: Take 137 mcg by mouth Daily. Take on empty stomach.)   • lisinopril-hydrochlorothiazide (PRINZIDE,ZESTORETIC) 10-12.5 MG per tablet TAKE ONE TABLET BY MOUTH DAILY (Patient taking differently: Take 1 tablet by mouth Daily.)   • montelukast (SINGULAIR) 10 MG tablet TAKE 1 TABLET BY MOUTH AT BEDTIME. (Patient  taking differently: Take 10 mg by mouth Every Night.)   • pantoprazole (PROTONIX) 40 MG EC tablet TAKE ONE TABLET BY MOUTH DAILY (Patient taking differently: Take 40 mg by mouth Daily.)   • ProAir  (90 Base) MCG/ACT inhaler INHALE 1-2 PUFFS BY MOUTH EVERY 4-6 HOURS AS NEEDED AND AS DIRECTED. (Patient taking differently: Inhale 1 puff Every 4 (Four) Hours As Needed.)   • rosuvastatin (CRESTOR) 40 MG tablet TAKE ONE TABLET BY MOUTH EVERY NIGHT (Patient taking differently: Take 40 mg by mouth Every Night.)   • Vitamin B12 (CYANOCOBALAMIN) 500 MCG tablet tablet TAKE 1 TABLET BY MOUTH DAILY. (Patient taking differently: 500 mcg Daily.)   • Chlorhexidine Gluconate Cloth 2 % pads Apply  topically Take As Directed.   • methocarbamol (ROBAXIN) 750 MG tablet TAKE 1 TABLET BY MOUTH EVERY NIGHT FOR MUSCLE SPASMS. (Patient taking differently: Take 750 mg by mouth Every Night.)   • mupirocin (BACTROBAN) 2 % nasal ointment into the nostril(s) as directed by provider Take As Directed.     Current Facility-Administered Medications on File Prior to Visit   Medication   • Chlorhexidine Gluconate 2 % pads 2 each   • Chlorhexidine Gluconate Cloth 2 % pads     PRN Meds:.    PMH:     Past Medical History:   Diagnosis Date   • Anxiety disorder    • Arthritis    • Asthma    • Borderline diabetic    • Depression    • GERD (gastroesophageal reflux disease)    • History of kidney stones    • Hyperlipidemia    • Hypertension    • Hypothyroidism    • Right knee pain    • Sleep apnea     NO MACHINE       PF/Surg/Soc Hx:     Past Surgical History:   Procedure Laterality Date   •  SECTION     • CHOLECYSTECTOMY     • EYE SURGERY Left    • JOINT REPLACEMENT Left    • TOTAL KNEE ARTHROPLASTY Left 2020    Procedure: TOTAL KNEE ARTHROPLASTY;  Surgeon: Juan Walsh MD;  Location: [facility] OR;  Service: Orthopedics;  Laterality: Left;        Social History     Occupational History   • Not on file   Tobacco Use   • Smoking status:  "Never Smoker   • Smokeless tobacco: Never Used   Vaping Use   • Vaping Use: Never used   Substance and Sexual Activity   • Alcohol use: No   • Drug use: No   • Sexual activity: Defer      Social History     Social History Narrative   • Not on file        Family History   Problem Relation Age of Onset   • Heart disease Mother    • Glaucoma Mother    • Cancer Mother    • Thyroid disease Sister    • Kidney disease Brother    • Glaucoma Brother    • Asthma Son    • Depression Son    • Bipolar disorder Son    • Hypertension Father    • Malig Hyperthermia Neg Hx          Review of Systems:   A 14 point review of systems was performed, pertinent positives discussed above, all other systems are negative    Physical Exam: 60 y.o. female  Vital Signs :   Vitals:    08/05/21 1323   BP: 132/78   BP Location: Left arm   Patient Position: Sitting   Temp: 96.4 °F (35.8 °C)   Weight: 96.6 kg (213 lb)   Height: 154.9 cm (61\")     General: Alert and Oriented x 3, No acute distress.  Psych: mood and affect appropriate; recent and remote memory intact  Eyes: conjunctiva clear; pupils equally round and reactive, sclera nonicteric  CV: no peripheral edema  Resp: normal respiratory effort  Skin: no rashes or wounds; normal turgor  Musculosketetal; pain and crepitance with knee range of motion  Vascular: palpable distal pulses    Xrays:  -3 views (AP, lateral, and sunrise) were ordered and reviewed demonstrating end-stage OA with bone on bone articulation.  -A full length AP xray was ordered and reviewed today for purposes of operative alignment demonstrating end stage arthritic findings. There are no previous full length films for review    Assessment:  End-stage Right knee osteoarthritis. Conservative measures have failed.      Plan:  The plan is to proceed with Right Total Knee Replacement. The patient voiced understanding of the risks, benefits, and alternative forms of treatment that were discussed with Dr Walsh at the time of " scheduling. 23     Anne Tejeda, APRN  8/5/2021

## 2021-08-06 ENCOUNTER — LAB (OUTPATIENT)
Dept: LAB | Facility: HOSPITAL | Age: 61
End: 2021-08-06

## 2021-08-06 DIAGNOSIS — Z01.818 OTHER SPECIFIED PRE-OPERATIVE EXAMINATION: ICD-10-CM

## 2021-08-06 LAB — SARS-COV-2 ORF1AB RESP QL NAA+PROBE: NOT DETECTED

## 2021-08-06 PROCEDURE — U0004 COV-19 TEST NON-CDC HGH THRU: HCPCS

## 2021-08-06 PROCEDURE — C9803 HOPD COVID-19 SPEC COLLECT: HCPCS

## 2021-08-06 RX ORDER — PANTOPRAZOLE SODIUM 40 MG/1
40 TABLET, DELAYED RELEASE ORAL DAILY
Qty: 30 TABLET | Refills: 3 | Status: SHIPPED | OUTPATIENT
Start: 2021-08-06 | End: 2021-11-01

## 2021-08-08 ENCOUNTER — ANESTHESIA EVENT (OUTPATIENT)
Dept: PERIOP | Facility: HOSPITAL | Age: 61
End: 2021-08-08

## 2021-08-09 ENCOUNTER — HOSPITAL ENCOUNTER (OUTPATIENT)
Facility: HOSPITAL | Age: 61
Discharge: HOME HEALTH | End: 2021-08-10
Attending: ORTHOPAEDIC SURGERY | Admitting: ORTHOPAEDIC SURGERY

## 2021-08-09 ENCOUNTER — ANESTHESIA (OUTPATIENT)
Dept: PERIOP | Facility: HOSPITAL | Age: 61
End: 2021-08-09

## 2021-08-09 ENCOUNTER — APPOINTMENT (OUTPATIENT)
Dept: GENERAL RADIOLOGY | Facility: HOSPITAL | Age: 61
End: 2021-08-09

## 2021-08-09 DIAGNOSIS — M17.11 PRIMARY OSTEOARTHRITIS OF RIGHT KNEE: ICD-10-CM

## 2021-08-09 DIAGNOSIS — Z96.651 S/P TKR (TOTAL KNEE REPLACEMENT), RIGHT: Primary | ICD-10-CM

## 2021-08-09 LAB — GLUCOSE BLDC GLUCOMTR-MCNC: 118 MG/DL (ref 70–130)

## 2021-08-09 PROCEDURE — 25010000002 DEXAMETHASONE PER 1 MG: Performed by: NURSE ANESTHETIST, CERTIFIED REGISTERED

## 2021-08-09 PROCEDURE — 82962 GLUCOSE BLOOD TEST: CPT

## 2021-08-09 PROCEDURE — 27447 TOTAL KNEE ARTHROPLASTY: CPT | Performed by: ORTHOPAEDIC SURGERY

## 2021-08-09 PROCEDURE — C1889 IMPLANT/INSERT DEVICE, NOC: HCPCS | Performed by: ORTHOPAEDIC SURGERY

## 2021-08-09 PROCEDURE — C9290 INJ, BUPIVACAINE LIPOSOME: HCPCS | Performed by: ORTHOPAEDIC SURGERY

## 2021-08-09 PROCEDURE — 25010000002 MIDAZOLAM PER 1 MG: Performed by: ANESTHESIOLOGY

## 2021-08-09 PROCEDURE — 25010000003 BUPIVACAINE LIPOSOME 1.3 % SUSPENSION 20 ML VIAL: Performed by: ORTHOPAEDIC SURGERY

## 2021-08-09 PROCEDURE — 25010000002 FENTANYL CITRATE (PF) 50 MCG/ML SOLUTION: Performed by: NURSE ANESTHETIST, CERTIFIED REGISTERED

## 2021-08-09 PROCEDURE — 25010000002 KETOROLAC TROMETHAMINE PER 15 MG: Performed by: NURSE ANESTHETIST, CERTIFIED REGISTERED

## 2021-08-09 PROCEDURE — 73560 X-RAY EXAM OF KNEE 1 OR 2: CPT

## 2021-08-09 PROCEDURE — 97110 THERAPEUTIC EXERCISES: CPT

## 2021-08-09 PROCEDURE — 25010000002 PROPOFOL 10 MG/ML EMULSION: Performed by: NURSE ANESTHETIST, CERTIFIED REGISTERED

## 2021-08-09 PROCEDURE — 25010000002 VANCOMYCIN 10 G RECONSTITUTED SOLUTION: Performed by: NURSE PRACTITIONER

## 2021-08-09 PROCEDURE — 25010000002 ONDANSETRON PER 1 MG: Performed by: NURSE ANESTHETIST, CERTIFIED REGISTERED

## 2021-08-09 PROCEDURE — 25010000002 PHENYLEPHRINE PER 1 ML: Performed by: NURSE ANESTHETIST, CERTIFIED REGISTERED

## 2021-08-09 PROCEDURE — 27447 TOTAL KNEE ARTHROPLASTY: CPT | Performed by: NURSE PRACTITIONER

## 2021-08-09 PROCEDURE — C1713 ANCHOR/SCREW BN/BN,TIS/BN: HCPCS | Performed by: ORTHOPAEDIC SURGERY

## 2021-08-09 PROCEDURE — 97161 PT EVAL LOW COMPLEX 20 MIN: CPT

## 2021-08-09 PROCEDURE — 25010000002 MIDAZOLAM PER 1 MG: Performed by: NURSE ANESTHETIST, CERTIFIED REGISTERED

## 2021-08-09 PROCEDURE — 25010000003 CEFAZOLIN IN DEXTROSE 2-4 GM/100ML-% SOLUTION: Performed by: NURSE PRACTITIONER

## 2021-08-09 PROCEDURE — C1776 JOINT DEVICE (IMPLANTABLE): HCPCS | Performed by: ORTHOPAEDIC SURGERY

## 2021-08-09 DEVICE — LEGION CRUCIATE RETAINING HIGH                                    FLEX HIGHLY CROSS LINKED                                    POLYETHYLENE SIZE 3-4 9MM
Type: IMPLANTABLE DEVICE | Site: KNEE | Status: FUNCTIONAL
Brand: LEGION

## 2021-08-09 DEVICE — VIOLET ANTIBACTERIAL POLYDIOXANONE, KNOTLESS TISSUE CONTROL DEVICE
Type: IMPLANTABLE DEVICE | Site: KNEE | Status: FUNCTIONAL
Brand: STRATAFIX

## 2021-08-09 DEVICE — CMT BONE PALACOS R HI/VISC 1X40: Type: IMPLANTABLE DEVICE | Site: KNEE | Status: FUNCTIONAL

## 2021-08-09 DEVICE — IMPLANTABLE DEVICE: Type: IMPLANTABLE DEVICE | Site: KNEE | Status: FUNCTIONAL

## 2021-08-09 DEVICE — GENESIS II NON-POROUS TIBIAL                                    BASEPLATE SIZE 3 RIGHT
Type: IMPLANTABLE DEVICE | Site: KNEE | Status: FUNCTIONAL
Brand: GENESIS II

## 2021-08-09 DEVICE — KNOTLESS TISSUE CONTROL DEVICE, UNDYED UNIDIRECTIONAL (ANTIBACTERIAL) SYNTHETIC ABSORBABLE DEVICE
Type: IMPLANTABLE DEVICE | Site: KNEE | Status: FUNCTIONAL
Brand: STRATAFIX

## 2021-08-09 DEVICE — GENESIS II BICONVEX PATELLA 29MM
Type: IMPLANTABLE DEVICE | Site: KNEE | Status: FUNCTIONAL
Brand: GENESIS II

## 2021-08-09 DEVICE — LEGION CRUCIATE RETAINING OXINIUM                                    FEMORAL SIZE 4 RIGHT
Type: IMPLANTABLE DEVICE | Site: KNEE | Status: FUNCTIONAL
Brand: LEGION

## 2021-08-09 RX ORDER — EPHEDRINE SULFATE 50 MG/ML
5 INJECTION, SOLUTION INTRAVENOUS ONCE AS NEEDED
Status: DISCONTINUED | OUTPATIENT
Start: 2021-08-09 | End: 2021-08-09 | Stop reason: HOSPADM

## 2021-08-09 RX ORDER — LABETALOL HYDROCHLORIDE 5 MG/ML
5 INJECTION, SOLUTION INTRAVENOUS
Status: DISCONTINUED | OUTPATIENT
Start: 2021-08-09 | End: 2021-08-09 | Stop reason: HOSPADM

## 2021-08-09 RX ORDER — FENTANYL CITRATE 50 UG/ML
50 INJECTION, SOLUTION INTRAMUSCULAR; INTRAVENOUS
Status: DISCONTINUED | OUTPATIENT
Start: 2021-08-09 | End: 2021-08-09 | Stop reason: HOSPADM

## 2021-08-09 RX ORDER — NALOXONE HCL 0.4 MG/ML
0.2 VIAL (ML) INJECTION AS NEEDED
Status: DISCONTINUED | OUTPATIENT
Start: 2021-08-09 | End: 2021-08-09 | Stop reason: HOSPADM

## 2021-08-09 RX ORDER — HYDRALAZINE HYDROCHLORIDE 20 MG/ML
5 INJECTION INTRAMUSCULAR; INTRAVENOUS
Status: DISCONTINUED | OUTPATIENT
Start: 2021-08-09 | End: 2021-08-09 | Stop reason: HOSPADM

## 2021-08-09 RX ORDER — FLUMAZENIL 0.1 MG/ML
0.2 INJECTION INTRAVENOUS AS NEEDED
Status: DISCONTINUED | OUTPATIENT
Start: 2021-08-09 | End: 2021-08-09 | Stop reason: HOSPADM

## 2021-08-09 RX ORDER — ONDANSETRON 4 MG/1
4 TABLET, FILM COATED ORAL EVERY 6 HOURS PRN
Status: DISCONTINUED | OUTPATIENT
Start: 2021-08-09 | End: 2021-08-10 | Stop reason: HOSPADM

## 2021-08-09 RX ORDER — PREGABALIN 75 MG/1
150 CAPSULE ORAL ONCE
Status: COMPLETED | OUTPATIENT
Start: 2021-08-09 | End: 2021-08-09

## 2021-08-09 RX ORDER — HYDROMORPHONE HYDROCHLORIDE 1 MG/ML
0.5 INJECTION, SOLUTION INTRAMUSCULAR; INTRAVENOUS; SUBCUTANEOUS
Status: DISCONTINUED | OUTPATIENT
Start: 2021-08-09 | End: 2021-08-09 | Stop reason: HOSPADM

## 2021-08-09 RX ORDER — SODIUM CHLORIDE, SODIUM LACTATE, POTASSIUM CHLORIDE, CALCIUM CHLORIDE 600; 310; 30; 20 MG/100ML; MG/100ML; MG/100ML; MG/100ML
100 INJECTION, SOLUTION INTRAVENOUS CONTINUOUS
Status: DISCONTINUED | OUTPATIENT
Start: 2021-08-09 | End: 2021-08-10 | Stop reason: HOSPADM

## 2021-08-09 RX ORDER — MELOXICAM 15 MG/1
15 TABLET ORAL ONCE
Status: COMPLETED | OUTPATIENT
Start: 2021-08-09 | End: 2021-08-09

## 2021-08-09 RX ORDER — PROMETHAZINE HYDROCHLORIDE 25 MG/1
25 SUPPOSITORY RECTAL ONCE AS NEEDED
Status: DISCONTINUED | OUTPATIENT
Start: 2021-08-09 | End: 2021-08-09 | Stop reason: HOSPADM

## 2021-08-09 RX ORDER — PROMETHAZINE HYDROCHLORIDE 25 MG/1
25 TABLET ORAL ONCE AS NEEDED
Status: DISCONTINUED | OUTPATIENT
Start: 2021-08-09 | End: 2021-08-09 | Stop reason: HOSPADM

## 2021-08-09 RX ORDER — ONDANSETRON 4 MG/1
4 TABLET, FILM COATED ORAL EVERY 8 HOURS PRN
Qty: 10 TABLET | Refills: 0 | Status: SHIPPED | OUTPATIENT
Start: 2021-08-09 | End: 2022-02-08

## 2021-08-09 RX ORDER — POLYETHYLENE GLYCOL 3350 17 G/17G
17 POWDER, FOR SOLUTION ORAL 2 TIMES DAILY
Status: DISCONTINUED | OUTPATIENT
Start: 2021-08-09 | End: 2021-08-10 | Stop reason: HOSPADM

## 2021-08-09 RX ORDER — ALBUTEROL SULFATE 90 UG/1
1 AEROSOL, METERED RESPIRATORY (INHALATION) EVERY 4 HOURS PRN
Status: DISCONTINUED | OUTPATIENT
Start: 2021-08-09 | End: 2021-08-10 | Stop reason: HOSPADM

## 2021-08-09 RX ORDER — FENTANYL CITRATE 50 UG/ML
INJECTION, SOLUTION INTRAMUSCULAR; INTRAVENOUS AS NEEDED
Status: DISCONTINUED | OUTPATIENT
Start: 2021-08-09 | End: 2021-08-09 | Stop reason: SURG

## 2021-08-09 RX ORDER — AMLODIPINE BESYLATE 10 MG/1
10 TABLET ORAL NIGHTLY
Status: DISCONTINUED | OUTPATIENT
Start: 2021-08-09 | End: 2021-08-10 | Stop reason: HOSPADM

## 2021-08-09 RX ORDER — LEVOTHYROXINE SODIUM 137 UG/1
137 TABLET ORAL DAILY
Status: DISCONTINUED | OUTPATIENT
Start: 2021-08-09 | End: 2021-08-10 | Stop reason: HOSPADM

## 2021-08-09 RX ORDER — PROMETHAZINE HYDROCHLORIDE 12.5 MG/1
12.5 TABLET ORAL EVERY 4 HOURS PRN
Status: DISCONTINUED | OUTPATIENT
Start: 2021-08-09 | End: 2021-08-10 | Stop reason: HOSPADM

## 2021-08-09 RX ORDER — MELOXICAM 15 MG/1
15 TABLET ORAL DAILY
Qty: 14 TABLET | Refills: 0 | Status: SHIPPED | OUTPATIENT
Start: 2021-08-09 | End: 2021-08-23

## 2021-08-09 RX ORDER — ONDANSETRON 2 MG/ML
4 INJECTION INTRAMUSCULAR; INTRAVENOUS ONCE AS NEEDED
Status: DISCONTINUED | OUTPATIENT
Start: 2021-08-09 | End: 2021-08-09 | Stop reason: HOSPADM

## 2021-08-09 RX ORDER — TRANEXAMIC ACID 100 MG/ML
INJECTION, SOLUTION INTRAVENOUS AS NEEDED
Status: DISCONTINUED | OUTPATIENT
Start: 2021-08-09 | End: 2021-08-09 | Stop reason: SURG

## 2021-08-09 RX ORDER — ONDANSETRON 2 MG/ML
4 INJECTION INTRAMUSCULAR; INTRAVENOUS EVERY 6 HOURS PRN
Status: DISCONTINUED | OUTPATIENT
Start: 2021-08-09 | End: 2021-08-10 | Stop reason: HOSPADM

## 2021-08-09 RX ORDER — HYDROCODONE BITARTRATE AND ACETAMINOPHEN 7.5; 325 MG/1; MG/1
1-2 TABLET ORAL EVERY 4 HOURS PRN
Qty: 60 TABLET | Refills: 0 | Status: SHIPPED | OUTPATIENT
Start: 2021-08-09 | End: 2021-08-24 | Stop reason: SDUPTHER

## 2021-08-09 RX ORDER — IBUPROFEN 600 MG/1
600 TABLET ORAL ONCE AS NEEDED
Status: DISCONTINUED | OUTPATIENT
Start: 2021-08-09 | End: 2021-08-09 | Stop reason: HOSPADM

## 2021-08-09 RX ORDER — FAMOTIDINE 20 MG/1
20 TABLET, FILM COATED ORAL
Status: DISCONTINUED | OUTPATIENT
Start: 2021-08-09 | End: 2021-08-10 | Stop reason: HOSPADM

## 2021-08-09 RX ORDER — SODIUM CHLORIDE 0.9 % (FLUSH) 0.9 %
3 SYRINGE (ML) INJECTION EVERY 12 HOURS SCHEDULED
Status: DISCONTINUED | OUTPATIENT
Start: 2021-08-09 | End: 2021-08-09 | Stop reason: HOSPADM

## 2021-08-09 RX ORDER — MONTELUKAST SODIUM 10 MG/1
10 TABLET ORAL NIGHTLY
Status: DISCONTINUED | OUTPATIENT
Start: 2021-08-09 | End: 2021-08-10 | Stop reason: HOSPADM

## 2021-08-09 RX ORDER — BUPIVACAINE HYDROCHLORIDE 7.5 MG/ML
INJECTION, SOLUTION EPIDURAL; RETROBULBAR
Status: COMPLETED | OUTPATIENT
Start: 2021-08-09 | End: 2021-08-09

## 2021-08-09 RX ORDER — DEXAMETHASONE SODIUM PHOSPHATE 4 MG/ML
INJECTION, SOLUTION INTRA-ARTICULAR; INTRALESIONAL; INTRAMUSCULAR; INTRAVENOUS; SOFT TISSUE AS NEEDED
Status: DISCONTINUED | OUTPATIENT
Start: 2021-08-09 | End: 2021-08-09 | Stop reason: SURG

## 2021-08-09 RX ORDER — MELOXICAM 15 MG/1
15 TABLET ORAL DAILY
Status: DISCONTINUED | OUTPATIENT
Start: 2021-08-10 | End: 2021-08-10 | Stop reason: HOSPADM

## 2021-08-09 RX ORDER — MAGNESIUM HYDROXIDE 1200 MG/15ML
LIQUID ORAL AS NEEDED
Status: DISCONTINUED | OUTPATIENT
Start: 2021-08-09 | End: 2021-08-09 | Stop reason: HOSPADM

## 2021-08-09 RX ORDER — ASPIRIN 81 MG/1
81 TABLET ORAL EVERY 12 HOURS SCHEDULED
Status: DISCONTINUED | OUTPATIENT
Start: 2021-08-10 | End: 2021-08-10 | Stop reason: HOSPADM

## 2021-08-09 RX ORDER — ACETAMINOPHEN 500 MG
1000 TABLET ORAL EVERY 6 HOURS PRN
COMMUNITY

## 2021-08-09 RX ORDER — ASPIRIN 81 MG/1
TABLET ORAL
Qty: 60 TABLET | Refills: 0 | Status: SHIPPED | OUTPATIENT
Start: 2021-08-10 | End: 2021-09-21

## 2021-08-09 RX ORDER — SODIUM CHLORIDE 0.9 % (FLUSH) 0.9 %
3-10 SYRINGE (ML) INJECTION AS NEEDED
Status: DISCONTINUED | OUTPATIENT
Start: 2021-08-09 | End: 2021-08-09 | Stop reason: HOSPADM

## 2021-08-09 RX ORDER — DIPHENHYDRAMINE HCL 25 MG
25 CAPSULE ORAL
Status: DISCONTINUED | OUTPATIENT
Start: 2021-08-09 | End: 2021-08-09 | Stop reason: HOSPADM

## 2021-08-09 RX ORDER — HYDROCODONE BITARTRATE AND ACETAMINOPHEN 7.5; 325 MG/1; MG/1
1 TABLET ORAL EVERY 4 HOURS PRN
Status: DISCONTINUED | OUTPATIENT
Start: 2021-08-09 | End: 2021-08-10 | Stop reason: HOSPADM

## 2021-08-09 RX ORDER — SODIUM CHLORIDE, SODIUM LACTATE, POTASSIUM CHLORIDE, CALCIUM CHLORIDE 600; 310; 30; 20 MG/100ML; MG/100ML; MG/100ML; MG/100ML
9 INJECTION, SOLUTION INTRAVENOUS CONTINUOUS
Status: DISCONTINUED | OUTPATIENT
Start: 2021-08-09 | End: 2021-08-09 | Stop reason: HOSPADM

## 2021-08-09 RX ORDER — FLUOXETINE HYDROCHLORIDE 20 MG/1
60 CAPSULE ORAL EVERY EVENING
Status: DISCONTINUED | OUTPATIENT
Start: 2021-08-09 | End: 2021-08-10 | Stop reason: HOSPADM

## 2021-08-09 RX ORDER — MIDAZOLAM HYDROCHLORIDE 1 MG/ML
INJECTION INTRAMUSCULAR; INTRAVENOUS AS NEEDED
Status: DISCONTINUED | OUTPATIENT
Start: 2021-08-09 | End: 2021-08-09 | Stop reason: SURG

## 2021-08-09 RX ORDER — FAMOTIDINE 10 MG/ML
20 INJECTION, SOLUTION INTRAVENOUS ONCE
Status: COMPLETED | OUTPATIENT
Start: 2021-08-09 | End: 2021-08-09

## 2021-08-09 RX ORDER — DIPHENHYDRAMINE HYDROCHLORIDE 50 MG/ML
12.5 INJECTION INTRAMUSCULAR; INTRAVENOUS
Status: DISCONTINUED | OUTPATIENT
Start: 2021-08-09 | End: 2021-08-09 | Stop reason: HOSPADM

## 2021-08-09 RX ORDER — POLYETHYLENE GLYCOL 3350 17 G/17G
17 POWDER, FOR SOLUTION ORAL 2 TIMES DAILY
Qty: 238 G | Refills: 0 | Status: SHIPPED | OUTPATIENT
Start: 2021-08-09 | End: 2021-08-16

## 2021-08-09 RX ORDER — HYDROCODONE BITARTRATE AND ACETAMINOPHEN 7.5; 325 MG/1; MG/1
2 TABLET ORAL EVERY 4 HOURS PRN
Status: DISCONTINUED | OUTPATIENT
Start: 2021-08-09 | End: 2021-08-10 | Stop reason: HOSPADM

## 2021-08-09 RX ORDER — KETOROLAC TROMETHAMINE 30 MG/ML
INJECTION, SOLUTION INTRAMUSCULAR; INTRAVENOUS AS NEEDED
Status: DISCONTINUED | OUTPATIENT
Start: 2021-08-09 | End: 2021-08-09 | Stop reason: SURG

## 2021-08-09 RX ORDER — OXYCODONE AND ACETAMINOPHEN 10; 325 MG/1; MG/1
1 TABLET ORAL EVERY 4 HOURS PRN
Status: DISCONTINUED | OUTPATIENT
Start: 2021-08-09 | End: 2021-08-09 | Stop reason: HOSPADM

## 2021-08-09 RX ORDER — PROPOFOL 10 MG/ML
VIAL (ML) INTRAVENOUS CONTINUOUS PRN
Status: DISCONTINUED | OUTPATIENT
Start: 2021-08-09 | End: 2021-08-09 | Stop reason: SURG

## 2021-08-09 RX ORDER — LIDOCAINE HYDROCHLORIDE 10 MG/ML
0.5 INJECTION, SOLUTION EPIDURAL; INFILTRATION; INTRACAUDAL; PERINEURAL ONCE AS NEEDED
Status: DISCONTINUED | OUTPATIENT
Start: 2021-08-09 | End: 2021-08-09 | Stop reason: HOSPADM

## 2021-08-09 RX ORDER — PANTOPRAZOLE SODIUM 40 MG/1
40 TABLET, DELAYED RELEASE ORAL DAILY
Status: DISCONTINUED | OUTPATIENT
Start: 2021-08-09 | End: 2021-08-10 | Stop reason: HOSPADM

## 2021-08-09 RX ORDER — UREA 10 %
3 LOTION (ML) TOPICAL NIGHTLY PRN
Status: DISCONTINUED | OUTPATIENT
Start: 2021-08-09 | End: 2021-08-10 | Stop reason: HOSPADM

## 2021-08-09 RX ORDER — MIDAZOLAM HYDROCHLORIDE 1 MG/ML
1 INJECTION INTRAMUSCULAR; INTRAVENOUS
Status: DISCONTINUED | OUTPATIENT
Start: 2021-08-09 | End: 2021-08-09 | Stop reason: HOSPADM

## 2021-08-09 RX ORDER — CEFAZOLIN SODIUM 2 G/100ML
2 INJECTION, SOLUTION INTRAVENOUS EVERY 8 HOURS
Status: COMPLETED | OUTPATIENT
Start: 2021-08-09 | End: 2021-08-10

## 2021-08-09 RX ORDER — ONDANSETRON 2 MG/ML
INJECTION INTRAMUSCULAR; INTRAVENOUS AS NEEDED
Status: DISCONTINUED | OUTPATIENT
Start: 2021-08-09 | End: 2021-08-09 | Stop reason: SURG

## 2021-08-09 RX ORDER — METHOCARBAMOL 750 MG/1
750 TABLET, FILM COATED ORAL NIGHTLY
Status: DISCONTINUED | OUTPATIENT
Start: 2021-08-09 | End: 2021-08-10 | Stop reason: HOSPADM

## 2021-08-09 RX ORDER — CEFAZOLIN SODIUM 2 G/100ML
2 INJECTION, SOLUTION INTRAVENOUS ONCE
Status: COMPLETED | OUTPATIENT
Start: 2021-08-09 | End: 2021-08-09

## 2021-08-09 RX ORDER — IPRATROPIUM BROMIDE AND ALBUTEROL SULFATE 2.5; .5 MG/3ML; MG/3ML
3 SOLUTION RESPIRATORY (INHALATION) ONCE AS NEEDED
Status: DISCONTINUED | OUTPATIENT
Start: 2021-08-09 | End: 2021-08-09 | Stop reason: HOSPADM

## 2021-08-09 RX ORDER — HYDROCODONE BITARTRATE AND ACETAMINOPHEN 7.5; 325 MG/1; MG/1
1 TABLET ORAL ONCE AS NEEDED
Status: COMPLETED | OUTPATIENT
Start: 2021-08-09 | End: 2021-08-09

## 2021-08-09 RX ADMIN — SODIUM CHLORIDE, POTASSIUM CHLORIDE, SODIUM LACTATE AND CALCIUM CHLORIDE 9 ML/HR: 600; 310; 30; 20 INJECTION, SOLUTION INTRAVENOUS at 07:05

## 2021-08-09 RX ADMIN — ONDANSETRON 4 MG: 2 INJECTION INTRAMUSCULAR; INTRAVENOUS at 08:42

## 2021-08-09 RX ADMIN — CEFAZOLIN SODIUM 2 G: 2 INJECTION, SOLUTION INTRAVENOUS at 23:19

## 2021-08-09 RX ADMIN — MELOXICAM 15 MG: 15 TABLET ORAL at 07:05

## 2021-08-09 RX ADMIN — TRANEXAMIC ACID 1000 MG: 1 INJECTION, SOLUTION INTRAVENOUS at 09:09

## 2021-08-09 RX ADMIN — PHENYLEPHRINE HYDROCHLORIDE 200 MCG: 10 INJECTION INTRAVENOUS at 09:48

## 2021-08-09 RX ADMIN — FAMOTIDINE 20 MG: 10 INJECTION INTRAVENOUS at 07:05

## 2021-08-09 RX ADMIN — MIDAZOLAM 1 MG: 1 INJECTION INTRAMUSCULAR; INTRAVENOUS at 07:05

## 2021-08-09 RX ADMIN — PHENYLEPHRINE HYDROCHLORIDE 100 MCG: 10 INJECTION INTRAVENOUS at 09:17

## 2021-08-09 RX ADMIN — PHENYLEPHRINE HYDROCHLORIDE 100 MCG: 10 INJECTION INTRAVENOUS at 09:06

## 2021-08-09 RX ADMIN — CEFAZOLIN SODIUM 2 G: 2 INJECTION, SOLUTION INTRAVENOUS at 07:58

## 2021-08-09 RX ADMIN — SODIUM CHLORIDE, POTASSIUM CHLORIDE, SODIUM LACTATE AND CALCIUM CHLORIDE 100 ML/HR: 600; 310; 30; 20 INJECTION, SOLUTION INTRAVENOUS at 14:11

## 2021-08-09 RX ADMIN — MIDAZOLAM 1 MG: 1 INJECTION INTRAMUSCULAR; INTRAVENOUS at 08:20

## 2021-08-09 RX ADMIN — MONTELUKAST SODIUM 10 MG: 10 TABLET, FILM COATED ORAL at 20:18

## 2021-08-09 RX ADMIN — MUPIROCIN: 20 OINTMENT TOPICAL at 21:00

## 2021-08-09 RX ADMIN — PHENYLEPHRINE HYDROCHLORIDE 200 MCG: 10 INJECTION INTRAVENOUS at 09:35

## 2021-08-09 RX ADMIN — FAMOTIDINE 20 MG: 20 TABLET, FILM COATED ORAL at 15:38

## 2021-08-09 RX ADMIN — AMLODIPINE BESYLATE 10 MG: 10 TABLET ORAL at 20:17

## 2021-08-09 RX ADMIN — PHENYLEPHRINE HYDROCHLORIDE 100 MCG: 10 INJECTION INTRAVENOUS at 08:51

## 2021-08-09 RX ADMIN — MIDAZOLAM 1 MG: 1 INJECTION INTRAMUSCULAR; INTRAVENOUS at 08:07

## 2021-08-09 RX ADMIN — BUPIVACAINE HYDROCHLORIDE 1.2 ML: 7.5 INJECTION, SOLUTION EPIDURAL; RETROBULBAR at 08:18

## 2021-08-09 RX ADMIN — PROPOFOL 25 MCG/KG/MIN: 10 INJECTION, EMULSION INTRAVENOUS at 08:30

## 2021-08-09 RX ADMIN — FENTANYL CITRATE 50 MCG: 50 INJECTION INTRAMUSCULAR; INTRAVENOUS at 08:31

## 2021-08-09 RX ADMIN — CEFAZOLIN SODIUM 2 G: 2 INJECTION, SOLUTION INTRAVENOUS at 15:37

## 2021-08-09 RX ADMIN — PREGABALIN 150 MG: 75 CAPSULE ORAL at 07:05

## 2021-08-09 RX ADMIN — VANCOMYCIN HYDROCHLORIDE 1500 MG: 10 INJECTION, POWDER, LYOPHILIZED, FOR SOLUTION INTRAVENOUS at 06:42

## 2021-08-09 RX ADMIN — PHENYLEPHRINE HYDROCHLORIDE 100 MCG: 10 INJECTION INTRAVENOUS at 08:21

## 2021-08-09 RX ADMIN — FLUOXETINE HYDROCHLORIDE 60 MG: 20 CAPSULE ORAL at 20:17

## 2021-08-09 RX ADMIN — KETOROLAC TROMETHAMINE 30 MG: 30 INJECTION, SOLUTION INTRAMUSCULAR at 09:57

## 2021-08-09 RX ADMIN — DEXAMETHASONE SODIUM PHOSPHATE 6 MG: 4 INJECTION, SOLUTION INTRAMUSCULAR; INTRAVENOUS at 08:30

## 2021-08-09 RX ADMIN — FENTANYL CITRATE 50 MCG: 50 INJECTION INTRAMUSCULAR; INTRAVENOUS at 08:07

## 2021-08-09 RX ADMIN — METHOCARBAMOL TABLETS 750 MG: 750 TABLET, COATED ORAL at 20:18

## 2021-08-09 RX ADMIN — PHENYLEPHRINE HYDROCHLORIDE 200 MCG: 10 INJECTION INTRAVENOUS at 09:40

## 2021-08-09 RX ADMIN — HYDROCODONE BITARTRATE AND ACETAMINOPHEN 1 TABLET: 7.5; 325 TABLET ORAL at 11:49

## 2021-08-09 RX ADMIN — HYDROCODONE BITARTRATE AND ACETAMINOPHEN 2 TABLET: 7.5; 325 TABLET ORAL at 23:19

## 2021-08-09 NOTE — PLAN OF CARE
Goal Outcome Evaluation:  Plan of Care Reviewed With: patient        Progress: improving  Outcome Summary: PT IS PO TODAY FROM A RIGHT TKA. PAIN WELL CONTROLLED WITH PO PAIN MEDS. AMBULATES WITH ASSIST OF ONE AND A WALKER. PT EDUCATED REGARDING HTN AND B/P MONITORING POST OP.

## 2021-08-09 NOTE — ANESTHESIA PROCEDURE NOTES
Spinal Block      Patient location during procedure: OR  Start Time: 8/9/2021 8:18 AM  Stop Time: 8/9/2021 8:18 AM  Indication:at surgeon's request  Performed By  Anesthesiologist: Daryn Kasper MD  CRNA: oCrazon Fontana CRNA  Preanesthetic Checklist  Completed: patient identified, IV checked, site marked, risks and benefits discussed, surgical consent, monitors and equipment checked, pre-op evaluation and timeout performed  Spinal Block Prep:  Patient Position:sitting  Sterile Tech:gloves, sterile barriers and mask  Prep:Chloraprep  Patient Monitoring:blood pressure monitoring, continuous pulse oximetry and EKG  Spinal Block Procedure  Approach:midline  Guidance:landmark technique and palpation technique  Location:L4-L5  Needle Type:Sprotte  Needle Gauge:24  Placement of Spinal needle event:cerebrospinal fluid aspirated  Paresthesia: no  Fluid Appearance:clear  Medications: bupivacaine PF (MARCAINE) 0.75 % injection, 1.2 mL  Med Administered at 8/9/2021 8:18 AM   Post Assessment  Patient Tolerance:patient tolerated the procedure well with no apparent complications  Complications no  Additional Notes  Pt tolerated procedure without incidence. To supine position, pillow under shoulders/head. POM O2 on pt.  Level checked.

## 2021-08-09 NOTE — DISCHARGE PLACEMENT REQUEST
"Mandy Gutierrez (60 y.o. Female)     Date of Birth Social Security Number Address Home Phone MRN    1960  [address] [phone] [id number]    Islam Marital Status          Hinduism        Admission Date Admission Type Admitting Provider Attending Provider Department, Room/Bed    8/9/21 Elective Juan Walsh MD Brown, Reid B, MD [address], P791/1    Discharge Date Discharge Disposition Discharge Destination         [place] [facility] [place]ending Provider: Juan Walsh MD    Allergies: Atorvastatin    Isolation: None   Infection: None   Code Status: Prior    Ht: 154.9 cm (61\")   Wt: 98 kg (216 lb)    Admission Cmt: None   Principal Problem: Primary osteoarthritis of right knee [M17.11] More...                 Active Insurance as of 8/9/2021     Primary Coverage     Payor Plan Insurance Group Employer/Plan Group    [facility] [facility]      Payor Plan Address Payor Plan Phone Number Payor Plan Fax Number Effective Dates    [address] [address]   1/1/2014 - None Entered    [place] [address]       Subscriber Name Subscriber Birth Date Member ID       MANDY GUTIERREZ 1960 [id number]                 Emergency Contacts      (Rel.) Home Phone Work Phone Mobile Phone    Harvinder Gutierrez (Spouse) [phone] -- --    ANTONRACHELLE (Son) [phone] -- [phone]              "

## 2021-08-09 NOTE — OP NOTE
Name: Lydia Gutierrez  YOB: 1960    DATE OF SURGERY: 8/9/2021    PREOPERATIVE DIAGNOSIS: Right knee end-stage osteoarthritis    POSTOPERATIVE DIAGNOSIS: Right knee end-stage osteoarthritis    PROCEDURE PERFORMED: Right total knee replacement    SURGEON: Juan Wlash M.D.    ASSISTANT: SILVINA BRANHAM    IMPLANTS: Varela [employer] Nephew Legion:     Implant Name Type Inv. Item Serial No.  Lot No. LRB No. Used Action   CMT BONE PALACOS R HI/VISC 1X40 - [id number] Implant CMT BONE PALACOS R HI/VISC 1X40  [facility] [id number] Right 2 Implanted   SUT CONTRL TISS STRATAFIX SYMM PDS PLUS DIEGO CT-1 60CM - [id number] Implant SUT CONTRL TISS STRATAFIX SYMM PDS PLUS DIEGO CT-1 60CM  [employer]  DIV OF J AND J [id number] Right 1 Implanted   PAT GEN2 BICONVEX [id number] - [id number] Implant PAT GEN2 BICONVEX [id number]  [employer] [id number] Right 1 Implanted   COMP FEM LEGION OXINIUM CR SZ4 RT - [id number] Implant COMP FEM LEGION OXINIUM CR SZ4 RT  [employer] [id number] Right 1 Implanted   BASE TIB/KN GEN2 NONPOR TI SZ3 RT - [id number] Implant BASE TIB/KN GEN2 NONPOR TI SZ3 RT  [employer] [id number] Right 1 Implanted   INSRT ART LEGION CR HF XLPE SZ3TO4 9MM - [id number] Implant INSRT ART LEGION CR HF XLPE SZ3TO4 9MM  [employer] [id number] Right 1 Implanted   SUT CONTRL TISS STRATAFIXSPIRALMNCRYL PLSPS2 REV3/0 45CM - [id number] Implant SUT CONTRL TISS STRATAFIXSPIRALMNCRYL PLSPS2 REV3/0 45CM  [employer]  DIV OF J AND J [id number] Right 1 Implanted       Estimated Blood Loss: 200cc  Specimens : none  Complications: none    DESCRIPTION OF PROCEDURE: The patient was taken to the operating room and placed in the supine position. A sequential compression device was carefully placed on the non-operative leg. Preoperative antibiotics were administered. Surgical time out was performed. After adequate induction of anesthesia, the leg was prepped and draped in the usual sterile fashion, exsanguinated with an Esmarch  bandage and the tourniquet inflated to 250 mmHg. A midline incision was performed followed by a medial parapatellar arthrotomy. The patella was subluxed laterally.  A portion of the fat pad, ACL, and anterior horns of the meniscus were excised. The drill hole was placed in the distal femur and the canal was the irrigated and suctioned. The IM juana was placed and a 5 degree distal valgus cut was performed on the femur. The femur was then sized with a sizing guide. The femoral cutting block was placed and all femoral cuts were performed. The proximal tibia was exposed. We used the extramedullary tibial cutting guide set for removal of 9mm of bone off the high side. The tibial cut was performed. The posterior horns of the menisci were excised. The posterior osteophytes were removed. Flexion extension blocks were then used to balance the knee. The tibial cut surface was then sized with the sizing templates and the tibial and femoral trial were then placed. The knee was placed in full extension and then the tibial tray rotation was then matched to the femoral rotation and marked.    Attention was then placed to the patella. The patella was noted to track centrally through range of motion. The patella was then sized with the trials. The thickness of the patella was then measured. The patella was resurfaced and the surrounding osteophytes were removed. The preoperative thickness was reproduced. The patella tracked centrally through range of motion.   At this point all trial components were removed, the knee was copiously irrigated with pulsed lavage, and the knee was injected with anesthetic cocktail solution. The cut surfaces were then dried with clean lap sponges, and the components were cemented tibia, followed by femur, then patella. The knee was held in full extension and all excess cement was removed. The knee was held still until the cement had completely hardened. We then placed the trial polyethylene spacer which  resulted in full extension and excellent flexion-extension balance. We placed the final polyethylene spacer.   The knee was then copiously irrigated. The tourniquet was then released. There was excellent hemostasis. We placed a one-eighth inch Hemovac drain. We closed the knee in multiple layers in standard fashion. Sterile dressing were applied. At the end of the case, the sponge and needle counts were reported as being correct. There were no known complications. The patient was then transported to the recovery room.      Juan Walsh M.D.

## 2021-08-09 NOTE — THERAPY EVALUATION
Patient Name: Lydia Gutierrez  : 1960    MRN: [id number]                              Today's Date: 2021       Admit Date: 2021    Visit Dx:     ICD-10-CM ICD-9-CM   1. S/P TKR (total knee replacement), right  Z96.651 V43.65   2. Primary osteoarthritis of right knee  M17.11 715.16     Patient Active Problem List   Diagnosis   • Abdominal mass   • Abnormal liver function tests   • Ultrasound scan abnormal   • Allergic rhinitis   • Asthma   • Benign essential hypertension   • Bladder problem   • Breast lump   • Dense breast tissue   • Breast tenderness   • Mixed anxiety depressive disorder   • Dyspareunia   • Abnormal C-reactive protein   • Leukocytosis   • Gastroesophageal reflux disease   • Fatigue   • Headache   • Herpes simplex virus (HSV) infection   • Hip pain, bilateral   • Hypothyroidism   • Low back pain   • Lumbar radiculopathy   • Mixed hyperlipidemia   • Oligomenorrhea   • Periodic limb movement disorder   • Obstructive sleep apnea   • Visual disturbance   • Vitamin D deficiency   • Bilateral knee pain   • Cervicalgia   • B12 deficiency   • Primary osteoarthritis of left knee   • Primary osteoarthritis of right knee     Past Medical History:   Diagnosis Date   • Anxiety disorder    • Arthritis    • Asthma    • Borderline diabetic    • Depression    • GERD (gastroesophageal reflux disease)    • History of kidney stones    • Hyperlipidemia    • Hypertension    • Hypothyroidism    • Right knee pain    • Sleep apnea     NO MACHINE     Past Surgical History:   Procedure Laterality Date   •  SECTION     • CHOLECYSTECTOMY     • EYE SURGERY Left    • JOINT REPLACEMENT Left    • TOTAL KNEE ARTHROPLASTY Left 2020    Procedure: TOTAL KNEE ARTHROPLASTY;  Surgeon: Juan Walsh MD;  Location[facility];  Service: Orthopedics;  Laterality: Left;     General Information     Row Name 21 1424          Physical Therapy Time and Intention    Document Type  evaluation Pt. is s/p Right  TKR  -MS     Mode of Treatment  physical therapy;individual therapy  -MS     Row Name 08/09/21 1424          General Information    Patient Profile Reviewed  yes  -MS     Prior Level of Function  independent:  -MS     Existing Precautions/Restrictions  (S) fall Exit alarm  -MS     Barriers to Rehab  none identified  -MS     Row Name 08/09/21 1424          Cognition    Orientation Status (Cognition)  oriented x 3  -MS     Row Name 08/09/21 1424          Safety Issues, Functional Mobility    Comment, Safety Issues/Impairments (Mobility)  Gait belt used for safety.  -MS       User Key  (r) = Recorded By, (t) = Taken By, (c) = Cosigned By    Initials Name Provider Type    Best Rangel, PT Physical Therapist        Mobility     Row Name 08/09/21 1425          Bed Mobility    Bed Mobility  supine-sit;sit-supine  -MS     Supine-Sit [place] (Bed Mobility)  independent  -MS     Row Name 08/09/21 1425          Sit-Stand Transfer    Sit-Stand [place] (Transfers)  contact guard  -MS     Assistive Device (Sit-Stand Transfers)  walker, front-wheeled  -MS     Row Name 08/09/21 1425          Gait/Stairs (Locomotion)    [place] Level (Gait)  contact guard  -MS     Assistive Device (Gait)  walker, front-wheeled  -MS     Distance in Feet (Gait)  25 feet  -MS     Deviations/Abnormal Patterns (Gait)  gamaliel decreased  -MS     Bilateral Gait Deviations  forward flexed posture  -MS     Comment (Gait/Stairs)  Verbal/tactile cues for posture correction.  -MS     Row Name 08/09/21 1425          Mobility    Extremity Weight-bearing Status  right lower extremity  -MS     Right Lower Extremity (Weight-bearing Status)  weight-bearing as tolerated (WBAT)  -MS       User Key  (r) = Recorded By, (t) = Taken By, (c) = Cosigned By    Initials Name Provider Type    MS BonillaBest, PT Physical Therapist        Obj/Interventions     Row Name 08/09/21 1425          Range of Motion Comprehensive    Comment, General Range of  Motion  BUE/LLE (WFL's)  -MS     Row Name 08/09/21 1425          Strength Comprehensive (MMT)    Comment, General Manual Muscle Testing (MMT) Assessment  BUE/LLE (3+/5)  -MS     Row Name 08/09/21 1425          Motor Skills    Therapeutic Exercise  -- Right TKR Ther. ex. program x 10 reps completed  -MS       User Key  (r) = Recorded By, (t) = Taken By, (c) = Cosigned By    Initials Name Provider Type    MS BoswellerBest, PT Physical Therapist        Goals/Plan     Row Name 08/09/21 1427          Transfer Goal 1 (PT)    Activity/Assistive Device (Transfer Goal 1, PT)  transfers, all;walker, rolling  -MS     [place] Level/Cues Needed (Transfer Goal 1, PT)  independent;standby assist  -MS     Time Frame (Transfer Goal 1, PT)  long term goal (LTG);3 days  -MS     Row Name 08/09/21 1427          Gait Training Goal 1 (PT)    Activity/Assistive Device (Gait Training Goal 1, PT)  gait (walking locomotion);walker, rolling  -MS     [place] Level (Gait Training Goal 1, PT)  independent;standby assist  -MS     Distance (Gait Training Goal 1, PT)  100 feet  -MS     Time Frame (Gait Training Goal 1, PT)  long term goal (LTG);3 days  -MS     Row Name 08/09/21 1427          ROM Goal 1 (PT)    ROM Goal 1 (PT)  Right knee AROM (5, 85)  -MS     Time Frame (ROM Goal 1, PT)  long-term goal (LTG);3 days  -MS     [facility] Name 08/09/21 1427          Stairs Goal 1 (PT)    Activity/Assistive Device (Stairs Goal 1, PT)  stairs, all skills  -MS     [place] Level/Cues Needed (Stairs Goal 1, PT)  standby assist  -MS     Number of Stairs (Stairs Goal 1, PT)  4  -MS     Time Frame (Stairs Goal 1, PT)  long term goal (LTG);3 days  -MS       User Key  (r) = Recorded By, (t) = Taken By, (c) = Cosigned By    Initials Name Provider Type    MS BonillaBest, PT Physical Therapist        Clinical Impression     Row Name 08/09/21 1426          Pain    Additional Documentation  Pain Scale: Numbers Pre/Post-Treatment (Group)  -[facility] [place]  Name 08/09/21 1426          Pain Scale: Numbers Pre/Post-Treatment    Pretreatment Pain Rating  3/10  -MS     Posttreatment Pain Rating  3/10  -MS     Pain Location - Side  Right  -MS     Pain Location  knee  -MS     Row Name 08/09/21 1426          Plan of Care Review    Plan of Care Reviewed With  patient  -MS     Row Name 08/09/21 1426          Therapy Assessment/Plan (PT)    Rehab Potential (PT)  good, to achieve stated therapy goals  -MS     Criteria for Skilled Interventions Met (PT)  skilled treatment is necessary  -MS     Row Name 08/09/21 1426          Positioning and Restraints    Pre-Treatment Position  in bed  -MS     Post Treatment Position  chair  -MS     In Chair  notified nsg;reclined;sitting;call light within reach;encouraged to call for assist;exit alarm on All lines intact. Ice pack to Right knee.  -MS       User Key  (r) = Recorded By, (t) = Taken By, (c) = Cosigned By    Initials Name Provider Type    Best Rangel PT Physical Therapist        Outcome Measures     Row Name 08/09/21 1427          How much help from another person do you currently need...    Turning from your back to your side while in flat bed without using bedrails?  4  -MS     Moving from lying on back to sitting on the side of a flat bed without bedrails?  4  -MS     Moving to and from a bed to a chair (including a wheelchair)?  3  -MS     Standing up from a chair using your arms (e.g., wheelchair, bedside chair)?  3  -MS     Climbing 3-5 steps with a railing?  3  -MS     To walk in hospital room?  3  -MS     AM-PAC 6 Clicks Score (PT)  20  -MS     Row Name 08/09/21 1427          Functional Assessment    Outcome Measure Options  AM-PAC 6 Clicks Basic Mobility (PT)  -MS       User Key  (r) = Recorded By, (t) = Taken By, (c) = Cosigned By    Initials Name Provider Type    Best Rangel PT Physical Therapist                       Physical Therapy Education                 Title: PT OT SLP Therapies (Done)      Topic: Physical Therapy (Done)     Point: Mobility training (Done)     Learning Progress Summary           Patient Acceptance, E,D, VU,NR by MS at 8/9/2021 1428                   Point: Home exercise program (Done)     Learning Progress Summary           Patient Acceptance, E,D, VU,NR by MS at 8/9/2021 1428                   Point: Body mechanics (Done)     Learning Progress Summary           Patient Acceptance, E,D, VU,NR by MS at 8/9/2021 1428                   Point: Precautions (Done)     Learning Progress Summary           Patient Acceptance, E,D, VU,NR by MS at 8/9/2021 1428                               User Key     Initials Effective Dates Name Provider Type Discipline    MS 06/16/21 -  Best Bonilla, PT Physical Therapist PT              PT Recommendation and Plan  Planned Therapy Interventions (PT): balance training, bed mobility training, gait training, home exercise program, patient/family education, postural re-education, transfer training, strengthening, ROM (range of motion), stair training  Plan of Care Reviewed With: patient  Outcome Summary: Pt. presents with typical post op impairments related to TKR surgery that include decreased ROM, strength, and overall balance.  Pt. will benefit from skilled inpt. P.T. to address her functional deficits and to assist pt. in regaining her maximum level of independence with functional mobility.     Time Calculation:   PT Charges     Row Name 08/09/21 1428             Time Calculation    Start Time  1345  -MS      Stop Time  1405  -MS      Time Calculation (min)  20 min  -MS      PT Received On  08/09/21  -MS      PT - Next Appointment  08/10/21  -MS      PT Goal Re-Cert Due Date  08/12/21  -MS         Time Calculation- PT    Total Timed Code Minutes- PT  19 minute(s)  -MS        User Key  (r) = Recorded By, (t) = Taken By, (c) = Cosigned By    Initials Name Provider Type    MS Best Bonilla, PT Physical Therapist        Therapy Charges for Today      Code Description Service Date Service Provider Modifiers Qty    92591546608 HC PT EVAL LOW COMPLEXITY 2 8/9/2021 Best Bonilla, PT GP 1    87160589702 HC PT THER PROC EA 15 MIN 8/9/2021 Best Bonilla, PT GP 1          PT G-Codes  Outcome Measure Options: AM-PAC 6 Clicks Basic Mobility (PT)  AM-PAC 6 Clicks Score (PT): 20    Best Bonilla, PT  8/9/2021

## 2021-08-09 NOTE — CASE MANAGEMENT/SOCIAL WORK
Discharge Planning Assessment  [facility]     Patient Name: Lydia Gutierrez  MRN: [id number]  Today's Date: 8/9/2021    Admit Date: 8/9/2021    Discharge Needs Assessment    No documentation.       Discharge Plan     Row Name 08/09/21 1407       Plan    Plan  Home with family support & [facility].    Patient/Family in Agreement with Plan  yes    Plan Comments  Spoke with the patient, verified current information and explained the role of the CCP. Patient said she has family support. She plans to d/c home with family support &  PT. She's worked with [facility] in the past and requests them again. Referral sent in Epic. She also said she plans for her family to transport her home by car at d/c. No other needs identified.        Continued Care and Services - Admitted Since 8/9/2021     Home Medical Care     Service Provider Request Status Selected Services Address Phone Fax Patient Preferred     [facility]  Pending - [facility] N[facility]A [address] [facility] [address] [address] [phone] --              Expected Discharge Date and Time     Expected Discharge Date Expected Discharge Time    Aug 10, 2021           Rossy Read RN

## 2021-08-09 NOTE — ANESTHESIA POSTPROCEDURE EVALUATION
"Patient: Lydia Gutierrez    Procedure Summary     Date: 08/09/21 Room / Location: [facility] OR  / [facility] MAIN OR    Anesthesia Start: 0802 Anesthesia Stop: 1018    Procedure: TOTAL KNEE ARTHROPLASTY (Right Knee) Diagnosis:       Primary osteoarthritis of right knee      (Primary osteoarthritis of right knee [M17.11])    Surgeons: Juan Walsh MD Provider: Daryn Kasper MD    Anesthesia Type: spinal ASA Status: 3          Anesthesia Type: spinal    Vitals  Vitals Value Taken Time   /61 08/09/21 1031   Temp 36.8 °C (98.3 °F) 08/09/21 1014   Pulse 83 08/09/21 1042   Resp 16 08/09/21 1030   SpO2 96 % 08/09/21 1042   Vitals shown include unvalidated device data.        Post Anesthesia Care and Evaluation    Patient location during evaluation: bedside  Patient participation: complete - patient participated  Level of consciousness: sleepy but conscious  Pain score: 0  Pain management: adequate  Airway patency: patent  Anesthetic complications: No anesthetic complications    Cardiovascular status: acceptable  Respiratory status: acceptable  Hydration status: acceptable    Comments: /61   Pulse 85   Temp 36.8 °C (98.3 °F) (Oral)   Resp 16   Ht 154.9 cm (61\")   Wt 98 kg (216 lb)   SpO2 94%   BMI 40.81 kg/m²         "

## 2021-08-09 NOTE — ANESTHESIA PREPROCEDURE EVALUATION
Anesthesia Evaluation     Patient summary reviewed and Nursing notes reviewed   no history of anesthetic complications:  NPO Solid Status: > 8 hours  NPO Liquid Status: > 2 hours           Airway   Mallampati: II  TM distance: >3 FB  Neck ROM: full  no difficulty expected  Dental - normal exam     Pulmonary - normal exam   (+) asthma,sleep apnea,   (-) COPD, not a smoker, lung cancer  Cardiovascular - normal exam  Exercise tolerance: good (4-7 METS)    ECG reviewed  Rhythm: regular  Rate: normal    (+) hypertension well controlled 2 medications or greater, valvular problems/murmurs MR, hyperlipidemia,   (-) past MI, CAD, dysrhythmias, angina, CHF, cardiac stents, CABG, pericardial effusion    ROS comment: TTE 06/2021:  ·Calculated left ventricular EF = 61.6% Estimated left ventricular EF = 62% Estimated left ventricular EF was in agreement with the calculated left ventricular EF. Left ventricular systolic function is normal. Normal left ventricular cavity size and wall thickness noted. All left ventricular wall segments contract normally. Left ventricular diastolic function is consistent with (grade I) impaired relaxation.  ·Trace mitral valve regurgitation is present.  ·There is a trivial pericardial effusion.    Neuro/Psych  (+) headaches, numbness, psychiatric history Anxiety and Depression,     (-) seizures, TIA, CVA  GI/Hepatic/Renal/Endo    (+) morbid obesity, GERD well controlled,  thyroid problem hypothyroidism  (-) PUD, hepatitis, liver disease, no renal disease, diabetes, GI bleed    Musculoskeletal     (+) neck pain,   Abdominal   (+) obese,     Abdomen: soft.   Substance History - negative use     OB/GYN negative ob/gyn ROS         Other   arthritis,                      Anesthesia Plan    ASA 3     spinal       Anesthetic plan, all risks, benefits, and alternatives have been provided, discussed and informed consent has been obtained with: patient.    Plan discussed with CRNA.

## 2021-08-09 NOTE — PLAN OF CARE
Goal Outcome Evaluation:  Plan of Care Reviewed With: patient           Outcome Summary: Pt. presents with typical post op impairments related to TKR surgery that include decreased ROM, strength, and overall balance.  Pt. will benefit from skilled inpt. P.T. to address her functional deficits and to assist pt. in regaining her maximum level of independence with functional mobility.    Patient was wearing a face mask during this therapy encounter. Therapist used appropriate personal protective equipment including eye protection, mask, and gloves.  Mask used was standard procedure mask. Appropriate PPE was worn during the entire therapy session. Hand hygiene was completed before and after therapy session. Patient is not in enhanced droplet precautions.

## 2021-08-10 ENCOUNTER — HOME HEALTH ADMISSION (OUTPATIENT)
Dept: HOME HEALTH SERVICES | Facility: HOME HEALTHCARE | Age: 61
End: 2021-08-10

## 2021-08-10 VITALS
WEIGHT: 216 LBS | RESPIRATION RATE: 16 BRPM | DIASTOLIC BLOOD PRESSURE: 78 MMHG | SYSTOLIC BLOOD PRESSURE: 130 MMHG | TEMPERATURE: 97.6 F | HEIGHT: 61 IN | BODY MASS INDEX: 40.78 KG/M2 | OXYGEN SATURATION: 95 % | HEART RATE: 84 BPM

## 2021-08-10 LAB
HCT VFR BLD AUTO: 30.8 % (ref 34–46.6)
HGB BLD-MCNC: 10.2 G/DL (ref 12–15.9)

## 2021-08-10 PROCEDURE — 85018 HEMOGLOBIN: CPT | Performed by: NURSE PRACTITIONER

## 2021-08-10 PROCEDURE — 25010000002 KETOROLAC TROMETHAMINE PER 15 MG: Performed by: NURSE PRACTITIONER

## 2021-08-10 PROCEDURE — 85014 HEMATOCRIT: CPT | Performed by: NURSE PRACTITIONER

## 2021-08-10 PROCEDURE — 97110 THERAPEUTIC EXERCISES: CPT

## 2021-08-10 RX ORDER — KETOROLAC TROMETHAMINE 30 MG/ML
15 INJECTION, SOLUTION INTRAMUSCULAR; INTRAVENOUS ONCE
Status: COMPLETED | OUTPATIENT
Start: 2021-08-10 | End: 2021-08-10

## 2021-08-10 RX ADMIN — ASPIRIN 81 MG: 81 TABLET, COATED ORAL at 08:42

## 2021-08-10 RX ADMIN — LEVOTHYROXINE SODIUM 137 MCG: 0.14 TABLET ORAL at 08:42

## 2021-08-10 RX ADMIN — PANTOPRAZOLE SODIUM 40 MG: 40 TABLET, DELAYED RELEASE ORAL at 08:42

## 2021-08-10 RX ADMIN — HYDROCODONE BITARTRATE AND ACETAMINOPHEN 2 TABLET: 7.5; 325 TABLET ORAL at 10:53

## 2021-08-10 RX ADMIN — POLYETHYLENE GLYCOL 3350 17 G: 17 POWDER, FOR SOLUTION ORAL at 08:42

## 2021-08-10 RX ADMIN — LISINOPRIL: 10 TABLET ORAL at 08:43

## 2021-08-10 RX ADMIN — MELOXICAM 15 MG: 15 TABLET ORAL at 08:42

## 2021-08-10 RX ADMIN — KETOROLAC TROMETHAMINE 15 MG: 30 INJECTION, SOLUTION INTRAMUSCULAR; INTRAVENOUS at 09:01

## 2021-08-10 RX ADMIN — HYDROCODONE BITARTRATE AND ACETAMINOPHEN 1 TABLET: 7.5; 325 TABLET ORAL at 06:55

## 2021-08-10 NOTE — PROGRESS NOTES
[facility] following for joint protocol.  Verified all info.  Noted orders in epic.  Thank you.  Caryl Little RN

## 2021-08-10 NOTE — PLAN OF CARE
Goal Outcome Evaluation:               POD # 1 TKA, VSS, norco for pain, plan is for DC today with HH once cleared by PT, educated on the importance of BP monitoring related to hx of HTN

## 2021-08-10 NOTE — THERAPY DISCHARGE NOTE
Patient Name: Lydia Gutierrez  : 1960    MRN: [id number]                              Today's Date: 8/10/2021       Admit Date: 2021    Visit Dx:     ICD-10-CM ICD-9-CM   1. S/P TKR (total knee replacement), right  Z96.651 V43.65   2. Primary osteoarthritis of right knee  M17.11 715.16     Patient Active Problem List   Diagnosis   • Abdominal mass   • Abnormal liver function tests   • Ultrasound scan abnormal   • Allergic rhinitis   • Asthma   • Benign essential hypertension   • Bladder problem   • Breast lump   • Dense breast tissue   • Breast tenderness   • Mixed anxiety depressive disorder   • Dyspareunia   • Abnormal C-reactive protein   • Leukocytosis   • Gastroesophageal reflux disease   • Fatigue   • Headache   • Herpes simplex virus (HSV) infection   • Hip pain, bilateral   • Hypothyroidism   • Low back pain   • Lumbar radiculopathy   • Mixed hyperlipidemia   • Oligomenorrhea   • Periodic limb movement disorder   • Obstructive sleep apnea   • Visual disturbance   • Vitamin D deficiency   • Bilateral knee pain   • Cervicalgia   • B12 deficiency   • Primary osteoarthritis of left knee   • Primary osteoarthritis of right knee     Past Medical History:   Diagnosis Date   • Anxiety disorder    • Arthritis    • Asthma    • Borderline diabetic    • Depression    • GERD (gastroesophageal reflux disease)    • History of kidney stones    • Hyperlipidemia    • Hypertension    • Hypothyroidism    • Right knee pain    • Sleep apnea     NO MACHINE     Past Surgical History:   Procedure Laterality Date   •  SECTION     • CHOLECYSTECTOMY     • EYE SURGERY Left    • JOINT REPLACEMENT Left    • TOTAL KNEE ARTHROPLASTY Left 2020    Procedure: TOTAL KNEE ARTHROPLASTY;  Surgeon: Juan Walsh MD;  Location: [facility];  Service: Orthopedics;  Laterality: Left;   • TOTAL KNEE ARTHROPLASTY Right 2021    Procedure: TOTAL KNEE ARTHROPLASTY;  Surgeon: Juan Walsh MD;  Location: [facility] OR;   Service: Orthopedics;  Laterality: Right;     General Information     Row Name 08/10/21 1109          Physical Therapy Time and Intention    Document Type  discharge treatment  -MS     Mode of Treatment  physical therapy;individual therapy  -MS     Row Name 08/10/21 1109          General Information    Patient Profile Reviewed  yes  -MS     Barriers to Rehab  none identified  -MS     Row Name 08/10/21 1109          Safety Issues, Functional Mobility    Comment, Safety Issues/Impairments (Mobility)  Gait belt used for safety.  -MS       User Key  (r) = Recorded By, (t) = Taken By, (c) = Cosigned By    Initials Name Provider Type    Best Rangel, PT Physical Therapist        Mobility     Row Name 08/10/21 1109          Bed Mobility    Supine-Sit Runnells (Bed Mobility)  independent  -MS     Row Name 08/10/21 1109          Sit-Stand Transfer    Sit-Stand Runnells (Transfers)  independent  -MS     Assistive Device (Sit-Stand Transfers)  walker, front-wheeled  -MS     Row Name 08/10/21 1109          Gait/Stairs (Locomotion)    Runnells Level (Gait)  standby assist  -MS     Assistive Device (Gait)  walker, front-wheeled  -MS     Distance in Feet (Gait)  120 feet  -MS     Deviations/Abnormal Patterns (Gait)  gamaliel decreased  -MS     Runnells Level (Stairs)  stand by assist  -MS     Handrail Location (Stairs)  right side (ascending)  -MS     Number of Steps (Stairs)  4  -MS     Ascending Technique (Stairs)  step-to-step  -MS     Descending Technique (Stairs)  step-to-step  -MS     Row Name 08/10/21 1109          Mobility    Right Lower Extremity (Weight-bearing Status)  weight-bearing as tolerated (WBAT)  -MS       User Key  (r) = Recorded By, (t) = Taken By, (c) = Cosigned By    Initials Name Provider Type    MS Best Bonilla, PT Physical Therapist        Obj/Interventions     Row Name 08/10/21 1110          Range of Motion Comprehensive    Comment, General Range of Motion  Right knee AROM (5,  65)  -MS     Row Name 08/10/21 1110          Motor Skills    Therapeutic Exercise  -- Right TKR ther. ex. program x 10 reps completed  -MS       User Key  (r) = Recorded By, (t) = Taken By, (c) = Cosigned By    Initials Name Provider Type    Best Rangel, PT Physical Therapist        Goals/Plan    No documentation.       Clinical Impression     Row Name 08/10/21 1110          Pain    Additional Documentation  Pain Scale: Numbers Pre/Post-Treatment (Group)  -MS     Row Name 08/10/21 1110          Pain Scale: Numbers Pre/Post-Treatment    Pretreatment Pain Rating  5/10  -MS     Posttreatment Pain Rating  3/10  -MS     Pain Location - Side  Right  -MS     Pain Location  knee  -MS     Pain Intervention(s)  Medication (See MAR);Nursing Notified;Cold applied;Repositioned;Elevated  -MS     Row Name 08/10/21 1110          Positioning and Restraints    Pre-Treatment Position  in bed  -MS     Post Treatment Position  chair  -MS     In Chair  notified nsg;reclined;sitting;call light within reach;encouraged to call for assist;exit alarm on  -MS       User Key  (r) = Recorded By, (t) = Taken By, (c) = Cosigned By    Initials Name Provider Type    Best Rangel, PT Physical Therapist        Outcome Measures     Row Name 08/10/21 1111          How much help from another person do you currently need...    Turning from your back to your side while in flat bed without using bedrails?  4  -MS     Moving from lying on back to sitting on the side of a flat bed without bedrails?  4  -MS     Moving to and from a bed to a chair (including a wheelchair)?  3  -MS     Standing up from a chair using your arms (e.g., wheelchair, bedside chair)?  3  -MS     Climbing 3-5 steps with a railing?  3  -MS     To walk in hospital room?  3  -MS     AM-PAC 6 Clicks Score (PT)  20  -MS     Row Name 08/10/21 1111          Functional Assessment    Outcome Measure Options  AM-PAC 6 Clicks Basic Mobility (PT)  -MS       User Key  (r) = Recorded  By, (t) = Taken By, (c) = Cosigned By    Initials Name Provider Type    MS Best Bonilla, PT Physical Therapist        Physical Therapy Education                 Title: PT OT SLP Therapies (Resolved)     Topic: Physical Therapy (Resolved)     Point: Mobility training (Resolved)     Learning Progress Summary           Patient Acceptance, E,D, VU,DU by MS at 8/10/2021 1111    Acceptance, E,TB, VU,DU by NL at 8/10/2021 0925    Acceptance, E,D, VU,NR by MS at 8/9/2021 1428                   Point: Home exercise program (Resolved)     Learning Progress Summary           Patient Acceptance, E,D, VU,DU by MS at 8/10/2021 1111    Acceptance, E,TB, VU,DU by NL at 8/10/2021 0925    Acceptance, E,D, VU,NR by MS at 8/9/2021 1428                   Point: Body mechanics (Resolved)     Learning Progress Summary           Patient Acceptance, E,D, VU,DU by MS at 8/10/2021 1111    Acceptance, E,TB, VU,DU by NL at 8/10/2021 0925    Acceptance, E,D, VU,NR by MS at 8/9/2021 1428                   Point: Precautions (Resolved)     Learning Progress Summary           Patient Acceptance, E,D, VU,DU by MS at 8/10/2021 1111    Acceptance, E,TB, VU,DU by NL at 8/10/2021 0925    Acceptance, E,D, VU,NR by MS at 8/9/2021 1428                               User Key     Initials Effective Dates Name Provider Type Discipline    NL 06/16/21 -  Padmini Velarde RN Registered Nurse Nurse    MS 06/16/21 -  Best Bonilla PT Physical Therapist PT              PT Recommendation and Plan  Planned Therapy Interventions (PT): balance training, bed mobility training, gait training, home exercise program, patient/family education, postural re-education, transfer training, strengthening, ROM (range of motion), stair training  Plan of Care Reviewed With: patient  Outcome Summary: Pt. is currently independent/SBA with all functional mobility and has met inpt. P.T. goals.  Pt./family with no further questions/concerns regarding functional mobility or home  safety.  Plan for discharge home this date.  Will sign off. Nursing notified.     Time Calculation:   PT Charges     Row Name 08/10/21 1112             Time Calculation    Start Time  1000  -MS      Stop Time  1020  -MS      Time Calculation (min)  20 min  -MS      PT Received On  08/10/21  -MS         Time Calculation- PT    Total Timed Code Minutes- PT  19 minute(s)  -MS        User Key  (r) = Recorded By, (t) = Taken By, (c) = Cosigned By    Initials Name Provider Type    Best Rangel, PT Physical Therapist        Therapy Charges for Today     Code Description Service Date Service Provider Modifiers Qty    22890853868 HC PT EVAL LOW COMPLEXITY 2 8/9/2021 Best Bonilla, PT GP 1    62512954135 HC PT THER PROC EA 15 MIN 8/9/2021 Best Bonilla, PT GP 1    66965838517 HC PT THER PROC EA 15 MIN 8/10/2021 Best Bonilla, PT GP 1          PT G-Codes  Outcome Measure Options: AM-PAC 6 Clicks Basic Mobility (PT)  AM-PAC 6 Clicks Score (PT): 20    PT Discharge Summary  Anticipated Discharge Disposition (PT): home with assist, home with home health  Reason for Discharge: Discharge from facility  Discharge Destination: Home with assist, Home with home health    Best Bonilla, PT  8/10/2021

## 2021-08-10 NOTE — DISCHARGE SUMMARY
Patient Name: Lydia Gutierrez  Patient YOB: 1960    Date of Admission:  8/9/2021  Date of Discharge:  8/10/2021  Discharge Diagnosis: CT TOTAL KNEE ARTHROPLASTY [43834] (TOTAL KNEE ARTHROPLASTY)  Presenting Problem/History of Present Illness: Primary osteoarthritis of right knee [M17.11]  Admitting Physician: Dr Juan Walsh  Consults:   Consults     No orders found for last 30 day(s).          DETAILS OF HOSPITAL STAY:  Patient is a 60 y.o. female was admitted to the floor following the above procedure and underwent an uncomplicated hospital stay.  Patient did well with physical therapy and was ambulating well without problems.  On the day of discharge the wound was clean, dry and intact and calf was soft and non tender and Homans sign was negative.  Patient was tolerating   Diet Instructions     Advance Diet as Tolerated      May advance diet as tolerated while in hospital.    Diet:      Diet Texture / Consistency: Regular       without problems.  Patient will be discharged home.    Condition on Discharge:  Stable    Vital Signs  Temp:  [97.1 °F (36.2 °C)-98.3 °F (36.8 °C)] 97.6 °F (36.4 °C)  Heart Rate:  [] 84  Resp:  [16-18] 16  BP: (100-136)/(55-80) 130/78    LABS:      Admission on 08/09/2021   Component Date Value Ref Range Status   • Glucose 08/09/2021 118  70 - 130 mg/dL Final    Meter: [id number] : [id number] [facility]    • Hemoglobin 08/10/2021 10.2* 12.0 - 15.9 g/dL Final   • Hematocrit 08/10/2021 30.8* 34.0 - 46.6 % Final       No results found.    Discharge Medications     Discharge Medications      New Medications      Instructions Start Date   Aspirin Adult Low Strength 81 MG EC tablet  Generic drug: aspirin   Take 1 tablet by mouth 2 (two) times a day for 14 days, THEN 1 tablet Daily for 28 days.   Start Date: August 10, 2021     ClearLax 17 GM/SCOOP powder  Generic drug: polyethylene glycol   Dissolve 17 g (1 capful) in liquid by mouth 2 (Two) Times a Day for 7 days       HYDROcodone-acetaminophen 7.5-325 MG per tablet  Commonly known as: NORCO   Take 1-2 tablets by mouth Every 4 (Four) to 6 (Six) Hours As Needed for pain      meloxicam 15 MG tablet  Commonly known as: MOBIC   15 mg, Oral, Daily      ondansetron 4 MG tablet  Commonly known as: Zofran   4 mg, Oral, Every 8 Hours PRN         Changes to Medications      Instructions Start Date   Allergy Relief 10 MG tablet  Generic drug: loratadine  What changed: how much to take   TAKE 1 TABLET BY MOUTH DAILY.      amLODIPine 10 MG tablet  Commonly known as: NORVASC  What changed: See the new instructions.   TAKE ONE TABLET BY MOUTH  EVERYDAY      fluticasone 50 MCG/ACT nasal spray  Commonly known as: FLONASE  What changed: See the new instructions.   INSTILL 2 SPRAYS INTO EACH NOSTRIL AS DIRECTED BY PROVIDER DAILY.      levothyroxine 137 MCG tablet  Commonly known as: SYNTHROID, LEVOTHROID  What changed: additional instructions   TAKE ONE TABLET BY MOUTH DAILY      methocarbamol 750 MG tablet  Commonly known as: ROBAXIN  What changed: additional instructions   TAKE 1 TABLET BY MOUTH EVERY NIGHT FOR MUSCLE SPASMS.      montelukast 10 MG tablet  Commonly known as: SINGULAIR  What changed: when to take this   TAKE 1 TABLET BY MOUTH AT BEDTIME.      ProAir  (90 Base) MCG/ACT inhaler  Generic drug: albuterol sulfate HFA  What changed: See the new instructions.   INHALE 1-2 PUFFS BY MOUTH EVERY 4-6 HOURS AS NEEDED AND AS DIRECTED.      rosuvastatin 40 MG tablet  Commonly known as: CRESTOR  What changed: when to take this   TAKE ONE TABLET BY MOUTH EVERY NIGHT         Continue These Medications      Instructions Start Date   acetaminophen 500 MG tablet  Commonly known as: TYLENOL   1,000 mg, Oral, Every 6 Hours PRN      famotidine 20 MG tablet  Commonly known as: PEPCID   20 mg, Oral, Daily Before Supper      FLUoxetine 20 MG capsule  Commonly known as: PROzac   60 mg, Oral, Every Evening      pantoprazole 40 MG EC tablet  Commonly  known as: PROTONIX   40 mg, Oral, Daily         Stop These Medications    Chlorhexidine Gluconate Cloth 2 % pads     cholecalciferol 25 MCG (1000 UT) tablet  Commonly known as: VITAMIN D3     folic acid 400 MCG tablet  Commonly known as: FOLVITE     mupirocin 2 % nasal ointment  Commonly known as: BACTROBAN     Vitamin B12 500 MCG tablet tablet  Commonly known as: CYANOCOBALAMIN        ASK your doctor about these medications      Instructions Start Date   lisinopril-hydrochlorothiazide 10-12.5 MG per tablet  Commonly known as: PRINJOYCEZESTORETIC   TAKE ONE TABLET BY MOUTH DAILY             Discharge Instructions: Patient is to continue with physical therapy exercises daily and continue working with the physical therapist as ordered. Patient may weight bear as tolerated. Apply ice regularly. Patient may ice for long periods of time as long as ice is not directly on the skin. Patient instructed on frequent calf pumping exercises.  Patient also instructed on incentive spirometer during hospitalization and encouraged to continue to use at home regularly.    Dressing: The dressing is designed to be left in place until you return to the office in 2 weeks.  The suction unit should stop functioning at 7 days and the green light will switch to yellow.  At that point the suction unit and tubing can be disconnected at the port closest to the dressing.  The suction unit and tubing may be discarded.  You may shower immediately upon return home, you will need to turn the pump off by depressing the orange button once and then you may disconnect the pump and tubing at the connection port.  After showering, shake off the excess water and reattach the tubing.  Restart the pump by depressing the orange button one time and you will notice the green light flashing again.  If the dressing becomes disloged or saturated it should be changed. Please refer to the SHAYLA information sheet if you have any questions about the dressing.  If you  have a home health nurse or therapist they can be contacted to assist with dressing change or repair. You may also call the SHAYLA dressing hotline for questions related to the dressing ([phone]). If there still other problems or questions related to the dressing despite these measures then you can contact Susan at our office [phone].  If for some reason the SHAYLA dressing is removed, after 7 days the wound can be gently cleaned with antibacterial soap then allowed to dry and covered with a dry sterile dressing. The wound should be covered at all times except while showering.  Patient may change dressings daily and prn using sterile 4x4 and paper tape, and should call if any unusual drainage, redness or swelling.*  Follow up appointment in 2 weeks - patient to call the office at [phone] to schedule.  Patient will be discharged on aspirin 81mg BID x weeks, then daily x 4weeks.    Discharge Diagnosis:    Patient Active Problem List   Diagnosis   • Abdominal mass   • Abnormal liver function tests   • Ultrasound scan abnormal   • Allergic rhinitis   • Asthma   • Benign essential hypertension   • Bladder problem   • Breast lump   • Dense breast tissue   • Breast tenderness   • Mixed anxiety depressive disorder   • Dyspareunia   • Abnormal C-reactive protein   • Leukocytosis   • Gastroesophageal reflux disease   • Fatigue   • Headache   • Herpes simplex virus (HSV) infection   • Hip pain, bilateral   • Hypothyroidism   • Low back pain   • Lumbar radiculopathy   • Mixed hyperlipidemia   • Oligomenorrhea   • Periodic limb movement disorder   • Obstructive sleep apnea   • Visual disturbance   • Vitamin D deficiency   • Bilateral knee pain   • Cervicalgia   • B12 deficiency   • Primary osteoarthritis of left knee   • Primary osteoarthritis of right knee       Follow-up Appointments  Future Appointments   Date Time Provider Department Center   8/24/2021 10:50 AM Juan Walsh MD [facility] [address] [facility]   12/9/2021  1:10 PM  Tha Castro, KELLY [facility] [address] [facility]          KELLY Mike  08/10/21  08:41 EDT

## 2021-08-10 NOTE — PLAN OF CARE
Goal Outcome Evaluation:         59 yo female, RTK, Pt has been up several times during shift to urinate in bathroom. Pt is well controlled with po pain medications. Pt vs WNL, alert and oriented. Plan is to discharge to home with home health following.

## 2021-08-11 ENCOUNTER — HOME CARE VISIT (OUTPATIENT)
Dept: HOME HEALTH SERVICES | Facility: HOME HEALTHCARE | Age: 61
End: 2021-08-11

## 2021-08-11 VITALS
DIASTOLIC BLOOD PRESSURE: 66 MMHG | SYSTOLIC BLOOD PRESSURE: 110 MMHG | TEMPERATURE: 96.8 F | OXYGEN SATURATION: 95 % | HEART RATE: 83 BPM

## 2021-08-11 PROCEDURE — G0151 HHCP-SERV OF PT,EA 15 MIN: HCPCS

## 2021-08-11 NOTE — HOME HEALTH
REASON FOR REFERRAL:  decreased ability to ambulate in and out of the home following recent hospital stay for R TKA resulting in functional decline and LE weakness.    MEDICAL HISTORY: Allergic rhinitis, Asthma, Hypertension, Mixed anxiety/depressive disorder, Dyspareunia, Leukocytosis, Gastroesophageal reflux disease, Herpes simplex virus, Hypothyroidism, Low back pain, Lumbar radiculopathy, Hyperlipidemia,Obstructive sleep apnea, Vitamin D deficiency, Cervicalgia, B12 deficiency    SKILLED PHYSICAL THERAPY IS MEDICALLY NECESSARY FOR: Instruction/education in lower extremity strengthening HEP, bed mobility/transfers training, gait training, balance training, fall prevention, and activity tolerance training to enable patient to safely exit home for medical appointments.       WBAT R LE    SHAYLA dressing over R knee surgical incision is designed to be left in place until patient follow up visit to the office in 2 weeks, but if dressing becomes disloged or saturated it should be changed.

## 2021-08-12 ENCOUNTER — TELEPHONE (OUTPATIENT)
Dept: ORTHOPEDIC SURGERY | Facility: CLINIC | Age: 61
End: 2021-08-12

## 2021-08-12 NOTE — TELEPHONE ENCOUNTER
Caller: [facility]    Relationship: HOME HEALTH PT     Best call back number: [phone]    What orders are you requesting (i.e. lab or imaging):ORDERS FOR CONTINUED PHYSICAL THERAPY; 2X'S 1ST WEEK, 3X'S SECOND WEEK, 1X THIRD WEEK    Additional notes: PLEASE CALL THERAPIST WITH VERBAL ORDERS

## 2021-08-13 ENCOUNTER — HOME CARE VISIT (OUTPATIENT)
Dept: HOME HEALTH SERVICES | Facility: HOME HEALTHCARE | Age: 61
End: 2021-08-13

## 2021-08-16 ENCOUNTER — HOME CARE VISIT (OUTPATIENT)
Dept: HOME HEALTH SERVICES | Facility: HOME HEALTHCARE | Age: 61
End: 2021-08-16

## 2021-08-16 VITALS
HEART RATE: 89 BPM | TEMPERATURE: 97.3 F | SYSTOLIC BLOOD PRESSURE: 122 MMHG | DIASTOLIC BLOOD PRESSURE: 68 MMHG | OXYGEN SATURATION: 98 %

## 2021-08-16 PROCEDURE — G0157 HHC PT ASSISTANT EA 15: HCPCS

## 2021-08-16 NOTE — HOME HEALTH
Subjective: I was not feeling good last visit, I think I may have may have ate something bad    Wound- right knee covered with SHAYLA dressing. no concerns noted around bandage, slight bruising and max amount of edema    Assessment: Patient appears safe in home with no AD however advised to use AD outside. Patient has Outpatient set up for 8/27/21 at [facility] PT [address]    Plan for next visit/Communication  increase ROM  gait training,  steps  HEP in standing

## 2021-08-18 ENCOUNTER — HOME CARE VISIT (OUTPATIENT)
Dept: HOME HEALTH SERVICES | Facility: HOME HEALTHCARE | Age: 61
End: 2021-08-18

## 2021-08-18 VITALS
TEMPERATURE: 98.7 F | OXYGEN SATURATION: 98 % | SYSTOLIC BLOOD PRESSURE: 130 MMHG | DIASTOLIC BLOOD PRESSURE: 80 MMHG | HEART RATE: 85 BPM

## 2021-08-18 PROCEDURE — G0157 HHC PT ASSISTANT EA 15: HCPCS

## 2021-08-18 NOTE — HOME HEALTH
Subjective:I am a little edgy today, I don't know why    Wound: SHAYLA on right knee    Assessment: Patient able to manuever around home and outside uneven surfaces including steps with STC. She was able to progress to standing HEP and improved on ROM.    Plan for next visit/Communication  Increase ROM  gait training  standing HEP

## 2021-08-20 ENCOUNTER — HOME CARE VISIT (OUTPATIENT)
Dept: HOME HEALTH SERVICES | Facility: HOME HEALTHCARE | Age: 61
End: 2021-08-20

## 2021-08-20 VITALS
OXYGEN SATURATION: 96 % | TEMPERATURE: 97.7 F | SYSTOLIC BLOOD PRESSURE: 122 MMHG | HEART RATE: 88 BPM | DIASTOLIC BLOOD PRESSURE: 72 MMHG

## 2021-08-20 PROCEDURE — G0157 HHC PT ASSISTANT EA 15: HCPCS

## 2021-08-20 NOTE — HOME HEALTH
Subjective: I am doing pretty good    Wound- Right knee covered with  SHAYLA . bruising coming to surface around knee and ankle areas    Assessment: Patient making steady progress with gait training outside with cane and on steps. Patient is improving with ROM.    Plan for next visit/Communication  write note to doctor  increase ROM

## 2021-08-23 ENCOUNTER — HOME CARE VISIT (OUTPATIENT)
Dept: HOME HEALTH SERVICES | Facility: HOME HEALTHCARE | Age: 61
End: 2021-08-23

## 2021-08-23 VITALS
TEMPERATURE: 97.4 F | SYSTOLIC BLOOD PRESSURE: 128 MMHG | OXYGEN SATURATION: 98 % | DIASTOLIC BLOOD PRESSURE: 70 MMHG | HEART RATE: 97 BPM

## 2021-08-23 PROCEDURE — G0151 HHCP-SERV OF PT,EA 15 MIN: HCPCS

## 2021-08-24 ENCOUNTER — OFFICE VISIT (OUTPATIENT)
Dept: ORTHOPEDIC SURGERY | Facility: CLINIC | Age: 61
End: 2021-08-24

## 2021-08-24 VITALS — TEMPERATURE: 97.3 F | WEIGHT: 210 LBS | BODY MASS INDEX: 39.65 KG/M2 | RESPIRATION RATE: 18 BRPM | HEIGHT: 61 IN

## 2021-08-24 DIAGNOSIS — Z96.651 S/P TKR (TOTAL KNEE REPLACEMENT), RIGHT: ICD-10-CM

## 2021-08-24 PROCEDURE — 99024 POSTOP FOLLOW-UP VISIT: CPT | Performed by: ORTHOPAEDIC SURGERY

## 2021-08-24 RX ORDER — HYDROCODONE BITARTRATE AND ACETAMINOPHEN 7.5; 325 MG/1; MG/1
1-2 TABLET ORAL EVERY 4 HOURS PRN
Qty: 40 TABLET | Refills: 0 | Status: SHIPPED | OUTPATIENT
Start: 2021-08-24 | End: 2022-02-08

## 2021-08-24 NOTE — PROGRESS NOTES
Lydia Gutierrez : 1960 MRN: [id number] DATE: 2021    DIAGNOSIS: 2 week follow up right total knee      SUBJECTIVE:Patient returns today for 2 week follow up of right total knee replacement. Patient reports doing well with no unusual complaints. Appears to be progressing appropriately.    OBJECTIVE:   Exam:. The incision is healing appropriately. No sign of infection. Range of motion is progressing as expected. The calf is soft and nontender with a negative Homans sign.    ASSESSMENT: 2 week status post right knee replacement.    PLAN: 1) Christelle and fusion dressings removed and steri strips applied   2) Order given for PT   3) Discontinue KANIKA hose   4) Continue ice PRN   5) aspirin 81 mg orally every day for 1 month / refilled pain meds   6) Follow up in 6 weeks with repeat Xrays of right knee (3views)    KELLY Mike  2021     This patient was seen in conjunction today with Dr. Juan Walsh.  Dr. Walsh agrees with the above-stated assessment and plan.

## 2021-08-25 ENCOUNTER — TELEPHONE (OUTPATIENT)
Dept: ORTHOPEDIC SURGERY | Facility: HOSPITAL | Age: 61
End: 2021-08-25

## 2021-08-25 NOTE — TELEPHONE ENCOUNTER
Called and spoke with Ms. Gutierrez as she is SP TKA. She states she is doing ok, She is still having quite a bit of pain and muscle aches. She is taking the pain medication regularly. She had a F/U appt yesterday. Dressing removed, incision looks good. She has graduated from  and is starting OP PT in a few days. She has graduated to the use of a cane when out. She says she hasn't had an issue with BM's because she keeps that powder in her water bottle all the time. Ms. Gutierrez has no questions/concerns for me at this time. My contact information was given should she need anything. She verbalized understanding.

## 2021-08-27 ENCOUNTER — APPOINTMENT (OUTPATIENT)
Dept: PHYSICAL THERAPY | Facility: HOSPITAL | Age: 61
End: 2021-08-27

## 2021-09-09 DIAGNOSIS — J45.20 MILD INTERMITTENT ASTHMA WITHOUT COMPLICATION: ICD-10-CM

## 2021-09-09 RX ORDER — ALBUTEROL SULFATE 90 UG/1
1 AEROSOL, METERED RESPIRATORY (INHALATION) EVERY 4 HOURS PRN
Qty: 18 G | Refills: 6 | Status: SHIPPED | OUTPATIENT
Start: 2021-09-09 | End: 2021-12-06

## 2021-09-13 ENCOUNTER — HOSPITAL ENCOUNTER (OUTPATIENT)
Dept: PHYSICAL THERAPY | Facility: HOSPITAL | Age: 61
Setting detail: THERAPIES SERIES
Discharge: HOME | End: 2021-09-13

## 2021-09-13 DIAGNOSIS — R26.89 IMPAIRED GAIT AND MOBILITY: ICD-10-CM

## 2021-09-13 DIAGNOSIS — M25.561 ACUTE PAIN OF RIGHT KNEE: Primary | ICD-10-CM

## 2021-09-13 DIAGNOSIS — Z96.651 AFTERCARE FOLLOWING RIGHT KNEE JOINT REPLACEMENT SURGERY: ICD-10-CM

## 2021-09-13 DIAGNOSIS — Z47.1 AFTERCARE FOLLOWING RIGHT KNEE JOINT REPLACEMENT SURGERY: ICD-10-CM

## 2021-09-13 PROCEDURE — 97161 PT EVAL LOW COMPLEX 20 MIN: CPT

## 2021-09-14 NOTE — THERAPY EVALUATION
Outpatient Physical Therapy Ortho Initial Evaluation  [facility]     Patient Name: Lydia Gutierrez  : 1960  MRN: [id number]  Today's Date: 2021      Visit Date: 2021    Patient Active Problem List   Diagnosis   • Abdominal mass   • Abnormal liver function tests   • Ultrasound scan abnormal   • Allergic rhinitis   • Asthma   • Benign essential hypertension   • Bladder problem   • Breast lump   • Dense breast tissue   • Breast tenderness   • Mixed anxiety depressive disorder   • Dyspareunia   • Abnormal C-reactive protein   • Leukocytosis   • Gastroesophageal reflux disease   • Fatigue   • Headache   • Herpes simplex virus (HSV) infection   • Hip pain, bilateral   • Hypothyroidism   • Low back pain   • Lumbar radiculopathy   • Mixed hyperlipidemia   • Oligomenorrhea   • Periodic limb movement disorder   • Obstructive sleep apnea   • Visual disturbance   • Vitamin D deficiency   • Bilateral knee pain   • Cervicalgia   • B12 deficiency   • Primary osteoarthritis of left knee   • Primary osteoarthritis of right knee        Past Medical History:   Diagnosis Date   • Anxiety disorder    • Arthritis    • Asthma    • Borderline diabetic    • Depression    • GERD (gastroesophageal reflux disease)    • History of kidney stones    • Hyperlipidemia    • Hypertension    • Hypothyroidism    • Right knee pain    • Sleep apnea     NO MACHINE        Past Surgical History:   Procedure Laterality Date   •  SECTION     • CHOLECYSTECTOMY     • EYE SURGERY Left    • JOINT REPLACEMENT Left    • TOTAL KNEE ARTHROPLASTY Left 2020    Procedure: TOTAL KNEE ARTHROPLASTY;  Surgeon: Juan Walsh MD;  Location: [facility];  Service: Orthopedics;  Laterality: Left;   • TOTAL KNEE ARTHROPLASTY Right 2021    Procedure: TOTAL KNEE ARTHROPLASTY;  Surgeon: Juan Walsh MD;  Location: [facility];  Service: Orthopedics;  Laterality: Right;       Visit Dx:     ICD-10-CM ICD-9-CM   1. Acute pain of right  knee  M25.561 719.46   2. Aftercare following right knee joint replacement surgery  Z47.1 V54.81    Z96.651 V43.65   3. Impaired gait and mobility  R26.89 781.2         Patient History     Row Name 09/13/21 1100             History    Brief Description of Current Complaint  Pt underwent R knee TKA on 8/9/21, D/C from hospital to PT which ended 2 weeks following surgery. Pt was scheduled to begin OPPT, however had death in the family and had to cancel appointment. Pt has been performing HHPT exercises 2x/day, however continues to experience high pain levels. Pt using cane when out of the house, however does not have with her today.   -CN      Previous treatment for THIS PROBLEM  Surgery  -CN      Surgery Date:  08/09/21  -CN         Pain     Pain Location  Knee  -CN      Pain at Present  4  -CN      Pain at Best  4  -CN      Pain at Worst  10  -CN      Pain Frequency  Constant/continuous  -CN      Pain Description  Aching  -CN      What Performance Factors Make the Current Problem(s) WORSE?  Exercise, standing extended periods of time, at night  -CN      What Performance Factors Make the Current Problem(s) BETTER?  Pain meds, ice once  in a while, elevation  -CN      Is your sleep disturbed?  Yes  -CN      Difficulties at work?  Not a working individual  -CN      Difficulties with ADL's?  Yes, standing to cook, 4 stairs to enter trailer, using step to pattern  -CN      Difficulties with recreational activities?  Yes, walking  -CN         Fall Risk Assessment    Any falls in the past year:  Yes  -CN      Number of falls reported in the last 12 months  1  -CN      Factors that contributed to the fall:  Lost balance fell off of a 2 step ladder, prior to R TKA  -CN         Daily Activities    Primary Language  English  -CN      Are you able to read  Yes  -CN      Are you able to write  Yes  -CN      How does patient learn best?  Pictures/Video  -CN      Barriers to learning  None  -CN      Pt Participated in POC and  Goals  Yes  -CN         Safety    Are you being hurt, hit, or frightened by anyone at home or in your life?  No  -CN      Are you being neglected by a caregiver  No  -CN        User Key  (r) = Recorded By, (t) = Taken By, (c) = Cosigned By    Initials Name Provider Type    Navya López PT Physical Therapist          PT Ortho     Row Name 09/13/21 1100       Posture/Observations    Observations  Incision healing;Edema  -CN       Myotomal Screen- Lower Quarter Clearing    Hip flexion (L2)  Right:;4- (Good -);Left:;3+ (Fair +)  -CN    Knee extension (L3)  Right:;4- (Good -);Left:;4 (Good)  -CN    Ankle DF (L4)  Bilateral:;4 (Good)  -CN    Ankle PF (S1)  Right:;4- (Good -);Left:;4 (Good)  -CN    Knee flexion (S2)  Right:;3+ (Fair +);Left:;4 (Good)  -CN       General ROM    RT Lower Ext  Rt Knee Extension/Flexion  -CN    LT Lower Ext  Lt Knee Extension/Flexion  -CN       Right Lower Ext    Rt Knee Extension/Flexion AROM  0-  -CN       Left Lower Ext    Lt Knee Extension/Flexion AROM  0-7-104  -CN       Lower Extremity Flexibility    Hamstrings  Bilateral:;Mildly limited;Moderately limited  -CN    Hip Flexors  Bilateral:;Mildly limited;Moderately limited  -CN       Gait/Stairs (Locomotion)    Ascending Technique (Stairs)  step-to-step  -CN    Descending Technique (Stairs)  step-to-step  -CN    Comment (Gait/Stairs)  Amb without AD today, antalgia on the R, dec TKE, dec heel strike, dec push off, dec hip ext  -CN      User Key  (r) = Recorded By, (t) = Taken By, (c) = Cosigned By    Initials Name Provider Type    Navya López PT Physical Therapist                      Therapy Education  Education Details: Anatomy, goals of PT, eval findings, importance of stretching, increasing stretching 4x/day  Given: HEP, Symptoms/condition management, Pain management  Program: New, Reinforced  How Provided: Verbal, Demonstration  Provided to: Patient  Level of Understanding: Teach back education  performed, Verbalized, Demonstrated     PT OP Goals     Row Name 09/13/21 1100          PT Short Term Goals    STG Date to Achieve  10/13/21  -CN     STG 1  Pt will report pain rated 3/10 at worst in order to demonstrate ability to return to normalized ADLs and functional activities.  -CN     STG 1 Progress  New  -CN     STG 2  Pt will be independent with initial HEP for symptom management.  -CN     STG 2 Progress  New  -CN     STG 3  Pt will demonstrate R knee AROM to 5 deg ext and 110 deg flex in order to improve gait and functional mobility.   -CN     STG 3 Progress  New  -CN        Long Term Goals    LTG Date to Achieve  11/13/21  -CN     LTG 1  Pt will be independent and compliant with advanced HEP for long term management of symptoms and prevention of future occurrence.   -CN     LTG 1 Progress  New  -CN     LTG 2  Pt will reduce level of perceived disability as measured by the KOS-ADL  to 75% in order to improve QOL.  -CN     LTG 2 Progress  New  -CN     LTG 3  Pt will demonstrate R knee AROM to 2 deg ext and 120 deg flex in order to improve gait and functional mobility.   -CN     LTG 3 Progress  New  -CN     LTG 4  Pt will demonstrate R knee strength to 4+/5 in order to return to recriprocal stair climbing.   -CN     LTG 4 Progress  New  -CN        Time Calculation    PT Goal Re-Cert Due Date  10/13/21  -CN       User Key  (r) = Recorded By, (t) = Taken By, (c) = Cosigned By    Initials Name Provider Type    Navya López, PT Physical Therapist          PT Assessment/Plan     Row Name 09/1/21 1100          PT Assessment    Functional Limitations  Decreased safety during functional activities;Impaired gait;Impaired locomotion;Limitation in home management;Limitations in community activities;Limitations in functional capacity and performance;Performance in leisure activities;Performance in self-care ADL  -CN     Impairments  Balance;Endurance;Gait;Edema;Impaired flexibility;Joint mobility;Muscle  strength;Pain;Posture;Range of motion  -CN     Assessment Comments  61 y.o. female referred to outpatient physical therapy for evaluation and treatment of right TKA on 8/9/21.  Patient presents with decreased knee ROM, dec B LE strength R>L, gait impairments, decreased hip/knee flexibility. Pt's signs and symptoms are consistent with referring diagnosis.  Pertinent comorbidities and personal factors that may affect progress include, but are not limited to, delay in initiating OP PT per death in the family, dec ROM in L knee with hx of TKA, high pain levels.  Pt scored 48% on the KOS-ADL where 100% is no disability and is in stable clinical condition. Pt would benefit from skilled PT to address functional deficits and return to PLOF.  -CN     Please refer to paper survey for additional self-reported information  Yes  -CN     Rehab Potential  Good  -CN     Patient/caregiver participated in establishment of treatment plan and goals  Yes  -CN     Patient would benefit from skilled therapy intervention  Yes  -CN        PT Plan    PT Frequency  2x/week  -CN     Predicted Duration of Therapy Intervention (PT)  12-14 visits  -CN     Planned CPT's?  PT EVAL LOW COMPLEXITY: 53489;PT RE-EVAL: 44575;PT THER PROC EA 15 MIN: 25486;PT THER ACT EA 15 MIN: 33337;PT MANUAL THERAPY EA 15 MIN: 15851;PT NEUROMUSC RE-EDUCATION EA 15 MIN: 62850;PT GAIT TRAINING EA 15 MIN: 52886;PT SELF CARE/HOME MGMT/TRAIN EA 15: 85477;PT HOT OR COLD PACK TREAT MCARE;PT ELECTRICAL STIM UNATTEND:   -CN     PT Plan Comments  Assess response to evaluation. May consider manual techniques for improved end ROM into flex/ext. Continue with Nustep warm up, stair stretches, gait training.  -CN       User Key  (r) = Recorded By, (t) = Taken By, (c) = Cosigned By    Initials Name Provider Type    Navya López, PT Physical Therapist            OP Exercises     Row Name 09/13/21 1100             Exercise 1    Exercise Name 1  Nustep L5  -CN       Cueing 1  Verbal  -CN      Time 1  5 min  -CN      Additional Comments  at end of session for improved mobility  -CN         Exercise 2    Exercise Name 2  Seated HS stretch with self overpressure into ext  -CN      Cueing 2  Verbal;Demo  -CN      Reps 2  3  -CN      Time 2  15 sec  -CN         Exercise 3    Exercise Name 3  Stair knee flex stretch  -CN      Cueing 3  Verbal;Demo  -CN      Reps 3  3  -CN      Time 3  20 sec  -CN         Exercise 4    Exercise Name 4  Standing hip abd  -CN      Cueing 4  Verbal;Demo  -CN      Reps 4  10  -CN         Exercise 5    Exercise Name 5  Standing HS curls  -CN      Cueing 5  Verbal;Demo  -CN      Reps 5  10  -CN         Exercise 6    Exercise Name 6  HR  -CN      Cueing 6  Verbal;Demo  -CN      Reps 6  10  -CN        User Key  (r) = Recorded By, (t) = Taken By, (c) = Cosigned By    Initials Name Provider Type    CN Navya Lindo, PT Physical Therapist                        Outcome Measure Options: Knee Outcome Score- ADL  Knee Outcome Score  Knee Outcome Score Comments: 48%      Time Calculation:     Start Time: 1103  Stop Time: 1142  Time Calculation (min): 39 min     Therapy Charges for Today     Code Description Service Date Service Provider Modifiers Qty    50952341376 HC PT EVAL LOW COMPLEXITY 3 9/13/2021 Navya Lindo, PT GP 1          PT G-Codes  Outcome Measure Options: Knee Outcome Score- ADL         Navya Lindo, NICOLE  9/13/2021

## 2021-09-15 ENCOUNTER — HOSPITAL ENCOUNTER (OUTPATIENT)
Dept: PHYSICAL THERAPY | Facility: HOSPITAL | Age: 61
Setting detail: THERAPIES SERIES
Discharge: HOME | End: 2021-09-15

## 2021-09-15 DIAGNOSIS — Z96.651 AFTERCARE FOLLOWING RIGHT KNEE JOINT REPLACEMENT SURGERY: ICD-10-CM

## 2021-09-15 DIAGNOSIS — M25.561 ACUTE PAIN OF RIGHT KNEE: Primary | ICD-10-CM

## 2021-09-15 DIAGNOSIS — R26.89 IMPAIRED GAIT AND MOBILITY: ICD-10-CM

## 2021-09-15 DIAGNOSIS — Z47.1 AFTERCARE FOLLOWING RIGHT KNEE JOINT REPLACEMENT SURGERY: ICD-10-CM

## 2021-09-15 PROCEDURE — 97110 THERAPEUTIC EXERCISES: CPT

## 2021-09-15 PROCEDURE — 97140 MANUAL THERAPY 1/> REGIONS: CPT

## 2021-09-15 NOTE — THERAPY TREATMENT NOTE
Outpatient Physical Therapy Ortho Treatment Note  [facility]     Patient Name: Lydia Gutierrez  : 1960  MRN: [id number]  Today's Date: 9/15/2021      Visit Date: 09/15/2021    Visit Dx:    ICD-10-CM ICD-9-CM   1. Acute pain of right knee  M25.561 719.46   2. Aftercare following right knee joint replacement surgery  Z47.1 V54.81    Z96.651 V43.65   3. Impaired gait and mobility  R26.89 781.2       Patient Active Problem List   Diagnosis   • Abdominal mass   • Abnormal liver function tests   • Ultrasound scan abnormal   • Allergic rhinitis   • Asthma   • Benign essential hypertension   • Bladder problem   • Breast lump   • Dense breast tissue   • Breast tenderness   • Mixed anxiety depressive disorder   • Dyspareunia   • Abnormal C-reactive protein   • Leukocytosis   • Gastroesophageal reflux disease   • Fatigue   • Headache   • Herpes simplex virus (HSV) infection   • Hip pain, bilateral   • Hypothyroidism   • Low back pain   • Lumbar radiculopathy   • Mixed hyperlipidemia   • Oligomenorrhea   • Periodic limb movement disorder   • Obstructive sleep apnea   • Visual disturbance   • Vitamin D deficiency   • Bilateral knee pain   • Cervicalgia   • B12 deficiency   • Primary osteoarthritis of left knee   • Primary osteoarthritis of right knee        Past Medical History:   Diagnosis Date   • Anxiety disorder    • Arthritis    • Asthma    • Borderline diabetic    • Depression    • GERD (gastroesophageal reflux disease)    • History of kidney stones    • Hyperlipidemia    • Hypertension    • Hypothyroidism    • Right knee pain    • Sleep apnea     NO MACHINE        Past Surgical History:   Procedure Laterality Date   •  SECTION     • CHOLECYSTECTOMY     • EYE SURGERY Left    • JOINT REPLACEMENT Left    • TOTAL KNEE ARTHROPLASTY Left 2020    Procedure: TOTAL KNEE ARTHROPLASTY;  Surgeon: Juan Walsh MD;  Location: [facility];  Service: Orthopedics;  Laterality: Left;   • TOTAL KNEE  ARTHROPLASTY Right 8/9/2021    Procedure: TOTAL KNEE ARTHROPLASTY;  Surgeon: Juan Walsh MD;  Location: [facility] MAIN OR;  Service: Orthopedics;  Laterality: Right;       PT Ortho     Row Name 09/15/21 1400       Right Lower Ext    Rt Knee Extension/Flexion AROM  5-114 post manual  -CA      User Key  (r) = Recorded By, (t) = Taken By, (c) = Cosigned By    Initials Name Provider Type    Licha Fraser, PT Physical Therapist                      PT Assessment/Plan     Row Name 09/15/21 1300          PT Assessment    Assessment Comments  Ms. Gutierrez returns for first f/u visit since initial eval today. Reports good HEP compliance and well-managed pain levels. Main complaint is feelings of stiffness. Cont progression of ROM and strengthening. Pt remains good candidate for skilled PT.  -CA        PT Plan    PT Plan Comments  Begin RCB with 1/2 revolutions  -CA       User Key  (r) = Recorded By, (t) = Taken By, (c) = Cosigned By    Initials Name Provider Type    Licha Fraser, PT Physical Therapist            OP Exercises     Row Name 09/15/21 1300             Subjective Comments    Subjective Comments  Feeling stiff after the car ride  -CA         Subjective Pain    Able to rate subjective pain?  yes  -CA      Subjective Pain Comment  just stiffness  -CA         Total Minutes    86624 - PT Therapeutic Exercise Minutes  29  -CA      03016 - PT Manual Therapy Minutes  10  -CA         Exercise 1    Exercise Name 1  Nustep L5  -CA      Cueing 1  Verbal  -CA      Time 1  5 min  -CA         Exercise 2    Exercise Name 2  standing HS str  -CA      Cueing 2  Verbal  -CA      Reps 2  3  -CA      Time 2  20 sec  -CA      Additional Comments  step  -CA         Exercise 3    Exercise Name 3  Stair knee flex stretch  -CA      Cueing 3  Verbal;Demo  -CA      Reps 3  3  -CA      Time 3  20 sec  -CA         Exercise 4    Exercise Name 4  Standing hip abd  -CA      Cueing 4  Verbal;Demo  -CA      Sets 4  2  -CA      Reps 4  10  " -CA      Time 4  2#  -CA         Exercise 5    Exercise Name 5  Standing HS curls  -CA      Cueing 5  Verbal;Demo  -CA      Sets 5  2  -CA      Reps 5  10  -CA      Time 5  2#  -CA         Exercise 6    Exercise Name 6  HR  -CA      Cueing 6  Verbal;Demo  -CA      Reps 6  20  -CA         Exercise 7    Exercise Name 7  step gastroc str  -CA      Cueing 7  Verbal  -CA      Reps 7  3  -CA      Time 7  20 sec  -CA         Exercise 8    Exercise Name 8  mini squat  -CA      Cueing 8  Verbal;Tactile;Demo  -CA      Sets 8  2  -CA      Reps 8  10  -CA      Additional Comments  cues for glute squeeze at top, cues for posterior weight shift  -CA         Exercise 9    Exercise Name 9  fwd step up  -CA      Cueing 9  Verbal  -CA      Sets 9  2  -CA      Reps 9  10  -CA      Time 9  4\"  -CA         Exercise 10    Exercise Name 10  R LAQ  -CA      Cueing 10  Verbal  -CA      Sets 10  2  -CA      Reps 10  10  -CA      Time 10  2#  -CA        User Key  (r) = Recorded By, (t) = Taken By, (c) = Cosigned By    Initials Name Provider Type    Licha Fraser, PT Physical Therapist                      Manual Rx (last 36 hours)      Manual Treatments     Row Name 09/15/21 1300             Total Minutes    86600 - PT Manual Therapy Minutes  10  -CA         Manual Rx 1    Manual Rx 1 Location  R knee  -CA      Manual Rx 1 Type  seated flexion + IR with mob belt  -CA         Manual Rx 2    Manual Rx 2 Location  R knee  -CA      Manual Rx 2 Type  extension mob in supine  -CA        User Key  (r) = Recorded By, (t) = Taken By, (c) = Cosigned By    Initials Name Provider Type    Licha Fraser, NICOLE Physical Therapist          PT OP Goals     Row Name 09/15/21 1300          PT Short Term Goals    STG Date to Achieve  10/13/21  -CA     STG 1  Pt will report pain rated 3/10 at worst in order to demonstrate ability to return to normalized ADLs and functional activities.  -CA     STG 1 Progress  Ongoing  -CA     STG 2  Pt will be " independent with initial HEP for symptom management.  -CA     STG 2 Progress  Ongoing  -CA     STG 3  Pt will demonstrate R knee AROM to 5 deg ext and 110 deg flex in order to improve gait and functional mobility.   -CA     STG 3 Progress  Ongoing  -CA        Long Term Goals    LTG Date to Achieve  11/13/21  -CA     LTG 1  Pt will be independent and compliant with advanced HEP for long term management of symptoms and prevention of future occurrence.   -CA     LTG 1 Progress  Ongoing  -CA     LTG 2  Pt will reduce level of perceived disability as measured by the KOS-ADL  to 75% in order to improve QOL.  -CA     LTG 2 Progress  Ongoing  -CA     LTG 3  Pt will demonstrate R knee AROM to 2 deg ext and 120 deg flex in order to improve gait and functional mobility.   -CA     LTG 3 Progress  Ongoing  -CA     LTG 4  Pt will demonstrate R knee strength to 4+/5 in order to return to recriprocal stair climbing.   -CA     LTG 4 Progress  Ongoing  -CA       User Key  (r) = Recorded By, (t) = Taken By, (c) = Cosigned By    Initials Name Provider Type    Licha Fraser PT Physical Therapist          Therapy Education  Education Details: Access Code [email or web address]  Given: HEP  Program: Reinforced, Progressed  How Provided: Verbal, Demonstration, Written  Provided to: Patient  Level of Understanding: Teach back education performed, Verbalized, Demonstrated              Time Calculation:   Start Time: 1304  Stop Time: 1343  Time Calculation (min): 39 min  Total Timed Code Minutes- PT: 39 minute(s)  Timed Charges  37910 - PT Therapeutic Exercise Minutes: 29  48625 - PT Manual Therapy Minutes: 10  Total Minutes  Timed Charges Total Minutes: 39   Total Minutes: 39  Therapy Charges for Today     Code Description Service Date Service Provider Modifiers Qty    16237994006 HC PT THER PROC EA 15 MIN 9/15/2021 Licha Pearson, PT GP 2    24891351842 HC PT MANUAL THERAPY EA 15 MIN 9/15/2021 Licha Pearson, PT GP 1                     Licha Pearson, PT  9/15/2021

## 2021-09-20 ENCOUNTER — HOSPITAL ENCOUNTER (OUTPATIENT)
Dept: PHYSICAL THERAPY | Facility: HOSPITAL | Age: 61
Setting detail: THERAPIES SERIES
Discharge: HOME | End: 2021-09-20

## 2021-09-20 DIAGNOSIS — Z96.651 AFTERCARE FOLLOWING RIGHT KNEE JOINT REPLACEMENT SURGERY: ICD-10-CM

## 2021-09-20 DIAGNOSIS — R26.89 IMPAIRED GAIT AND MOBILITY: ICD-10-CM

## 2021-09-20 DIAGNOSIS — Z47.1 AFTERCARE FOLLOWING RIGHT KNEE JOINT REPLACEMENT SURGERY: ICD-10-CM

## 2021-09-20 DIAGNOSIS — M25.561 ACUTE PAIN OF RIGHT KNEE: Primary | ICD-10-CM

## 2021-09-20 PROCEDURE — 97110 THERAPEUTIC EXERCISES: CPT

## 2021-09-20 PROCEDURE — 97140 MANUAL THERAPY 1/> REGIONS: CPT

## 2021-09-20 NOTE — THERAPY TREATMENT NOTE
Outpatient Physical Therapy Ortho Treatment Note  [facility]     Patient Name: Lydia Gutierrez  : 1960  MRN: [id number]  Today's Date: 2021      Visit Date: 2021    Visit Dx:    ICD-10-CM ICD-9-CM   1. Acute pain of right knee  M25.561 719.46   2. Aftercare following right knee joint replacement surgery  Z47.1 V54.81    Z96.651 V43.65   3. Impaired gait and mobility  R26.89 781.2       Patient Active Problem List   Diagnosis   • Abdominal mass   • Abnormal liver function tests   • Ultrasound scan abnormal   • Allergic rhinitis   • Asthma   • Benign essential hypertension   • Bladder problem   • Breast lump   • Dense breast tissue   • Breast tenderness   • Mixed anxiety depressive disorder   • Dyspareunia   • Abnormal C-reactive protein   • Leukocytosis   • Gastroesophageal reflux disease   • Fatigue   • Headache   • Herpes simplex virus (HSV) infection   • Hip pain, bilateral   • Hypothyroidism   • Low back pain   • Lumbar radiculopathy   • Mixed hyperlipidemia   • Oligomenorrhea   • Periodic limb movement disorder   • Obstructive sleep apnea   • Visual disturbance   • Vitamin D deficiency   • Bilateral knee pain   • Cervicalgia   • B12 deficiency   • Primary osteoarthritis of left knee   • Primary osteoarthritis of right knee        Past Medical History:   Diagnosis Date   • Anxiety disorder    • Arthritis    • Asthma    • Borderline diabetic    • Depression    • GERD (gastroesophageal reflux disease)    • History of kidney stones    • Hyperlipidemia    • Hypertension    • Hypothyroidism    • Right knee pain    • Sleep apnea     NO MACHINE        Past Surgical History:   Procedure Laterality Date   •  SECTION     • CHOLECYSTECTOMY     • EYE SURGERY Left    • JOINT REPLACEMENT Left    • TOTAL KNEE ARTHROPLASTY Left 2020    Procedure: TOTAL KNEE ARTHROPLASTY;  Surgeon: Juan Walsh MD;  Location: [facility];  Service: Orthopedics;  Laterality: Left;   • TOTAL KNEE  ARTHROPLASTY Right 8/9/2021    Procedure: TOTAL KNEE ARTHROPLASTY;  Surgeon: Juan Walsh MD;  Location: [facility] MAIN OR;  Service: Orthopedics;  Laterality: Right;       PT Ortho     Row Name 09/20/21 1200       Right Lower Ext    Rt Knee Extension/Flexion AROM  0-4-111 post-manual  -      User Key  (r) = Recorded By, (t) = Taken By, (c) = Cosigned By    Initials Name Provider Type     Joanna Leal, PT Physical Therapist                      PT Assessment/Plan     Row Name 09/20/21 1245          PT Assessment    Assessment Comments  Ms. Gutierrez presents to clinic this date with c/o inccreased pain 6/10, likely due to weather/rain. Continued to address ROM deficits, added recumbent bike following manual with pt. completing 1/2 revolutions without complaint. Pt. cued for proper foot placement and weight shift with mini squat to reduce anterior weight shift and engage glutes.  -        PT Plan    PT Plan Comments  continue RCB; deanna deanna/gait mechanics  -       User Key  (r) = Recorded By, (t) = Taken By, (c) = Cosigned By    Initials Name Provider Type     Joanna Leal, PT Physical Therapist            OP Exercises     Row Name 09/20/21 1200             Subjective Comments    Subjective Comments  I am hurting a little bit today  -         Subjective Pain    Able to rate subjective pain?  yes  -      Pre-Treatment Pain Level  6  -MH         Total Minutes    64458 - PT Therapeutic Exercise Minutes  28  -MH      14184 - PT Manual Therapy Minutes  10  -MH         Exercise 1    Exercise Name 1  Nustep L5  -MH      Cueing 1  Verbal  -      Time 1  6 min  -MH      Additional Comments  UE/LE  -MH         Exercise 2    Exercise Name 2  standing HS str  -      Cueing 2  Verbal  -MH      Reps 2  3  -MH      Time 2  20sec  -      Additional Comments  step  -MH         Exercise 3    Exercise Name 3  Stair knee flex stretch  -      Cueing 3  Verbal;Demo  -MH      Reps 3  3  -MH      Time 3  20sec  -MH          Exercise 4    Exercise Name 4  Standing hip abd  -MH      Cueing 4  Verbal;Demo  -MH      Sets 4  2  -MH      Reps 4  10  -MH      Time 4  2#  -MH         Exercise 5    Exercise Name 5  Standing HS curls  -MH      Cueing 5  Verbal;Demo  -MH      Sets 5  2  -MH      Reps 5  10  -MH      Time 5  2#  -MH         Exercise 6    Exercise Name 6  HR  -MH      Cueing 6  Verbal;Demo  -MH      Sets 6  2  -MH      Reps 6  20  -MH         Exercise 7    Exercise Name 7  step gastroc str  -MH      Cueing 7  Verbal  -MH      Reps 7  3  -MH      Time 7  20sec  -MH         Exercise 8    Exercise Name 8  mini squat  -MH      Cueing 8  Verbal;Tactile;Demo  -MH      Reps 8  10  -MH      Time 8  cues for foot placement  -MH         Exercise 10    Exercise Name 10  R LAQ  -MH      Cueing 10  Verbal  -MH      Sets 10  2  -MH      Reps 10  10  -MH      Time 10  2#  -MH         Exercise 11    Exercise Name 11  recumbent bike  -MH      Cueing 11  Verbal  -MH      Time 11  4 min  -MH      Additional Comments  1/2 revolutions seat 6  -MH        User Key  (r) = Recorded By, (t) = Taken By, (c) = Cosigned By    Initials Name Provider Type     Joanna Leal, PT Physical Therapist                      Manual Rx (last 36 hours)      Manual Treatments     Row Name 09/20/21 1200             Total Minutes    14291 - PT Manual Therapy Minutes  10  -MH         Manual Rx 1    Manual Rx 1 Location  R knee  -MH      Manual Rx 1 Type  seated flexion + IR with mob belt  -MH         Manual Rx 2    Manual Rx 2 Location  R knee  -MH      Manual Rx 2 Type  extension mob in supine  -MH        User Key  (r) = Recorded By, (t) = Taken By, (c) = Cosigned By    Initials Name Provider Type     Joanna Leal, PT Physical Therapist          PT OP Goals     Row Name 09/20/21 1200          PT Short Term Goals    STG Date to Achieve  10/13/21  -     STG 1  Pt will report pain rated 3/10 at worst in order to demonstrate ability to return to normalized ADLs  and functional activities.  -     STG 1 Progress  Ongoing  -     STG 1 Progress Comments  6/10 this date  -     STG 2  Pt will be independent with initial HEP for symptom management.  -     STG 2 Progress  Ongoing  -     STG 3  Pt will demonstrate R knee AROM to 5 deg ext and 110 deg flex in order to improve gait and functional mobility.   -     STG 3 Progress  Met  -     STG 3 Progress Comments  0-4-111   -        Long Term Goals    LTG Date to Achieve  11/13/21  -     LTG 1  Pt will be independent and compliant with advanced HEP for long term management of symptoms and prevention of future occurrence.   -     LTG 1 Progress  Ongoing  -     LTG 2  Pt will reduce level of perceived disability as measured by the KOS-ADL  to 75% in order to improve QOL.  -     LTG 2 Progress  Ongoing  -     LTG 3  Pt will demonstrate R knee AROM to 2 deg ext and 120 deg flex in order to improve gait and functional mobility.   -     LTG 3 Progress  Ongoing  -     LTG 4  Pt will demonstrate R knee strength to 4+/5 in order to return to recriprocal stair climbing.   -     LTG 4 Progress  Ongoing  -       User Key  (r) = Recorded By, (t) = Taken By, (c) = Cosigned By    Initials Name Provider Type    Joanna Hauser PT Physical Therapist          Therapy Education  Given: Symptoms/condition management, Mobility training  Program: Reinforced  How Provided: Verbal, Demonstration  Provided to: Patient  Level of Understanding: Verbalized, Demonstrated              Time Calculation:   Start Time: 1220  Stop Time: 1258  Time Calculation (min): 38 min  Total Timed Code Minutes- PT: 38 minute(s)  Timed Charges  07812 - PT Therapeutic Exercise Minutes: 28  67222 - PT Manual Therapy Minutes: 10  Total Minutes  Timed Charges Total Minutes: 38   Total Minutes: 38  Therapy Charges for Today     Code Description Service Date Service Provider Modifiers Qty    43190086288 HC PT MANUAL THERAPY EA 15 MIN 9/20/2021  Joanna Leal, PT GP 1    [id number]  PT THER PROC EA 15 MIN 9/20/2021 Joanna Leal, PT GP 2                    Joanna Leal, PT  9/20/2021

## 2021-09-22 ENCOUNTER — APPOINTMENT (OUTPATIENT)
Dept: PHYSICAL THERAPY | Facility: HOSPITAL | Age: 61
End: 2021-09-22

## 2021-09-27 ENCOUNTER — HOSPITAL ENCOUNTER (OUTPATIENT)
Dept: PHYSICAL THERAPY | Facility: HOSPITAL | Age: 61
Setting detail: THERAPIES SERIES
Discharge: HOME | End: 2021-09-27

## 2021-09-27 DIAGNOSIS — Z47.1 AFTERCARE FOLLOWING RIGHT KNEE JOINT REPLACEMENT SURGERY: ICD-10-CM

## 2021-09-27 DIAGNOSIS — R26.89 IMPAIRED GAIT AND MOBILITY: ICD-10-CM

## 2021-09-27 DIAGNOSIS — M25.561 ACUTE PAIN OF RIGHT KNEE: Primary | ICD-10-CM

## 2021-09-27 DIAGNOSIS — Z96.651 AFTERCARE FOLLOWING RIGHT KNEE JOINT REPLACEMENT SURGERY: ICD-10-CM

## 2021-09-27 PROCEDURE — 97110 THERAPEUTIC EXERCISES: CPT

## 2021-09-27 PROCEDURE — 97140 MANUAL THERAPY 1/> REGIONS: CPT

## 2021-09-27 NOTE — THERAPY TREATMENT NOTE
Outpatient Physical Therapy Ortho Treatment Note  [facility]     Patient Name: Lydia Gutierrez  : 1960  MRN: [id number]  Today's Date: 2021      Visit Date: 2021    Visit Dx:    ICD-10-CM ICD-9-CM   1. Acute pain of right knee  M25.561 719.46   2. Aftercare following right knee joint replacement surgery  Z47.1 V54.81    Z96.651 V43.65   3. Impaired gait and mobility  R26.89 781.2       Patient Active Problem List   Diagnosis   • Abdominal mass   • Abnormal liver function tests   • Ultrasound scan abnormal   • Allergic rhinitis   • Asthma   • Benign essential hypertension   • Bladder problem   • Breast lump   • Dense breast tissue   • Breast tenderness   • Mixed anxiety depressive disorder   • Dyspareunia   • Abnormal C-reactive protein   • Leukocytosis   • Gastroesophageal reflux disease   • Fatigue   • Headache   • Herpes simplex virus (HSV) infection   • Hip pain, bilateral   • Hypothyroidism   • Low back pain   • Lumbar radiculopathy   • Mixed hyperlipidemia   • Oligomenorrhea   • Periodic limb movement disorder   • Obstructive sleep apnea   • Visual disturbance   • Vitamin D deficiency   • Bilateral knee pain   • Cervicalgia   • B12 deficiency   • Primary osteoarthritis of left knee   • Primary osteoarthritis of right knee        Past Medical History:   Diagnosis Date   • Anxiety disorder    • Arthritis    • Asthma    • Borderline diabetic    • Depression    • GERD (gastroesophageal reflux disease)    • History of kidney stones    • Hyperlipidemia    • Hypertension    • Hypothyroidism    • Right knee pain    • Sleep apnea     NO MACHINE        Past Surgical History:   Procedure Laterality Date   •  SECTION     • CHOLECYSTECTOMY     • EYE SURGERY Left    • JOINT REPLACEMENT Left    • TOTAL KNEE ARTHROPLASTY Left 2020    Procedure: TOTAL KNEE ARTHROPLASTY;  Surgeon: Juan Walsh MD;  Location: [facility];  Service: Orthopedics;  Laterality: Left;   • TOTAL KNEE  ARTHROPLASTY Right 8/9/2021    Procedure: TOTAL KNEE ARTHROPLASTY;  Surgeon: Juan Walsh MD;  Location: [facility];  Service: Orthopedics;  Laterality: Right;       PT Ortho     Row Name 09/27/21 1100       Right Lower Ext    Rt Knee Extension/Flexion AROM  0-2-111 post-manual/end of session  -CA      User Key  (r) = Recorded By, (t) = Taken By, (c) = Cosigned By    Initials Name Provider Type    Licha Fraser, PT Physical Therapist                      PT Assessment/Plan     Row Name 09/27/21 1100          PT Assessment    Assessment Comments  Ms. Gutierrez presents today ambulation with no AD, but mildly antalgic gait/limp. Continued progression R knee ROM, 0-2-111 deg post session today, and initiated balance and gait training to address gait deficits.  -CA        PT Plan    PT Plan Comments  Add TKE, R knee flex on wall, possibly with weight  -CA       User Key  (r) = Recorded By, (t) = Taken By, (c) = Cosigned By    Initials Name Provider Type    CA Licha Pearson, PT Physical Therapist            OP Exercises     Row Name 09/27/21 1000             Subjective Comments    Subjective Comments  Just feels tight today  -CA         Subjective Pain    Able to rate subjective pain?  yes  -CA      Pre-Treatment Pain Level  0  -CA      Subjective Pain Comment  just stiff  -CA         Total Minutes    46097 - PT Therapeutic Exercise Minutes  30  -CA      59231 - PT Manual Therapy Minutes  8  -CA         Exercise 1    Exercise Name 1  Nustep L5  -CA      Cueing 1  Verbal  -CA      Time 1  5 min  -CA      Additional Comments  LE only  -CA         Exercise 2    Exercise Name 2  standing HS str  -CA      Cueing 2  Verbal  -CA      Reps 2  3  -CA      Time 2  20sec  -CA      Additional Comments  step  -CA         Exercise 3    Exercise Name 3  Stair knee flex stretch  -CA      Cueing 3  Verbal;Demo  -CA      Reps 3  3  -CA      Time 3  20sec  -CA         Exercise 4    Exercise Name 4  Standing hip abd  -CA       "Cueing 4  Verbal;Demo  -CA      Sets 4  2  -CA      Reps 4  10  -CA      Time 4  3#  -CA         Exercise 5    Exercise Name 5  Standing HS curls  -CA      Cueing 5  Verbal;Demo  -CA      Sets 5  2  -CA      Reps 5  10  -CA      Time 5  3#  -CA         Exercise 6    Exercise Name 6  HR  -CA      Cueing 6  Verbal;Demo  -CA      Sets 6  2  -CA      Reps 6  20  -CA         Exercise 7    Exercise Name 7  step gastroc str  -CA      Cueing 7  Verbal  -CA      Reps 7  3  -CA      Time 7  20sec  -CA         Exercise 8    Exercise Name 8  mini squat  -CA      Cueing 8  Verbal;Tactile;Demo  -CA      Sets 8  2  -CA      Reps 8  10  -CA         Exercise 9    Exercise Name 9  fwd step up  -CA      Cueing 9  Verbal  -CA      Sets 9  2  -CA      Reps 9  10  -CA      Time 9  6\"  -CA         Exercise 10    Exercise Name 10  R LAQ  -CA      Cueing 10  Verbal  -CA      Sets 10  2  -CA      Reps 10  10  -CA      Time 10  3#  -CA         Exercise 11    Exercise Name 11  recumbent bike  -CA      Cueing 11  Verbal  -CA      Time 11  4 min  -CA         Exercise 12    Exercise Name 12  deanna deanna on right  -CA      Cueing 12  Verbal;Demo  -CA      Reps 12  20  -CA         Exercise 13    Exercise Name 13  R SLS  -CA      Cueing 13  Verbal;Demo  -CA      Reps 13  3  -CA      Time 13  10 s  -CA      Additional Comments  intermittent fingertip  -CA         Exercise 14    Exercise Name 14  R retro step with L UE flex  -CA      Cueing 14  Verbal;Demo  -CA      Reps 14  20  -CA        User Key  (r) = Recorded By, (t) = Taken By, (c) = Cosigned By    Initials Name Provider Type    Licha Fraser, PT Physical Therapist                      Manual Rx (last 36 hours)      Manual Treatments     Row Name 09/27/21 1000             Total Minutes    32206 - PT Manual Therapy Minutes  8  -CA         Manual Rx 1    Manual Rx 1 Location  R knee  -CA      Manual Rx 1 Type  seated flexion + IR with mob belt  -CA        User Key  (r) = Recorded By, (t) = Taken " By, (c) = Cosigned By    Initials Name Provider Type    Licha Fraser, PT Physical Therapist                             Time Calculation:   Start Time: 1049  Stop Time: 1127  Time Calculation (min): 38 min  Total Timed Code Minutes- PT: 38 minute(s)  Timed Charges  29150 - PT Therapeutic Exercise Minutes: 30  57493 - PT Manual Therapy Minutes: 8  Total Minutes  Timed Charges Total Minutes: 38   Total Minutes: 38  Therapy Charges for Today     Code Description Service Date Service Provider Modifiers Qty    69828715576 HC PT MANUAL THERAPY EA 15 MIN 9/27/2021 Licha Pearson, PT GP 1    93223041536 HC PT THER PROC EA 15 MIN 9/27/2021 Licha Pearson, PT GP 2                    Licha Pearson, PT  9/27/2021

## 2021-09-29 ENCOUNTER — HOSPITAL ENCOUNTER (OUTPATIENT)
Dept: PHYSICAL THERAPY | Facility: HOSPITAL | Age: 61
Setting detail: THERAPIES SERIES
Discharge: HOME | End: 2021-09-29

## 2021-09-29 DIAGNOSIS — Z96.651 AFTERCARE FOLLOWING RIGHT KNEE JOINT REPLACEMENT SURGERY: ICD-10-CM

## 2021-09-29 DIAGNOSIS — Z47.1 AFTERCARE FOLLOWING RIGHT KNEE JOINT REPLACEMENT SURGERY: ICD-10-CM

## 2021-09-29 DIAGNOSIS — R26.89 IMPAIRED GAIT AND MOBILITY: ICD-10-CM

## 2021-09-29 DIAGNOSIS — M25.561 ACUTE PAIN OF RIGHT KNEE: Primary | ICD-10-CM

## 2021-09-29 PROCEDURE — 97110 THERAPEUTIC EXERCISES: CPT

## 2021-09-29 PROCEDURE — 97140 MANUAL THERAPY 1/> REGIONS: CPT

## 2021-09-29 NOTE — THERAPY TREATMENT NOTE
Outpatient Physical Therapy Ortho Treatment Note  [facility]     Patient Name: Lydia Gutierrez  : 1960  MRN: [id number]  Today's Date: 2021      Visit Date: 2021    Visit Dx:    ICD-10-CM ICD-9-CM   1. Acute pain of right knee  M25.561 719.46   2. Aftercare following right knee joint replacement surgery  Z47.1 V54.81    Z96.651 V43.65   3. Impaired gait and mobility  R26.89 781.2       Patient Active Problem List   Diagnosis   • Abdominal mass   • Abnormal liver function tests   • Ultrasound scan abnormal   • Allergic rhinitis   • Asthma   • Benign essential hypertension   • Bladder problem   • Breast lump   • Dense breast tissue   • Breast tenderness   • Mixed anxiety depressive disorder   • Dyspareunia   • Abnormal C-reactive protein   • Leukocytosis   • Gastroesophageal reflux disease   • Fatigue   • Headache   • Herpes simplex virus (HSV) infection   • Hip pain, bilateral   • Hypothyroidism   • Low back pain   • Lumbar radiculopathy   • Mixed hyperlipidemia   • Oligomenorrhea   • Periodic limb movement disorder   • Obstructive sleep apnea   • Visual disturbance   • Vitamin D deficiency   • Bilateral knee pain   • Cervicalgia   • B12 deficiency   • Primary osteoarthritis of left knee   • Primary osteoarthritis of right knee        Past Medical History:   Diagnosis Date   • Anxiety disorder    • Arthritis    • Asthma    • Borderline diabetic    • Depression    • GERD (gastroesophageal reflux disease)    • History of kidney stones    • Hyperlipidemia    • Hypertension    • Hypothyroidism    • Right knee pain    • Sleep apnea     NO MACHINE        Past Surgical History:   Procedure Laterality Date   •  SECTION     • CHOLECYSTECTOMY     • EYE SURGERY Left    • JOINT REPLACEMENT Left    • TOTAL KNEE ARTHROPLASTY Left 2020    Procedure: TOTAL KNEE ARTHROPLASTY;  Surgeon: Juan Walsh MD;  Location: [facility];  Service: Orthopedics;  Laterality: Left;   • TOTAL KNEE  ARTHROPLASTY Right 8/9/2021    Procedure: TOTAL KNEE ARTHROPLASTY;  Surgeon: Juan Walsh MD;  Location: [facility] OR;  Service: Orthopedics;  Laterality: Right;                       PT Assessment/Plan     Row Name 09/29/21 1345          PT Assessment    Assessment Comments  Pt reporting pain rated 3/10 today. Pt with improving gait pattern, worked on heel strike, push off in // bars today with good response. Pt able to perform SLS 20 sec with intermittent fingertips on bar and performed HS curls and hip abd B for stability through R LE during stance. Pt progressing well with current plan.  -CN        PT Plan    PT Plan Comments  Consider TKE, R knee flex on wall next visit.  -CN       User Key  (r) = Recorded By, (t) = Taken By, (c) = Cosigned By    Initials Name Provider Type    CN Navya Lindo, PT Physical Therapist            OP Exercises     Row Name 09/29/21 1100             Subjective Comments    Subjective Comments  I feel ok today, I had to walk a ways to get in today.   -CN         Subjective Pain    Able to rate subjective pain?  yes  -CN      Pre-Treatment Pain Level  3  -CN         Total Minutes    46736 - PT Therapeutic Exercise Minutes  32  -CN      46972 - PT Manual Therapy Minutes  8  -CN         Exercise 1    Exercise Name 1  Nustep L5  -CN      Cueing 1  Verbal  -CN      Time 1  5 min  -CN      Additional Comments  LE only  -CN         Exercise 2    Exercise Name 2  standing HS str  -CN      Cueing 2  Verbal  -CN      Reps 2  3  -CN      Time 2  20sec  -CN      Additional Comments  step  -CN         Exercise 3    Exercise Name 3  Stair knee flex stretch  -CN      Cueing 3  Verbal;Demo  -CN      Reps 3  3  -CN      Time 3  20sec  -CN         Exercise 4    Exercise Name 4  Standing hip abd  -CN      Cueing 4  Verbal;Demo  -CN      Sets 4  2  -CN      Reps 4  10  -CN      Time 4  3#  -CN      Additional Comments  B  -CN         Exercise 5    Exercise Name 5  Standing HS curls  -CN    "   Cueing 5  Verbal;Demo  -CN      Sets 5  2  -CN      Reps 5  10  -CN      Time 5  3#  -CN      Additional Comments  B  -CN         Exercise 6    Exercise Name 6  HR  -CN      Cueing 6  Verbal;Demo  -CN      Sets 6  2  -CN      Reps 6  20  -CN         Exercise 7    Exercise Name 7  step gastroc str  -CN      Cueing 7  Verbal  -CN      Reps 7  3  -CN      Time 7  20sec  -CN         Exercise 8    Exercise Name 8  mini squat  -CN      Cueing 8  Verbal;Tactile;Demo  -CN      Sets 8  2  -CN      Reps 8  10  -CN         Exercise 9    Exercise Name 9  fwd step up  -CN      Cueing 9  Verbal  -CN      Sets 9  2  -CN      Reps 9  10  -CN      Time 9  6\"  -CN      Additional Comments  no UEs  -CN         Exercise 10    Exercise Name 10  R LAQ  -CN      Cueing 10  Verbal  -CN      Sets 10  2  -CN      Reps 10  10  -CN      Time 10  3#  -CN         Exercise 11    Exercise Name 11  recumbent bike  -CN      Cueing 11  Verbal  -CN      Time 11  4 min  -CN      Additional Comments  Full revolution on the R, unable to make on the L  -CN         Exercise 12    Exercise Name 12  deanna deanna on right  -CN      Cueing 12  Verbal;Demo  -CN      Reps 12  20  -CN         Exercise 13    Exercise Name 13  R SLS  -CN      Cueing 13  Verbal;Demo  -CN      Reps 13  3  -CN      Time 13  20 sec  -CN      Additional Comments  intermittent fingertip  -CN         Exercise 15    Exercise Name 15  Gait in // bars, cues for heel strike, push off  -CN      Cueing 15  Verbal;Demo  -CN      Reps 15  3 laps  -CN        User Key  (r) = Recorded By, (t) = Taken By, (c) = Cosigned By    Initials Name Provider Type    Navya López, PT Physical Therapist                      Manual Rx (last 36 hours)      Manual Treatments     Row Name 09/29/21 1100             Total Minutes    06207 - PT Manual Therapy Minutes  8  -CN         Manual Rx 1    Manual Rx 1 Location  R knee  -CN      Manual Rx 1 Type  seated flexion + IR with mob belt  -CN        User Key "  (r) = Recorded By, (t) = Taken By, (c) = Cosigned By    Initials Name Provider Type    Navya López PT Physical Therapist          PT OP Goals     Row Name 09/29/21 1300          PT Short Term Goals    STG Date to Achieve  10/13/21  -CN     STG 1  Pt will report pain rated 3/10 at worst in order to demonstrate ability to return to normalized ADLs and functional activities.  -CN     STG 1 Progress  Ongoing  -CN     STG 1 Progress Comments  Pt reporting pain rated 3/10 today.   -CN     STG 2  Pt will be independent with initial HEP for symptom management.  -CN     STG 2 Progress  Met  -CN     STG 3  Pt will demonstrate R knee AROM to 5 deg ext and 110 deg flex in order to improve gait and functional mobility.   -CN     STG 3 Progress  Met  -CN        Long Term Goals    LTG Date to Achieve  11/13/21  -CN     LTG 1  Pt will be independent and compliant with advanced HEP for long term management of symptoms and prevention of future occurrence.   -CN     LTG 1 Progress  Ongoing  -CN     LTG 2  Pt will reduce level of perceived disability as measured by the KOS-ADL  to 75% in order to improve QOL.  -CN     LTG 2 Progress  Ongoing  -CN     LTG 3  Pt will demonstrate R knee AROM to 2 deg ext and 120 deg flex in order to improve gait and functional mobility.   -CN     LTG 3 Progress  Ongoing  -CN     LTG 4  Pt will demonstrate R knee strength to 4+/5 in order to return to recriprocal stair climbing.   -CN     LTG 4 Progress  Ongoing  -CN       User Key  (r) = Recorded By, (t) = Taken By, (c) = Cosigned By    Initials Name Provider Type    Navya López, NICOLE Physical Therapist          Therapy Education  Given: Symptoms/condition management, Mobility training  Program: Reinforced  How Provided: Verbal, Demonstration  Provided to: Patient  Level of Understanding: Verbalized, Demonstrated              Time Calculation:   Start Time: 1145  Stop Time: 1227  Time Calculation (min): 42 min  Timed  Charges  05152 - PT Therapeutic Exercise Minutes: 32  07927 - PT Manual Therapy Minutes: 8  Total Minutes  Timed Charges Total Minutes: 40   Total Minutes: 40  Therapy Charges for Today     Code Description Service Date Service Provider Modifiers Qty    90528522133  PT THER PROC EA 15 MIN 9/29/2021 Navya Lindo, PT GP 2    [id number]  PT MANUAL THERAPY EA 15 MIN 9/29/2021 Navya Lindo, PT GP 1                    Navya Lindo, PT  9/29/2021

## 2021-10-04 RX ORDER — LORATADINE 10 MG/1
10 TABLET ORAL DAILY
Qty: 90 TABLET | Refills: 0 | Status: SHIPPED | OUTPATIENT
Start: 2021-10-04 | End: 2022-01-04

## 2021-10-07 ENCOUNTER — OFFICE VISIT (OUTPATIENT)
Dept: ORTHOPEDIC SURGERY | Facility: CLINIC | Age: 61
End: 2021-10-07

## 2021-10-07 VITALS — WEIGHT: 210 LBS | HEIGHT: 61 IN | TEMPERATURE: 98 F | BODY MASS INDEX: 39.65 KG/M2

## 2021-10-07 DIAGNOSIS — Z96.651 S/P TKR (TOTAL KNEE REPLACEMENT), RIGHT: Primary | ICD-10-CM

## 2021-10-07 PROCEDURE — 73562 X-RAY EXAM OF KNEE 3: CPT | Performed by: NURSE PRACTITIONER

## 2021-10-07 PROCEDURE — 99024 POSTOP FOLLOW-UP VISIT: CPT | Performed by: NURSE PRACTITIONER

## 2021-10-07 NOTE — PROGRESS NOTES
Lydia Gutierrez : 1960 MRN: [id number] DATE: 10/7/2021    DIAGNOSIS: 8 week follow up right total knee      SUBJECTIVE:Patient returns today for 8 week follow up of right total knee replacement. Patient reports doing well with no unusual complaints. Appears to be progressing appropriately.    OBJECTIVE:   Exam:. The incision is well healed. No sign of infection. Range of motion is measured at 0  to 120. The calf is soft and nontender with a negative Homans sign. Strength is progressing and the patient is ambulating appropriately.    DIAGNOSTIC STUDIES  Xrays: 3 views of the right knee (AP, lateral, and sunrise) were ordered and reviewed for evaluation of recent knee replacement. They demonstrate a well positioned, well aligned knee replacement without complicating factors noted. In comparison with previous films there has been no change.    ASSESSMENT: 8 week status post right knee replacement.    PLAN: 1) Continue with PT exercises as prescribed   2) Follow up in 10 months with [facility] for annual visit    KELLY Mike  10/7/2021

## 2021-10-11 ENCOUNTER — HOSPITAL ENCOUNTER (OUTPATIENT)
Dept: PHYSICAL THERAPY | Facility: HOSPITAL | Age: 61
Setting detail: THERAPIES SERIES
Discharge: HOME | End: 2021-10-11

## 2021-10-11 DIAGNOSIS — Z47.1 AFTERCARE FOLLOWING RIGHT KNEE JOINT REPLACEMENT SURGERY: ICD-10-CM

## 2021-10-11 DIAGNOSIS — Z96.651 AFTERCARE FOLLOWING RIGHT KNEE JOINT REPLACEMENT SURGERY: ICD-10-CM

## 2021-10-11 DIAGNOSIS — M25.561 ACUTE PAIN OF RIGHT KNEE: Primary | ICD-10-CM

## 2021-10-11 DIAGNOSIS — R26.89 IMPAIRED GAIT AND MOBILITY: ICD-10-CM

## 2021-10-11 PROCEDURE — 97140 MANUAL THERAPY 1/> REGIONS: CPT

## 2021-10-11 PROCEDURE — 97110 THERAPEUTIC EXERCISES: CPT

## 2021-10-11 NOTE — THERAPY TREATMENT NOTE
Outpatient Physical Therapy Ortho Treatment Note  [facility]     Patient Name: Lydia Gutierrez  : 1960  MRN: [id number]  Today's Date: 10/11/2021      Visit Date: 10/11/2021    Visit Dx:    ICD-10-CM ICD-9-CM   1. Acute pain of right knee  M25.561 719.46   2. Aftercare following right knee joint replacement surgery  Z47.1 V54.81    Z96.651 V43.65   3. Impaired gait and mobility  R26.89 781.2       Patient Active Problem List   Diagnosis   • Abdominal mass   • Abnormal liver function tests   • Ultrasound scan abnormal   • Allergic rhinitis   • Asthma   • Benign essential hypertension   • Bladder problem   • Breast lump   • Dense breast tissue   • Breast tenderness   • Mixed anxiety depressive disorder   • Dyspareunia   • Abnormal C-reactive protein   • Leukocytosis   • Gastroesophageal reflux disease   • Fatigue   • Headache   • Herpes simplex virus (HSV) infection   • Hip pain, bilateral   • Hypothyroidism   • Low back pain   • Lumbar radiculopathy   • Mixed hyperlipidemia   • Oligomenorrhea   • Periodic limb movement disorder   • Obstructive sleep apnea   • Visual disturbance   • Vitamin D deficiency   • Bilateral knee pain   • Cervicalgia   • B12 deficiency   • Primary osteoarthritis of left knee   • Primary osteoarthritis of right knee        Past Medical History:   Diagnosis Date   • Anxiety disorder    • Arthritis    • Asthma    • Borderline diabetic    • Depression    • GERD (gastroesophageal reflux disease)    • History of kidney stones    • Hyperlipidemia    • Hypertension    • Hypothyroidism    • Right knee pain    • Sleep apnea     NO MACHINE        Past Surgical History:   Procedure Laterality Date   •  SECTION     • CHOLECYSTECTOMY     • EYE SURGERY Left    • JOINT REPLACEMENT Left    • TOTAL KNEE ARTHROPLASTY Left 2020    Procedure: TOTAL KNEE ARTHROPLASTY;  Surgeon: Juan Walsh MD;  Location: [facility];  Service: Orthopedics;  Laterality: Left;   • TOTAL KNEE  ARTHROPLASTY Right 8/9/2021    Procedure: TOTAL KNEE ARTHROPLASTY;  Surgeon: Juan Walsh MD;  Location: [facility];  Service: Orthopedics;  Laterality: Right;                        PT Assessment/Plan     Row Name 10/11/21 1358          PT Assessment    Assessment Comments Pt with pain rated 3/10 and did not make it to PT appointments last week. Pt went to MD and had good report with 8 week follow up. Pt with slight stiffness noted into flex today which decreased after warm up and stair stretches. Improved gait pattern noted today with minimal antalgia, good heel strike noted. Pt is progressing well with therapy goals.  -CN            PT Plan    PT Plan Comments Recert next visit. Consider prone TKE and knee flex on wall  -CN           User Key  (r) = Recorded By, (t) = Taken By, (c) = Cosigned By    Initials Name Provider Type    Navya López, PT Physical Therapist                   OP Exercises     Row Name 10/11/21 1100             Subjective Comments    Subjective Comments I am doing ok, sorry I missed last week. I have been trying to go for a walk every night.  -CN              Subjective Pain    Able to rate subjective pain? yes  -CN      Pre-Treatment Pain Level 3  -CN              Total Minutes    94492 - PT Therapeutic Exercise Minutes 32  -CN      39395 - PT Manual Therapy Minutes 8  -CN              Exercise 1    Exercise Name 1 Nustep L5  -CN      Cueing 1 Verbal  -CN      Time 1 5 min  -CN      Additional Comments LE only  -CN              Exercise 2    Exercise Name 2 standing HS str  -CN      Cueing 2 Verbal  -CN      Reps 2 3  -CN      Time 2 20sec  -CN      Additional Comments step  -CN              Exercise 3    Exercise Name 3 Stair knee flex stretch  -CN      Cueing 3 Verbal; Demo  -CN      Reps 3 3  -CN      Time 3 20sec  -CN              Exercise 4    Exercise Name 4 Standing hip abd  -CN      Cueing 4 Verbal; Demo  -CN      Sets 4 2  -CN      Reps 4 10  -CN      Time 4 3#   "-CN      Additional Comments B  -CN              Exercise 5    Exercise Name 5 Standing HS curls  -CN      Cueing 5 Verbal; Demo  -CN      Sets 5 2  -CN      Reps 5 10  -CN      Time 5 3#  -CN      Additional Comments B  -CN              Exercise 6    Exercise Name 6 HR  -CN      Cueing 6 Verbal; Demo  -CN      Sets 6 2  -CN      Reps 6 20  -CN      Additional Comments on blue foam  -CN              Exercise 7    Exercise Name 7 step gastroc str  -CN      Cueing 7 Verbal  -CN      Reps 7 3  -CN      Time 7 20sec  -CN              Exercise 8    Exercise Name 8 mini squat  -CN      Cueing 8 Verbal; Tactile; Demo  -CN      Sets 8 2  -CN      Reps 8 10  -CN      Additional Comments on blue foam  -CN              Exercise 9    Exercise Name 9 fwd step up  -CN      Cueing 9 Verbal  -CN      Sets 9 2  -CN      Reps 9 10  -CN      Time 9 6\"  -CN      Additional Comments with opposite knee  and 1 UE flex  -CN              Exercise 10    Exercise Name 10 R LAQ  -CN      Cueing 10 Verbal  -CN      Sets 10 2  -CN      Reps 10 10  -CN      Time 10 3#  -CN              Exercise 11    Exercise Name 11 recumbent bike  -CN      Cueing 11 Verbal  -CN      Time 11 4 min  -CN              Exercise 13    Exercise Name 13 R SLS  -CN      Cueing 13 Verbal; Demo  -CN      Reps 13 3  -CN      Time 13 30 sec  -CN      Additional Comments on blue foam; minimal fingertip use  -CN              Exercise 15    Exercise Name 15 Gait in back hallway  -CN      Cueing 15 Verbal; Demo  -CN      Time 15 2 min  -CN            User Key  (r) = Recorded By, (t) = Taken By, (c) = Cosigned By    Initials Name Provider Type    Navya López, PT Physical Therapist                         Manual Rx (last 36 hours)     Manual Treatments     Row Name 10/11/21 1100             Total Minutes    58282 - PT Manual Therapy Minutes 8  -CN              Manual Rx 1    Manual Rx 1 Location R knee  -CN      Manual Rx 1 Type seated flexion + IR with mob " belt  -CN            User Key  (r) = Recorded By, (t) = Taken By, (c) = Cosigned By    Initials Name Provider Type    Navya López, PT Physical Therapist                    Therapy Education  Given: Symptoms/condition management, Mobility training  Program: Reinforced  How Provided: Verbal, Demonstration  Provided to: Patient  Level of Understanding: Verbalized, Demonstrated              Time Calculation:   Start Time: 1146  Stop Time: 1226  Time Calculation (min): 40 min  Timed Charges  91389 - PT Therapeutic Exercise Minutes: 32  32936 - PT Manual Therapy Minutes: 8  Total Minutes  Timed Charges Total Minutes: 40   Total Minutes: 40  Therapy Charges for Today     Code Description Service Date Service Provider Modifiers Qty    08993735012 HC PT THER PROC EA 15 MIN 10/11/2021 Navya Lindo, PT GP 2    57563634901 HC PT MANUAL THERAPY EA 15 MIN 10/11/2021 Navya Lindo, PT GP 1                    Navya Lindo, PT  10/11/2021

## 2021-10-13 ENCOUNTER — HOSPITAL ENCOUNTER (OUTPATIENT)
Dept: PHYSICAL THERAPY | Facility: HOSPITAL | Age: 61
Setting detail: THERAPIES SERIES
Discharge: HOME | End: 2021-10-13

## 2021-10-13 DIAGNOSIS — Z47.1 AFTERCARE FOLLOWING RIGHT KNEE JOINT REPLACEMENT SURGERY: ICD-10-CM

## 2021-10-13 DIAGNOSIS — M25.561 ACUTE PAIN OF RIGHT KNEE: Primary | ICD-10-CM

## 2021-10-13 DIAGNOSIS — Z96.651 AFTERCARE FOLLOWING RIGHT KNEE JOINT REPLACEMENT SURGERY: ICD-10-CM

## 2021-10-13 DIAGNOSIS — R26.89 IMPAIRED GAIT AND MOBILITY: ICD-10-CM

## 2021-10-13 PROCEDURE — 97110 THERAPEUTIC EXERCISES: CPT

## 2021-10-13 NOTE — THERAPY PROGRESS REPORT/RE-CERT
Outpatient Physical Therapy Ortho Re-Assessment  [facility]     Patient Name: Lydia Gutierrez  : 1960  MRN: [id number]  Today's Date: 10/13/2021      Visit Date: 10/13/2021    Patient Active Problem List   Diagnosis   • Abdominal mass   • Abnormal liver function tests   • Ultrasound scan abnormal   • Allergic rhinitis   • Asthma   • Benign essential hypertension   • Bladder problem   • Breast lump   • Dense breast tissue   • Breast tenderness   • Mixed anxiety depressive disorder   • Dyspareunia   • Abnormal C-reactive protein   • Leukocytosis   • Gastroesophageal reflux disease   • Fatigue   • Headache   • Herpes simplex virus (HSV) infection   • Hip pain, bilateral   • Hypothyroidism   • Low back pain   • Lumbar radiculopathy   • Mixed hyperlipidemia   • Oligomenorrhea   • Periodic limb movement disorder   • Obstructive sleep apnea   • Visual disturbance   • Vitamin D deficiency   • Bilateral knee pain   • Cervicalgia   • B12 deficiency   • Primary osteoarthritis of left knee   • Primary osteoarthritis of right knee        Past Medical History:   Diagnosis Date   • Anxiety disorder    • Arthritis    • Asthma    • Borderline diabetic    • Depression    • GERD (gastroesophageal reflux disease)    • History of kidney stones    • Hyperlipidemia    • Hypertension    • Hypothyroidism    • Right knee pain    • Sleep apnea     NO MACHINE        Past Surgical History:   Procedure Laterality Date   •  SECTION     • CHOLECYSTECTOMY     • EYE SURGERY Left    • JOINT REPLACEMENT Left    • TOTAL KNEE ARTHROPLASTY Left 2020    Procedure: TOTAL KNEE ARTHROPLASTY;  Surgeon: Juan Walsh MD;  Location: [facility];  Service: Orthopedics;  Laterality: Left;   • TOTAL KNEE ARTHROPLASTY Right 2021    Procedure: TOTAL KNEE ARTHROPLASTY;  Surgeon: Juan Walsh MD;  Location: [facility];  Service: Orthopedics;  Laterality: Right;       Visit Dx:     ICD-10-CM ICD-9-CM   1. Acute pain of right knee   M25.561 719.46   2. Aftercare following right knee joint replacement surgery  Z47.1 V54.81    Z96.651 V43.65   3. Impaired gait and mobility  R26.89 781.2              PT Ortho     Row Name 10/13/21 1000       Myotomal Screen- Lower Quarter Clearing    Knee extension (L3) 4+ (Good +)  -CN    Knee flexion (S2) 4+ (Good +)  -CN       Right Lower Ext    Rt Knee Extension/Flexion AROM 0-4-116  -CN          User Key  (r) = Recorded By, (t) = Taken By, (c) = Cosigned By    Initials Name Provider Type    Navya López, PT Physical Therapist                            Therapy Education  Given: Symptoms/condition management, Mobility training  Program: Reinforced  How Provided: Verbal, Demonstration  Provided to: Patient  Level of Understanding: Verbalized, Demonstrated      PT OP Goals     Row Name 10/13/21 1000          PT Short Term Goals    STG Date to Achieve 10/13/21  -CN     STG 1 Pt will report pain rated 3/10 at worst in order to demonstrate ability to return to normalized ADLs and functional activities.  -CN     STG 1 Progress Ongoing  -CN     STG 1 Progress Comments Pt with pain rated 6/10 at worst.  -CN     STG 2 Pt will be independent with initial HEP for symptom management.  -CN     STG 2 Progress Met  -CN     STG 3 Pt will demonstrate R knee AROM to 5 deg ext and 110 deg flex in order to improve gait and functional mobility.   -CN     STG 3 Progress Met  -CN            Long Term Goals    LTG Date to Achieve 11/13/21  -CN     LTG 1 Pt will be independent and compliant with advanced HEP for long term management of symptoms and prevention of future occurrence.   -CN     LTG 1 Progress Progressing  -CN     LTG 1 Progress Comments Progressing HEP as tolerated.  -CN     LTG 2 Pt will reduce level of perceived disability as measured by the KOS-ADL  to 75% in order to improve QOL.  -CN     LTG 2 Progress Progressing  -CN     LTG 3 Pt will demonstrate R knee AROM to 2 deg ext and 120 deg flex in order to  improve gait and functional mobility.   -CN     LTG 3 Progress Progressing  -CN     LTG 3 Progress Comments Pt demonstrates R knee ROM to 0-4-116.  -CN     LTG 4 Pt will demonstrate R knee strength to 4+/5 in order to return to recriprocal stair climbing.   -CN     LTG 4 Progress Met  -CN           User Key  (r) = Recorded By, (t) = Taken By, (c) = Cosigned By    Initials Name Provider Type    Navya López, PT Physical Therapist                 PT Assessment/Plan     Row Name 10/13/21 1059          PT Assessment    Functional Limitations Decreased safety during functional activities; Impaired gait; Impaired locomotion; Limitation in home management; Limitations in community activities; Limitations in functional capacity and performance; Performance in leisure activities; Performance in self-care ADL  -CN     Impairments Balance; Endurance; Gait; Edema; Impaired flexibility; Joint mobility; Muscle strength; Pain; Posture; Range of motion  -CN     Assessment Comments Lydia Gutierrez has been seen for 7 physical therapy sessions including evaluation for R TKA on 8/9/21.  Treatment has included therapeutic exercise, manual therapy and gait training. Progress to physical therapy goals is good. Pt has met 2/3 STG and 1/4 LTG. Pt with R knee ROM measuring 0-4-116 and knee strength rated 4+/5. Pt continues to demonstrate deficits with gait pattern, endurance and knee ROM. Pt will benefit from continued skilled physical therapy to address remaining impairments and functional limitations.  -CN     Please refer to paper survey for additional self-reported information Yes  -CN     Rehab Potential Good  -CN     Patient/caregiver participated in establishment of treatment plan and goals Yes  -CN     Patient would benefit from skilled therapy intervention Yes  -CN            PT Plan    PT Frequency 2x/week  -CN     Predicted Duration of Therapy Intervention (PT) 6-8 visits  -CN     PT Plan Comments Continue to progress  "functional strength, ROM.  -CN           User Key  (r) = Recorded By, (t) = Taken By, (c) = Cosigned By    Initials Name Provider Type    Navya López, PT Physical Therapist                   OP Exercises     Row Name 10/13/21 1000             Subjective Comments    Subjective Comments I feel ok today, I had to walk in far from the parking lot.  -CN              Subjective Pain    Able to rate subjective pain? yes  -CN      Pre-Treatment Pain Level 0  -CN              Total Minutes    40183 - PT Therapeutic Exercise Minutes 40  -CN              Exercise 2    Exercise Name 2 standing HS str  -CN      Cueing 2 Verbal  -CN      Reps 2 3  -CN      Time 2 20sec  -CN      Additional Comments step  -CN              Exercise 3    Exercise Name 3 Stair knee flex stretch  -CN      Cueing 3 Verbal; Demo  -CN      Reps 3 3  -CN      Time 3 20sec  -CN              Exercise 4    Exercise Name 4 Standing hip abd  -CN      Cueing 4 Verbal; Demo  -CN      Sets 4 2  -CN      Reps 4 10  -CN      Time 4 3#  -CN      Additional Comments B, blue foam  -CN              Exercise 5    Exercise Name 5 Standing HS curls  -CN      Cueing 5 Verbal; Demo  -CN      Sets 5 2  -CN      Reps 5 10  -CN      Time 5 3#  -CN      Additional Comments B, blue foam  -CN              Exercise 6    Exercise Name 6 HR  -CN      Cueing 6 Verbal; Demo  -CN      Sets 6 2  -CN      Reps 6 20  -CN      Additional Comments on blue foam  -CN              Exercise 7    Exercise Name 7 step gastroc str  -CN      Cueing 7 Verbal  -CN      Reps 7 3  -CN      Time 7 20sec  -CN              Exercise 8    Exercise Name 8 mini squat  -CN      Cueing 8 Verbal; Tactile; Demo  -CN      Sets 8 2  -CN      Reps 8 10  -CN      Additional Comments on blue foam  -CN              Exercise 9    Exercise Name 9 fwd step up  -CN      Cueing 9 Verbal  -CN      Sets 9 2  -CN      Reps 9 10  -CN      Time 9 6\"  -CN      Additional Comments with opposite knee  and 1 UE " flex  -CN              Exercise 10    Exercise Name 10 R LAQ  -CN      Cueing 10 Verbal  -CN      Sets 10 2  -CN      Reps 10 10  -CN      Time 10 3#  -CN              Exercise 11    Exercise Name 11 recumbent bike  -CN      Cueing 11 Verbal  -CN      Time 11 5 min  -CN      Additional Comments full revolution on the R, half on the L  -CN              Exercise 12    Exercise Name 12 Tandem gait in // bars  -CN      Cueing 12 Verbal; Demo  -CN      Reps 12 3 laps  -CN              Exercise 13    Exercise Name 13 B SLS  -CN      Cueing 13 Verbal; Demo  -CN      Reps 13 3  -CN      Time 13 30 sec  -CN      Additional Comments on blue foam; minimal fingertip use  -CN            User Key  (r) = Recorded By, (t) = Taken By, (c) = Cosigned By    Initials Name Provider Type    CN Navya Lindo, PT Physical Therapist                              Outcome Measure Options: Knee Outcome Score- ADL  Knee Outcome Score  Knee Outcome Score Comments: 61%      Time Calculation:     Start Time: 1018  Stop Time: 1058  Time Calculation (min): 40 min  Timed Charges  73063 - PT Therapeutic Exercise Minutes: 40  Total Minutes  Timed Charges Total Minutes: 40   Total Minutes: 40     Therapy Charges for Today     Code Description Service Date Service Provider Modifiers Qty    57876325781 HC PT THER PROC EA 15 MIN 10/13/2021 Navya Lindo, PT GP 3          PT G-Codes  Outcome Measure Options: Knee Outcome Score- ADL         Navya Lindo, PT  10/13/2021

## 2021-10-28 ENCOUNTER — HOSPITAL ENCOUNTER (OUTPATIENT)
Dept: PHYSICAL THERAPY | Facility: HOSPITAL | Age: 61
Setting detail: THERAPIES SERIES
Discharge: HOME | End: 2021-10-28

## 2021-10-28 DIAGNOSIS — M25.561 ACUTE PAIN OF RIGHT KNEE: Primary | ICD-10-CM

## 2021-10-28 DIAGNOSIS — R26.89 IMPAIRED GAIT AND MOBILITY: ICD-10-CM

## 2021-10-28 DIAGNOSIS — Z96.651 AFTERCARE FOLLOWING RIGHT KNEE JOINT REPLACEMENT SURGERY: ICD-10-CM

## 2021-10-28 DIAGNOSIS — Z47.1 AFTERCARE FOLLOWING RIGHT KNEE JOINT REPLACEMENT SURGERY: ICD-10-CM

## 2021-10-28 PROCEDURE — 97110 THERAPEUTIC EXERCISES: CPT

## 2021-10-28 NOTE — THERAPY TREATMENT NOTE
Outpatient Physical Therapy Ortho Treatment Note  [facility]     Patient Name: Lydia Gutierrez  : 1960  MRN: [id number]  Today's Date: 10/28/2021      Visit Date: 10/28/2021    Visit Dx:    ICD-10-CM ICD-9-CM   1. Acute pain of right knee  M25.561 719.46   2. Aftercare following right knee joint replacement surgery  Z47.1 V54.81    Z96.651 V43.65   3. Impaired gait and mobility  R26.89 781.2       Patient Active Problem List   Diagnosis   • Abdominal mass   • Abnormal liver function tests   • Ultrasound scan abnormal   • Allergic rhinitis   • Asthma   • Benign essential hypertension   • Bladder problem   • Breast lump   • Dense breast tissue   • Breast tenderness   • Mixed anxiety depressive disorder   • Dyspareunia   • Abnormal C-reactive protein   • Leukocytosis   • Gastroesophageal reflux disease   • Fatigue   • Headache   • Herpes simplex virus (HSV) infection   • Hip pain, bilateral   • Hypothyroidism   • Low back pain   • Lumbar radiculopathy   • Mixed hyperlipidemia   • Oligomenorrhea   • Periodic limb movement disorder   • Obstructive sleep apnea   • Visual disturbance   • Vitamin D deficiency   • Bilateral knee pain   • Cervicalgia   • B12 deficiency   • Primary osteoarthritis of left knee   • Primary osteoarthritis of right knee        Past Medical History:   Diagnosis Date   • Anxiety disorder    • Arthritis    • Asthma    • Borderline diabetic    • Depression    • GERD (gastroesophageal reflux disease)    • History of kidney stones    • Hyperlipidemia    • Hypertension    • Hypothyroidism    • Right knee pain    • Sleep apnea     NO MACHINE        Past Surgical History:   Procedure Laterality Date   •  SECTION     • CHOLECYSTECTOMY     • EYE SURGERY Left    • JOINT REPLACEMENT Left    • TOTAL KNEE ARTHROPLASTY Left 2020    Procedure: TOTAL KNEE ARTHROPLASTY;  Surgeon: Juan Walsh MD;  Location: [facility];  Service: Orthopedics;  Laterality: Left;   • TOTAL KNEE  ARTHROPLASTY Right 8/9/2021    Procedure: TOTAL KNEE ARTHROPLASTY;  Surgeon: Juan Walsh MD;  Location: [facility];  Service: Orthopedics;  Laterality: Right;                        PT Assessment/Plan     Row Name 10/28/21 1341          PT Assessment    Assessment Comments Pt with continued stiffness following 45 min drive to clinic and altered gait pattern including anltalgia, dec knee flexion and dec TKE with gait at beginning of session. Performed current program with additon of wall squats and leg press with good form. Continued with gait training with good response following flexibility exercises with near normal pattern noted.  -CN            PT Plan    PT Plan Comments Continue with gait training, strengthening and ROM as tolerated. Likley remeasure ROM next visit and incorporate manual stretching if needed.  -CN           User Key  (r) = Recorded By, (t) = Taken By, (c) = Cosigned By    Initials Name Provider Type    CN Navya Lindo, PT Physical Therapist                   OP Exercises     Row Name 10/28/21 0900             Subjective Comments    Subjective Comments It is stiff this morning. It is usually stiff in the morning but then I have a 45 min drive to get here so it is really stiff.  -CN              Subjective Pain    Able to rate subjective pain? yes  -CN      Pre-Treatment Pain Level 0  -CN              Total Minutes    72921 - PT Therapeutic Exercise Minutes 40  -CN              Exercise 1    Exercise Name 1 Wall squats  -CN      Cueing 1 Verbal; Demo  -CN      Reps 1 5  -CN      Time 1 10 sec  -CN      Additional Comments Small range  -CN              Exercise 2    Exercise Name 2 standing HS str  -CN      Cueing 2 Verbal  -CN      Reps 2 3  -CN      Time 2 20sec  -CN      Additional Comments step  -CN              Exercise 3    Exercise Name 3 Stair knee flex stretch  -CN      Cueing 3 Verbal; Demo  -CN      Reps 3 3  -CN      Time 3 20sec  -CN              Exercise 4     "Exercise Name 4 Standing hip abd  -CN      Cueing 4 Verbal; Demo  -CN      Sets 4 2  -CN      Reps 4 10  -CN      Time 4 3#  -CN      Additional Comments B, blue foam  -CN              Exercise 5    Exercise Name 5 Standing HS curls  -CN      Cueing 5 Verbal; Demo  -CN      Sets 5 2  -CN      Reps 5 10  -CN      Time 5 3#  -CN      Additional Comments B, blue foam  -CN              Exercise 6    Exercise Name 6 HR  -CN      Cueing 6 Verbal; Demo  -CN      Sets 6 2  -CN      Reps 6 20  -CN      Additional Comments on blue foam  -CN              Exercise 7    Exercise Name 7 step gastroc str  -CN      Cueing 7 Verbal  -CN      Reps 7 3  -CN      Time 7 20sec  -CN              Exercise 8    Exercise Name 8 mini squat  -CN      Cueing 8 Verbal; Tactile; Demo  -CN      Sets 8 2  -CN      Reps 8 10  -CN      Additional Comments on blue foam  -CN              Exercise 9    Exercise Name 9 fwd step up  -CN      Cueing 9 Verbal  -CN      Sets 9 2  -CN      Reps 9 10  -CN      Time 9 6\"  -CN      Additional Comments with opposite knee  and 1 UE flex  -CN              Exercise 10    Exercise Name 10 R LAQ  -CN      Cueing 10 Verbal  -CN      Sets 10 2  -CN      Reps 10 10  -CN      Time 10 4#  -CN              Exercise 11    Exercise Name 11 recumbent bike  -CN      Cueing 11 Verbal  -CN      Time 11 5 min  -CN      Additional Comments full revolution on the R, half on the L  -CN              Exercise 12    Exercise Name 12 --  -CN              Exercise 13    Exercise Name 13 B SLS  -CN      Cueing 13 Verbal; Demo  -CN      Reps 13 3  -CN      Time 13 30 sec  -CN      Additional Comments on blue foam; minimal fingertip use  -CN              Exercise 14    Exercise Name 14 Leg press  -CN      Cueing 14 Verbal  -CN      Sets 14 2  -CN      Reps 14 10  -CN      Additional Comments 50#  -CN              Exercise 15    Exercise Name 15 Gait in back hallway  -CN      Cueing 15 Verbal; Demo  -CN      Time 15 2 min  -CN         "    User Key  (r) = Recorded By, (t) = Taken By, (c) = Cosigned By    Initials Name Provider Type    Navya López PT Physical Therapist                              PT OP Goals     Row Name 10/28/21 1100          PT Short Term Goals    STG Date to Achieve 10/13/21  -CN     STG 1 Pt will report pain rated 3/10 at worst in order to demonstrate ability to return to normalized ADLs and functional activities.  -CN     STG 1 Progress Ongoing  -CN     STG 1 Progress Comments Pt continues with soreness, however minimal to no pain.  -CN     STG 2 Pt will be independent with initial HEP for symptom management.  -CN     STG 2 Progress Met  -CN     STG 3 Pt will demonstrate R knee AROM to 5 deg ext and 110 deg flex in order to improve gait and functional mobility.   -CN     STG 3 Progress Met  -CN            Long Term Goals    LTG Date to Achieve 11/13/21  -CN     LTG 1 Pt will be independent and compliant with advanced HEP for long term management of symptoms and prevention of future occurrence.   -CN     LTG 1 Progress Progressing  -CN     LTG 2 Pt will reduce level of perceived disability as measured by the KOS-ADL  to 75% in order to improve QOL.  -CN     LTG 2 Progress Progressing  -CN     LTG 3 Pt will demonstrate R knee AROM to 2 deg ext and 120 deg flex in order to improve gait and functional mobility.   -CN     LTG 3 Progress Progressing  -CN     LTG 4 Pt will demonstrate R knee strength to 4+/5 in order to return to recriprocal stair climbing.   -CN     LTG 4 Progress Met  -CN           User Key  (r) = Recorded By, (t) = Taken By, (c) = Cosigned By    Initials Name Provider Type    Navya López PT Physical Therapist                Therapy Education  Given: Symptoms/condition management, Mobility training  Program: Reinforced  How Provided: Verbal, Demonstration  Provided to: Patient  Level of Understanding: Verbalized, Demonstrated              Time Calculation:   Start Time: 0915  Stop  Time: 1000  Time Calculation (min): 45 min  Timed Charges  32771 - PT Therapeutic Exercise Minutes: 40  Total Minutes  Timed Charges Total Minutes: 40   Total Minutes: 40  Therapy Charges for Today     Code Description Service Date Service Provider Modifiers Qty    [id number]  PT THER PROC EA 15 MIN 10/28/2021 Navya Lindo, PT GP 3                    Navya Lindo, PT  10/28/2021

## 2021-11-01 DIAGNOSIS — F41.8 MIXED ANXIETY DEPRESSIVE DISORDER: ICD-10-CM

## 2021-11-01 DIAGNOSIS — K21.9 GASTROESOPHAGEAL REFLUX DISEASE, UNSPECIFIED WHETHER ESOPHAGITIS PRESENT: ICD-10-CM

## 2021-11-01 DIAGNOSIS — E53.8 B12 DEFICIENCY: ICD-10-CM

## 2021-11-01 DIAGNOSIS — E03.9 ACQUIRED HYPOTHYROIDISM: ICD-10-CM

## 2021-11-01 DIAGNOSIS — J30.9 ALLERGIC RHINITIS, UNSPECIFIED SEASONALITY, UNSPECIFIED TRIGGER: ICD-10-CM

## 2021-11-01 DIAGNOSIS — E53.8 FOLATE DEFICIENCY: ICD-10-CM

## 2021-11-01 RX ORDER — LANOLIN ALCOHOL/MO/W.PET/CERES
400 CREAM (GRAM) TOPICAL DAILY
Qty: 30 TABLET | Refills: 0 | Status: SHIPPED | OUTPATIENT
Start: 2021-11-01 | End: 2021-12-06

## 2021-11-01 RX ORDER — MONTELUKAST SODIUM 10 MG/1
TABLET ORAL
Qty: 30 TABLET | Refills: 0 | Status: SHIPPED | OUTPATIENT
Start: 2021-11-01 | End: 2021-12-06

## 2021-11-01 RX ORDER — FLUTICASONE PROPIONATE 50 MCG
SPRAY, SUSPENSION (ML) NASAL
Qty: 16 G | Refills: 0 | Status: SHIPPED | OUTPATIENT
Start: 2021-11-01 | End: 2021-12-06

## 2021-11-01 RX ORDER — AMLODIPINE BESYLATE 10 MG/1
TABLET ORAL
Qty: 30 TABLET | Refills: 0 | Status: SHIPPED | OUTPATIENT
Start: 2021-11-01 | End: 2021-12-06

## 2021-11-01 RX ORDER — FAMOTIDINE 20 MG/1
20 TABLET, FILM COATED ORAL
Qty: 30 TABLET | Refills: 0 | Status: SHIPPED | OUTPATIENT
Start: 2021-11-01 | End: 2021-12-06

## 2021-11-01 RX ORDER — CHOLECALCIFEROL (VITAMIN D3) 125 MCG
CAPSULE ORAL
Qty: 30 TABLET | Refills: 0 | Status: SHIPPED | OUTPATIENT
Start: 2021-11-01 | End: 2021-12-06

## 2021-11-01 RX ORDER — LEVOTHYROXINE SODIUM 137 UG/1
TABLET ORAL
Qty: 30 TABLET | Refills: 0 | Status: SHIPPED | OUTPATIENT
Start: 2021-11-01 | End: 2021-12-06

## 2021-11-01 RX ORDER — LISINOPRIL AND HYDROCHLOROTHIAZIDE 12.5; 1 MG/1; MG/1
TABLET ORAL
Qty: 30 TABLET | Refills: 0 | Status: SHIPPED | OUTPATIENT
Start: 2021-11-01 | End: 2021-12-06

## 2021-11-01 RX ORDER — PANTOPRAZOLE SODIUM 40 MG/1
40 TABLET, DELAYED RELEASE ORAL DAILY
Qty: 30 TABLET | Refills: 0 | Status: SHIPPED | OUTPATIENT
Start: 2021-11-01 | End: 2021-12-06

## 2021-11-01 RX ORDER — ROSUVASTATIN CALCIUM 40 MG/1
TABLET, COATED ORAL
Qty: 30 TABLET | Refills: 0 | Status: SHIPPED | OUTPATIENT
Start: 2021-11-01 | End: 2021-12-06

## 2021-11-01 NOTE — TELEPHONE ENCOUNTER
Last ov 5/14/21    Patient will have fasting labs. Call if no results in 1 week. Stability of conditions, plan, follow up, and further recommendations pending labs. Follow up in 3-6 months if labs are stable.       Last lab 5/14/21

## 2021-11-07 RX ORDER — METHOCARBAMOL 750 MG/1
TABLET, FILM COATED ORAL
Qty: 90 TABLET | Refills: 0 | Status: SHIPPED | OUTPATIENT
Start: 2021-11-07 | End: 2022-01-04

## 2021-11-08 ENCOUNTER — HOSPITAL ENCOUNTER (OUTPATIENT)
Dept: PHYSICAL THERAPY | Facility: HOSPITAL | Age: 61
Setting detail: THERAPIES SERIES
Discharge: HOME | End: 2021-11-08

## 2021-11-08 DIAGNOSIS — Z96.651 AFTERCARE FOLLOWING RIGHT KNEE JOINT REPLACEMENT SURGERY: ICD-10-CM

## 2021-11-08 DIAGNOSIS — R26.89 IMPAIRED GAIT AND MOBILITY: ICD-10-CM

## 2021-11-08 DIAGNOSIS — Z47.1 AFTERCARE FOLLOWING RIGHT KNEE JOINT REPLACEMENT SURGERY: ICD-10-CM

## 2021-11-08 DIAGNOSIS — M25.561 ACUTE PAIN OF RIGHT KNEE: Primary | ICD-10-CM

## 2021-11-08 PROCEDURE — 97110 THERAPEUTIC EXERCISES: CPT

## 2021-11-08 NOTE — THERAPY DISCHARGE NOTE
Outpatient Physical Therapy Ortho Treatment Note/Discharge Summary  [facility]     Patient Name: Lydia Gutierrez  : 1960  MRN: [id number]  Today's Date: 2021      Visit Date: 2021    Visit Dx:    ICD-10-CM ICD-9-CM   1. Acute pain of right knee  M25.561 719.46   2. Aftercare following right knee joint replacement surgery  Z47.1 V54.81    Z96.651 V43.65   3. Impaired gait and mobility  R26.89 781.2       Patient Active Problem List   Diagnosis   • Abdominal mass   • Abnormal liver function tests   • Ultrasound scan abnormal   • Allergic rhinitis   • Asthma   • Benign essential hypertension   • Bladder problem   • Breast lump   • Dense breast tissue   • Breast tenderness   • Mixed anxiety depressive disorder   • Dyspareunia   • Abnormal C-reactive protein   • Leukocytosis   • Gastroesophageal reflux disease   • Fatigue   • Headache   • Herpes simplex virus (HSV) infection   • Hip pain, bilateral   • Hypothyroidism   • Low back pain   • Lumbar radiculopathy   • Mixed hyperlipidemia   • Oligomenorrhea   • Periodic limb movement disorder   • Obstructive sleep apnea   • Visual disturbance   • Vitamin D deficiency   • Bilateral knee pain   • Cervicalgia   • B12 deficiency   • Primary osteoarthritis of left knee   • Primary osteoarthritis of right knee        Past Medical History:   Diagnosis Date   • Anxiety disorder    • Arthritis    • Asthma    • Borderline diabetic    • Depression    • GERD (gastroesophageal reflux disease)    • History of kidney stones    • Hyperlipidemia    • Hypertension    • Hypothyroidism    • Right knee pain    • Sleep apnea     NO MACHINE        Past Surgical History:   Procedure Laterality Date   •  SECTION     • CHOLECYSTECTOMY     • EYE SURGERY Left    • JOINT REPLACEMENT Left    • TOTAL KNEE ARTHROPLASTY Left 2020    Procedure: TOTAL KNEE ARTHROPLASTY;  Surgeon: Juan Walsh MD;  Location: [facility] OR;  Service: Orthopedics;  Laterality: Left;   •  TOTAL KNEE ARTHROPLASTY Right 8/9/2021    Procedure: TOTAL KNEE ARTHROPLASTY;  Surgeon: Juan Walsh MD;  Location: [facility] MAIN OR;  Service: Orthopedics;  Laterality: Right;        PT Ortho     Row Name 11/08/21 1100       Right Lower Ext    Rt Knee Extension/Flexion AROM 0-3-115  -CN          User Key  (r) = Recorded By, (t) = Taken By, (c) = Cosigned By    Initials Name Provider Type    Navya López, PT Physical Therapist                                     OP Exercises     Row Name 11/08/21 1100             Subjective Comments    Subjective Comments Its a little stiff, but it is doing ok. I am working on getting a bike for home. I think today needs to be my last day. I am having trouble getting someone to bring me to my appointments and I know what to do at home.  -CN              Subjective Pain    Able to rate subjective pain? yes  -CN      Pre-Treatment Pain Level 0  -CN              Total Minutes    72857 - PT Therapeutic Exercise Minutes 40  -CN              Exercise 1    Exercise Name 1 Time spent reviewing long term HEP  -CN              Exercise 2    Exercise Name 2 standing HS str  -CN      Cueing 2 Verbal  -CN      Reps 2 3  -CN      Time 2 20sec  -CN      Additional Comments step  -CN              Exercise 3    Exercise Name 3 Stair knee flex stretch  -CN      Cueing 3 Verbal; Demo  -CN      Reps 3 3  -CN      Time 3 20sec  -CN              Exercise 4    Exercise Name 4 Standing hip abd  -CN      Cueing 4 Verbal; Demo  -CN      Sets 4 2  -CN      Reps 4 10  -CN      Time 4 3#  -CN      Additional Comments B, blue foam  -CN              Exercise 5    Exercise Name 5 Standing HS curls  -CN      Cueing 5 Verbal; Demo  -CN      Sets 5 2  -CN      Reps 5 10  -CN      Time 5 3#  -CN      Additional Comments B, blue foam  -CN              Exercise 6    Exercise Name 6 HR  -CN      Cueing 6 Verbal; Demo  -CN      Sets 6 2  -CN      Reps 6 20  -CN      Additional Comments on blue foam  -CN       "        Exercise 7    Exercise Name 7 step gastroc str  -CN      Cueing 7 Verbal  -CN      Reps 7 3  -CN      Time 7 20sec  -CN              Exercise 8    Exercise Name 8 mini squat  -CN      Cueing 8 Verbal; Tactile; Demo  -CN      Sets 8 2  -CN      Reps 8 10  -CN      Additional Comments on blue foam  -CN              Exercise 9    Exercise Name 9 fwd step up  -CN      Cueing 9 Verbal  -CN      Sets 9 2  -CN      Reps 9 10  -CN      Time 9 6\"  -CN      Additional Comments with opposite knee  and 1 UE flex  -CN              Exercise 10    Exercise Name 10 R LAQ  -CN      Cueing 10 Verbal  -CN      Sets 10 2  -CN      Reps 10 10  -CN      Time 10 4#  -CN              Exercise 11    Exercise Name 11 recumbent bike  -CN      Cueing 11 Verbal  -CN      Time 11 5 min  -CN      Additional Comments full revolution on the R, half on the L  -CN              Exercise 13    Exercise Name 13 B SLS  -CN      Cueing 13 Verbal; Demo  -CN      Reps 13 3  -CN      Time 13 30 sec  -CN      Additional Comments on blue foam; minimal fingertip use  -CN              Exercise 14    Exercise Name 14 Leg press  -CN      Cueing 14 Verbal  -CN      Sets 14 2  -CN      Reps 14 10  -CN      Additional Comments 55#  -CN            User Key  (r) = Recorded By, (t) = Taken By, (c) = Cosigned By    Initials Name Provider Type    Navya López, PT Physical Therapist                                PT OP Goals     Row Name 11/08/21 1100          PT Short Term Goals    STG Date to Achieve 10/13/21  -CN     STG 1 Pt will report pain rated 3/10 at worst in order to demonstrate ability to return to normalized ADLs and functional activities.  -CN     STG 1 Progress Partially Met  -CN     STG 2 Pt will be independent with initial HEP for symptom management.  -CN     STG 2 Progress Met  -CN     STG 3 Pt will demonstrate R knee AROM to 5 deg ext and 110 deg flex in order to improve gait and functional mobility.   -CN     STG 3 Progress " Met  -CN            Long Term Goals    LTG Date to Achieve 11/13/21  -CN     LTG 1 Pt will be independent and compliant with advanced HEP for long term management of symptoms and prevention of future occurrence.   -CN     LTG 1 Progress Met  -CN     LTG 2 Pt will reduce level of perceived disability as measured by the KOS-ADL  to 75% in order to improve QOL.  -CN     LTG 2 Progress Not Met  -CN     LTG 3 Pt will demonstrate R knee AROM to 2 deg ext and 120 deg flex in order to improve gait and functional mobility.   -CN     LTG 3 Progress Not Met  -CN     LTG 4 Pt will demonstrate R knee strength to 4+/5 in order to return to recriprocal stair climbing.   -CN     LTG 4 Progress Met  -CN           User Key  (r) = Recorded By, (t) = Taken By, (c) = Cosigned By    Initials Name Provider Type    Navya López, PT Physical Therapist                Therapy Education  Given: Symptoms/condition management, Mobility training  Program: Reinforced  How Provided: Verbal, Demonstration  Provided to: Patient  Level of Understanding: Verbalized, Demonstrated    Outcome Measure Options: Knee Outcome Score- ADL  Knee Outcome Score  Knee Outcome Score Comments: 66%      Time Calculation:   Start Time: 1105  Stop Time: 1145  Time Calculation (min): 40 min  Timed Charges  63917 - PT Therapeutic Exercise Minutes: 40  Total Minutes  Timed Charges Total Minutes: 40   Total Minutes: 40  Therapy Charges for Today     Code Description Service Date Service Provider Modifiers Qty    49201973167 HC PT THER PROC EA 15 MIN 11/8/2021 Navya Lindo, PT GP 3          PT G-Codes  Outcome Measure Options: Knee Outcome Score- ADL     OP PT Discharge Summary  Date of Discharge: 11/08/21  Reason for Discharge: Independent, Patient/Caregiver request  Outcomes Achieved: Patient able to partially acheive established goals  Discharge Destination: Home with home program  Discharge Instructions/Additional Comments: Pt s/p R TKA on  8/9/21. Pt with ROM measuring 0-3-115 and I with HEP. Pt requesting D/C at this time per difficulty finding ride to therapy. Pt D/C to I management at this time and advised to call with remaining questions/concerns.      Navya Lindo, PT  11/8/2021

## 2021-12-04 DIAGNOSIS — E53.8 FOLATE DEFICIENCY: ICD-10-CM

## 2021-12-04 DIAGNOSIS — E53.8 B12 DEFICIENCY: ICD-10-CM

## 2021-12-04 DIAGNOSIS — F41.8 MIXED ANXIETY DEPRESSIVE DISORDER: ICD-10-CM

## 2021-12-04 DIAGNOSIS — K21.9 GASTROESOPHAGEAL REFLUX DISEASE, UNSPECIFIED WHETHER ESOPHAGITIS PRESENT: ICD-10-CM

## 2021-12-04 DIAGNOSIS — J30.9 ALLERGIC RHINITIS, UNSPECIFIED SEASONALITY, UNSPECIFIED TRIGGER: ICD-10-CM

## 2021-12-04 DIAGNOSIS — E03.9 ACQUIRED HYPOTHYROIDISM: ICD-10-CM

## 2021-12-05 DIAGNOSIS — J45.20 MILD INTERMITTENT ASTHMA WITHOUT COMPLICATION: ICD-10-CM

## 2021-12-06 RX ORDER — CHOLECALCIFEROL (VITAMIN D3) 125 MCG
CAPSULE ORAL
Qty: 30 TABLET | Refills: 0 | Status: SHIPPED | OUTPATIENT
Start: 2021-12-06 | End: 2022-01-04

## 2021-12-06 RX ORDER — AMLODIPINE BESYLATE 10 MG/1
TABLET ORAL
Qty: 30 TABLET | Refills: 0 | Status: SHIPPED | OUTPATIENT
Start: 2021-12-06 | End: 2022-01-04

## 2021-12-06 RX ORDER — PANTOPRAZOLE SODIUM 40 MG/1
40 TABLET, DELAYED RELEASE ORAL DAILY
Qty: 30 TABLET | Refills: 0 | Status: SHIPPED | OUTPATIENT
Start: 2021-12-06 | End: 2022-01-04

## 2021-12-06 RX ORDER — FLUTICASONE PROPIONATE 50 MCG
SPRAY, SUSPENSION (ML) NASAL
Qty: 50 ML | Refills: 0 | Status: SHIPPED | OUTPATIENT
Start: 2021-12-06 | End: 2022-01-04

## 2021-12-06 RX ORDER — ROSUVASTATIN CALCIUM 40 MG/1
TABLET, COATED ORAL
Qty: 30 TABLET | Refills: 0 | Status: SHIPPED | OUTPATIENT
Start: 2021-12-06 | End: 2022-01-04

## 2021-12-06 RX ORDER — LEVOTHYROXINE SODIUM 137 UG/1
TABLET ORAL
Qty: 30 TABLET | Refills: 0 | Status: SHIPPED | OUTPATIENT
Start: 2021-12-06 | End: 2022-01-04

## 2021-12-06 RX ORDER — LISINOPRIL AND HYDROCHLOROTHIAZIDE 12.5; 1 MG/1; MG/1
TABLET ORAL
Qty: 30 TABLET | Refills: 0 | Status: SHIPPED | OUTPATIENT
Start: 2021-12-06 | End: 2022-01-04

## 2021-12-06 RX ORDER — MONTELUKAST SODIUM 10 MG/1
TABLET ORAL
Qty: 30 TABLET | Refills: 0 | Status: SHIPPED | OUTPATIENT
Start: 2021-12-06 | End: 2022-01-04

## 2021-12-06 RX ORDER — FAMOTIDINE 20 MG/1
20 TABLET, FILM COATED ORAL
Qty: 30 TABLET | Refills: 0 | Status: SHIPPED | OUTPATIENT
Start: 2021-12-06 | End: 2022-01-04

## 2021-12-06 RX ORDER — LANOLIN ALCOHOL/MO/W.PET/CERES
400 CREAM (GRAM) TOPICAL DAILY
Qty: 30 TABLET | Refills: 0 | Status: SHIPPED | OUTPATIENT
Start: 2021-12-06 | End: 2022-01-04

## 2021-12-06 NOTE — TELEPHONE ENCOUNTER
Prescriptions refilled times 30 days patient needs to schedule fbw and ov . Sent message to patient and informed also put a note in for pharmacist.

## 2022-01-03 DIAGNOSIS — K21.9 GASTROESOPHAGEAL REFLUX DISEASE, UNSPECIFIED WHETHER ESOPHAGITIS PRESENT: ICD-10-CM

## 2022-01-03 DIAGNOSIS — E53.8 FOLATE DEFICIENCY: ICD-10-CM

## 2022-01-03 DIAGNOSIS — F41.8 MIXED ANXIETY DEPRESSIVE DISORDER: ICD-10-CM

## 2022-01-03 DIAGNOSIS — E53.8 B12 DEFICIENCY: ICD-10-CM

## 2022-01-03 DIAGNOSIS — E03.9 ACQUIRED HYPOTHYROIDISM: ICD-10-CM

## 2022-01-03 DIAGNOSIS — J30.9 ALLERGIC RHINITIS, UNSPECIFIED SEASONALITY, UNSPECIFIED TRIGGER: ICD-10-CM

## 2022-01-04 RX ORDER — ROSUVASTATIN CALCIUM 40 MG/1
TABLET, COATED ORAL
Qty: 30 TABLET | Refills: 0 | Status: SHIPPED | OUTPATIENT
Start: 2022-01-04 | End: 2022-02-16 | Stop reason: SDUPTHER

## 2022-01-04 RX ORDER — LISINOPRIL AND HYDROCHLOROTHIAZIDE 12.5; 1 MG/1; MG/1
TABLET ORAL
Qty: 30 TABLET | Refills: 0 | Status: SHIPPED | OUTPATIENT
Start: 2022-01-04 | End: 2022-02-16 | Stop reason: SDUPTHER

## 2022-01-04 RX ORDER — PANTOPRAZOLE SODIUM 40 MG/1
40 TABLET, DELAYED RELEASE ORAL DAILY
Qty: 30 TABLET | Refills: 0 | Status: SHIPPED | OUTPATIENT
Start: 2022-01-04 | End: 2022-02-16 | Stop reason: SDUPTHER

## 2022-01-04 RX ORDER — FLUTICASONE PROPIONATE 50 MCG
SPRAY, SUSPENSION (ML) NASAL
Qty: 50 ML | Refills: 3 | Status: SHIPPED | OUTPATIENT
Start: 2022-01-04 | End: 2022-05-04

## 2022-01-04 RX ORDER — AMLODIPINE BESYLATE 10 MG/1
TABLET ORAL
Qty: 30 TABLET | Refills: 0 | Status: SHIPPED | OUTPATIENT
Start: 2022-01-04 | End: 2022-02-16 | Stop reason: SDUPTHER

## 2022-01-04 RX ORDER — MONTELUKAST SODIUM 10 MG/1
TABLET ORAL
Qty: 30 TABLET | Refills: 0 | Status: SHIPPED | OUTPATIENT
Start: 2022-01-04 | End: 2022-02-16 | Stop reason: SDUPTHER

## 2022-01-04 RX ORDER — LORATADINE 10 MG/1
TABLET ORAL
Qty: 90 TABLET | Refills: 0 | Status: SHIPPED | OUTPATIENT
Start: 2022-01-04 | End: 2022-02-16 | Stop reason: SDUPTHER

## 2022-01-04 RX ORDER — FAMOTIDINE 20 MG/1
20 TABLET, FILM COATED ORAL
Qty: 30 TABLET | Refills: 0 | Status: SHIPPED | OUTPATIENT
Start: 2022-01-04 | End: 2022-02-16 | Stop reason: SDUPTHER

## 2022-01-04 RX ORDER — CHOLECALCIFEROL (VITAMIN D3) 125 MCG
CAPSULE ORAL
Qty: 30 TABLET | Refills: 0 | Status: SHIPPED | OUTPATIENT
Start: 2022-01-04 | End: 2022-02-16 | Stop reason: SDUPTHER

## 2022-01-04 RX ORDER — LANOLIN ALCOHOL/MO/W.PET/CERES
400 CREAM (GRAM) TOPICAL DAILY
Qty: 30 TABLET | Refills: 0 | Status: SHIPPED | OUTPATIENT
Start: 2022-01-04 | End: 2022-02-16 | Stop reason: SDUPTHER

## 2022-01-04 RX ORDER — LEVOTHYROXINE SODIUM 137 UG/1
TABLET ORAL
Qty: 30 TABLET | Refills: 0 | Status: SHIPPED | OUTPATIENT
Start: 2022-01-04 | End: 2022-02-16 | Stop reason: SDUPTHER

## 2022-01-04 RX ORDER — METHOCARBAMOL 750 MG/1
TABLET, FILM COATED ORAL
Qty: 90 TABLET | Refills: 0 | Status: SHIPPED | OUTPATIENT
Start: 2022-01-04 | End: 2022-05-04

## 2022-01-05 ENCOUNTER — TELEPHONE (OUTPATIENT)
Dept: FAMILY MEDICINE CLINIC | Facility: CLINIC | Age: 62
End: 2022-01-05

## 2022-01-05 NOTE — TELEPHONE ENCOUNTER
Caller: Lydia Gutierrez    Relationship: Self    Best call back number: [phone] (H)    What medication are you requesting: SOMETHING FOR SYMPTOMS    What are your current symptoms: BAD COUGH, CHEST CONGESTION, STUFFY HEAD, HEADACHE, FEVER/CHILLS AT NIGHT, ADDITIONAL SHORTNESS OF BREATH    How long have you been experiencing symptoms: 01/01/2022    Have you had these symptoms before:    [] Yes  [x] No    Have you been treated for these symptoms before:   [] Yes  [x] No    If a prescription is needed, what is your preferred pharmacy and phone number:  [facility] - [address] - [phone]  - [phone] FX     Additional notes: PATIENT IS VACCINATED AGAINST COVID BUT SUSPECTS SHE MAY HAVE BEEN EXPOSED TO COVID, PLEASE ADVISE IF SOMETHING CAN BE CALLED INTO THE PHARMACY ASAP

## 2022-01-06 NOTE — TELEPHONE ENCOUNTER
Patient needs to be seen and evaluated for symptoms. She needs flu and COvid 19 testing. She had appt with me 1/4 but did not come for appt. She should go to urgent care for evaluation.

## 2022-01-10 NOTE — TELEPHONE ENCOUNTER
Spoke with patient and informed her to go to [facility] she states that her sx's have now resolved and she is feeling much better.

## 2022-02-08 ENCOUNTER — OFFICE VISIT (OUTPATIENT)
Dept: FAMILY MEDICINE CLINIC | Facility: CLINIC | Age: 62
End: 2022-02-08

## 2022-02-08 VITALS
DIASTOLIC BLOOD PRESSURE: 70 MMHG | OXYGEN SATURATION: 97 % | TEMPERATURE: 98.1 F | BODY MASS INDEX: 39.65 KG/M2 | WEIGHT: 210 LBS | RESPIRATION RATE: 20 BRPM | HEART RATE: 82 BPM | HEIGHT: 61 IN | SYSTOLIC BLOOD PRESSURE: 122 MMHG

## 2022-02-08 DIAGNOSIS — I65.23 BILATERAL CAROTID ARTERY STENOSIS: ICD-10-CM

## 2022-02-08 DIAGNOSIS — R79.89 ABNORMAL LIVER FUNCTION TESTS: ICD-10-CM

## 2022-02-08 DIAGNOSIS — J45.20 MILD INTERMITTENT ASTHMA WITHOUT COMPLICATION: ICD-10-CM

## 2022-02-08 DIAGNOSIS — M25.562 CHRONIC PAIN OF BOTH KNEES: ICD-10-CM

## 2022-02-08 DIAGNOSIS — M25.561 CHRONIC PAIN OF BOTH KNEES: ICD-10-CM

## 2022-02-08 DIAGNOSIS — R00.0 TACHYCARDIA: ICD-10-CM

## 2022-02-08 DIAGNOSIS — K21.9 GASTROESOPHAGEAL REFLUX DISEASE, UNSPECIFIED WHETHER ESOPHAGITIS PRESENT: ICD-10-CM

## 2022-02-08 DIAGNOSIS — R42 POSTURAL DIZZINESS WITH PRESYNCOPE: ICD-10-CM

## 2022-02-08 DIAGNOSIS — J30.9 ALLERGIC RHINITIS, UNSPECIFIED SEASONALITY, UNSPECIFIED TRIGGER: ICD-10-CM

## 2022-02-08 DIAGNOSIS — E53.8 B12 DEFICIENCY: ICD-10-CM

## 2022-02-08 DIAGNOSIS — R00.2 PALPITATIONS: ICD-10-CM

## 2022-02-08 DIAGNOSIS — E78.2 MIXED HYPERLIPIDEMIA: ICD-10-CM

## 2022-02-08 DIAGNOSIS — I10 BENIGN ESSENTIAL HYPERTENSION: Primary | ICD-10-CM

## 2022-02-08 DIAGNOSIS — Z23 FLU VACCINE NEED: ICD-10-CM

## 2022-02-08 DIAGNOSIS — R55 POSTURAL DIZZINESS WITH PRESYNCOPE: ICD-10-CM

## 2022-02-08 DIAGNOSIS — F41.8 MIXED ANXIETY DEPRESSIVE DISORDER: ICD-10-CM

## 2022-02-08 DIAGNOSIS — R73.03 PREDIABETES: ICD-10-CM

## 2022-02-08 DIAGNOSIS — G47.33 OBSTRUCTIVE SLEEP APNEA: ICD-10-CM

## 2022-02-08 DIAGNOSIS — E03.9 ACQUIRED HYPOTHYROIDISM: ICD-10-CM

## 2022-02-08 DIAGNOSIS — E55.9 VITAMIN D DEFICIENCY: ICD-10-CM

## 2022-02-08 DIAGNOSIS — E53.8 FOLATE DEFICIENCY: ICD-10-CM

## 2022-02-08 DIAGNOSIS — D72.829 LEUKOCYTOSIS, UNSPECIFIED TYPE: ICD-10-CM

## 2022-02-08 DIAGNOSIS — G89.29 CHRONIC PAIN OF BOTH KNEES: ICD-10-CM

## 2022-02-08 PROCEDURE — 99214 OFFICE O/P EST MOD 30 MIN: CPT | Performed by: PHYSICIAN ASSISTANT

## 2022-02-08 PROCEDURE — 90686 IIV4 VACC NO PRSV 0.5 ML IM: CPT | Performed by: PHYSICIAN ASSISTANT

## 2022-02-08 PROCEDURE — 90471 IMMUNIZATION ADMIN: CPT | Performed by: PHYSICIAN ASSISTANT

## 2022-02-16 DIAGNOSIS — E53.8 FOLATE DEFICIENCY: ICD-10-CM

## 2022-02-16 DIAGNOSIS — E53.8 B12 DEFICIENCY: ICD-10-CM

## 2022-02-16 DIAGNOSIS — K21.9 GASTROESOPHAGEAL REFLUX DISEASE, UNSPECIFIED WHETHER ESOPHAGITIS PRESENT: ICD-10-CM

## 2022-02-16 DIAGNOSIS — J30.9 ALLERGIC RHINITIS, UNSPECIFIED SEASONALITY, UNSPECIFIED TRIGGER: ICD-10-CM

## 2022-02-16 DIAGNOSIS — E03.9 ACQUIRED HYPOTHYROIDISM: ICD-10-CM

## 2022-02-16 DIAGNOSIS — F41.8 MIXED ANXIETY DEPRESSIVE DISORDER: ICD-10-CM

## 2022-02-16 DIAGNOSIS — J45.20 MILD INTERMITTENT ASTHMA WITHOUT COMPLICATION: ICD-10-CM

## 2022-02-16 RX ORDER — LANOLIN ALCOHOL/MO/W.PET/CERES
400 CREAM (GRAM) TOPICAL DAILY
Qty: 30 TABLET | Refills: 6 | Status: SHIPPED | OUTPATIENT
Start: 2022-02-16 | End: 2022-09-08

## 2022-02-16 RX ORDER — FAMOTIDINE 20 MG/1
20 TABLET, FILM COATED ORAL
Qty: 30 TABLET | Refills: 6 | Status: SHIPPED | OUTPATIENT
Start: 2022-02-16 | End: 2022-08-12 | Stop reason: SDUPTHER

## 2022-02-16 RX ORDER — ROSUVASTATIN CALCIUM 40 MG/1
40 TABLET, COATED ORAL
Qty: 30 TABLET | Refills: 6 | Status: SHIPPED | OUTPATIENT
Start: 2022-02-16 | End: 2022-08-12 | Stop reason: SDUPTHER

## 2022-02-16 RX ORDER — MONTELUKAST SODIUM 10 MG/1
10 TABLET ORAL
Qty: 30 TABLET | Refills: 6 | Status: SHIPPED | OUTPATIENT
Start: 2022-02-16 | End: 2022-08-12 | Stop reason: SDUPTHER

## 2022-02-16 RX ORDER — PANTOPRAZOLE SODIUM 40 MG/1
40 TABLET, DELAYED RELEASE ORAL DAILY
Qty: 30 TABLET | Refills: 6 | Status: SHIPPED | OUTPATIENT
Start: 2022-02-16 | End: 2022-08-12 | Stop reason: SDUPTHER

## 2022-02-16 RX ORDER — LISINOPRIL AND HYDROCHLOROTHIAZIDE 12.5; 1 MG/1; MG/1
1 TABLET ORAL DAILY
Qty: 30 TABLET | Refills: 6 | Status: SHIPPED | OUTPATIENT
Start: 2022-02-16 | End: 2022-08-12 | Stop reason: SDUPTHER

## 2022-02-16 RX ORDER — LEVOTHYROXINE SODIUM 137 UG/1
137 TABLET ORAL DAILY
Qty: 30 TABLET | Refills: 6 | Status: SHIPPED | OUTPATIENT
Start: 2022-02-16 | End: 2022-08-12 | Stop reason: SDUPTHER

## 2022-02-16 RX ORDER — CHOLECALCIFEROL (VITAMIN D3) 125 MCG
500 CAPSULE ORAL DAILY
Qty: 30 TABLET | Refills: 6 | Status: SHIPPED | OUTPATIENT
Start: 2022-02-16 | End: 2022-09-08

## 2022-02-16 RX ORDER — FLUOXETINE HYDROCHLORIDE 20 MG/1
60 CAPSULE ORAL EVERY EVENING
Qty: 270 CAPSULE | Refills: 6 | Status: SHIPPED | OUTPATIENT
Start: 2022-02-16 | End: 2022-08-12 | Stop reason: SDUPTHER

## 2022-02-16 RX ORDER — LORATADINE 10 MG/1
10 TABLET ORAL DAILY
Qty: 90 TABLET | Refills: 0 | Status: SHIPPED | OUTPATIENT
Start: 2022-02-16 | End: 2022-07-01

## 2022-02-16 RX ORDER — ALBUTEROL SULFATE 90 UG/1
1 AEROSOL, METERED RESPIRATORY (INHALATION) EVERY 4 HOURS PRN
Qty: 18 G | Refills: 6 | Status: SHIPPED | OUTPATIENT
Start: 2022-02-16 | End: 2022-10-05

## 2022-02-16 RX ORDER — AMLODIPINE BESYLATE 10 MG/1
10 TABLET ORAL DAILY
Qty: 30 TABLET | Refills: 6 | Status: SHIPPED | OUTPATIENT
Start: 2022-02-16 | End: 2022-08-12 | Stop reason: SDUPTHER

## 2022-05-04 DIAGNOSIS — J30.9 ALLERGIC RHINITIS, UNSPECIFIED SEASONALITY, UNSPECIFIED TRIGGER: ICD-10-CM

## 2022-05-04 RX ORDER — FLUTICASONE PROPIONATE 50 MCG
SPRAY, SUSPENSION (ML) NASAL
Qty: 16 G | Refills: 3 | Status: SHIPPED | OUTPATIENT
Start: 2022-05-04 | End: 2023-03-28 | Stop reason: SDUPTHER

## 2022-05-04 RX ORDER — METHOCARBAMOL 750 MG/1
TABLET, FILM COATED ORAL
Qty: 30 TABLET | Refills: 0 | Status: SHIPPED | OUTPATIENT
Start: 2022-05-04 | End: 2022-05-05

## 2022-05-05 RX ORDER — METHOCARBAMOL 750 MG/1
TABLET, FILM COATED ORAL
Qty: 30 TABLET | Refills: 3 | Status: SHIPPED | OUTPATIENT
Start: 2022-05-05 | End: 2022-09-26

## 2022-06-15 ENCOUNTER — OFFICE VISIT (OUTPATIENT)
Dept: FAMILY MEDICINE CLINIC | Facility: CLINIC | Age: 62
End: 2022-06-15

## 2022-06-15 VITALS
HEIGHT: 61 IN | DIASTOLIC BLOOD PRESSURE: 72 MMHG | WEIGHT: 225 LBS | SYSTOLIC BLOOD PRESSURE: 118 MMHG | BODY MASS INDEX: 42.48 KG/M2 | TEMPERATURE: 97.5 F | OXYGEN SATURATION: 96 % | HEART RATE: 103 BPM

## 2022-06-15 DIAGNOSIS — E03.9 ACQUIRED HYPOTHYROIDISM: ICD-10-CM

## 2022-06-15 DIAGNOSIS — F41.8 MIXED ANXIETY DEPRESSIVE DISORDER: ICD-10-CM

## 2022-06-15 DIAGNOSIS — E55.9 VITAMIN D DEFICIENCY: ICD-10-CM

## 2022-06-15 DIAGNOSIS — G47.33 OBSTRUCTIVE SLEEP APNEA: ICD-10-CM

## 2022-06-15 DIAGNOSIS — R00.2 PALPITATIONS: ICD-10-CM

## 2022-06-15 DIAGNOSIS — R73.03 PREDIABETES: ICD-10-CM

## 2022-06-15 DIAGNOSIS — H91.93 BILATERAL HEARING LOSS, UNSPECIFIED HEARING LOSS TYPE: ICD-10-CM

## 2022-06-15 DIAGNOSIS — I10 BENIGN ESSENTIAL HYPERTENSION: Primary | ICD-10-CM

## 2022-06-15 DIAGNOSIS — E78.2 MIXED HYPERLIPIDEMIA: ICD-10-CM

## 2022-06-15 DIAGNOSIS — R79.89 ABNORMAL LIVER FUNCTION TESTS: ICD-10-CM

## 2022-06-15 DIAGNOSIS — R55 POSTURAL DIZZINESS WITH PRESYNCOPE: ICD-10-CM

## 2022-06-15 DIAGNOSIS — M25.562 CHRONIC PAIN OF BOTH KNEES: ICD-10-CM

## 2022-06-15 DIAGNOSIS — G89.29 CHRONIC PAIN OF BOTH KNEES: ICD-10-CM

## 2022-06-15 DIAGNOSIS — I65.23 BILATERAL CAROTID ARTERY STENOSIS: ICD-10-CM

## 2022-06-15 DIAGNOSIS — R42 DIZZINESS: ICD-10-CM

## 2022-06-15 DIAGNOSIS — R21 RASH: ICD-10-CM

## 2022-06-15 DIAGNOSIS — R00.0 TACHYCARDIA: ICD-10-CM

## 2022-06-15 DIAGNOSIS — D72.829 LEUKOCYTOSIS, UNSPECIFIED TYPE: ICD-10-CM

## 2022-06-15 DIAGNOSIS — E53.8 B12 DEFICIENCY: ICD-10-CM

## 2022-06-15 DIAGNOSIS — K21.9 GASTROESOPHAGEAL REFLUX DISEASE, UNSPECIFIED WHETHER ESOPHAGITIS PRESENT: ICD-10-CM

## 2022-06-15 DIAGNOSIS — J45.20 MILD INTERMITTENT ASTHMA WITHOUT COMPLICATION: ICD-10-CM

## 2022-06-15 DIAGNOSIS — R42 POSTURAL DIZZINESS WITH PRESYNCOPE: ICD-10-CM

## 2022-06-15 DIAGNOSIS — J30.9 ALLERGIC RHINITIS, UNSPECIFIED SEASONALITY, UNSPECIFIED TRIGGER: ICD-10-CM

## 2022-06-15 DIAGNOSIS — M25.561 CHRONIC PAIN OF BOTH KNEES: ICD-10-CM

## 2022-06-15 DIAGNOSIS — E53.8 FOLATE DEFICIENCY: ICD-10-CM

## 2022-06-15 PROCEDURE — 99214 OFFICE O/P EST MOD 30 MIN: CPT | Performed by: PHYSICIAN ASSISTANT

## 2022-06-15 RX ORDER — VALACYCLOVIR HYDROCHLORIDE 1 G/1
1000 TABLET, FILM COATED ORAL 3 TIMES DAILY
Qty: 30 TABLET | Refills: 0 | Status: SHIPPED | OUTPATIENT
Start: 2022-06-15

## 2022-06-15 RX ORDER — LANOLIN ALCOHOL/MO/W.PET/CERES
CREAM (GRAM) TOPICAL
COMMUNITY
Start: 2022-06-08 | End: 2022-06-15 | Stop reason: SDUPTHER

## 2022-06-15 RX ORDER — METHYLPREDNISOLONE 4 MG/1
TABLET ORAL
Qty: 21 TABLET | Refills: 0 | Status: SHIPPED | OUTPATIENT
Start: 2022-06-15

## 2022-06-20 LAB
25(OH)D3+25(OH)D2 SERPL-MCNC: 37.5 NG/ML (ref 30–100)
ALBUMIN SERPL-MCNC: 4.6 G/DL (ref 3.8–4.9)
ALBUMIN/GLOB SERPL: 1.7 {RATIO} (ref 1.2–2.2)
ALP SERPL-CCNC: 105 IU/L (ref 44–121)
ALT SERPL-CCNC: 26 IU/L (ref 0–32)
APPEARANCE UR: CLEAR
AST SERPL-CCNC: 19 IU/L (ref 0–40)
BACTERIA #/AREA URNS HPF: NORMAL /[HPF]
BACTERIA UR CULT: ABNORMAL
BASOPHILS # BLD AUTO: 0 X10E3/UL (ref 0–0.2)
BASOPHILS NFR BLD AUTO: 0 %
BILIRUB SERPL-MCNC: 0.2 MG/DL (ref 0–1.2)
BILIRUB UR QL STRIP: NEGATIVE
BUN SERPL-MCNC: 21 MG/DL (ref 6–24)
BUN/CREAT SERPL: 33 (ref 9–23)
CALCIUM SERPL-MCNC: 9.5 MG/DL (ref 8.7–10.2)
CASTS URNS QL MICRO: NORMAL /LPF
CHLORIDE SERPL-SCNC: 101 MMOL/L (ref 96–106)
CHOLEST SERPL-MCNC: 170 MG/DL (ref 100–199)
CK SERPL-CCNC: 37 U/L (ref 32–182)
CO2 SERPL-SCNC: 22 MMOL/L (ref 20–29)
COLOR UR: YELLOW
CREAT SERPL-MCNC: 0.64 MG/DL (ref 0.57–1)
EGFRCR SERPLBLD CKD-EPI 2021: 102 ML/MIN/1.73
EOSINOPHIL # BLD AUTO: 0 X10E3/UL (ref 0–0.4)
EOSINOPHIL NFR BLD AUTO: 0 %
EPI CELLS #/AREA URNS HPF: NORMAL /HPF (ref 0–10)
ERYTHROCYTE [DISTWIDTH] IN BLOOD BY AUTOMATED COUNT: 13.3 % (ref 11.7–15.4)
FERRITIN SERPL-MCNC: 36 NG/ML (ref 15–150)
FOLATE SERPL-MCNC: >20 NG/ML
GLOBULIN SER CALC-MCNC: 2.7 G/DL (ref 1.5–4.5)
GLUCOSE SERPL-MCNC: 142 MG/DL (ref 65–99)
GLUCOSE UR QL STRIP: NEGATIVE
HBA1C MFR BLD: 6.9 % (ref 4.8–5.6)
HCT VFR BLD AUTO: 38.9 % (ref 34–46.6)
HDLC SERPL-MCNC: 48 MG/DL
HGB BLD-MCNC: 12.9 G/DL (ref 11.1–15.9)
HGB UR QL STRIP: NEGATIVE
IMM GRANULOCYTES # BLD AUTO: 0.1 X10E3/UL (ref 0–0.1)
IMM GRANULOCYTES NFR BLD AUTO: 1 %
IRON SATN MFR SERPL: 18 % (ref 15–55)
IRON SERPL-MCNC: 75 UG/DL (ref 27–159)
KETONES UR QL STRIP: NEGATIVE
LDLC SERPL CALC-MCNC: 99 MG/DL (ref 0–99)
LDLC/HDLC SERPL: 2.1 RATIO (ref 0–3.2)
LEUKOCYTE ESTERASE UR QL STRIP: ABNORMAL
LYMPHOCYTES # BLD AUTO: 1.8 X10E3/UL (ref 0.7–3.1)
LYMPHOCYTES NFR BLD AUTO: 15 %
MCH RBC QN AUTO: 29.5 PG (ref 26.6–33)
MCHC RBC AUTO-ENTMCNC: 33.2 G/DL (ref 31.5–35.7)
MCV RBC AUTO: 89 FL (ref 79–97)
MICRO URNS: ABNORMAL
MONOCYTES # BLD AUTO: 0.7 X10E3/UL (ref 0.1–0.9)
MONOCYTES NFR BLD AUTO: 6 %
NEUTROPHILS # BLD AUTO: 9.7 X10E3/UL (ref 1.4–7)
NEUTROPHILS NFR BLD AUTO: 78 %
NITRITE UR QL STRIP: NEGATIVE
OTHER ANTIBIOTIC SUSC ISLT: ABNORMAL
PH UR STRIP: 6 [PH] (ref 5–7.5)
PLATELET # BLD AUTO: 396 X10E3/UL (ref 150–450)
POTASSIUM SERPL-SCNC: 4.2 MMOL/L (ref 3.5–5.2)
PROT SERPL-MCNC: 7.3 G/DL (ref 6–8.5)
PROT UR QL STRIP: NEGATIVE
RBC # BLD AUTO: 4.38 X10E6/UL (ref 3.77–5.28)
RBC #/AREA URNS HPF: NORMAL /HPF (ref 0–2)
SODIUM SERPL-SCNC: 140 MMOL/L (ref 134–144)
SP GR UR STRIP: 1.02 (ref 1–1.03)
T3FREE SERPL-MCNC: 2.6 PG/ML (ref 2–4.4)
T4 FREE SERPL-MCNC: 1.52 NG/DL (ref 0.82–1.77)
TIBC SERPL-MCNC: 416 UG/DL (ref 250–450)
TRIGL SERPL-MCNC: 129 MG/DL (ref 0–149)
TSH SERPL DL<=0.005 MIU/L-ACNC: 0.53 UIU/ML (ref 0.45–4.5)
UIBC SERPL-MCNC: 341 UG/DL (ref 131–425)
URINALYSIS REFLEX: ABNORMAL
UROBILINOGEN UR STRIP-MCNC: 0.2 MG/DL (ref 0.2–1)
VIT B12 SERPL-MCNC: 1069 PG/ML (ref 232–1245)
VLDLC SERPL CALC-MCNC: 23 MG/DL (ref 5–40)
WBC # BLD AUTO: 12.3 X10E3/UL (ref 3.4–10.8)
WBC #/AREA URNS HPF: NORMAL /HPF (ref 0–5)

## 2022-06-26 DIAGNOSIS — D72.829 LEUKOCYTOSIS, UNSPECIFIED TYPE: Primary | ICD-10-CM

## 2022-06-26 DIAGNOSIS — E11.9 NEW ONSET TYPE 2 DIABETES MELLITUS: ICD-10-CM

## 2022-06-27 ENCOUNTER — HOSPITAL ENCOUNTER (OUTPATIENT)
Dept: CARDIOLOGY | Facility: HOSPITAL | Age: 62
Discharge: HOME | End: 2022-06-27
Admitting: PHYSICIAN ASSISTANT

## 2022-06-27 DIAGNOSIS — R55 POSTURAL DIZZINESS WITH PRESYNCOPE: ICD-10-CM

## 2022-06-27 DIAGNOSIS — R42 DIZZINESS: ICD-10-CM

## 2022-06-27 DIAGNOSIS — R00.2 PALPITATIONS: ICD-10-CM

## 2022-06-27 DIAGNOSIS — R42 POSTURAL DIZZINESS WITH PRESYNCOPE: ICD-10-CM

## 2022-06-27 DIAGNOSIS — I65.23 BILATERAL CAROTID ARTERY STENOSIS: ICD-10-CM

## 2022-06-27 LAB
BH CV XLRA MEAS LEFT DIST CCA EDV: -20.3 CM/SEC
BH CV XLRA MEAS LEFT DIST CCA PSV: -73 CM/SEC
BH CV XLRA MEAS LEFT DIST ICA EDV: -19.2 CM/SEC
BH CV XLRA MEAS LEFT DIST ICA PSV: -52.7 CM/SEC
BH CV XLRA MEAS LEFT ICA/CCA RATIO: 1.58
BH CV XLRA MEAS LEFT MID ICA EDV: -36 CM/SEC
BH CV XLRA MEAS LEFT MID ICA PSV: -114.9 CM/SEC
BH CV XLRA MEAS LEFT PROX CCA EDV: 23.5 CM/SEC
BH CV XLRA MEAS LEFT PROX CCA PSV: 80.7 CM/SEC
BH CV XLRA MEAS LEFT PROX ECA EDV: -13.7 CM/SEC
BH CV XLRA MEAS LEFT PROX ECA PSV: -65.3 CM/SEC
BH CV XLRA MEAS LEFT PROX ICA EDV: 23.6 CM/SEC
BH CV XLRA MEAS LEFT PROX ICA PSV: 80.7 CM/SEC
BH CV XLRA MEAS LEFT PROX SCLA PSV: 101.1 CM/SEC
BH CV XLRA MEAS LEFT VERTEBRAL A EDV: 13.7 CM/SEC
BH CV XLRA MEAS LEFT VERTEBRAL A PSV: 45.5 CM/SEC
BH CV XLRA MEAS RIGHT DIST CCA EDV: 26.7 CM/SEC
BH CV XLRA MEAS RIGHT DIST CCA PSV: 75.8 CM/SEC
BH CV XLRA MEAS RIGHT DIST ICA EDV: -38.1 CM/SEC
BH CV XLRA MEAS RIGHT DIST ICA PSV: -86.1 CM/SEC
BH CV XLRA MEAS RIGHT ICA/CCA RATIO: 1.19
BH CV XLRA MEAS RIGHT MID ICA EDV: -37 CM/SEC
BH CV XLRA MEAS RIGHT MID ICA PSV: -90.1 CM/SEC
BH CV XLRA MEAS RIGHT PROX CCA EDV: 24.2 CM/SEC
BH CV XLRA MEAS RIGHT PROX CCA PSV: 74.6 CM/SEC
BH CV XLRA MEAS RIGHT PROX ECA EDV: -21 CM/SEC
BH CV XLRA MEAS RIGHT PROX ECA PSV: -119.8 CM/SEC
BH CV XLRA MEAS RIGHT PROX ICA EDV: 29.4 CM/SEC
BH CV XLRA MEAS RIGHT PROX ICA PSV: 88.3 CM/SEC
BH CV XLRA MEAS RIGHT PROX SCLA PSV: 94.3 CM/SEC
BH CV XLRA MEAS RIGHT VERTEBRAL A EDV: 11.7 CM/SEC
BH CV XLRA MEAS RIGHT VERTEBRAL A PSV: 38.3 CM/SEC
LEFT ARM BP: NORMAL MMHG
MAXIMAL PREDICTED HEART RATE: 161 BPM
RIGHT ARM BP: NORMAL MMHG
STRESS TARGET HR: 137 BPM

## 2022-06-27 PROCEDURE — 93880 EXTRACRANIAL BILAT STUDY: CPT

## 2022-07-01 RX ORDER — LORATADINE 10 MG/1
TABLET ORAL
Qty: 30 TABLET | Refills: 0 | Status: SHIPPED | OUTPATIENT
Start: 2022-07-01 | End: 2022-07-28

## 2022-07-05 RX ORDER — LORATADINE 10 MG/1
TABLET ORAL
Qty: 30 TABLET | Refills: 0 | OUTPATIENT
Start: 2022-07-05

## 2022-07-15 ENCOUNTER — TELEPHONE (OUTPATIENT)
Dept: FAMILY MEDICINE CLINIC | Facility: CLINIC | Age: 62
End: 2022-07-15

## 2022-07-27 ENCOUNTER — HOSPITAL ENCOUNTER (OUTPATIENT)
Dept: MRI IMAGING | Facility: HOSPITAL | Age: 62
Discharge: HOME | End: 2022-07-27
Admitting: PHYSICIAN ASSISTANT

## 2022-07-27 DIAGNOSIS — R55 POSTURAL DIZZINESS WITH PRESYNCOPE: ICD-10-CM

## 2022-07-27 DIAGNOSIS — R42 POSTURAL DIZZINESS WITH PRESYNCOPE: ICD-10-CM

## 2022-07-27 DIAGNOSIS — H91.93 BILATERAL HEARING LOSS, UNSPECIFIED HEARING LOSS TYPE: ICD-10-CM

## 2022-07-27 DIAGNOSIS — R42 DIZZINESS: ICD-10-CM

## 2022-07-27 PROCEDURE — 0 GADOBENATE DIMEGLUMINE 529 MG/ML SOLUTION: Performed by: PHYSICIAN ASSISTANT

## 2022-07-27 PROCEDURE — A9577 INJ MULTIHANCE: HCPCS | Performed by: PHYSICIAN ASSISTANT

## 2022-07-27 PROCEDURE — 70553 MRI BRAIN STEM W/O & W/DYE: CPT

## 2022-07-27 RX ADMIN — GADOBENATE DIMEGLUMINE 20 ML: 529 INJECTION, SOLUTION INTRAVENOUS at 12:28

## 2022-07-28 RX ORDER — LORATADINE 10 MG/1
TABLET ORAL
Qty: 30 TABLET | Refills: 0 | Status: SHIPPED | OUTPATIENT
Start: 2022-07-28 | End: 2022-08-24

## 2022-07-30 DIAGNOSIS — R55 POSTURAL DIZZINESS WITH PRESYNCOPE: Primary | ICD-10-CM

## 2022-07-30 DIAGNOSIS — R42 POSTURAL DIZZINESS WITH PRESYNCOPE: Primary | ICD-10-CM

## 2022-07-30 DIAGNOSIS — R42 DIZZINESS: ICD-10-CM

## 2022-07-30 DIAGNOSIS — G47.33 OBSTRUCTIVE SLEEP APNEA: ICD-10-CM

## 2022-07-30 DIAGNOSIS — I10 BENIGN ESSENTIAL HYPERTENSION: ICD-10-CM

## 2022-08-12 DIAGNOSIS — K21.9 GASTROESOPHAGEAL REFLUX DISEASE, UNSPECIFIED WHETHER ESOPHAGITIS PRESENT: ICD-10-CM

## 2022-08-12 DIAGNOSIS — F41.8 MIXED ANXIETY DEPRESSIVE DISORDER: ICD-10-CM

## 2022-08-12 DIAGNOSIS — E03.9 ACQUIRED HYPOTHYROIDISM: ICD-10-CM

## 2022-08-15 RX ORDER — ROSUVASTATIN CALCIUM 40 MG/1
40 TABLET, COATED ORAL
Qty: 30 TABLET | Refills: 1 | Status: SHIPPED | OUTPATIENT
Start: 2022-08-15 | End: 2022-11-11

## 2022-08-15 RX ORDER — FLUOXETINE HYDROCHLORIDE 20 MG/1
60 CAPSULE ORAL EVERY EVENING
Qty: 90 CAPSULE | Refills: 1 | Status: SHIPPED | OUTPATIENT
Start: 2022-08-15

## 2022-08-15 RX ORDER — LEVOTHYROXINE SODIUM 137 UG/1
137 TABLET ORAL DAILY
Qty: 30 TABLET | Refills: 1 | Status: SHIPPED | OUTPATIENT
Start: 2022-08-15 | End: 2022-11-11

## 2022-08-15 RX ORDER — PANTOPRAZOLE SODIUM 40 MG/1
40 TABLET, DELAYED RELEASE ORAL DAILY
Qty: 30 TABLET | Refills: 1 | Status: SHIPPED | OUTPATIENT
Start: 2022-08-15 | End: 2022-11-11

## 2022-08-15 RX ORDER — FAMOTIDINE 20 MG/1
20 TABLET, FILM COATED ORAL
Qty: 30 TABLET | Refills: 1 | Status: SHIPPED | OUTPATIENT
Start: 2022-08-15 | End: 2022-11-11

## 2022-08-15 RX ORDER — LISINOPRIL AND HYDROCHLOROTHIAZIDE 12.5; 1 MG/1; MG/1
1 TABLET ORAL DAILY
Qty: 30 TABLET | Refills: 1 | Status: SHIPPED | OUTPATIENT
Start: 2022-08-15 | End: 2022-11-11

## 2022-08-15 RX ORDER — AMLODIPINE BESYLATE 10 MG/1
10 TABLET ORAL DAILY
Qty: 30 TABLET | Refills: 1 | Status: SHIPPED | OUTPATIENT
Start: 2022-08-15 | End: 2022-11-11

## 2022-08-15 RX ORDER — MONTELUKAST SODIUM 10 MG/1
10 TABLET ORAL
Qty: 30 TABLET | Refills: 1 | Status: SHIPPED | OUTPATIENT
Start: 2022-08-15 | End: 2022-11-28 | Stop reason: SDUPTHER

## 2022-08-24 RX ORDER — LORATADINE 10 MG/1
TABLET ORAL
Qty: 30 TABLET | Refills: 0 | Status: SHIPPED | OUTPATIENT
Start: 2022-08-24 | End: 2022-09-19

## 2022-08-25 ENCOUNTER — OFFICE VISIT (OUTPATIENT)
Dept: ORTHOPEDIC SURGERY | Facility: CLINIC | Age: 62
End: 2022-08-25

## 2022-08-25 VITALS — TEMPERATURE: 97.2 F | HEIGHT: 61 IN | RESPIRATION RATE: 16 BRPM | BODY MASS INDEX: 42.48 KG/M2 | WEIGHT: 225 LBS

## 2022-08-25 DIAGNOSIS — Z96.651 S/P TKR (TOTAL KNEE REPLACEMENT), RIGHT: Primary | ICD-10-CM

## 2022-08-25 DIAGNOSIS — M54.16 LUMBAR RADICULOPATHY: ICD-10-CM

## 2022-08-25 DIAGNOSIS — R52 PAIN: ICD-10-CM

## 2022-08-25 PROCEDURE — 73502 X-RAY EXAM HIP UNI 2-3 VIEWS: CPT | Performed by: NURSE PRACTITIONER

## 2022-08-25 PROCEDURE — 99213 OFFICE O/P EST LOW 20 MIN: CPT | Performed by: NURSE PRACTITIONER

## 2022-08-25 PROCEDURE — 73562 X-RAY EXAM OF KNEE 3: CPT | Performed by: NURSE PRACTITIONER

## 2022-08-25 RX ORDER — FOLIC ACID 1 MG/1
TABLET ORAL
COMMUNITY
Start: 2022-07-08 | End: 2022-10-12 | Stop reason: SDUPTHER

## 2022-08-25 NOTE — PROGRESS NOTES
"Lydia Gutierrez : 1960 MRN: [id number] DATE: 2022    DIAGNOSIS: Annual follow up right total knee      SUBJECTIVE:Patient returns today for a one year follow up of right total knee replacement. Patient reports she is still having a moderate amount of pain to the medial side of her knee.  Patient states this has not progressed or improved since a few weeks after surgery.  Patient describes the pain as a moderate to severe ache however she has sometimes sharp stabbing pain.  Patient states that she cannot stand for long period of time and cannot do any type of prolonged walking without this pain.  Patient reports this pain is pretty severe and rates it as a 7 out of 10.  Patient states her symptoms are worse now than what they were before surgery.  Patient denies any signs or symptoms of infection.  Patient is without any other significant complaints today.    OBJECTIVE:    Temp 97.2 °F (36.2 °C) (Temporal)   Resp 16   Ht 154.9 cm (60.98\")   Wt 102 kg (225 lb)   BMI 42.54 kg/m²   Family History   Problem Relation Age of Onset   • Heart disease Mother    • Glaucoma Mother    • Cancer Mother    • Thyroid disease Sister    • Kidney disease Brother    • Glaucoma Brother    • Asthma Son    • Depression Son    • Bipolar disorder Son    • Hypertension Father    • Malig Hyperthermia Neg Hx      Past Medical History:   Diagnosis Date   • Anxiety disorder    • Arthritis    • Asthma    • Borderline diabetic    • Depression    • GERD (gastroesophageal reflux disease)    • History of kidney stones    • Hyperlipidemia    • Hypertension    • Hypothyroidism    • Right knee pain    • Sleep apnea     NO MACHINE     Past Surgical History:   Procedure Laterality Date   •  SECTION     • CHOLECYSTECTOMY     • EYE SURGERY Left    • JOINT REPLACEMENT Left    • TOTAL KNEE ARTHROPLASTY Left 2020    Procedure: TOTAL KNEE ARTHROPLASTY;  Surgeon: Juan Walsh MD;  Location: [facility];  Service: Orthopedics;  " Laterality: Left;   • TOTAL KNEE ARTHROPLASTY Right 8/9/2021    Procedure: TOTAL KNEE ARTHROPLASTY;  Surgeon: Juan Walsh MD;  Location: [facility];  Service: Orthopedics;  Laterality: Right;     Social History     Socioeconomic History   • Marital status:    Tobacco Use   • Smoking status: Never Smoker   • Smokeless tobacco: Never Used   Vaping Use   • Vaping Use: Never used   Substance and Sexual Activity   • Alcohol use: No   • Drug use: No   • Sexual activity: Defer     Review of Systems: 14 point review of systems performed, all systems negative     Exam:. The incision is well healed. Range of motion is measured at 0 to 120. The calf is soft and nontender with a negative Homans sign. Alignment is neutral. Good quad strength. There is no evidence of varus/valgus or flexion instability. No effusion. Intact to light touch with palpable distal pulses.     Hip:  right    LEG ALIGNMENT:     Neutral   ,    equal leg lengths    GAIT:     Nonantalgic    SKIN:     No abnormality    RANGE OF MOTION:      Full without joint irritability    STRENGTH:     5 / 5    hip flexion and abduction    DISTAL PULSES:    Paplable    DISTAL SENSATION :   Intact    LYMPHATICS:     No   lymphadenopathy    OTHER:          - Negative Stinchfeld test      - Negative log roll      - No Tenderness to palpation trochanteric bursa      - Neg FADIR      - Neg BERONICA      - No SI tenderness       DIAGNOSTIC STUDIES  Xrays: 3 views(AP bilateral knees, lateral right, and sunrise bilateral knees) were ordered and reviewed for evaluation of right knee replacement. They demonstrate a well positioned, well aligned knee replacement without complicating factors noted. In comparison with previous films there has been no change.    2 views right hip (AP and lateral views) were ordered and reviewed for evaluation of right knee/hip pain.  They demonstrate very minimal osteoarthritic changes.  No other hip x-rays were available for comparison  purposes.    ASSESSMENT: Annual follow up right knee replacement     PLAN:    Treatment option as well as imaging results were discussed in detail with the patient.  Patient's knee mechanically functions well and from an imaging standpoint is very well aligned with no complications present.  I did evaluate the patient's hip to see if she potentially had hip arthritis which could explain her symptoms.  Patient had a nonirritable hip upon examination as well as imaging show she has very minimal arthritic changes about the hip.  I consulted with Dr. Walsh on this case about the specific issue and he thinks and feels that this could be radicular in nature as the patient does have a history of back problems.  Since this patient has had this issue ongoing for the last 10 months, we are going to order an MRI of the lumbar spine for further evaluation.  I will review the MRI with Dr. Walsh once we get the images and call the patient back and let her know what the results are as well as his treatment recommendations.  Patient voices understanding and satisfaction of this plan today.    Tha Castro, APRN  8/25/2022

## 2022-09-06 DIAGNOSIS — E53.8 FOLATE DEFICIENCY: ICD-10-CM

## 2022-09-06 DIAGNOSIS — E53.8 B12 DEFICIENCY: ICD-10-CM

## 2022-09-08 RX ORDER — CHOLECALCIFEROL (VITAMIN D3) 125 MCG
500 CAPSULE ORAL DAILY
Qty: 30 TABLET | Refills: 0 | Status: SHIPPED | OUTPATIENT
Start: 2022-09-08 | End: 2022-11-07

## 2022-09-08 RX ORDER — LANOLIN ALCOHOL/MO/W.PET/CERES
400 CREAM (GRAM) TOPICAL DAILY
Qty: 30 TABLET | Refills: 0 | Status: SHIPPED | OUTPATIENT
Start: 2022-09-08 | End: 2022-11-07

## 2022-09-08 NOTE — TELEPHONE ENCOUNTER
Please schedule follow up with me, fasting. She was to follow up with me in 3 months from 3/2022 appt

## 2022-09-19 RX ORDER — LORATADINE 10 MG/1
TABLET ORAL
Qty: 30 TABLET | Refills: 0 | Status: SHIPPED | OUTPATIENT
Start: 2022-09-19 | End: 2022-10-17

## 2022-09-20 DIAGNOSIS — D72.829 LEUKOCYTOSIS, UNSPECIFIED TYPE: ICD-10-CM

## 2022-09-26 RX ORDER — METHOCARBAMOL 750 MG/1
TABLET, FILM COATED ORAL
Qty: 30 TABLET | Refills: 3 | Status: SHIPPED | OUTPATIENT
Start: 2022-09-26 | End: 2023-01-20

## 2022-10-04 DIAGNOSIS — J45.20 MILD INTERMITTENT ASTHMA WITHOUT COMPLICATION: ICD-10-CM

## 2022-10-05 RX ORDER — ALBUTEROL SULFATE 90 UG/1
AEROSOL, METERED RESPIRATORY (INHALATION)
Qty: 18 G | Refills: 0 | Status: SHIPPED | OUTPATIENT
Start: 2022-10-05 | End: 2022-10-17

## 2022-10-08 ENCOUNTER — HOSPITAL ENCOUNTER (OUTPATIENT)
Dept: MRI IMAGING | Facility: HOSPITAL | Age: 62
Discharge: HOME | End: 2022-10-08
Admitting: NURSE PRACTITIONER

## 2022-10-08 DIAGNOSIS — M54.16 LUMBAR RADICULOPATHY: ICD-10-CM

## 2022-10-08 PROCEDURE — 72148 MRI LUMBAR SPINE W/O DYE: CPT

## 2022-10-12 ENCOUNTER — OFFICE VISIT (OUTPATIENT)
Dept: FAMILY MEDICINE CLINIC | Facility: CLINIC | Age: 62
End: 2022-10-12

## 2022-10-12 VITALS
WEIGHT: 220 LBS | HEART RATE: 97 BPM | OXYGEN SATURATION: 93 % | TEMPERATURE: 97.1 F | DIASTOLIC BLOOD PRESSURE: 62 MMHG | HEIGHT: 61 IN | BODY MASS INDEX: 41.54 KG/M2 | SYSTOLIC BLOOD PRESSURE: 118 MMHG

## 2022-10-12 DIAGNOSIS — G47.33 OBSTRUCTIVE SLEEP APNEA: ICD-10-CM

## 2022-10-12 DIAGNOSIS — D72.829 LEUKOCYTOSIS, UNSPECIFIED TYPE: ICD-10-CM

## 2022-10-12 DIAGNOSIS — J30.9 ALLERGIC RHINITIS, UNSPECIFIED SEASONALITY, UNSPECIFIED TRIGGER: ICD-10-CM

## 2022-10-12 DIAGNOSIS — I65.23 BILATERAL CAROTID ARTERY STENOSIS: ICD-10-CM

## 2022-10-12 DIAGNOSIS — M25.561 CHRONIC PAIN OF BOTH KNEES: ICD-10-CM

## 2022-10-12 DIAGNOSIS — E11.9 NEW ONSET TYPE 2 DIABETES MELLITUS: Primary | ICD-10-CM

## 2022-10-12 DIAGNOSIS — R42 DIZZINESS: ICD-10-CM

## 2022-10-12 DIAGNOSIS — K21.9 GASTROESOPHAGEAL REFLUX DISEASE, UNSPECIFIED WHETHER ESOPHAGITIS PRESENT: ICD-10-CM

## 2022-10-12 DIAGNOSIS — R00.0 TACHYCARDIA: ICD-10-CM

## 2022-10-12 DIAGNOSIS — E53.8 B12 DEFICIENCY: ICD-10-CM

## 2022-10-12 DIAGNOSIS — J45.20 MILD INTERMITTENT ASTHMA WITHOUT COMPLICATION: ICD-10-CM

## 2022-10-12 DIAGNOSIS — R20.2 NUMBNESS AND TINGLING OF UPPER AND LOWER EXTREMITIES OF BOTH SIDES: ICD-10-CM

## 2022-10-12 DIAGNOSIS — G89.29 CHRONIC PAIN OF BOTH KNEES: ICD-10-CM

## 2022-10-12 DIAGNOSIS — Z23 NEED FOR INFLUENZA VACCINATION: ICD-10-CM

## 2022-10-12 DIAGNOSIS — I10 BENIGN ESSENTIAL HYPERTENSION: ICD-10-CM

## 2022-10-12 DIAGNOSIS — R21 RASH: ICD-10-CM

## 2022-10-12 DIAGNOSIS — E03.9 ACQUIRED HYPOTHYROIDISM: ICD-10-CM

## 2022-10-12 DIAGNOSIS — E53.8 FOLATE DEFICIENCY: ICD-10-CM

## 2022-10-12 DIAGNOSIS — R00.2 PALPITATIONS: ICD-10-CM

## 2022-10-12 DIAGNOSIS — R79.89 ABNORMAL LIVER FUNCTION TESTS: ICD-10-CM

## 2022-10-12 DIAGNOSIS — E78.2 MIXED HYPERLIPIDEMIA: ICD-10-CM

## 2022-10-12 DIAGNOSIS — R20.0 NUMBNESS AND TINGLING OF UPPER AND LOWER EXTREMITIES OF BOTH SIDES: ICD-10-CM

## 2022-10-12 DIAGNOSIS — M25.562 CHRONIC PAIN OF BOTH KNEES: ICD-10-CM

## 2022-10-12 DIAGNOSIS — E55.9 VITAMIN D DEFICIENCY: ICD-10-CM

## 2022-10-12 DIAGNOSIS — R06.02 SHORTNESS OF BREATH: ICD-10-CM

## 2022-10-12 DIAGNOSIS — F41.8 MIXED ANXIETY DEPRESSIVE DISORDER: ICD-10-CM

## 2022-10-12 PROCEDURE — 90686 IIV4 VACC NO PRSV 0.5 ML IM: CPT | Performed by: PHYSICIAN ASSISTANT

## 2022-10-12 PROCEDURE — 99214 OFFICE O/P EST MOD 30 MIN: CPT | Performed by: PHYSICIAN ASSISTANT

## 2022-10-12 PROCEDURE — 90471 IMMUNIZATION ADMIN: CPT | Performed by: PHYSICIAN ASSISTANT

## 2022-10-12 RX ORDER — LANOLIN ALCOHOL/MO/W.PET/CERES
CREAM (GRAM) TOPICAL
COMMUNITY
Start: 2022-09-08 | End: 2022-10-12 | Stop reason: SDUPTHER

## 2022-10-12 NOTE — PROGRESS NOTES
Subjective   Lydia Gutierrez is a 62 y.o. female who presents today in follow up of diabetes, hypertension, hyperlipidemia, hypothyroidism, vitamin D and vitamin B12 deficiencies, GERD, asthma, allergic rhinitis, BREN, depression and anxiety, arthritis, numbness and tingling in her hands and feet, dizziness, palpitations, tachycardia, and rash.     HPI     Cramping when she has to void- started 3 weeks ago. When it starts to hurt, she has to void. She has low back pain as well. Almost feels like menstrual cramps. She has no burning with urination. If drinks more water, worse symptoms. It is intermittent. A little better than when it started 3 weeks ago. She could not sit up initially and had fever. She would see lumps of something in the toilet. Did not feel anything pass. Last fever- a week or so ago.     More SOA when getting up and moving around. She has had more trouble about 1 month ago. Aware of it now. She is wearing out easier. When she gets up to let the dog out, she is out of breath. Using inhaler and helps some.     Diabetes mellitus- started Metformin 500 mg twice daily 6/2022. No significant weight loss. She is to take twice daily. She had diarrhea and upset stomach with twice daily and can only take once daily. Better with once daily.   Last eye exam- [facility]- last appt 10/3/2022- left eye with retina tear- another tear. Wants to see retina specialist but she was not ready. No DMR.   Hypertension-has been stable on Norvasc and lisinopril HCTZ. Does not check at home- has no machine.   Hyperlipidemia- taking Crestor and tolerating well. She has not made changes for diet/ exercise.    Thyroid- no symptoms.  Feels has been controlled on levothyroxine. No signs or symptoms of hyperthyroidism or hypothyroidism.  Vitamin D-  Has been out of vit D for a couple weeks. Has not restarted vit D.    B12- increased B12 100 mcg to 500 mcg daily- low with labs 2/2021.   Folate deficiency- Advised to  "take folic acid 400 mcg daily. Taking folic acid as directed.   Leukocytosis- no signs or symptoms of illness or malignancy.   Elevated liver function tests- NSAIDs- Ibuprofen 800 mg twice daily, Tylenol- none, alcohol- none.     GERD- controlled on Protonix without breakthrough symptoms. Knows it if she misses it- has stomach pain. As long as she takes it, she is ok.   Asthma- continues Singulair daily and Ventolin as needed. She has been better since moving out of previous molded apartment   Allergic Rhinitis- controlled with Flonase twice daily.   BREN- not using CPAP nightly. They wanted to do another sleep study and insurance would not cover it.     Depression and anxiety- taking Prozac 40 mg once daily. When she decreased 60 mg to 40 mg, she had some improvement in anxiety but she has increased anxiety about driving. She cannot handle getting on interstate- nervous when she rides or drives and cannot go on the expressway. Started about 1-2 years ago when she quit her job. She goes to the store and runs in and runs out and hopes no one sees her. Occurred about the same time. When she goes to the store she used to work, they stop and want to talk and she feels like she has to get home. Sisters on medication for anxiety. She may be able to find out. Cousin put on something after her daughter .   Weaning down to 20 mg once daily. Felt like was going \"nuts\"- she is better about going places but has to be with her  or kids. If she is around too many people, she feels sick. The idea of going anywhere makes her sick. Started about 1 year ago after losing her job, her dog dying, and knee surgery. She has to sit with her legs straight and bending. Works with bike and exercises and still has tightness behind both knees.   Was on Zoloft in the past- she reports she was always angry long ago. She reports it settled her down and was able to deal with her kids ok. She reports no AE and worked well then it stopped " working. No other medications for moods. No one has been on medication that worked well. Not sure about her sister. Son was on medication but he stopped and is dealing with it the best he can. Never found the right medication.   · She went back to work at the [employer] part time because they will work with her schedule- working 2 days per week.   · She underwent knee surgery to be able to work full time again and avoid disability.   · Living in [facility] with her  and son & is doing well. 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 Driving son's car now.   · Moods were controlled on Prozac 40 mg daily then she was more depressed- upset easier. Not sure it was working well.  Feeling drained and tired all the time. She would try to drink anything to get energy. Not wanting to leave the house or get out bed. She had to have her dog put down- seemed to have worsening when she had to put her dog down. Has another dog but still has been more depressed. Increased to 60 mg.     Knee pain/arthritis-  She was last seen by Ortho 10/2021- they reported she was doing well and progressing. She never returned to work following surgery, reporting she is unable to stand for a long time. She has pain in bilateral knees- lateral knee bilaterally. She is unable to stand- feels like the lock. She will try to walk away and her leg is stiff if she is standing at a store. She also has stiffness if she is laying with her legs straight for too long.   · At prior appt, she reported stiffness of her left knee but that her pain improved. She reported she had to quit her job- right knee was preventing her from working.   · Total knee replacement 12/7/2020- Dr Juan Walsh.     Numbness, tingling bilateral upper and lower extremities-   Pins and needles bilateral hands and feet sometimes at night- when relaxes, feels it. She has to move her legs to try to help. Improved some- still has but not as much.  "    Dizziness, palpitations, tachycardia- improved with DM medication.   Dizziness only every now and then depending on how she turns her head. Lasts a couple seconds then resolves. Dizziness is more when she turns her head and when she is driving and turns her head. Worse with turning to the right. When she is going down steps and has to look down, she gets dizzy.  It is not when her heart is racing or having heart symptoms.   sometimes she does feel her heart beat out of control only x 1-2 when sitting still. Lasts up to 5 minutes then resolves. She has also had pain in left jaws up to ear x 1 month. She also feels like she has a lump in her throat x 2 weeks. She was told by someone that is how they felt with MI. She is concerned. Due for carotid duplex. Had some GERD symptoms as well- felt like it was coming up through her throat. No significant chest pain. No change in SOA. No associated nausea, vomiting, sweating. Not having dizziness.   · 2/2021- Patient had stopped all medication- She had \"a spell\" a few nights prior with feeling dizzy in the bathroom while taking a shower. When she gets out of the shower, she usually has a rapid heart beat and breathes hard. However, with this episode, she couldn't feel her whole body and was breathing shallow. This resolved with laying down for some time. When she was home, her head was stopped up and when she gets to work, it \"loosens up\".   · She was tachycardic in office and EKG was slightly abnormal. I referred to cardiology for evaluation and further workup as they determine appropriate. I also referred for for MRI brain.   · MRI brain, IAC- 7/2022- small vessel ischemic disease, fluid throughout mastoid air cells on the right.  Otherwise negative.  · Carotid duplex-6/2022- bilateral mild carotid artery stenosis-16 to 49%.  · Referred to cardiology and neurology.  Patient canceled appointment and did not reschedule.  Carotid stenosis-6/2022-mild bilateral carotid " artery stenosis-16 to 49%.    Rash- rash improved then recurred. Treated with alcohol and resolved. Lasted 1 week but alcohol helped the itching.   she started last week on neck- felt like bumps and scratched- irritated and now down right chest and right shoulder and axilla.     Laser surgery for pterygium on right eye. Couldn't see to drive or work but now has improved.     Patient's specialists:  Cardiology- Dr. Mitchell, Liz Vee, KELLY- last appointment 5/2021 for neck pain radiating to jaw and shoulder, hypertension, hyperlipidemia, BREN (not treated), history of benign palpitations, mild bilateral carotid artery stenosis.  Advised stress test and echo prior to knee surgery.  Referred back 6/2022 with appointment 8/2022.  Patient canceled appointment and did not reschedule.  Neurology- Dr. De Oliveira-referred 6/2022 with appointment 7/2022.  Patient canceled and did not reschedule.  Orthopedic surgery- Dr. Juan Walsh, Tha Castro, KELLY- last appointment 8/2022 for follow-up right TKR.  Knee mechanically functions well and is well aligned per imaging.  Evaluated hip for possible hip arthritis to explain symptoms with nonirritable hip on exam and very minimal arthritic changes.  Consulted Dr. Walsh and thought symptoms may be radicular from lumbar spine.  Referred for MRI lumbar spine.  MRI lumbar spine with grade 1 degenerative anterior spondylolisthesis L4 on L5 and L5 on S1 by 3 mm, mild bilateral foraminal narrowing, degenerative changes L4-5, L5-S1.  No significant canal or foraminal stenosis otherwise.        The following portions of the patient's history were reviewed and updated as appropriate: allergies, current medications, past family history, past medical history, past social history, past surgical history and problem list.    Review of Systems   Constitutional: Negative.    HENT: Negative.    Eyes: Negative.    Respiratory: Negative.    Cardiovascular: Positive for palpitations.   Gastrointestinal:  Negative.         GERD, regurgitation   Endocrine: Negative.    Genitourinary: Negative.    Musculoskeletal: Positive for arthralgias and gait problem.   Skin: Positive for rash.   Neurological: Positive for dizziness and numbness.   Hematological: Negative.    Psychiatric/Behavioral: Positive for agitation and dysphoric mood. The patient is nervous/anxious.        Objective    Vitals:    10/12/22 1005   BP: 118/62   Pulse: 97   Temp: 97.1 °F (36.2 °C)   SpO2: 93%     Body mass index is 41.59 kg/m².    Physical Exam   Constitutional: She is oriented to person, place, and time. She appears well-developed. No distress.   HENT:   Head: Normocephalic and atraumatic.   Right Ear: External ear normal.   Left Ear: External ear normal.   Nose: Nose normal.   Eyes: Conjunctivae and lids are normal.   Neck: Carotid bruit is not present.   Cardiovascular: Normal rate, regular rhythm, normal heart sounds and normal pulses. Exam reveals no gallop and no friction rub.   No murmur heard.  Pulmonary/Chest: Effort normal and breath sounds normal. No respiratory distress. She has no wheezes. She has no rhonchi. She has no rales.   Abdominal: Normal appearance.   Musculoskeletal: No deformity.   Neurological: She is alert and oriented to person, place, and time.   Skin: Skin is warm and dry.   Psychiatric: Her speech is normal and behavior is normal. Mood, affect and thought content normal.   Nursing note and vitals reviewed.      Assessment & Plan   Diagnoses and all orders for this visit:    1. New onset type 2 diabetes mellitus (HCC) (Primary)  -     Comprehensive Metabolic Panel  -     Hemoglobin A1c  -     Uric Acid  -     Urinalysis With Culture If Indicated -  -     Microalbumin / Creatinine Urine Ratio - Urine, Clean Catch    2. Benign essential hypertension  -     Comprehensive Metabolic Panel  -     Urinalysis With Culture If Indicated -  -     Microalbumin / Creatinine Urine Ratio - Urine, Clean Catch    3. Mixed  hyperlipidemia  -     Comprehensive Metabolic Panel  -     CK  -     Lipid Panel With LDL / HDL Ratio    4. Acquired hypothyroidism  -     TSH  -     T4, free  -     T3, Free    5. Vitamin D deficiency  -     Comprehensive Metabolic Panel  -     Vitamin D 25 Hydroxy    6. B12 deficiency  -     CBC & Differential  -     Vitamin B12 & Folate    7. Folate deficiency  -     CBC & Differential  -     Vitamin B12 & Folate    8. Leukocytosis, unspecified type  -     CBC & Differential    9. Abnormal liver function tests  -     Comprehensive Metabolic Panel  -     Iron and TIBC  -     Ferritin    10. Gastroesophageal reflux disease, unspecified whether esophagitis present    11. Mild intermittent asthma without complication  -     Ambulatory Referral to Pulmonology    12. Allergic rhinitis, unspecified seasonality, unspecified trigger    13. Obstructive sleep apnea  -     Ambulatory Referral to Pulmonology    14. Mixed anxiety depressive disorder    15. Chronic pain of both knees    16. Numbness and tingling of upper and lower extremities of both sides    17. Dizziness    18. Palpitations    19. Tachycardia  -     Ambulatory Referral to Pulmonology    20. Bilateral carotid artery stenosis    21. Rash    22. Need for influenza vaccination  -     Cancel: FluLaval/Fluzone >6 mos ([id number])  -     FluLaval/Fluarix/Fluzone >6 Months    23. Shortness of breath  -     Ambulatory Referral to Pulmonology    Other orders  -     Microscopic Examination -  -     Urine culture, Comprehensive - ,       Assessment and Plan  Patient will have fasting labs. Call if no results in 1 week. Stability of conditions, plan, follow up, and further recommendations pending labs. Follow up in 3-6 months if labs are stable.     · Diabetes mellitus type 2- Prediabetes with increased A1c 6/2022 to 6.9%.  She was started on metformin 500 mg twice daily, referred to optometry and diabetes education.  · Hypertension- Blood pressure is stable today but her  pulse is elevated.  Continue Norvasc 10 mg once daily and lisinopril HCTZ 10/12.5 mg once daily.  Patient to monitor blood pressure and pulse and call or return if elevated or low.  · Hyperlipidemia- Last lipids with continued hypertriglyceridemia and increased about 30 points in 1-1/2 years. Continue Crestor 40 mg at bedtime and work diligently on diet and exercise.   · Hypothyroidism- Thyroid has been stable. Continue levothyroxine 137 mcg daily. Further recommendations pending labs.  · Vitamin D deficiency- Dosing recommendations pending labs.  · B12 deficiency- Continue B12 500 mcg daily. Await labs for further recommendations.   · Folate deficiency- Continue folic acid 400 mcg daily. Further recommendations pending labs.    · Leukocytosis- Consider hematology referral if persistent elevation.   · Elevated liver function tests- Avoid NSAIDS and alcohol and limit Tylenol to 2 grams daily.  · GERD- Symptoms are controlled but she does have breakthrough if she misses a dose. Continue Protonix 40 mg once daily and add Pepcid 20 mg 1-2 x daily. Consider GI follow up with EGD if no resolution, worsening, new, or changing symptoms.     · Asthma-Patient has had more shortness of air with exertion.  She needs to see pulmonology in follow-up.  I will refer today.  Continue Singulair 10 mg at bedtime.  To be seen ASAP if worsening, new or changing symptoms.  · Allergic Rhinitis- Allergy symptoms improved with getting out of molded apartment. Continue Flonase 1 sprays in each nostril twice daily.   · BREN- Patient has not been using her CPAP, reporting her mask broke.  She has not been seen by sleep medicine in over a year either.  I referred back to sleep medicine but she did not go for appt. I will refer to pulmonology today with shortness of air, tachycardia, and untreated BREN.  She will need follow up with sleep medicine and ensure compliance with CPAP, as this could contribute to symptoms.   · Major Depressive Disorder  and anxiety- Moods are stable. She was previously feeling fatigued, decreased motivation, and increased irritability. We increased Prozac to 60 mg once daily, which helped moods some. I discussed with her that her moods tend to be worse when she is not working. She reported being unable to work with knee pain. I discussed returning to orthopedic surgery to discuss her pain and try to consider returning to work. She should consider counseling to see if this helps her stressors as well. To be seen ASAP if worsening moods or AE with medication.   · OA knee s/p TKR- Patient reports being unable to work with knee pain. She has not followed with orthopedic surgery since 10/2021. She should follow up with orthopedic surgery and discuss treatment recommendations to allow better healing and return to work.    · Dizziness, palpitations, left jaw and ear pain, lump in throat sensation- She developed symptoms of dizziness, episodes of heart racing, and concerning symptoms for angina. I referred to cardiology with negative testing and added H2 blocker to PPI with consideration of esophageal spasm. She also needs to be compliant with CPAP use and return to sleep medicine.  I will refer to pulmonology today.  · Rash- Rash appears consistent with HZV, as it was unilateral and down her neck and chest.  I treated with Medrol Dosepak and Valtrex 1 g 3 times daily for 10 days.  Patient to be seen ASAP if worsening, new or changing symptoms or to be seen if no resolution of symptoms.  · Paresthesias, neuropathy, restless leg symptoms- I will have patient check fasting labs.  If no etiology of symptoms, we will need to ensure tight glucose control and consider referral for EMG/NCS.  · Dysuria, low back pain-I will check urine and make further recommendations.    I spent 30 minutes caring for Lydia Gutierrez on this date of service. This time includes time spent by me in the following activities as necessary: preparing for the visit,  reviewing tests, specialists records and previous visits, obtaining and/or reviewing a separately obtained history, performing a medically appropriate exam and/or evaluation, counseling and educating the patient, family, caregiver, referring and/or communicating with other healthcare professionals, documenting information in the medical record, independently interpreting results and communicating that information with the patient, family, caregiver, and developing a medically appropriate treatment plan with consideration of other conditions, medications, and treatments.

## 2022-10-14 DIAGNOSIS — M54.16 LUMBAR RADICULOPATHY: Primary | ICD-10-CM

## 2022-10-14 NOTE — PROGRESS NOTES
I called and spoke to the patient in regards to their MRI results.  Does have some degenerative changes to the spine with an area of concern at L5-S1 in which there is moderate right and severe left facet hypertrophic changes that narrows the neuronal foraminal bilaterally.  Dr. Walsh did recommend sending the patient for a lumbar epidural and then following up with orthospine.  Patient states that she does not really want to do the epidural at this time and would rather sit down with orthospine and discuss further treatment options.  Therefore we will refer the patient to our orthopedic spine nurse practitioner for further evaluation.

## 2022-10-16 LAB
25(OH)D3+25(OH)D2 SERPL-MCNC: 30.5 NG/ML (ref 30–100)
ALBUMIN SERPL-MCNC: 4.6 G/DL (ref 3.5–5.2)
ALBUMIN/CREAT UR: 12 MG/G CREAT (ref 0–29)
ALBUMIN/GLOB SERPL: 2.2 G/DL
ALP SERPL-CCNC: 106 U/L (ref 39–117)
ALT SERPL-CCNC: 29 U/L (ref 1–33)
APPEARANCE UR: CLEAR
AST SERPL-CCNC: 30 U/L (ref 1–32)
BACTERIA #/AREA URNS HPF: ABNORMAL /HPF
BACTERIA UR CULT: ABNORMAL
BACTERIA UR CULT: ABNORMAL
BASOPHILS # BLD AUTO: 0.04 10*3/MM3 (ref 0–0.2)
BASOPHILS NFR BLD AUTO: 0.5 % (ref 0–1.5)
BILIRUB SERPL-MCNC: 0.3 MG/DL (ref 0–1.2)
BILIRUB UR QL STRIP: NEGATIVE
BUN SERPL-MCNC: 17 MG/DL (ref 8–23)
BUN/CREAT SERPL: 23.9 (ref 7–25)
CALCIUM SERPL-MCNC: 9.7 MG/DL (ref 8.6–10.5)
CASTS URNS QL MICRO: ABNORMAL /LPF
CHLORIDE SERPL-SCNC: 102 MMOL/L (ref 98–107)
CHOLEST SERPL-MCNC: 156 MG/DL (ref 0–200)
CK SERPL-CCNC: 46 U/L (ref 20–180)
CO2 SERPL-SCNC: 26.4 MMOL/L (ref 22–29)
COLOR UR: YELLOW
CREAT SERPL-MCNC: 0.71 MG/DL (ref 0.57–1)
CREAT UR-MCNC: 251 MG/DL
EGFRCR SERPLBLD CKD-EPI 2021: 97.5 ML/MIN/1.73
EOSINOPHIL # BLD AUTO: 0.24 10*3/MM3 (ref 0–0.4)
EOSINOPHIL NFR BLD AUTO: 2.7 % (ref 0.3–6.2)
EPI CELLS #/AREA URNS HPF: ABNORMAL /HPF (ref 0–10)
ERYTHROCYTE [DISTWIDTH] IN BLOOD BY AUTOMATED COUNT: 12.9 % (ref 12.3–15.4)
FERRITIN SERPL-MCNC: 60.3 NG/ML (ref 13–150)
FOLATE SERPL-MCNC: 17 NG/ML (ref 4.78–24.2)
GLOBULIN SER CALC-MCNC: 2.1 GM/DL
GLUCOSE SERPL-MCNC: 127 MG/DL (ref 65–99)
GLUCOSE UR QL STRIP: NEGATIVE
HBA1C MFR BLD: 6.8 % (ref 4.8–5.6)
HCT VFR BLD AUTO: 41.3 % (ref 34–46.6)
HDLC SERPL-MCNC: 46 MG/DL (ref 40–60)
HGB BLD-MCNC: 13.9 G/DL (ref 12–15.9)
HGB UR QL STRIP: NEGATIVE
IMM GRANULOCYTES # BLD AUTO: 0.03 10*3/MM3 (ref 0–0.05)
IMM GRANULOCYTES NFR BLD AUTO: 0.3 % (ref 0–0.5)
IRON SATN MFR SERPL: 14 % (ref 20–50)
IRON SERPL-MCNC: 71 MCG/DL (ref 37–145)
KETONES UR QL STRIP: ABNORMAL
LDLC SERPL CALC-MCNC: 80 MG/DL (ref 0–100)
LDLC/HDLC SERPL: 1.63 {RATIO}
LEUKOCYTE ESTERASE UR QL STRIP: ABNORMAL
LYMPHOCYTES # BLD AUTO: 1.75 10*3/MM3 (ref 0.7–3.1)
LYMPHOCYTES NFR BLD AUTO: 19.8 % (ref 19.6–45.3)
MCH RBC QN AUTO: 29.3 PG (ref 26.6–33)
MCHC RBC AUTO-ENTMCNC: 33.7 G/DL (ref 31.5–35.7)
MCV RBC AUTO: 87.1 FL (ref 79–97)
MICRO URNS: ABNORMAL
MICROALBUMIN UR-MCNC: 30 UG/ML
MONOCYTES # BLD AUTO: 0.61 10*3/MM3 (ref 0.1–0.9)
MONOCYTES NFR BLD AUTO: 6.9 % (ref 5–12)
MUCOUS THREADS URNS QL MICRO: PRESENT /HPF
NEUTROPHILS # BLD AUTO: 6.16 10*3/MM3 (ref 1.7–7)
NEUTROPHILS NFR BLD AUTO: 69.8 % (ref 42.7–76)
NITRITE UR QL STRIP: NEGATIVE
NRBC BLD AUTO-RTO: 0 /100 WBC (ref 0–0.2)
OTHER ANTIBIOTIC SUSC ISLT: ABNORMAL
PH UR STRIP: 5.5 [PH] (ref 5–7.5)
PLATELET # BLD AUTO: 415 10*3/MM3 (ref 140–450)
POTASSIUM SERPL-SCNC: 4.3 MMOL/L (ref 3.5–5.2)
PROT SERPL-MCNC: 6.7 G/DL (ref 6–8.5)
PROT UR QL STRIP: ABNORMAL
RBC # BLD AUTO: 4.74 10*6/MM3 (ref 3.77–5.28)
RBC #/AREA URNS HPF: ABNORMAL /HPF (ref 0–2)
SODIUM SERPL-SCNC: 141 MMOL/L (ref 136–145)
SP GR UR STRIP: 1.02 (ref 1–1.03)
T3FREE SERPL-MCNC: 2.6 PG/ML (ref 2–4.4)
T4 FREE SERPL-MCNC: 1.35 NG/DL (ref 0.93–1.7)
TIBC SERPL-MCNC: 523 MCG/DL
TRIGL SERPL-MCNC: 175 MG/DL (ref 0–150)
TSH SERPL DL<=0.005 MIU/L-ACNC: 1.55 UIU/ML (ref 0.27–4.2)
UIBC SERPL-MCNC: 452 MCG/DL (ref 112–346)
URATE SERPL-MCNC: 4.1 MG/DL (ref 2.4–5.7)
URINALYSIS REFLEX: ABNORMAL
UROBILINOGEN UR STRIP-MCNC: 0.2 MG/DL (ref 0.2–1)
VIT B12 SERPL-MCNC: 1745 PG/ML (ref 211–946)
VLDLC SERPL CALC-MCNC: 30 MG/DL (ref 5–40)
WBC # BLD AUTO: 8.83 10*3/MM3 (ref 3.4–10.8)
WBC #/AREA URNS HPF: ABNORMAL /HPF (ref 0–5)

## 2022-10-17 ENCOUNTER — TELEPHONE (OUTPATIENT)
Dept: ORTHOPEDIC SURGERY | Facility: CLINIC | Age: 62
End: 2022-10-17

## 2022-10-17 DIAGNOSIS — J45.20 MILD INTERMITTENT ASTHMA WITHOUT COMPLICATION: ICD-10-CM

## 2022-10-17 RX ORDER — LORATADINE 10 MG/1
TABLET ORAL
Qty: 30 TABLET | Refills: 0 | Status: SHIPPED | OUTPATIENT
Start: 2022-10-17 | End: 2022-11-14

## 2022-10-17 RX ORDER — ALBUTEROL SULFATE 90 UG/1
AEROSOL, METERED RESPIRATORY (INHALATION)
Qty: 18 G | Refills: 0 | Status: SHIPPED | OUTPATIENT
Start: 2022-10-17

## 2022-10-25 DIAGNOSIS — N39.0 URINARY TRACT INFECTION WITHOUT HEMATURIA, SITE UNSPECIFIED: ICD-10-CM

## 2022-10-25 DIAGNOSIS — E11.9 NEW ONSET TYPE 2 DIABETES MELLITUS: Primary | ICD-10-CM

## 2022-10-25 RX ORDER — SEMAGLUTIDE 1.34 MG/ML
0.25 INJECTION, SOLUTION SUBCUTANEOUS WEEKLY
Qty: 1.5 ML | Refills: 0 | Status: SHIPPED | OUTPATIENT
Start: 2022-10-25 | End: 2022-12-05

## 2022-10-25 RX ORDER — NITROFURANTOIN 25; 75 MG/1; MG/1
100 CAPSULE ORAL 2 TIMES DAILY
Qty: 14 CAPSULE | Refills: 0 | Status: SHIPPED | OUTPATIENT
Start: 2022-10-25

## 2022-10-26 ENCOUNTER — PRIOR AUTHORIZATION (OUTPATIENT)
Dept: FAMILY MEDICINE CLINIC | Facility: CLINIC | Age: 62
End: 2022-10-26

## 2022-10-26 NOTE — TELEPHONE ENCOUNTER
PA for Ozempic      APPROVED    The request has been approved. The authorization is effective for a maximum of 12 fills from 10/26/2022 to 10/25/2023, as long as the member is enrolled in their current health plan. The request was approved as submitted. A written notification letter will follow with additional details.    Heidi

## 2022-10-27 ENCOUNTER — TELEPHONE (OUTPATIENT)
Dept: FAMILY MEDICINE CLINIC | Facility: CLINIC | Age: 62
End: 2022-10-27

## 2022-10-27 DIAGNOSIS — E11.9 NEW ONSET TYPE 2 DIABETES MELLITUS: Primary | ICD-10-CM

## 2022-10-28 RX ORDER — LANCETS
EACH MISCELLANEOUS
Qty: 100 EACH | Refills: 0 | Status: SHIPPED | OUTPATIENT
Start: 2022-10-28 | End: 2023-02-02 | Stop reason: SDUPTHER

## 2022-10-28 RX ORDER — BLOOD-GLUCOSE METER
EACH MISCELLANEOUS
Qty: 1 KIT | Refills: 0 | Status: SHIPPED | OUTPATIENT
Start: 2022-10-28 | End: 2022-11-28

## 2022-11-04 ENCOUNTER — TELEPHONE (OUTPATIENT)
Dept: ORTHOPEDIC SURGERY | Facility: CLINIC | Age: 62
End: 2022-11-04

## 2022-11-04 DIAGNOSIS — E53.8 FOLATE DEFICIENCY: ICD-10-CM

## 2022-11-04 DIAGNOSIS — E53.8 B12 DEFICIENCY: ICD-10-CM

## 2022-11-04 NOTE — TELEPHONE ENCOUNTER
Caller: MANDY WALTON    Relationship to patient: SELF     Best call back number: [phone]    Type of visit: NEW PATIENT    If rescheduling, when is the original appointment: 11/04/2022    Additional notes:ATTEMPTED TO WARM TRANSFER FOR SCHEDULE, UNSUCCESSFUL

## 2022-11-07 RX ORDER — LANOLIN ALCOHOL/MO/W.PET/CERES
400 CREAM (GRAM) TOPICAL DAILY
Qty: 30 TABLET | Refills: 3 | Status: SHIPPED | OUTPATIENT
Start: 2022-11-07 | End: 2023-03-03

## 2022-11-07 RX ORDER — CHOLECALCIFEROL (VITAMIN D3) 125 MCG
500 CAPSULE ORAL DAILY
Qty: 30 TABLET | Refills: 3 | Status: SHIPPED | OUTPATIENT
Start: 2022-11-07 | End: 2023-03-03

## 2022-11-11 DIAGNOSIS — F41.8 MIXED ANXIETY DEPRESSIVE DISORDER: ICD-10-CM

## 2022-11-11 DIAGNOSIS — K21.9 GASTROESOPHAGEAL REFLUX DISEASE, UNSPECIFIED WHETHER ESOPHAGITIS PRESENT: ICD-10-CM

## 2022-11-11 DIAGNOSIS — E03.9 ACQUIRED HYPOTHYROIDISM: ICD-10-CM

## 2022-11-11 RX ORDER — LEVOTHYROXINE SODIUM 137 UG/1
137 TABLET ORAL DAILY
Qty: 30 TABLET | Refills: 1 | Status: SHIPPED | OUTPATIENT
Start: 2022-11-11 | End: 2023-01-06

## 2022-11-11 RX ORDER — ROSUVASTATIN CALCIUM 40 MG/1
40 TABLET, COATED ORAL
Qty: 30 TABLET | Refills: 1 | Status: SHIPPED | OUTPATIENT
Start: 2022-11-11 | End: 2023-01-06

## 2022-11-11 RX ORDER — FAMOTIDINE 20 MG/1
20 TABLET, FILM COATED ORAL
Qty: 30 TABLET | Refills: 1 | Status: SHIPPED | OUTPATIENT
Start: 2022-11-11 | End: 2023-01-06

## 2022-11-11 RX ORDER — LISINOPRIL AND HYDROCHLOROTHIAZIDE 12.5; 1 MG/1; MG/1
1 TABLET ORAL DAILY
Qty: 30 TABLET | Refills: 1 | Status: SHIPPED | OUTPATIENT
Start: 2022-11-11 | End: 2023-01-06

## 2022-11-11 RX ORDER — PANTOPRAZOLE SODIUM 40 MG/1
40 TABLET, DELAYED RELEASE ORAL DAILY
Qty: 30 TABLET | Refills: 1 | Status: SHIPPED | OUTPATIENT
Start: 2022-11-11 | End: 2023-01-06

## 2022-11-11 RX ORDER — AMLODIPINE BESYLATE 10 MG/1
10 TABLET ORAL DAILY
Qty: 30 TABLET | Refills: 1 | Status: SHIPPED | OUTPATIENT
Start: 2022-11-11 | End: 2023-01-06

## 2022-11-14 RX ORDER — LORATADINE 10 MG/1
TABLET ORAL
Qty: 30 TABLET | Refills: 0 | Status: SHIPPED | OUTPATIENT
Start: 2022-11-14 | End: 2022-12-12

## 2022-11-16 DIAGNOSIS — E11.9 NEW ONSET TYPE 2 DIABETES MELLITUS: ICD-10-CM

## 2022-11-16 RX ORDER — SEMAGLUTIDE 1.34 MG/ML
0.25 INJECTION, SOLUTION SUBCUTANEOUS WEEKLY
Qty: 1.5 ML | Refills: 0 | OUTPATIENT
Start: 2022-11-16

## 2022-11-16 NOTE — TELEPHONE ENCOUNTER
Caller: Mandy uGtierrez    Relationship: Self    Best call back number: [phone]   Requested Prescriptions:   Requested Prescriptions     Pending Prescriptions Disp Refills   • Semaglutide,0.25 or 0.5MG/DOS, (Ozempic, 0.25 or 0.5 MG/DOSE,) 2 MG/1.5ML solution pen-injector 1.5 mL 0     Sig: Inject 0.25 mg under the skin into the appropriate area as directed 1 (One) Time Per Week.        [facility]arm[facility] where [facility] sent[facility] - [address] - [phone]  - [phone] FX     Additional details provided by patient: MANDY SAYS SHE HAS JUST GIVEN HER 4TH SHOT, SHE SAYS SHE IS NEEDING TO INCREASE TO .5     Does the patient have less than a 3 day supply:  [x] Yes  [] No    Cali Last   11/16/22 15:42 EST

## 2022-11-26 DIAGNOSIS — E11.9 NEW ONSET TYPE 2 DIABETES MELLITUS: ICD-10-CM

## 2022-11-28 RX ORDER — BLOOD-GLUCOSE METER
EACH MISCELLANEOUS
Qty: 1 KIT | Refills: 0 | Status: SHIPPED | OUTPATIENT
Start: 2022-11-28

## 2022-11-29 RX ORDER — MONTELUKAST SODIUM 10 MG/1
10 TABLET ORAL
Qty: 30 TABLET | Refills: 1 | Status: SHIPPED | OUTPATIENT
Start: 2022-11-29 | End: 2023-03-21

## 2022-11-30 ENCOUNTER — OFFICE VISIT (OUTPATIENT)
Dept: ORTHOPEDIC SURGERY | Facility: CLINIC | Age: 62
End: 2022-11-30

## 2022-11-30 DIAGNOSIS — M51.36 DDD (DEGENERATIVE DISC DISEASE), LUMBAR: Primary | ICD-10-CM

## 2022-11-30 DIAGNOSIS — M25.562 CHRONIC PAIN OF LEFT KNEE: ICD-10-CM

## 2022-11-30 DIAGNOSIS — G89.29 CHRONIC PAIN OF RIGHT KNEE: ICD-10-CM

## 2022-11-30 DIAGNOSIS — G89.29 CHRONIC PAIN OF LEFT KNEE: ICD-10-CM

## 2022-11-30 DIAGNOSIS — M25.561 CHRONIC PAIN OF RIGHT KNEE: ICD-10-CM

## 2022-11-30 PROCEDURE — 99213 OFFICE O/P EST LOW 20 MIN: CPT | Performed by: NURSE PRACTITIONER

## 2022-11-30 PROCEDURE — 72100 X-RAY EXAM L-S SPINE 2/3 VWS: CPT | Performed by: NURSE PRACTITIONER

## 2022-11-30 NOTE — PROGRESS NOTES
Patient Name: Lydia Gutierrez   YOB: 1960  Referring Primary Care Physician: Joanna Sanabria PA      Chief Complaint:    Chief Complaint   Patient presents with   • Lumbar Spine - Initial Evaluation, Pain        HPI:  Lydia Gutierrez is a 62 y.o. female who presents to [facility] with complaint of bilateral knee pain.  She has history of left TKA in 2020 and right TKA in 2021 with Dr. Walsh.  She says the preop knee pain has resolved but she has persistent pain just below the patella bilaterally.  Pain is worse in the morning and she feels like her knees will lock up at times.  She does have some pain in the right lateral shin.  She does complain of intermittent low back pain but states it is not persistent and she feels that her pain is mostly related to her knees still.  She has history of a back injury when she was a child and fell out of a barn.  She denies any saddle anesthesia or bowel or bladder dysfunction.   Prior pertinent records were reviewed.    PFSH:  See attached    ROS: As per HPI, otherwise negative    Objective:    There were no vitals filed for this visit.  There is no height or weight on file to calculate BMI.      Physical Exam  Constitutional:       Appearance: She is well-developed and well-nourished.   Eyes:      General: No scleral icterus.  Skin:     General: Skin is intact.   Neurological:      Mental Status: She is alert.   Psychiatric:         Mood and Affect: Mood and affect normal.       Spine Musculoskeletal Exam    Palpation    Thoracolumbar    Tenderness: present      Spinous process: lower lumbar    Right      Muscle tone: normal    Left      Muscle tone: normal    Strength    Thoracolumbar    Thoracolumbar motor exam is normal.     Sensory    Thoracolumbar    Thoracolumbar sensation is normal.    Reflexes    Right      Quadriceps: 1/4      Achilles: 2/4     Left      Quadriceps: 1/4      Achilles: 2/4    Special Tests     Thoracolumbar      Right      SLR: no back or leg pain      Left      SLR: no back or leg pain    General      Constitutional: moderately obese, well-developed and well-nourished    Scleral icterus: no    Labored breathing: no    Psychiatric: normal mood and affect and no acute distress    Neurological: alert and oriented x3    Skin: intact        IMAGING:     Indication: pain related symptoms,  Views: 2V AP&LAT lumbar  Findings: Personally reviewed with the patient and reveals spinal thesis L4 and L5, mild to space narrowing, no fractures noted.  Comparison views: Lumbar MRI at [facility] on 10/8/2022 was reviewed with the patient as well and reveals grade 1 anterolisthesis L4 and L5, radiologist also reports minimal anterolisthesis L5 on S1 which I do not appreciate, degenerative change most pronounced L4-5 and L5-S1 resulting in mild bilateral foraminal narrowing.  No significant canal or foraminal narrowing otherwise.    Assessment:           Diagnoses and all orders for this visit:    1. DDD (degenerative disc disease), lumbar (Primary)    2. Chronic pain of left knee    3. Chronic pain of right knee             Plan:  The MRI does not reveal any severe foraminal or canal narrowing to explain her pattern of symptoms.  Her pain does seem mostly isolated to the knees.  We discussed trying physical therapy for the low back and continuing therapy for the knee pain.  She is reluctant to proceed with more physical therapy as she had fairly exhaustive therapy after the knee replacements.  She was given a home exercise program for back exercises.  We discussed trialing lumbar injection therapy as a test and treatment to see if it offers any relief, but she reports a fear of spinal injections.  She is convinced that the pain is primarily knee related and I would not disagree.  She was advised to follow-up with KELLY Mike as he did discuss trying knee injections with her in the past which she declined, however  after discussion today she would consider this approach.  We will plan on follow-up only as needed in the future since she does not have loss of nerve function or true radiculopathy at this time.     Return if symptoms worsen or fail to improve.

## 2022-12-03 DIAGNOSIS — E11.9 NEW ONSET TYPE 2 DIABETES MELLITUS: ICD-10-CM

## 2022-12-05 RX ORDER — SEMAGLUTIDE 1.34 MG/ML
1 INJECTION, SOLUTION SUBCUTANEOUS
Qty: 3 ML | Refills: 1 | Status: SHIPPED | OUTPATIENT
Start: 2022-12-05 | End: 2023-02-01

## 2022-12-12 RX ORDER — LORATADINE 10 MG/1
TABLET ORAL
Qty: 30 TABLET | Refills: 0 | Status: SHIPPED | OUTPATIENT
Start: 2022-12-12 | End: 2023-02-02 | Stop reason: SDUPTHER

## 2022-12-15 DIAGNOSIS — D72.829 LEUKOCYTOSIS, UNSPECIFIED TYPE: ICD-10-CM

## 2023-01-03 ENCOUNTER — TELEPHONE (OUTPATIENT)
Dept: FAMILY MEDICINE CLINIC | Facility: CLINIC | Age: 63
End: 2023-01-03

## 2023-01-03 NOTE — TELEPHONE ENCOUNTER
provider out of office, called pt and was unable to leave a VM due to her Vm box not being set up yet

## 2023-01-06 ENCOUNTER — OFFICE VISIT (OUTPATIENT)
Dept: FAMILY MEDICINE CLINIC | Facility: CLINIC | Age: 63
End: 2023-01-06
Payer: COMMERCIAL

## 2023-01-06 VITALS
WEIGHT: 210 LBS | SYSTOLIC BLOOD PRESSURE: 126 MMHG | BODY MASS INDEX: 39.65 KG/M2 | HEART RATE: 90 BPM | HEIGHT: 61 IN | OXYGEN SATURATION: 97 % | TEMPERATURE: 97.5 F | DIASTOLIC BLOOD PRESSURE: 62 MMHG

## 2023-01-06 DIAGNOSIS — J40 BRONCHITIS: ICD-10-CM

## 2023-01-06 DIAGNOSIS — R42 DIZZINESS: ICD-10-CM

## 2023-01-06 DIAGNOSIS — M25.561 CHRONIC PAIN OF BOTH KNEES: ICD-10-CM

## 2023-01-06 DIAGNOSIS — K21.9 GASTROESOPHAGEAL REFLUX DISEASE, UNSPECIFIED WHETHER ESOPHAGITIS PRESENT: ICD-10-CM

## 2023-01-06 DIAGNOSIS — J45.20 MILD INTERMITTENT ASTHMA WITHOUT COMPLICATION: ICD-10-CM

## 2023-01-06 DIAGNOSIS — J32.9 OTHER SINUSITIS, UNSPECIFIED CHRONICITY: ICD-10-CM

## 2023-01-06 DIAGNOSIS — R06.02 SHORTNESS OF BREATH: ICD-10-CM

## 2023-01-06 DIAGNOSIS — R20.0 NUMBNESS AND TINGLING OF UPPER AND LOWER EXTREMITIES OF BOTH SIDES: ICD-10-CM

## 2023-01-06 DIAGNOSIS — R00.2 PALPITATIONS: ICD-10-CM

## 2023-01-06 DIAGNOSIS — F41.8 MIXED ANXIETY DEPRESSIVE DISORDER: ICD-10-CM

## 2023-01-06 DIAGNOSIS — G89.29 CHRONIC PAIN OF BOTH KNEES: ICD-10-CM

## 2023-01-06 DIAGNOSIS — E11.65 TYPE 2 DIABETES MELLITUS WITH HYPERGLYCEMIA, WITHOUT LONG-TERM CURRENT USE OF INSULIN: Primary | ICD-10-CM

## 2023-01-06 DIAGNOSIS — R20.2 NUMBNESS AND TINGLING OF UPPER AND LOWER EXTREMITIES OF BOTH SIDES: ICD-10-CM

## 2023-01-06 DIAGNOSIS — E53.8 B12 DEFICIENCY: ICD-10-CM

## 2023-01-06 DIAGNOSIS — I65.23 BILATERAL CAROTID ARTERY STENOSIS: ICD-10-CM

## 2023-01-06 DIAGNOSIS — E55.9 VITAMIN D DEFICIENCY: ICD-10-CM

## 2023-01-06 DIAGNOSIS — I10 BENIGN ESSENTIAL HYPERTENSION: ICD-10-CM

## 2023-01-06 DIAGNOSIS — M25.562 CHRONIC PAIN OF BOTH KNEES: ICD-10-CM

## 2023-01-06 DIAGNOSIS — R79.89 ABNORMAL LIVER FUNCTION TESTS: ICD-10-CM

## 2023-01-06 DIAGNOSIS — E03.9 ACQUIRED HYPOTHYROIDISM: ICD-10-CM

## 2023-01-06 DIAGNOSIS — G47.33 OBSTRUCTIVE SLEEP APNEA: ICD-10-CM

## 2023-01-06 DIAGNOSIS — E53.8 FOLATE DEFICIENCY: ICD-10-CM

## 2023-01-06 DIAGNOSIS — J30.9 ALLERGIC RHINITIS, UNSPECIFIED SEASONALITY, UNSPECIFIED TRIGGER: ICD-10-CM

## 2023-01-06 DIAGNOSIS — D72.829 LEUKOCYTOSIS, UNSPECIFIED TYPE: ICD-10-CM

## 2023-01-06 DIAGNOSIS — E78.2 MIXED HYPERLIPIDEMIA: ICD-10-CM

## 2023-01-06 DIAGNOSIS — R00.0 TACHYCARDIA: ICD-10-CM

## 2023-01-06 PROBLEM — G47.30 SLEEP APNEA: Status: ACTIVE | Noted: 2023-01-06

## 2023-01-06 PROCEDURE — 99214 OFFICE O/P EST MOD 30 MIN: CPT | Performed by: PHYSICIAN ASSISTANT

## 2023-01-06 RX ORDER — GUAIFENESIN AND DEXTROMETHORPHAN HYDROBROMIDE 600; 30 MG/1; MG/1
1 TABLET, EXTENDED RELEASE ORAL 2 TIMES DAILY
Qty: 45 TABLET | Refills: 0 | Status: SHIPPED | OUTPATIENT
Start: 2023-01-06

## 2023-01-06 RX ORDER — BUDESONIDE AND FORMOTEROL FUMARATE DIHYDRATE 160; 4.5 UG/1; UG/1
AEROSOL RESPIRATORY (INHALATION)
COMMUNITY
Start: 2022-12-14

## 2023-01-06 RX ORDER — LEVOTHYROXINE SODIUM 137 UG/1
137 TABLET ORAL DAILY
Qty: 90 TABLET | Refills: 0 | Status: SHIPPED | OUTPATIENT
Start: 2023-01-06 | End: 2023-04-03

## 2023-01-06 RX ORDER — ROSUVASTATIN CALCIUM 40 MG/1
40 TABLET, COATED ORAL
Qty: 90 TABLET | Refills: 0 | Status: SHIPPED | OUTPATIENT
Start: 2023-01-06 | End: 2023-04-03

## 2023-01-06 RX ORDER — DOXYCYCLINE HYCLATE 100 MG/1
100 CAPSULE ORAL 2 TIMES DAILY
Qty: 20 CAPSULE | Refills: 0 | Status: SHIPPED | OUTPATIENT
Start: 2023-01-06

## 2023-01-06 RX ORDER — PANTOPRAZOLE SODIUM 40 MG/1
40 TABLET, DELAYED RELEASE ORAL DAILY
Qty: 90 TABLET | Refills: 0 | Status: SHIPPED | OUTPATIENT
Start: 2023-01-06 | End: 2023-04-03

## 2023-01-06 RX ORDER — AMLODIPINE BESYLATE 10 MG/1
10 TABLET ORAL DAILY
Qty: 90 TABLET | Refills: 0 | Status: SHIPPED | OUTPATIENT
Start: 2023-01-06 | End: 2023-04-03

## 2023-01-06 RX ORDER — FAMOTIDINE 20 MG/1
20 TABLET, FILM COATED ORAL
Qty: 90 TABLET | Refills: 0 | Status: SHIPPED | OUTPATIENT
Start: 2023-01-06 | End: 2023-04-03

## 2023-01-06 RX ORDER — LISINOPRIL AND HYDROCHLOROTHIAZIDE 12.5; 1 MG/1; MG/1
1 TABLET ORAL DAILY
Qty: 90 TABLET | Refills: 0 | Status: SHIPPED | OUTPATIENT
Start: 2023-01-06 | End: 2023-04-03

## 2023-01-06 RX ORDER — LANOLIN ALCOHOL/MO/W.PET/CERES
CREAM (GRAM) TOPICAL
COMMUNITY
Start: 2023-01-02 | End: 2023-01-06 | Stop reason: SDUPTHER
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
## 2023-01-12 ENCOUNTER — TELEPHONE (OUTPATIENT)
Dept: FAMILY MEDICINE CLINIC | Facility: CLINIC | Age: 63
End: 2023-01-12
Payer: COMMERCIAL

## 2023-01-12 NOTE — TELEPHONE ENCOUNTER
Patient came in for labs and was unable to leave a urine specimen.  She said her urinary symptoms here better, according to Kyra with [facility].  I did not see or talk to the patient. She notified me after the patient left.       Heidi

## 2023-01-13 LAB
25(OH)D3+25(OH)D2 SERPL-MCNC: 24.4 NG/ML (ref 30–100)
ALBUMIN SERPL-MCNC: 4.8 G/DL (ref 3.8–4.9)
ALBUMIN/GLOB SERPL: 2.3 {RATIO} (ref 1.2–2.2)
ALP SERPL-CCNC: 96 IU/L (ref 44–121)
ALT SERPL-CCNC: 20 IU/L (ref 0–32)
AST SERPL-CCNC: 19 IU/L (ref 0–40)
BASOPHILS # BLD AUTO: 0.1 X10E3/UL (ref 0–0.2)
BASOPHILS NFR BLD AUTO: 1 %
BILIRUB SERPL-MCNC: 0.4 MG/DL (ref 0–1.2)
BUN SERPL-MCNC: 18 MG/DL (ref 8–27)
BUN/CREAT SERPL: 25 (ref 12–28)
CALCIUM SERPL-MCNC: 9.9 MG/DL (ref 8.7–10.3)
CHLORIDE SERPL-SCNC: 103 MMOL/L (ref 96–106)
CHOLEST SERPL-MCNC: 113 MG/DL (ref 100–199)
CK SERPL-CCNC: 36 U/L (ref 32–182)
CO2 SERPL-SCNC: 23 MMOL/L (ref 20–29)
CREAT SERPL-MCNC: 0.73 MG/DL (ref 0.57–1)
CREAT UR-MCNC: NORMAL MG/DL
EGFRCR SERPLBLD CKD-EPI 2021: 94 ML/MIN/1.73
EOSINOPHIL # BLD AUTO: 0.2 X10E3/UL (ref 0–0.4)
EOSINOPHIL NFR BLD AUTO: 3 %
ERYTHROCYTE [DISTWIDTH] IN BLOOD BY AUTOMATED COUNT: 13.9 % (ref 11.7–15.4)
FOLATE SERPL-MCNC: >20 NG/ML
GLOBULIN SER CALC-MCNC: 2.1 G/DL (ref 1.5–4.5)
GLUCOSE SERPL-MCNC: 103 MG/DL (ref 70–99)
GLUCOSE UR QL STRIP: NORMAL
HBA1C MFR BLD: 6 % (ref 4.8–5.6)
HCT VFR BLD AUTO: 39.8 % (ref 34–46.6)
HDLC SERPL-MCNC: 36 MG/DL
HGB BLD-MCNC: 13.5 G/DL (ref 11.1–15.9)
IMM GRANULOCYTES # BLD AUTO: 0 X10E3/UL (ref 0–0.1)
IMM GRANULOCYTES NFR BLD AUTO: 0 %
KETONES UR QL STRIP: NORMAL
LDLC SERPL CALC-MCNC: 49 MG/DL (ref 0–99)
LDLC/HDLC SERPL: 1.4 RATIO (ref 0–3.2)
LYMPHOCYTES # BLD AUTO: 1.9 X10E3/UL (ref 0.7–3.1)
LYMPHOCYTES NFR BLD AUTO: 26 %
MCH RBC QN AUTO: 29.4 PG (ref 26.6–33)
MCHC RBC AUTO-ENTMCNC: 33.9 G/DL (ref 31.5–35.7)
MCV RBC AUTO: 87 FL (ref 79–97)
MICROALBUMIN UR-MCNC: NORMAL
MONOCYTES # BLD AUTO: 0.5 X10E3/UL (ref 0.1–0.9)
MONOCYTES NFR BLD AUTO: 7 %
NEUTROPHILS # BLD AUTO: 4.6 X10E3/UL (ref 1.4–7)
NEUTROPHILS NFR BLD AUTO: 63 %
PH UR STRIP: NORMAL [PH]
PLATELET # BLD AUTO: 426 X10E3/UL (ref 150–450)
POTASSIUM SERPL-SCNC: 4.5 MMOL/L (ref 3.5–5.2)
PROT SERPL-MCNC: 6.9 G/DL (ref 6–8.5)
PROT UR QL STRIP: NORMAL
RBC # BLD AUTO: 4.59 X10E6/UL (ref 3.77–5.28)
SODIUM SERPL-SCNC: 142 MMOL/L (ref 134–144)
SP GR UR STRIP: NORMAL
SPECIMEN STATUS: NORMAL
T3FREE SERPL-MCNC: 2.7 PG/ML (ref 2–4.4)
T4 FREE SERPL-MCNC: 1.55 NG/DL (ref 0.82–1.77)
TRIGL SERPL-MCNC: 164 MG/DL (ref 0–149)
TSH SERPL DL<=0.005 MIU/L-ACNC: 0.45 UIU/ML (ref 0.45–4.5)
URATE SERPL-MCNC: 3.7 MG/DL (ref 3–7.2)
VIT B12 SERPL-MCNC: 1535 PG/ML (ref 232–1245)
VLDLC SERPL CALC-MCNC: 28 MG/DL (ref 5–40)
WBC # BLD AUTO: 7.3 X10E3/UL (ref 3.4–10.8)

## 2023-01-20 RX ORDER — METHOCARBAMOL 750 MG/1
TABLET, FILM COATED ORAL
Qty: 30 TABLET | Refills: 3 | Status: SHIPPED | OUTPATIENT
Start: 2023-01-20

## 2023-01-30 DIAGNOSIS — E11.9 NEW ONSET TYPE 2 DIABETES MELLITUS: ICD-10-CM

## 2023-02-01 RX ORDER — SEMAGLUTIDE 1.34 MG/ML
INJECTION, SOLUTION SUBCUTANEOUS
Qty: 3 ML | Refills: 1 | Status: SHIPPED | OUTPATIENT
Start: 2023-02-01 | End: 2023-04-03

## 2023-02-02 DIAGNOSIS — E11.9 NEW ONSET TYPE 2 DIABETES MELLITUS: ICD-10-CM

## 2023-02-03 RX ORDER — LORATADINE 10 MG/1
10 TABLET ORAL DAILY
Qty: 30 TABLET | Refills: 0 | Status: SHIPPED | OUTPATIENT
Start: 2023-02-03 | End: 2023-03-01

## 2023-02-03 RX ORDER — LANCETS
EACH MISCELLANEOUS
Qty: 100 EACH | Refills: 0 | Status: SHIPPED | OUTPATIENT
Start: 2023-02-03

## 2023-02-04 DIAGNOSIS — E11.9 NEW ONSET TYPE 2 DIABETES MELLITUS: ICD-10-CM

## 2023-02-06 RX ORDER — LANCETS 33 GAUGE
EACH MISCELLANEOUS
Qty: 100 EACH | Refills: 0 | OUTPATIENT
Start: 2023-02-06

## 2023-03-01 RX ORDER — LORATADINE 10 MG/1
10 TABLET ORAL DAILY
Qty: 30 TABLET | Refills: 0 | Status: SHIPPED | OUTPATIENT
Start: 2023-03-01 | End: 2023-03-27

## 2023-03-03 DIAGNOSIS — E53.8 FOLATE DEFICIENCY: ICD-10-CM

## 2023-03-03 DIAGNOSIS — E53.8 B12 DEFICIENCY: ICD-10-CM

## 2023-03-03 RX ORDER — LANOLIN ALCOHOL/MO/W.PET/CERES
400 CREAM (GRAM) TOPICAL DAILY
Qty: 30 TABLET | Refills: 3 | Status: SHIPPED | OUTPATIENT
Start: 2023-03-03

## 2023-03-03 RX ORDER — CHOLECALCIFEROL (VITAMIN D3) 125 MCG
500 CAPSULE ORAL DAILY
Qty: 30 TABLET | Refills: 3 | Status: SHIPPED | OUTPATIENT
Start: 2023-03-03

## 2023-03-11 DIAGNOSIS — D72.829 LEUKOCYTOSIS, UNSPECIFIED TYPE: ICD-10-CM

## 2023-03-21 RX ORDER — MONTELUKAST SODIUM 10 MG/1
10 TABLET ORAL
Qty: 30 TABLET | Refills: 1 | Status: SHIPPED | OUTPATIENT
Start: 2023-03-21

## 2023-03-27 RX ORDER — LORATADINE 10 MG/1
TABLET ORAL
Qty: 30 TABLET | Refills: 0 | Status: SHIPPED | OUTPATIENT
Start: 2023-03-27

## 2023-03-28 DIAGNOSIS — J30.9 ALLERGIC RHINITIS, UNSPECIFIED SEASONALITY, UNSPECIFIED TRIGGER: ICD-10-CM

## 2023-03-30 DIAGNOSIS — E11.9 NEW ONSET TYPE 2 DIABETES MELLITUS: ICD-10-CM

## 2023-03-30 RX ORDER — FLUTICASONE PROPIONATE 50 MCG
2 SPRAY, SUSPENSION (ML) NASAL DAILY
Qty: 16 G | Refills: 3 | Status: SHIPPED | OUTPATIENT
Start: 2023-03-30

## 2023-04-03 DIAGNOSIS — E03.9 ACQUIRED HYPOTHYROIDISM: ICD-10-CM

## 2023-04-03 DIAGNOSIS — F41.8 MIXED ANXIETY DEPRESSIVE DISORDER: ICD-10-CM

## 2023-04-03 DIAGNOSIS — K21.9 GASTROESOPHAGEAL REFLUX DISEASE, UNSPECIFIED WHETHER ESOPHAGITIS PRESENT: ICD-10-CM

## 2023-04-03 RX ORDER — PANTOPRAZOLE SODIUM 40 MG/1
40 TABLET, DELAYED RELEASE ORAL DAILY
Qty: 90 TABLET | Refills: 0 | Status: SHIPPED | OUTPATIENT
Start: 2023-04-03

## 2023-04-03 RX ORDER — ROSUVASTATIN CALCIUM 40 MG/1
40 TABLET, COATED ORAL
Qty: 90 TABLET | Refills: 0 | Status: SHIPPED | OUTPATIENT
Start: 2023-04-03

## 2023-04-03 RX ORDER — SEMAGLUTIDE 1.34 MG/ML
INJECTION, SOLUTION SUBCUTANEOUS
Qty: 3 ML | Refills: 1 | Status: SHIPPED | OUTPATIENT
Start: 2023-04-03

## 2023-04-03 RX ORDER — LISINOPRIL AND HYDROCHLOROTHIAZIDE 12.5; 1 MG/1; MG/1
1 TABLET ORAL DAILY
Qty: 90 TABLET | Refills: 0 | Status: SHIPPED | OUTPATIENT
Start: 2023-04-03

## 2023-04-03 RX ORDER — AMLODIPINE BESYLATE 10 MG/1
10 TABLET ORAL DAILY
Qty: 90 TABLET | Refills: 0 | Status: SHIPPED | OUTPATIENT
Start: 2023-04-03

## 2023-04-03 RX ORDER — FAMOTIDINE 20 MG/1
20 TABLET, FILM COATED ORAL
Qty: 90 TABLET | Refills: 0 | Status: SHIPPED | OUTPATIENT
Start: 2023-04-03

## 2023-04-03 RX ORDER — LEVOTHYROXINE SODIUM 137 UG/1
137 TABLET ORAL DAILY
Qty: 90 TABLET | Refills: 0 | Status: SHIPPED | OUTPATIENT
Start: 2023-04-03

## 2023-04-25 RX ORDER — LORATADINE 10 MG/1
TABLET ORAL
Qty: 30 TABLET | Refills: 0 | Status: SHIPPED | OUTPATIENT
Start: 2023-04-25

## 2023-05-14 DIAGNOSIS — E11.9 NEW ONSET TYPE 2 DIABETES MELLITUS: ICD-10-CM

## 2023-05-16 RX ORDER — LANCETS 33 GAUGE
EACH MISCELLANEOUS
Qty: 100 EACH | Refills: 0 | Status: SHIPPED | OUTPATIENT
Start: 2023-05-16

## 2023-05-16 RX ORDER — METHOCARBAMOL 750 MG/1
TABLET, FILM COATED ORAL
Qty: 30 TABLET | Refills: 0 | Status: SHIPPED | OUTPATIENT
Start: 2023-05-16

## 2023-05-16 RX ORDER — MONTELUKAST SODIUM 10 MG/1
10 TABLET ORAL
Qty: 30 TABLET | Refills: 0 | Status: SHIPPED | OUTPATIENT
Start: 2023-05-16

## 2023-05-16 NOTE — TELEPHONE ENCOUNTER
Please see result note- she was to be scheduled for follow up with me in in 4 months from 1/2023. Please schedule her for follow up with me, fasting.

## 2023-05-19 ENCOUNTER — OFFICE VISIT (OUTPATIENT)
Dept: FAMILY MEDICINE CLINIC | Facility: CLINIC | Age: 63
End: 2023-05-19
Payer: COMMERCIAL

## 2023-05-19 VITALS
WEIGHT: 202 LBS | RESPIRATION RATE: 16 BRPM | DIASTOLIC BLOOD PRESSURE: 60 MMHG | BODY MASS INDEX: 38.14 KG/M2 | SYSTOLIC BLOOD PRESSURE: 102 MMHG | TEMPERATURE: 97.1 F | HEIGHT: 61 IN | HEART RATE: 93 BPM | OXYGEN SATURATION: 99 %

## 2023-05-19 DIAGNOSIS — G47.33 OBSTRUCTIVE SLEEP APNEA: ICD-10-CM

## 2023-05-19 DIAGNOSIS — K21.9 GASTROESOPHAGEAL REFLUX DISEASE, UNSPECIFIED WHETHER ESOPHAGITIS PRESENT: ICD-10-CM

## 2023-05-19 DIAGNOSIS — R79.89 ABNORMAL LIVER FUNCTION TESTS: ICD-10-CM

## 2023-05-19 DIAGNOSIS — E03.9 ACQUIRED HYPOTHYROIDISM: ICD-10-CM

## 2023-05-19 DIAGNOSIS — M25.562 CHRONIC PAIN OF BOTH KNEES: ICD-10-CM

## 2023-05-19 DIAGNOSIS — J45.20 MILD INTERMITTENT ASTHMA WITHOUT COMPLICATION: ICD-10-CM

## 2023-05-19 DIAGNOSIS — R42 DIZZINESS: ICD-10-CM

## 2023-05-19 DIAGNOSIS — I10 BENIGN ESSENTIAL HYPERTENSION: ICD-10-CM

## 2023-05-19 DIAGNOSIS — E53.8 FOLATE DEFICIENCY: ICD-10-CM

## 2023-05-19 DIAGNOSIS — J30.9 ALLERGIC RHINITIS, UNSPECIFIED SEASONALITY, UNSPECIFIED TRIGGER: ICD-10-CM

## 2023-05-19 DIAGNOSIS — E55.9 VITAMIN D DEFICIENCY: ICD-10-CM

## 2023-05-19 DIAGNOSIS — D72.829 LEUKOCYTOSIS, UNSPECIFIED TYPE: ICD-10-CM

## 2023-05-19 DIAGNOSIS — E11.65 TYPE 2 DIABETES MELLITUS WITH HYPERGLYCEMIA, WITHOUT LONG-TERM CURRENT USE OF INSULIN: ICD-10-CM

## 2023-05-19 DIAGNOSIS — E78.2 MIXED HYPERLIPIDEMIA: ICD-10-CM

## 2023-05-19 DIAGNOSIS — R00.2 PALPITATIONS: ICD-10-CM

## 2023-05-19 DIAGNOSIS — F41.8 MIXED ANXIETY DEPRESSIVE DISORDER: ICD-10-CM

## 2023-05-19 DIAGNOSIS — R00.0 TACHYCARDIA: ICD-10-CM

## 2023-05-19 DIAGNOSIS — R20.2 NUMBNESS AND TINGLING OF UPPER AND LOWER EXTREMITIES OF BOTH SIDES: ICD-10-CM

## 2023-05-19 DIAGNOSIS — Z11.59 ENCOUNTER FOR HEPATITIS C SCREENING TEST FOR LOW RISK PATIENT: ICD-10-CM

## 2023-05-19 DIAGNOSIS — Z12.31 ENCOUNTER FOR SCREENING MAMMOGRAM FOR MALIGNANT NEOPLASM OF BREAST: ICD-10-CM

## 2023-05-19 DIAGNOSIS — I65.23 BILATERAL CAROTID ARTERY STENOSIS: ICD-10-CM

## 2023-05-19 DIAGNOSIS — E53.8 B12 DEFICIENCY: ICD-10-CM

## 2023-05-19 DIAGNOSIS — R20.0 NUMBNESS AND TINGLING OF UPPER AND LOWER EXTREMITIES OF BOTH SIDES: ICD-10-CM

## 2023-05-19 DIAGNOSIS — R06.02 SHORTNESS OF BREATH: ICD-10-CM

## 2023-05-19 DIAGNOSIS — M25.561 CHRONIC PAIN OF BOTH KNEES: ICD-10-CM

## 2023-05-19 DIAGNOSIS — E11.649 TYPE 2 DIABETES MELLITUS WITH HYPOGLYCEMIA WITHOUT COMA, WITHOUT LONG-TERM CURRENT USE OF INSULIN: Primary | ICD-10-CM

## 2023-05-19 DIAGNOSIS — G89.29 CHRONIC PAIN OF BOTH KNEES: ICD-10-CM

## 2023-05-19 LAB
EXPIRATION DATE: ABNORMAL
EXPIRATION DATE: NORMAL
HBA1C MFR BLD: 5.5 %
Lab: ABNORMAL
Lab: NORMAL
POC CREATININE URINE: 200
POC MICROALBUMIN URINE: 80
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
## 2023-05-20 LAB
25(OH)D3+25(OH)D2 SERPL-MCNC: 33.1 NG/ML (ref 30–100)
ALBUMIN SERPL-MCNC: 5.1 G/DL (ref 3.5–5.2)
ALBUMIN/GLOB SERPL: 2.2 G/DL
ALP SERPL-CCNC: 105 U/L (ref 39–117)
ALT SERPL-CCNC: 25 U/L (ref 1–33)
AST SERPL-CCNC: 18 U/L (ref 1–32)
BASOPHILS # BLD AUTO: 0.05 10*3/MM3 (ref 0–0.2)
BASOPHILS NFR BLD AUTO: 0.7 % (ref 0–1.5)
BILIRUB SERPL-MCNC: 0.4 MG/DL (ref 0–1.2)
BUN SERPL-MCNC: 16 MG/DL (ref 8–23)
BUN/CREAT SERPL: 22.9 (ref 7–25)
CALCIUM SERPL-MCNC: 10.4 MG/DL (ref 8.6–10.5)
CHLORIDE SERPL-SCNC: 102 MMOL/L (ref 98–107)
CHOLEST SERPL-MCNC: 134 MG/DL (ref 0–200)
CK SERPL-CCNC: 67 U/L (ref 20–180)
CO2 SERPL-SCNC: 27.4 MMOL/L (ref 22–29)
CREAT SERPL-MCNC: 0.7 MG/DL (ref 0.57–1)
EGFRCR SERPLBLD CKD-EPI 2021: 99.2 ML/MIN/1.73
EOSINOPHIL # BLD AUTO: 0.15 10*3/MM3 (ref 0–0.4)
EOSINOPHIL NFR BLD AUTO: 2.2 % (ref 0.3–6.2)
ERYTHROCYTE [DISTWIDTH] IN BLOOD BY AUTOMATED COUNT: 13 % (ref 12.3–15.4)
FOLATE SERPL-MCNC: >20 NG/ML (ref 4.78–24.2)
GLOBULIN SER CALC-MCNC: 2.3 GM/DL
GLUCOSE SERPL-MCNC: 102 MG/DL (ref 65–99)
HCT VFR BLD AUTO: 41.5 % (ref 34–46.6)
HCV IGG SERPL QL IA: NON REACTIVE
HDLC SERPL-MCNC: 40 MG/DL (ref 40–60)
HGB BLD-MCNC: 14.2 G/DL (ref 12–15.9)
IMM GRANULOCYTES # BLD AUTO: 0.02 10*3/MM3 (ref 0–0.05)
IMM GRANULOCYTES NFR BLD AUTO: 0.3 % (ref 0–0.5)
LDLC SERPL CALC-MCNC: 67 MG/DL (ref 0–100)
LDLC/HDLC SERPL: 1.57 {RATIO}
LYMPHOCYTES # BLD AUTO: 1.54 10*3/MM3 (ref 0.7–3.1)
LYMPHOCYTES NFR BLD AUTO: 22.3 % (ref 19.6–45.3)
MCH RBC QN AUTO: 30 PG (ref 26.6–33)
MCHC RBC AUTO-ENTMCNC: 34.2 G/DL (ref 31.5–35.7)
MCV RBC AUTO: 87.6 FL (ref 79–97)
MONOCYTES # BLD AUTO: 0.56 10*3/MM3 (ref 0.1–0.9)
MONOCYTES NFR BLD AUTO: 8.1 % (ref 5–12)
NEUTROPHILS # BLD AUTO: 4.6 10*3/MM3 (ref 1.7–7)
NEUTROPHILS NFR BLD AUTO: 66.4 % (ref 42.7–76)
NRBC BLD AUTO-RTO: 0 /100 WBC (ref 0–0.2)
PLATELET # BLD AUTO: 364 10*3/MM3 (ref 140–450)
POTASSIUM SERPL-SCNC: 4.2 MMOL/L (ref 3.5–5.2)
PROT SERPL-MCNC: 7.4 G/DL (ref 6–8.5)
RBC # BLD AUTO: 4.74 10*6/MM3 (ref 3.77–5.28)
SODIUM SERPL-SCNC: 140 MMOL/L (ref 136–145)
T3FREE SERPL-MCNC: 3.4 PG/ML (ref 2–4.4)
T4 FREE SERPL-MCNC: 1.73 NG/DL (ref 0.93–1.7)
TRIGL SERPL-MCNC: 157 MG/DL (ref 0–150)
TSH SERPL DL<=0.005 MIU/L-ACNC: 0.22 UIU/ML (ref 0.27–4.2)
URATE SERPL-MCNC: 4.6 MG/DL (ref 2.4–5.7)
VIT B12 SERPL-MCNC: 1682 PG/ML (ref 211–946)
VLDLC SERPL CALC-MCNC: 27 MG/DL (ref 5–40)
WBC # BLD AUTO: 6.92 10*3/MM3 (ref 3.4–10.8)

## 2023-05-22 RX ORDER — LORATADINE 10 MG/1
TABLET ORAL
Qty: 30 TABLET | Refills: 2 | Status: SHIPPED | OUTPATIENT
Start: 2023-05-22

## 2023-05-25 DIAGNOSIS — E11.9 NEW ONSET TYPE 2 DIABETES MELLITUS: ICD-10-CM

## 2023-05-27 RX ORDER — SEMAGLUTIDE 1.34 MG/ML
INJECTION, SOLUTION SUBCUTANEOUS
Qty: 3 ML | Refills: 1 | Status: SHIPPED | OUTPATIENT
Start: 2023-05-27

## 2023-06-06 DIAGNOSIS — E03.9 ACQUIRED HYPOTHYROIDISM: Primary | ICD-10-CM

## 2023-06-07 DIAGNOSIS — D72.829 LEUKOCYTOSIS, UNSPECIFIED TYPE: ICD-10-CM

## 2023-06-13 LAB
T3FREE SERPL-MCNC: 2.7 PG/ML (ref 2–4.4)
T4 FREE SERPL-MCNC: 1.36 NG/DL (ref 0.82–1.77)
TSH SERPL DL<=0.005 MIU/L-ACNC: 0.36 UIU/ML (ref 0.45–4.5)

## 2023-06-14 RX ORDER — MONTELUKAST SODIUM 10 MG/1
10 TABLET ORAL
Qty: 90 TABLET | Refills: 0 | Status: SHIPPED | OUTPATIENT
Start: 2023-06-14

## 2023-06-14 RX ORDER — METHOCARBAMOL 750 MG/1
TABLET, FILM COATED ORAL
Qty: 90 TABLET | Refills: 0 | Status: SHIPPED | OUTPATIENT
Start: 2023-06-14

## 2023-06-19 DIAGNOSIS — E03.9 ACQUIRED HYPOTHYROIDISM: ICD-10-CM

## 2023-06-19 RX ORDER — LEVOTHYROXINE SODIUM 0.12 MG/1
125 TABLET ORAL DAILY
Qty: 30 TABLET | Refills: 1 | Status: SHIPPED | OUTPATIENT
Start: 2023-06-19

## 2023-07-24 DIAGNOSIS — J30.9 ALLERGIC RHINITIS, UNSPECIFIED SEASONALITY, UNSPECIFIED TRIGGER: ICD-10-CM

## 2023-07-26 RX ORDER — FLUTICASONE PROPIONATE 50 MCG
SPRAY, SUSPENSION (ML) NASAL
Qty: 16 G | Refills: 3 | Status: SHIPPED | OUTPATIENT
Start: 2023-07-26

## 2023-08-13 DIAGNOSIS — E03.9 ACQUIRED HYPOTHYROIDISM: ICD-10-CM

## 2023-08-14 RX ORDER — LEVOTHYROXINE SODIUM 0.12 MG/1
125 TABLET ORAL DAILY
Qty: 30 TABLET | Refills: 1 | Status: SHIPPED | OUTPATIENT
Start: 2023-08-14

## 2023-08-20 DIAGNOSIS — E11.9 NEW ONSET TYPE 2 DIABETES MELLITUS: ICD-10-CM

## 2023-08-22 RX ORDER — LANCETS 33 GAUGE
EACH MISCELLANEOUS
Qty: 100 EACH | Refills: 0 | Status: SHIPPED | OUTPATIENT
Start: 2023-08-22

## 2023-08-26 DIAGNOSIS — F41.8 MIXED ANXIETY DEPRESSIVE DISORDER: ICD-10-CM

## 2023-08-28 RX ORDER — LORATADINE 10 MG/1
10 TABLET ORAL DAILY
Qty: 30 TABLET | Refills: 2 | Status: SHIPPED | OUTPATIENT
Start: 2023-08-28

## 2023-08-28 RX ORDER — FLUOXETINE HYDROCHLORIDE 20 MG/1
60 CAPSULE ORAL EVERY EVENING
Qty: 90 CAPSULE | Refills: 1 | Status: SHIPPED | OUTPATIENT
Start: 2023-08-28

## 2023-09-02 DIAGNOSIS — D72.829 LEUKOCYTOSIS, UNSPECIFIED TYPE: ICD-10-CM

## 2023-09-11 RX ORDER — METHOCARBAMOL 750 MG/1
750 TABLET, FILM COATED ORAL NIGHTLY
Qty: 90 TABLET | Refills: 0 | Status: SHIPPED | OUTPATIENT
Start: 2023-09-11

## 2023-09-11 RX ORDER — MONTELUKAST SODIUM 10 MG/1
10 TABLET ORAL
Qty: 90 TABLET | Refills: 0 | Status: SHIPPED | OUTPATIENT
Start: 2023-09-11

## 2023-09-14 DIAGNOSIS — E11.9 NEW ONSET TYPE 2 DIABETES MELLITUS: ICD-10-CM

## 2023-09-15 RX ORDER — SEMAGLUTIDE 1.34 MG/ML
INJECTION, SOLUTION SUBCUTANEOUS
Qty: 3 ML | Refills: 1 | Status: SHIPPED | OUTPATIENT
Start: 2023-09-15

## 2023-09-18 ENCOUNTER — OFFICE VISIT (OUTPATIENT)
Dept: FAMILY MEDICINE CLINIC | Facility: CLINIC | Age: 63
End: 2023-09-18
Payer: COMMERCIAL

## 2023-09-18 VITALS
WEIGHT: 204 LBS | HEIGHT: 61 IN | DIASTOLIC BLOOD PRESSURE: 58 MMHG | BODY MASS INDEX: 38.51 KG/M2 | RESPIRATION RATE: 14 BRPM | TEMPERATURE: 98 F | HEART RATE: 80 BPM | SYSTOLIC BLOOD PRESSURE: 98 MMHG | OXYGEN SATURATION: 99 %

## 2023-09-18 DIAGNOSIS — E55.9 VITAMIN D DEFICIENCY: ICD-10-CM

## 2023-09-18 DIAGNOSIS — E53.8 B12 DEFICIENCY: ICD-10-CM

## 2023-09-18 DIAGNOSIS — J45.20 MILD INTERMITTENT ASTHMA WITHOUT COMPLICATION: ICD-10-CM

## 2023-09-18 DIAGNOSIS — G47.33 OBSTRUCTIVE SLEEP APNEA: ICD-10-CM

## 2023-09-18 DIAGNOSIS — F41.8 MIXED ANXIETY DEPRESSIVE DISORDER: ICD-10-CM

## 2023-09-18 DIAGNOSIS — R79.89 ABNORMAL LIVER FUNCTION TESTS: ICD-10-CM

## 2023-09-18 DIAGNOSIS — J30.9 ALLERGIC RHINITIS, UNSPECIFIED SEASONALITY, UNSPECIFIED TRIGGER: ICD-10-CM

## 2023-09-18 DIAGNOSIS — E53.8 FOLATE DEFICIENCY: ICD-10-CM

## 2023-09-18 DIAGNOSIS — E03.9 ACQUIRED HYPOTHYROIDISM: ICD-10-CM

## 2023-09-18 DIAGNOSIS — D72.829 LEUKOCYTOSIS, UNSPECIFIED TYPE: ICD-10-CM

## 2023-09-18 DIAGNOSIS — E11.65 TYPE 2 DIABETES MELLITUS WITH HYPERGLYCEMIA, WITHOUT LONG-TERM CURRENT USE OF INSULIN: ICD-10-CM

## 2023-09-18 DIAGNOSIS — I10 BENIGN ESSENTIAL HYPERTENSION: ICD-10-CM

## 2023-09-18 DIAGNOSIS — Z00.00 ROUTINE ADULT HEALTH MAINTENANCE: Primary | ICD-10-CM

## 2023-09-18 DIAGNOSIS — E78.2 MIXED HYPERLIPIDEMIA: ICD-10-CM

## 2023-09-18 DIAGNOSIS — K21.9 GASTROESOPHAGEAL REFLUX DISEASE, UNSPECIFIED WHETHER ESOPHAGITIS PRESENT: ICD-10-CM

## 2023-09-18 RX ORDER — FLUOXETINE HYDROCHLORIDE 20 MG/1
20 CAPSULE ORAL EVERY EVENING
Qty: 90 CAPSULE | Refills: 1 | Status: SHIPPED | OUTPATIENT
Start: 2023-09-18

## 2023-09-18 RX ORDER — FAMOTIDINE 20 MG/1
20 TABLET, FILM COATED ORAL
Qty: 90 TABLET | Refills: 0 | Status: SHIPPED | OUTPATIENT
Start: 2023-09-18 | End: 2023-09-25

## 2023-09-18 NOTE — PROGRESS NOTES
Subjective   Lydia Gutierrez is a 63 y.o. female who presents today for CPE.     History of Present Illness     Last Pap- unsure- no Hx abnormal PAP. Does not want PAP. Last may have been at [facility].   Last mammogram- 2023- negative.   Last DEXA- has not had  Last colonoscopy-  with EGD- reactive gastropathy and hyperplastic polyp. Recheck in 10 years.   Last Tdap- unknown. 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   Prevnar- has not had   Pneumovax- has not had  Hepatitis A- has not had  Last flu shot- 10/2022  Shingrix- had varicella as a child. Has not had vaccination.   Covid 19- Pfizer 2021, 2021    Past Medical History:   Diagnosis Date    Anxiety disorder     Arthritis     Arthritis of back Not sure    Had it for a number of years    Asthma     Borderline diabetic     Depression     GERD (gastroesophageal reflux disease)     History of kidney stones     Hyperlipidemia     Hypertension     Hypothyroidism     Knee swelling     Had replacement in both    Low back strain     Had for a long while    Right knee pain     Sleep apnea     NO MACHINE     Past Surgical History:   Procedure Laterality Date    BREAST BIOPSY Left     benign     SECTION      CHOLECYSTECTOMY      EYE SURGERY Left     JOINT REPLACEMENT Left     TOTAL KNEE ARTHROPLASTY Left 2020    Procedure: TOTAL KNEE ARTHROPLASTY;  Surgeon: Juan Walsh MD;  Location: [facility] OR;  Service: Orthopedics;  Laterality: Left;    TOTAL KNEE ARTHROPLASTY Right 2021    Procedure: TOTAL KNEE ARTHROPLASTY;  Surgeon: Juan Walsh MD;  Location: [facility] OR;  Service: Orthopedics;  Laterality: Right;     Social History     Socioeconomic History    Marital status:    Tobacco Use    Smoking status: Never    Smokeless tobacco: Never   Vaping Use    Vaping Use: Never used   Substance and Sexual Activity    Alcohol use: No    Drug use: No    Sexual activity: Not Currently     Partners: Male       Family History   Problem Relation Age of Onset    Heart disease Mother     Glaucoma Mother     Cancer Mother     Hypertension Father     Thyroid disease Sister     Kidney disease Brother     Glaucoma Brother     Asthma Son     Depression Son     Bipolar disorder Son     Breast cancer Paternal Grandmother     Breast cancer Paternal Aunt     Malig Hyperthermia Neg Hx         The following portions of the patient's history were reviewed and updated as appropriate: allergies, current medications, past family history, past medical history, past social history, past surgical history and problem list.    Review of Systems    Objective   Vitals:    09/18/23 1135   BP: 98/58   Pulse: 80   Resp: 14   Temp: 98 °F (36.7 °C)   SpO2: 99%      Body mass index is 38.57 kg/m².      Physical Exam  Vitals and nursing note reviewed.   Constitutional:       General: She is not in acute distress.     Appearance: She is well-developed. She is not diaphoretic.   HENT:      Head: Normocephalic and atraumatic.      Right Ear: Hearing, tympanic membrane, ear canal and external ear normal.      Left Ear: Hearing, tympanic membrane, ear canal and external ear normal.      Nose: Nose normal.   Eyes:      General: Lids are normal.      Conjunctiva/sclera: Conjunctivae normal.      Pupils: Pupils are equal, round, and reactive to light.   Neck:      Thyroid: No thyroid mass or thyromegaly.      Vascular: No carotid bruit or JVD.      Trachea: No tracheal deviation.   Cardiovascular:      Rate and Rhythm: Normal rate and regular rhythm.      Pulses:           Radial pulses are 2+ on the right side and 2+ on the left side.        Posterior tibial pulses are 2+ on the right side and 2+ on the left side.      Heart sounds: Normal heart sounds. No murmur heard.    No friction rub. No gallop.   Pulmonary:      Effort: Pulmonary effort is normal. No respiratory distress.      Breath sounds: Normal breath sounds. No wheezing, rhonchi or rales.    Abdominal:      General: Bowel sounds are normal. There is no distension or abdominal bruit.      Palpations: Abdomen is soft. Abdomen is not rigid.      Tenderness: There is no abdominal tenderness. There is no guarding or rebound.      Hernia: No hernia is present.   Musculoskeletal:         General: No tenderness or deformity. Normal range of motion.      Cervical back: Neck supple.      Comments: Normal strength.   Lymphadenopathy:      Cervical: No cervical adenopathy.   Skin:     General: Skin is warm and dry.      Findings: No erythema or rash.   Neurological:      Mental Status: She is alert and oriented to person, place, and time.      Cranial Nerves: No cranial nerve deficit.      Sensory: No sensory deficit.      Motor: No abnormal muscle tone.      Coordination: Coordination normal.      Gait: Gait normal.      Deep Tendon Reflexes: Reflexes are normal and symmetric. Reflexes normal.   Psychiatric:         Speech: Speech normal.         Behavior: Behavior normal.         Thought Content: Thought content normal.       Assessment & Plan   Diagnoses and all orders for this visit:    1. Type 2 diabetes mellitus with hyperglycemia, without long-term current use of insulin (Primary)  -     Comprehensive Metabolic Panel  -     Hemoglobin A1c  -     Urinalysis With Culture If Indicated -  -     Microalbumin / Creatinine Urine Ratio - Urine, Clean Catch    2. Benign essential hypertension  -     Blood Pressure Device  -     Comprehensive Metabolic Panel  -     Urinalysis With Culture If Indicated -  -     Microalbumin / Creatinine Urine Ratio - Urine, Clean Catch    3. Gastroesophageal reflux disease, unspecified whether esophagitis present  -     famotidine (PEPCID) 20 MG tablet; Take 1 tablet by mouth Daily Before Supper.  Dispense: 90 tablet; Refill: 0    4. Mixed anxiety depressive disorder  -     FLUoxetine (PROzac) 20 MG capsule; Take 1 capsule by mouth Every Evening.  Dispense: 90 capsule; Refill:  1    5. Routine adult health maintenance    6. Mixed hyperlipidemia  -     Comprehensive Metabolic Panel  -     CK  -     Lipid Panel With LDL / HDL Ratio    7. Acquired hypothyroidism    8. Vitamin D deficiency  -     Comprehensive Metabolic Panel  -     Vitamin D,25-Hydroxy    9. B12 deficiency  -     CBC & Differential  -     Vitamin B12 & Folate    10. Folate deficiency  -     CBC & Differential  -     Vitamin B12 & Folate    11. Leukocytosis, unspecified type  -     CBC & Differential    12. Abnormal liver function tests  -     Comprehensive Metabolic Panel    13. Mild intermittent asthma without complication    14. Obstructive sleep apnea    15. Allergic rhinitis, unspecified seasonality, unspecified trigger        Assessment and Plan  CPE completed today.  She is overdue for Pap and DEXA and is up-to-date on mammogram.  She declines Pap at this time.  I advise she consider Pap now and in 3 years then will be complete and no longer needed per ACOG guidelines.  We will complete DEXA with her mammogram 6/2024.  She needs to check with her insurance on coverage and location of coverage for Tdap, Prevnar 20, Shingrix, and needs to ensure annual flu shot and COVID-19 booster.  Preventative counseling for healthy lifestyle-diet and exercise, updated health maintenance, and ensuring follow-up with specialists and treatment of all conditions.
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
## 2023-09-19 DIAGNOSIS — J45.20 MILD INTERMITTENT ASTHMA WITHOUT COMPLICATION: ICD-10-CM

## 2023-09-19 DIAGNOSIS — E11.9 NEW ONSET TYPE 2 DIABETES MELLITUS: ICD-10-CM

## 2023-09-20 RX ORDER — ALBUTEROL SULFATE 90 UG/1
2 AEROSOL, METERED RESPIRATORY (INHALATION) EVERY 6 HOURS PRN
Qty: 18 G | Refills: 0 | Status: SHIPPED | OUTPATIENT
Start: 2023-09-20

## 2023-09-20 RX ORDER — BLOOD-GLUCOSE METER
1 EACH MISCELLANEOUS DAILY
Qty: 1 KIT | Refills: 0 | Status: SHIPPED | OUTPATIENT
Start: 2023-09-20

## 2023-09-22 LAB
25(OH)D3+25(OH)D2 SERPL-MCNC: 26.2 NG/ML (ref 30–100)
ALBUMIN SERPL-MCNC: 4.7 G/DL (ref 3.9–4.9)
ALBUMIN/CREAT UR: 6 MG/G CREAT (ref 0–29)
ALBUMIN/GLOB SERPL: 1.7 {RATIO} (ref 1.2–2.2)
ALP SERPL-CCNC: 94 IU/L (ref 44–121)
ALT SERPL-CCNC: 20 IU/L (ref 0–32)
APPEARANCE UR: CLEAR
AST SERPL-CCNC: 19 IU/L (ref 0–40)
BACTERIA #/AREA URNS HPF: NORMAL /[HPF]
BACTERIA UR CULT: ABNORMAL
BACTERIA UR CULT: ABNORMAL
BASOPHILS # BLD AUTO: 0.1 X10E3/UL (ref 0–0.2)
BASOPHILS NFR BLD AUTO: 1 %
BILIRUB SERPL-MCNC: 0.3 MG/DL (ref 0–1.2)
BILIRUB UR QL STRIP: NEGATIVE
BUN SERPL-MCNC: 21 MG/DL (ref 8–27)
BUN/CREAT SERPL: 28 (ref 12–28)
CALCIUM SERPL-MCNC: 9.6 MG/DL (ref 8.7–10.3)
CASTS URNS QL MICRO: NORMAL /LPF
CHLORIDE SERPL-SCNC: 101 MMOL/L (ref 96–106)
CHOLEST SERPL-MCNC: 159 MG/DL (ref 100–199)
CK SERPL-CCNC: 44 U/L (ref 32–182)
CO2 SERPL-SCNC: 24 MMOL/L (ref 20–29)
COLOR UR: YELLOW
CREAT SERPL-MCNC: 0.75 MG/DL (ref 0.57–1)
CREAT UR-MCNC: 232.7 MG/DL
EGFRCR SERPLBLD CKD-EPI 2021: 91 ML/MIN/1.73
EOSINOPHIL # BLD AUTO: 0.2 X10E3/UL (ref 0–0.4)
EOSINOPHIL NFR BLD AUTO: 2 %
EPI CELLS #/AREA URNS HPF: NORMAL /HPF (ref 0–10)
ERYTHROCYTE [DISTWIDTH] IN BLOOD BY AUTOMATED COUNT: 13.5 % (ref 11.7–15.4)
FOLATE SERPL-MCNC: >20 NG/ML
GLOBULIN SER CALC-MCNC: 2.7 G/DL (ref 1.5–4.5)
GLUCOSE SERPL-MCNC: 81 MG/DL (ref 70–99)
GLUCOSE UR QL STRIP: NEGATIVE
HBA1C MFR BLD: 6 % (ref 4.8–5.6)
HCT VFR BLD AUTO: 42.5 % (ref 34–46.6)
HDLC SERPL-MCNC: 45 MG/DL
HGB BLD-MCNC: 14.3 G/DL (ref 11.1–15.9)
HGB UR QL STRIP: NEGATIVE
IMM GRANULOCYTES # BLD AUTO: 0 X10E3/UL (ref 0–0.1)
IMM GRANULOCYTES NFR BLD AUTO: 0 %
KETONES UR QL STRIP: NEGATIVE
LDLC SERPL CALC-MCNC: 82 MG/DL (ref 0–99)
LDLC/HDLC SERPL: 1.8 RATIO (ref 0–3.2)
LEUKOCYTE ESTERASE UR QL STRIP: ABNORMAL
LYMPHOCYTES # BLD AUTO: 1.8 X10E3/UL (ref 0.7–3.1)
LYMPHOCYTES NFR BLD AUTO: 22 %
MCH RBC QN AUTO: 30 PG (ref 26.6–33)
MCHC RBC AUTO-ENTMCNC: 33.6 G/DL (ref 31.5–35.7)
MCV RBC AUTO: 89 FL (ref 79–97)
MICRO URNS: ABNORMAL
MICROALBUMIN UR-MCNC: 13.3 UG/ML
MONOCYTES # BLD AUTO: 0.5 X10E3/UL (ref 0.1–0.9)
MONOCYTES NFR BLD AUTO: 6 %
MUCOUS THREADS URNS QL MICRO: PRESENT
NEUTROPHILS # BLD AUTO: 5.6 X10E3/UL (ref 1.4–7)
NEUTROPHILS NFR BLD AUTO: 69 %
NITRITE UR QL STRIP: NEGATIVE
OTHER ANTIBIOTIC SUSC ISLT: ABNORMAL
PH UR STRIP: 6 [PH] (ref 5–7.5)
PLATELET # BLD AUTO: 344 X10E3/UL (ref 150–450)
POTASSIUM SERPL-SCNC: 4.5 MMOL/L (ref 3.5–5.2)
PROT SERPL-MCNC: 7.4 G/DL (ref 6–8.5)
PROT UR QL STRIP: NEGATIVE
RBC # BLD AUTO: 4.76 X10E6/UL (ref 3.77–5.28)
RBC #/AREA URNS HPF: NORMAL /HPF (ref 0–2)
SODIUM SERPL-SCNC: 140 MMOL/L (ref 134–144)
SP GR UR STRIP: 1.02 (ref 1–1.03)
TRIGL SERPL-MCNC: 192 MG/DL (ref 0–149)
URINALYSIS REFLEX: ABNORMAL
UROBILINOGEN UR STRIP-MCNC: 0.2 MG/DL (ref 0.2–1)
VIT B12 SERPL-MCNC: 1345 PG/ML (ref 232–1245)
VLDLC SERPL CALC-MCNC: 32 MG/DL (ref 5–40)
WBC # BLD AUTO: 8.2 X10E3/UL (ref 3.4–10.8)
WBC #/AREA URNS HPF: NORMAL /HPF (ref 0–5)

## 2023-09-24 DIAGNOSIS — F41.8 MIXED ANXIETY DEPRESSIVE DISORDER: ICD-10-CM

## 2023-09-24 DIAGNOSIS — K21.9 GASTROESOPHAGEAL REFLUX DISEASE, UNSPECIFIED WHETHER ESOPHAGITIS PRESENT: ICD-10-CM

## 2023-09-25 RX ORDER — FAMOTIDINE 20 MG/1
20 TABLET, FILM COATED ORAL
Qty: 90 TABLET | Refills: 1 | Status: SHIPPED | OUTPATIENT
Start: 2023-09-25

## 2023-09-25 RX ORDER — ROSUVASTATIN CALCIUM 40 MG/1
40 TABLET, COATED ORAL
Qty: 90 TABLET | Refills: 1 | Status: SHIPPED | OUTPATIENT
Start: 2023-09-25

## 2023-09-25 RX ORDER — AMLODIPINE BESYLATE 10 MG/1
10 TABLET ORAL DAILY
Qty: 90 TABLET | Refills: 1 | Status: SHIPPED | OUTPATIENT
Start: 2023-09-25

## 2023-09-25 RX ORDER — LISINOPRIL AND HYDROCHLOROTHIAZIDE 12.5; 1 MG/1; MG/1
1 TABLET ORAL DAILY
Qty: 90 TABLET | Refills: 1 | Status: SHIPPED | OUTPATIENT
Start: 2023-09-25

## 2023-09-25 RX ORDER — PANTOPRAZOLE SODIUM 40 MG/1
40 TABLET, DELAYED RELEASE ORAL DAILY
Qty: 90 TABLET | Refills: 1 | Status: SHIPPED | OUTPATIENT
Start: 2023-09-25

## 2023-10-03 RX ORDER — LORATADINE 10 MG/1
10 TABLET ORAL DAILY
Qty: 30 TABLET | Refills: 2 | Status: SHIPPED | OUTPATIENT
Start: 2023-10-03

## 2023-10-09 DIAGNOSIS — E03.9 ACQUIRED HYPOTHYROIDISM: ICD-10-CM

## 2023-10-09 RX ORDER — LEVOTHYROXINE SODIUM 0.12 MG/1
125 TABLET ORAL DAILY
Qty: 30 TABLET | Refills: 1 | Status: SHIPPED | OUTPATIENT
Start: 2023-10-09

## 2023-11-06 RX ORDER — LORATADINE 10 MG/1
10 TABLET ORAL DAILY
Qty: 30 TABLET | Refills: 2 | Status: SHIPPED | OUTPATIENT
Start: 2023-11-06

## 2023-11-09 DIAGNOSIS — E11.9 NEW ONSET TYPE 2 DIABETES MELLITUS: ICD-10-CM

## 2023-11-10 RX ORDER — SEMAGLUTIDE 1.34 MG/ML
INJECTION, SOLUTION SUBCUTANEOUS
Qty: 3 ML | Refills: 2 | Status: SHIPPED | OUTPATIENT
Start: 2023-11-10

## 2023-11-19 DIAGNOSIS — J30.9 ALLERGIC RHINITIS, UNSPECIFIED SEASONALITY, UNSPECIFIED TRIGGER: ICD-10-CM

## 2023-11-21 RX ORDER — FLUTICASONE PROPIONATE 50 MCG
2 SPRAY, SUSPENSION (ML) NASAL DAILY
Qty: 16 G | Refills: 3 | Status: SHIPPED | OUTPATIENT
Start: 2023-11-21

## 2023-11-21 NOTE — TELEPHONE ENCOUNTER
Please see result note-patient was to be scheduled for follow-up with me in 3 months from 9/2023 appointment.  Please schedule follow-up with me the end of December 2023 for her 3-month follow-up, fasting

## 2023-12-03 DIAGNOSIS — D72.829 LEUKOCYTOSIS, UNSPECIFIED TYPE: ICD-10-CM

## 2023-12-04 NOTE — TELEPHONE ENCOUNTER
The refill request from 11/19/2023-please call the patient to schedule her for follow-up.    Please see result note-patient was to be scheduled for follow-up with me in 3 months from 9/2023 appointment.  Please schedule follow-up with me the end of December 2023 for her 3-month follow-up, fasting

## 2023-12-05 NOTE — TELEPHONE ENCOUNTER
Left message for patient to return call OK FOR HUB TO RELAY    Patient is overdue for a follow up, please schedule

## 2023-12-06 DIAGNOSIS — E03.9 ACQUIRED HYPOTHYROIDISM: ICD-10-CM

## 2023-12-06 RX ORDER — LEVOTHYROXINE SODIUM 0.12 MG/1
125 TABLET ORAL DAILY
Qty: 30 TABLET | Refills: 0 | Status: SHIPPED | OUTPATIENT
Start: 2023-12-06

## 2023-12-07 RX ORDER — METHOCARBAMOL 750 MG/1
750 TABLET, FILM COATED ORAL NIGHTLY
Qty: 30 TABLET | Refills: 0 | Status: SHIPPED | OUTPATIENT
Start: 2023-12-07

## 2023-12-07 NOTE — TELEPHONE ENCOUNTER
Please see result note and refill request-     [facility]   12/4/23  5:25 PM Note      The refill request from 11/19/2023-please call the patient to schedule her for follow-up.     Please see result note-patient was to be scheduled for follow-up with me in 3 months from 9/2023 appointment.  Please schedule follow-up with me the end of December 2023 for her 3-month follow-up, fasting

## 2023-12-09 RX ORDER — MONTELUKAST SODIUM 10 MG/1
10 TABLET ORAL
Qty: 90 TABLET | Refills: 0 | Status: SHIPPED | OUTPATIENT
Start: 2023-12-09

## 2023-12-12 ENCOUNTER — OFFICE VISIT (OUTPATIENT)
Dept: FAMILY MEDICINE CLINIC | Facility: CLINIC | Age: 63
End: 2023-12-12
Payer: MEDICARE

## 2023-12-12 VITALS
WEIGHT: 207 LBS | HEIGHT: 61 IN | SYSTOLIC BLOOD PRESSURE: 102 MMHG | OXYGEN SATURATION: 96 % | BODY MASS INDEX: 39.08 KG/M2 | DIASTOLIC BLOOD PRESSURE: 68 MMHG | HEART RATE: 97 BPM | TEMPERATURE: 97.1 F

## 2023-12-12 DIAGNOSIS — G89.29 CHRONIC PAIN OF BOTH KNEES: ICD-10-CM

## 2023-12-12 DIAGNOSIS — D72.829 LEUKOCYTOSIS, UNSPECIFIED TYPE: ICD-10-CM

## 2023-12-12 DIAGNOSIS — E78.2 MIXED HYPERLIPIDEMIA: ICD-10-CM

## 2023-12-12 DIAGNOSIS — R20.0 NUMBNESS AND TINGLING OF UPPER AND LOWER EXTREMITIES OF BOTH SIDES: ICD-10-CM

## 2023-12-12 DIAGNOSIS — E53.8 B12 DEFICIENCY: ICD-10-CM

## 2023-12-12 DIAGNOSIS — E11.65 TYPE 2 DIABETES MELLITUS WITH HYPERGLYCEMIA, WITHOUT LONG-TERM CURRENT USE OF INSULIN: Primary | ICD-10-CM

## 2023-12-12 DIAGNOSIS — R00.2 PALPITATIONS: ICD-10-CM

## 2023-12-12 DIAGNOSIS — R79.89 ABNORMAL LIVER FUNCTION TESTS: ICD-10-CM

## 2023-12-12 DIAGNOSIS — R00.0 TACHYCARDIA: ICD-10-CM

## 2023-12-12 DIAGNOSIS — E55.9 VITAMIN D DEFICIENCY: ICD-10-CM

## 2023-12-12 DIAGNOSIS — R20.2 NUMBNESS AND TINGLING OF UPPER AND LOWER EXTREMITIES OF BOTH SIDES: ICD-10-CM

## 2023-12-12 DIAGNOSIS — I65.23 BILATERAL CAROTID ARTERY STENOSIS: ICD-10-CM

## 2023-12-12 DIAGNOSIS — R21 RASH: ICD-10-CM

## 2023-12-12 DIAGNOSIS — E03.9 ACQUIRED HYPOTHYROIDISM: ICD-10-CM

## 2023-12-12 DIAGNOSIS — K21.9 GASTROESOPHAGEAL REFLUX DISEASE, UNSPECIFIED WHETHER ESOPHAGITIS PRESENT: ICD-10-CM

## 2023-12-12 DIAGNOSIS — R06.02 SHORTNESS OF BREATH: ICD-10-CM

## 2023-12-12 DIAGNOSIS — I10 BENIGN ESSENTIAL HYPERTENSION: ICD-10-CM

## 2023-12-12 DIAGNOSIS — J30.9 ALLERGIC RHINITIS, UNSPECIFIED SEASONALITY, UNSPECIFIED TRIGGER: ICD-10-CM

## 2023-12-12 DIAGNOSIS — J45.20 MILD INTERMITTENT ASTHMA WITHOUT COMPLICATION: ICD-10-CM

## 2023-12-12 DIAGNOSIS — E53.8 FOLATE DEFICIENCY: ICD-10-CM

## 2023-12-12 DIAGNOSIS — G47.33 OBSTRUCTIVE SLEEP APNEA: ICD-10-CM

## 2023-12-12 DIAGNOSIS — R42 DIZZINESS: ICD-10-CM

## 2023-12-12 DIAGNOSIS — F41.8 MIXED ANXIETY DEPRESSIVE DISORDER: ICD-10-CM

## 2023-12-12 DIAGNOSIS — Z23 NEED FOR INFLUENZA VACCINATION: ICD-10-CM

## 2023-12-12 DIAGNOSIS — M25.562 CHRONIC PAIN OF BOTH KNEES: ICD-10-CM

## 2023-12-12 DIAGNOSIS — R79.9 ABNORMAL FINDING OF BLOOD CHEMISTRY, UNSPECIFIED: ICD-10-CM

## 2023-12-12 DIAGNOSIS — M25.561 CHRONIC PAIN OF BOTH KNEES: ICD-10-CM

## 2023-12-12 PROCEDURE — 99214 OFFICE O/P EST MOD 30 MIN: CPT | Performed by: PHYSICIAN ASSISTANT

## 2023-12-12 PROCEDURE — G0008 ADMIN INFLUENZA VIRUS VAC: HCPCS | Performed by: PHYSICIAN ASSISTANT

## 2023-12-12 PROCEDURE — 3074F SYST BP LT 130 MM HG: CPT | Performed by: PHYSICIAN ASSISTANT

## 2023-12-12 PROCEDURE — 1160F RVW MEDS BY RX/DR IN RCRD: CPT | Performed by: PHYSICIAN ASSISTANT

## 2023-12-12 PROCEDURE — 3044F HG A1C LEVEL LT 7.0%: CPT | Performed by: PHYSICIAN ASSISTANT

## 2023-12-12 PROCEDURE — 3078F DIAST BP <80 MM HG: CPT | Performed by: PHYSICIAN ASSISTANT

## 2023-12-12 PROCEDURE — 90686 IIV4 VACC NO PRSV 0.5 ML IM: CPT | Performed by: PHYSICIAN ASSISTANT

## 2023-12-12 PROCEDURE — 1159F MED LIST DOCD IN RCRD: CPT | Performed by: PHYSICIAN ASSISTANT
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
## 2023-12-14 LAB
25(OH)D3+25(OH)D2 SERPL-MCNC: 38.5 NG/ML (ref 30–100)
ALBUMIN SERPL-MCNC: 4.9 G/DL (ref 3.5–5.2)
ALBUMIN/CREAT UR: 6 MG/G CREAT (ref 0–29)
ALBUMIN/GLOB SERPL: 2.3 G/DL
ALP SERPL-CCNC: 97 U/L (ref 39–117)
ALT SERPL-CCNC: 21 U/L (ref 1–33)
APPEARANCE UR: CLEAR
AST SERPL-CCNC: 17 U/L (ref 1–32)
BACTERIA #/AREA URNS HPF: NORMAL /HPF
BACTERIA UR CULT: NORMAL
BACTERIA UR CULT: NORMAL
BASOPHILS # BLD AUTO: 0.05 10*3/MM3 (ref 0–0.2)
BASOPHILS NFR BLD AUTO: 0.8 % (ref 0–1.5)
BILIRUB SERPL-MCNC: 0.3 MG/DL (ref 0–1.2)
BILIRUB UR QL STRIP: NEGATIVE
BUN SERPL-MCNC: 19 MG/DL (ref 8–23)
BUN/CREAT SERPL: 25 (ref 7–25)
CALCIUM SERPL-MCNC: 10.3 MG/DL (ref 8.6–10.5)
CASTS URNS QL MICRO: NORMAL /LPF
CHLORIDE SERPL-SCNC: 100 MMOL/L (ref 98–107)
CHOLEST SERPL-MCNC: 137 MG/DL (ref 0–200)
CK SERPL-CCNC: 47 U/L (ref 20–180)
CO2 SERPL-SCNC: 27.9 MMOL/L (ref 22–29)
COLOR UR: YELLOW
CREAT SERPL-MCNC: 0.76 MG/DL (ref 0.57–1)
CREAT UR-MCNC: 168.8 MG/DL
EGFRCR SERPLBLD CKD-EPI 2021: 89.3 ML/MIN/1.73
EOSINOPHIL # BLD AUTO: 0.24 10*3/MM3 (ref 0–0.4)
EOSINOPHIL NFR BLD AUTO: 3.8 % (ref 0.3–6.2)
EPI CELLS #/AREA URNS HPF: NORMAL /HPF (ref 0–10)
ERYTHROCYTE [DISTWIDTH] IN BLOOD BY AUTOMATED COUNT: 13.1 % (ref 12.3–15.4)
FERRITIN SERPL-MCNC: 45.1 NG/ML (ref 13–150)
FOLATE SERPL-MCNC: >20 NG/ML (ref 4.78–24.2)
GLOBULIN SER CALC-MCNC: 2.1 GM/DL
GLUCOSE SERPL-MCNC: 103 MG/DL (ref 65–99)
GLUCOSE UR QL STRIP: NEGATIVE
HBA1C MFR BLD: 5.8 % (ref 4.8–5.6)
HCT VFR BLD AUTO: 41.5 % (ref 34–46.6)
HDLC SERPL-MCNC: 40 MG/DL (ref 40–60)
HGB BLD-MCNC: 14.2 G/DL (ref 12–15.9)
HGB UR QL STRIP: NEGATIVE
IMM GRANULOCYTES # BLD AUTO: 0.02 10*3/MM3 (ref 0–0.05)
IMM GRANULOCYTES NFR BLD AUTO: 0.3 % (ref 0–0.5)
IRON SATN MFR SERPL: 18 % (ref 20–50)
IRON SERPL-MCNC: 86 MCG/DL (ref 37–145)
KETONES UR QL STRIP: NEGATIVE
LDLC SERPL CALC-MCNC: 67 MG/DL (ref 0–100)
LDLC/HDLC SERPL: 1.55 {RATIO}
LEUKOCYTE ESTERASE UR QL STRIP: ABNORMAL
LYMPHOCYTES # BLD AUTO: 1.76 10*3/MM3 (ref 0.7–3.1)
LYMPHOCYTES NFR BLD AUTO: 28.2 % (ref 19.6–45.3)
MCH RBC QN AUTO: 30.3 PG (ref 26.6–33)
MCHC RBC AUTO-ENTMCNC: 34.2 G/DL (ref 31.5–35.7)
MCV RBC AUTO: 88.7 FL (ref 79–97)
MICRO URNS: ABNORMAL
MICROALBUMIN UR-MCNC: 9.5 UG/ML
MONOCYTES # BLD AUTO: 0.49 10*3/MM3 (ref 0.1–0.9)
MONOCYTES NFR BLD AUTO: 7.8 % (ref 5–12)
NEUTROPHILS # BLD AUTO: 3.69 10*3/MM3 (ref 1.7–7)
NEUTROPHILS NFR BLD AUTO: 59.1 % (ref 42.7–76)
NITRITE UR QL STRIP: NEGATIVE
NRBC BLD AUTO-RTO: 0 /100 WBC (ref 0–0.2)
PH UR STRIP: 6.5 [PH] (ref 5–7.5)
PLATELET # BLD AUTO: 343 10*3/MM3 (ref 140–450)
POTASSIUM SERPL-SCNC: 4.5 MMOL/L (ref 3.5–5.2)
PROT SERPL-MCNC: 7 G/DL (ref 6–8.5)
PROT UR QL STRIP: NEGATIVE
RBC # BLD AUTO: 4.68 10*6/MM3 (ref 3.77–5.28)
RBC #/AREA URNS HPF: NORMAL /HPF (ref 0–2)
SODIUM SERPL-SCNC: 140 MMOL/L (ref 136–145)
SP GR UR STRIP: 1.02 (ref 1–1.03)
T3FREE SERPL-MCNC: 3 PG/ML (ref 2–4.4)
T4 FREE SERPL-MCNC: 1.44 NG/DL (ref 0.93–1.7)
TIBC SERPL-MCNC: 483 MCG/DL
TRIGL SERPL-MCNC: 175 MG/DL (ref 0–150)
TSH SERPL DL<=0.005 MIU/L-ACNC: 1.05 UIU/ML (ref 0.27–4.2)
UIBC SERPL-MCNC: 397 MCG/DL (ref 112–346)
URATE SERPL-MCNC: 5.5 MG/DL (ref 2.4–5.7)
URINALYSIS REFLEX: ABNORMAL
UROBILINOGEN UR STRIP-MCNC: 0.2 MG/DL (ref 0.2–1)
VIT B12 SERPL-MCNC: 1855 PG/ML (ref 211–946)
VLDLC SERPL CALC-MCNC: 30 MG/DL (ref 5–40)
WBC # BLD AUTO: 6.25 10*3/MM3 (ref 3.4–10.8)
WBC #/AREA URNS HPF: NORMAL /HPF (ref 0–5)

## 2023-12-21 DIAGNOSIS — E53.8 FOLATE DEFICIENCY: ICD-10-CM

## 2023-12-21 RX ORDER — LANOLIN ALCOHOL/MO/W.PET/CERES
400 CREAM (GRAM) TOPICAL DAILY
Qty: 90 TABLET | Refills: 1 | Status: SHIPPED | OUTPATIENT
Start: 2023-12-21

## 2024-01-03 DIAGNOSIS — E03.9 ACQUIRED HYPOTHYROIDISM: ICD-10-CM

## 2024-01-03 RX ORDER — METHOCARBAMOL 750 MG/1
750 TABLET, FILM COATED ORAL NIGHTLY
Qty: 90 TABLET | Refills: 0 | Status: SHIPPED | OUTPATIENT
Start: 2024-01-03

## 2024-01-03 RX ORDER — LEVOTHYROXINE SODIUM 0.12 MG/1
125 TABLET ORAL DAILY
Qty: 90 TABLET | Refills: 0 | Status: SHIPPED | OUTPATIENT
Start: 2024-01-03

## 2024-01-22 DIAGNOSIS — J45.20 MILD INTERMITTENT ASTHMA WITHOUT COMPLICATION: ICD-10-CM

## 2024-01-23 RX ORDER — ALBUTEROL SULFATE 90 UG/1
2 AEROSOL, METERED RESPIRATORY (INHALATION) EVERY 6 HOURS PRN
Qty: 18 G | Refills: 0 | Status: SHIPPED | OUTPATIENT
Start: 2024-01-23

## 2024-02-08 DIAGNOSIS — E11.9 NEW ONSET TYPE 2 DIABETES MELLITUS: ICD-10-CM

## 2024-02-09 RX ORDER — SEMAGLUTIDE 1.34 MG/ML
INJECTION, SOLUTION SUBCUTANEOUS
Qty: 3 ML | Refills: 2 | Status: SHIPPED | OUTPATIENT
Start: 2024-02-09

## 2024-02-09 NOTE — TELEPHONE ENCOUNTER
See result note. Please schedule her for follow up with me 3/2024 for 3 month follow up from 12/2023 appt.

## 2024-02-14 DIAGNOSIS — J45.20 MILD INTERMITTENT ASTHMA WITHOUT COMPLICATION: ICD-10-CM

## 2024-02-15 RX ORDER — ALBUTEROL SULFATE 90 UG/1
2 AEROSOL, METERED RESPIRATORY (INHALATION) EVERY 6 HOURS PRN
Qty: 18 G | Refills: 0 | Status: SHIPPED | OUTPATIENT
Start: 2024-02-15

## 2024-03-03 DIAGNOSIS — D72.829 LEUKOCYTOSIS, UNSPECIFIED TYPE: ICD-10-CM

## 2024-03-06 ENCOUNTER — OFFICE VISIT (OUTPATIENT)
Dept: FAMILY MEDICINE CLINIC | Facility: CLINIC | Age: 64
End: 2024-03-06
Payer: MEDICARE

## 2024-03-06 VITALS
TEMPERATURE: 97 F | OXYGEN SATURATION: 99 % | SYSTOLIC BLOOD PRESSURE: 112 MMHG | BODY MASS INDEX: 39.27 KG/M2 | HEART RATE: 69 BPM | WEIGHT: 208 LBS | HEIGHT: 61 IN | RESPIRATION RATE: 18 BRPM | DIASTOLIC BLOOD PRESSURE: 68 MMHG

## 2024-03-06 DIAGNOSIS — F41.8 MIXED ANXIETY DEPRESSIVE DISORDER: ICD-10-CM

## 2024-03-06 DIAGNOSIS — R00.2 PALPITATIONS: ICD-10-CM

## 2024-03-06 DIAGNOSIS — R06.02 SHORTNESS OF BREATH: ICD-10-CM

## 2024-03-06 DIAGNOSIS — I65.23 BILATERAL CAROTID ARTERY STENOSIS: ICD-10-CM

## 2024-03-06 DIAGNOSIS — E03.9 ACQUIRED HYPOTHYROIDISM: ICD-10-CM

## 2024-03-06 DIAGNOSIS — E55.9 VITAMIN D DEFICIENCY: ICD-10-CM

## 2024-03-06 DIAGNOSIS — Z12.11 COLON CANCER SCREENING: ICD-10-CM

## 2024-03-06 DIAGNOSIS — K21.9 GASTROESOPHAGEAL REFLUX DISEASE, UNSPECIFIED WHETHER ESOPHAGITIS PRESENT: ICD-10-CM

## 2024-03-06 DIAGNOSIS — R79.89 ABNORMAL LIVER FUNCTION TESTS: ICD-10-CM

## 2024-03-06 DIAGNOSIS — G47.33 OBSTRUCTIVE SLEEP APNEA: ICD-10-CM

## 2024-03-06 DIAGNOSIS — E11.65 TYPE 2 DIABETES MELLITUS WITH HYPERGLYCEMIA, WITHOUT LONG-TERM CURRENT USE OF INSULIN: ICD-10-CM

## 2024-03-06 DIAGNOSIS — R42 DIZZINESS: ICD-10-CM

## 2024-03-06 DIAGNOSIS — M25.562 CHRONIC PAIN OF BOTH KNEES: ICD-10-CM

## 2024-03-06 DIAGNOSIS — Z00.00 MEDICARE ANNUAL WELLNESS VISIT, SUBSEQUENT: Primary | ICD-10-CM

## 2024-03-06 DIAGNOSIS — I10 BENIGN ESSENTIAL HYPERTENSION: ICD-10-CM

## 2024-03-06 DIAGNOSIS — E78.2 MIXED HYPERLIPIDEMIA: ICD-10-CM

## 2024-03-06 DIAGNOSIS — M25.561 CHRONIC PAIN OF BOTH KNEES: ICD-10-CM

## 2024-03-06 DIAGNOSIS — Z78.0 POSTMENOPAUSAL: ICD-10-CM

## 2024-03-06 DIAGNOSIS — Z12.31 ENCOUNTER FOR SCREENING MAMMOGRAM FOR MALIGNANT NEOPLASM OF BREAST: ICD-10-CM

## 2024-03-06 DIAGNOSIS — E53.8 FOLATE DEFICIENCY: ICD-10-CM

## 2024-03-06 DIAGNOSIS — R00.0 TACHYCARDIA: ICD-10-CM

## 2024-03-06 DIAGNOSIS — E53.8 B12 DEFICIENCY: ICD-10-CM

## 2024-03-06 DIAGNOSIS — D72.829 LEUKOCYTOSIS, UNSPECIFIED TYPE: ICD-10-CM

## 2024-03-06 DIAGNOSIS — J45.20 MILD INTERMITTENT ASTHMA WITHOUT COMPLICATION: ICD-10-CM

## 2024-03-06 DIAGNOSIS — G89.29 CHRONIC PAIN OF BOTH KNEES: ICD-10-CM

## 2024-03-06 DIAGNOSIS — R20.0 NUMBNESS AND TINGLING OF UPPER AND LOWER EXTREMITIES OF BOTH SIDES: ICD-10-CM

## 2024-03-06 DIAGNOSIS — R20.2 NUMBNESS AND TINGLING OF UPPER AND LOWER EXTREMITIES OF BOTH SIDES: ICD-10-CM

## 2024-03-06 DIAGNOSIS — J30.9 ALLERGIC RHINITIS, UNSPECIFIED SEASONALITY, UNSPECIFIED TRIGGER: ICD-10-CM

## 2024-03-06 RX ORDER — DULOXETIN HYDROCHLORIDE 30 MG/1
30 CAPSULE, DELAYED RELEASE ORAL DAILY
Qty: 30 CAPSULE | Refills: 0 | Status: SHIPPED | OUTPATIENT
Start: 2024-03-06

## 2024-03-06 NOTE — PROGRESS NOTES
The ABCs of the Annual Wellness Visit  Subsequent Medicare Wellness Visit    Subjective      Lydia Gutierrez is a 63 y.o. female who presents for a Subsequent Medicare Wellness Visit.    Last Pap- unsure- no Hx abnormal PAP. Does not want PAP. Last may have been at [facility].   Last mammogram- 6/2023- negative.   Last DEXA- has not had  Last colonoscopy- 2014 with EGD- reactive gastropathy and hyperplastic polyp. Recheck in 10 years.   Last Tdap- unknown. Thinks was with the [facility]- has not worked there in 8-9 years.   Prevnar- has not had   Pneumovax- has not had  Hepatitis A- has not had  Last flu shot- 10/2022  Shingrix- had varicella as a child. Has not had vaccination.   Covid 19- Pfizer 5/2021, 6/2021    The following portions of the patient's history were reviewed and   updated as appropriate: allergies, current medications, past family history, past medical history, past social history, past surgical history, and problem list.    Compared to one year ago, the patient feels her physical   health is worse. Can't do what she used to do because of knees, moods, depression.     Compared to one year ago, the patient feels her mental   health is worse. Worse with depression.     Recent Hospitalizations:  She was not admitted to the hospital during the last year. Last admission 8/2021 with [facility]      Current Medical Providers:  Patient Care Team:  Joanna Sanabria PA as PCP - General  Joanna Sanabria PA as PCP - Family Medicine  Doris Mitchell MD as Consulting Physician (Cardiology)    Outpatient Medications Prior to Visit   Medication Sig Dispense Refill    acetaminophen (TYLENOL) 500 MG tablet Take 2 tablets by mouth Every 6 (Six) Hours As Needed for Mild Pain.      amLODIPine (NORVASC) 10 MG tablet TAKE 1 TABLET BY MOUTH DAILY. 90 tablet 1    Blood Glucose Monitoring Suppl (OneTouch Verio Flex System) w/Device kit Use 1 each Daily. Check glucose once daily. 1 kit 0    famotidine (PEPCID) 20 MG tablet  TAKE 1 TABLET BY MOUTH DAILY BEFORE SUPPER. 90 tablet 1    FLUoxetine (PROzac) 20 MG capsule Take 1 capsule by mouth Every Evening. 90 capsule 1    fluticasone (FLONASE) 50 MCG/ACT nasal spray INSTILL 2 SPRAYS IN EACH NOSTRIL ONCE DAILY 16 g 3    folic acid (FOLVITE) 400 MCG tablet TAKE 1 TABLET BY MOUTH DAILY. 90 tablet 1    glucose blood (OneTouch Verio) test strip Use as instructed 200 each 6    Lancets (OneTouch Delica Plus [id number]) misc USE TO CHECK GLUCOSE DAILY AS NEEDED FOR HYPERGLYCEMIA 100 each 0    levothyroxine (SYNTHROID, LEVOTHROID) 125 MCG tablet TAKE 1 TABLET BY MOUTH DAILY. 90 tablet 0    lisinopril-hydrochlorothiazide (PRINZIDE,ZESTORETIC) 10-12.5 MG per tablet TAKE 1 TABLET BY MOUTH DAILY. 90 tablet 1    loratadine (CLARITIN) 10 MG tablet Take 1 tablet by mouth Daily. 30 tablet 2    metFORMIN (GLUCOPHAGE) 500 MG tablet TAKE 1 TABLET BY MOUTH DAILY WITH BREAKFAST. 90 tablet 0    methocarbamol (ROBAXIN) 750 MG tablet TAKE 1 TABLET BY MOUTH EVERY NIGHT. FOR MUSCLE SPASMS 90 tablet 0    montelukast (SINGULAIR) 10 MG tablet TAKE 1 TABLET BY MOUTH EVERY NIGHT AT BEDTIME. 90 tablet 0    Ozempic, 1 MG/DOSE, 4 MG/3ML solution pen-injector INJECT 1 MG UNDER THE SKIN INTO THE APPROPRIATE AREA AS DIRECTED EVERY SEVEN DAYS. 3 mL 2    pantoprazole (PROTONIX) 40 MG EC tablet TAKE 1 TABLET BY MOUTH DAILY. 90 tablet 1    rosuvastatin (CRESTOR) 40 MG tablet TAKE 1 TABLET BY MOUTH EVERY NIGHT AT BEDTIME. 90 tablet 1    Symbicort 160-4.5 MCG/ACT inhaler       valACYclovir (Valtrex) 1000 MG tablet Take 1 tablet by mouth 3 (Three) Times a Day. 30 tablet 0    Ventolin  (90 Base) MCG/ACT inhaler INHALE 2 PUFFS INTO THE LUNGS EVERY SIX HOURS AS NEEDED FOR WHEEZING. 18 g 0    vitamin B-12 (CYANOCOBALAMIN) 500 MCG tablet TAKE 1 TABLET BY MOUTH DAILY. 120 tablet 3     Facility-Administered Medications Prior to Visit   Medication Dose Route Frequency Provider Last Rate Last Admin    Chlorhexidine Gluconate 2 % pads 2  each  2 pad  Apply externally BID Nikhil, Anne L, APRN        Chlorhexidine Gluconate Cloth 2 % pads   Apply externally BID Nikhil, Anne L, APRN           No opioid medication identified on active medication list. I have reviewed chart for other potential  high risk medication/s and harmful drug interactions in the elderly.        Aspirin is not on active medication list.  Aspirin use is indicated based on review of current medical condition/s. Pros and cons of this therapy have been discussed with this patient. Benefits of this medication outweigh potential harm.  Patient has been instructed to start taking this medication..    Patient Active Problem List   Diagnosis    Abdominal mass    Abnormal liver function tests    Ultrasound scan abnormal    Allergic rhinitis    Asthma    Benign essential hypertension    Bladder problem    Lump of breast    Dense breast tissue    Breast tenderness    Mixed anxiety depressive disorder    Dyspareunia    Abnormal C-reactive protein    Leukocytosis    Gastroesophageal reflux disease    Fatigue    Headache    Herpes simplex    Hip pain    Hypothyroid    Low back pain    Lumbar radiculopathy    Hyperlipemia    Oligomenorrhea    Periodic limb movement disorder    Obstructive sleep apnea    Visual disturbance    Vitamin D deficiency    Pain in both knees    Cervicalgia    B12 deficiency    Primary osteoarthritis of left knee    Primary osteoarthritis of right knee    Hypertension    Sleep apnea    Cobalamin deficiency    Type 2 diabetes mellitus with hyperglycemia, without long-term current use of insulin    Folate deficiency    Bilateral carotid artery stenosis     Advance Care Planning   Advance Care Planning     Advance Directive is not on file.  ACP discussion was held with the patient during this visit. Patient does not have an advance directive, information provided.     Objective    Vitals:    03/06/24 0953   BP: 112/68   Pulse: 69   Resp: 18   Temp: 97 °F (36.1 °C)  "  SpO2: 99%   Weight: 94.3 kg (208 lb)   Height: 154.9 cm (60.98\")   PainSc: 0-No pain     Estimated body mass index is 39.32 kg/m² as calculated from the following:    Height as of this encounter: 154.9 cm (60.98\").    Weight as of this encounter: 94.3 kg (208 lb).           Does the patient have evidence of cognitive impairment?   No    Lab Results   Component Value Date    CHLPL 137 12/12/2023    TRIG 175 (H) 12/12/2023    HDL 40 12/12/2023    LDL 67 12/12/2023    VLDL 30 12/12/2023    HGBA1C 5.80 (H) 12/12/2023          HEALTH RISK ASSESSMENT    Smoking Status:  Social History     Tobacco Use   Smoking Status Never   Smokeless Tobacco Never     Alcohol Consumption:  Social History     Substance and Sexual Activity   Alcohol Use No     Fall Risk Screen:    NAE Fall Risk Assessment was completed, and patient is at MODERATE risk for falls. Assessment completed on:3/6/2024    Depression Screening:      3/6/2024     9:56 AM   PHQ-2/PHQ-9 Depression Screening   Little Interest or Pleasure in Doing Things 0-->not at all   Feeling Down, Depressed or Hopeless 1-->several days   PHQ-9: Brief Depression Severity Measure Score 1       Health Habits and Functional and Cognitive Screening:      3/6/2024     9:00 AM   Functional & Cognitive Status   Do you have difficulty preparing food and eating? No   Do you have difficulty bathing yourself, getting dressed or grooming yourself? No   Do you have difficulty using the toilet? No   Do you have difficulty moving around from place to place? Yes   Do you have trouble with steps or getting out of a bed or a chair? Yes   Current Diet Unhealthy Diet   Dental Exam Not up to date   Eye Exam Not up to date   Do you need help using the phone?  No   Are you deaf or do you have serious difficulty hearing?  Yes   Do you need help to go to places out of walking distance? Yes   Do you need help shopping? No   Do you need help preparing meals?  Yes   Do you need help with housework?  Yes "   Do you need help with laundry? Yes   Do you need help taking your medications? No   Do you need help managing money? No   Do you ever drive or ride in a car without wearing a seat belt? No   Have you felt unusual stress, anger or loneliness in the last month? Yes   Who do you live with? Spouse   If you need help, do you have trouble finding someone available to you? No   Have you been bothered in the last four weeks by sexual problems? No   Do you have difficulty concentrating, remembering or making decisions? No       Age-appropriate Screening Schedule:  Refer to the list below for future screening recommendations based on patient's age, sex and/or medical conditions. Orders for these recommended tests are listed in the plan section. The patient has been provided with a written plan.    Health Maintenance   Topic Date Due    ANNUAL WELLNESS VISIT  Never done    DIABETIC FOOT EXAM  Never done    PAP SMEAR  Never done    Pneumococcal Vaccine 0-64 (1 of 2 - PCV) 05/19/2024 (Originally 8/16/1966)    TDAP/TD VACCINES (1 - Tdap) 05/19/2024 (Originally 8/16/1981)    COVID-19 Vaccine (3 - 2023-24 season) 05/26/2024 (Originally 9/1/2023)    ZOSTER VACCINE (1 of 2) 12/12/2024 (Originally 8/16/2012)    RSV Vaccine - Adults (1 - 1-dose 60+ series) 03/06/2025 (Originally 8/16/2022)    COLORECTAL CANCER SCREENING  05/23/2024    DIABETIC EYE EXAM  05/25/2024    HEMOGLOBIN A1C  06/12/2024    LIPID PANEL  12/12/2024    URINE MICROALBUMIN  12/12/2024    BMI FOLLOWUP  02/09/2025    MAMMOGRAM  06/26/2025    HEPATITIS C SCREENING  Completed    INFLUENZA VACCINE  Completed                  CMS Preventative Services Quick Reference  Risk Factors Identified During Encounter:    Chronic Pain: Orthopedics Referral Ordered  Depression/Dysphoria: Current medication adjusted.  Follow up visit planned.  Hearing Problem:  discuss audiology  Immunizations Discussed/Encouraged: Tdap, Shingrix, COVID19, and RSV (Respiratory Syncytial  Virus)    The above risks/problems have been discussed with the patient.  Pertinent information has been shared with the patient in the After Visit Summary.    There are no diagnoses linked to this encounter.    Follow Up:   Next Medicare Wellness visit to be scheduled in 1 year.      An After Visit Summary and PPPS were made available to the patient.

## 2024-03-06 NOTE — PATIENT INSTRUCTIONS
Medicare Wellness  Personal Prevention Plan of Service     Date of Office Visit:    Encounter Provider:  MIKA Cage  Place of Service:  [facility]  Patient Name: Lydia Gutierrez  :  1960    As part of the Medicare Wellness portion of your visit today, we are providing you with this personalized preventive plan of services (PPPS). This plan is based upon recommendations of the [place] Preventive Services Task Force (USPSTF) and the Advisory Committee on Immunization Practices (ACIP).    This lists the preventive care services that should be considered, and provides dates of when you are due. Items listed as completed are up-to-date and do not require any further intervention.    Health Maintenance   Topic Date Due    DIABETIC FOOT EXAM  Never done    Pneumococcal Vaccine 0-64 (1 of 2 - PCV) 2024 (Originally 1966)    TDAP/TD VACCINES (1 - Tdap) 2024 (Originally 1981)    COVID-19 Vaccine (3 - -24 season) 2024 (Originally 2023)    ZOSTER VACCINE (1 of 2) 2024 (Originally 2012)    RSV Vaccine - Adults (1 - 1-dose 60+ series) 2025 (Originally 2022)    PAP SMEAR  2025 (Originally 2016)    COLORECTAL CANCER SCREENING  2024    DIABETIC EYE EXAM  2024    HEMOGLOBIN A1C  2024    LIPID PANEL  2024    URINE MICROALBUMIN  2024    BMI FOLLOWUP  2025    ANNUAL WELLNESS VISIT  2025    MAMMOGRAM  2025    HEPATITIS C SCREENING  Completed    INFLUENZA VACCINE  Completed       Orders Placed This Encounter   Procedures    Mammo screening digital tomosynthesis bilateral w CAD     Standing Status:   Future     Standing Expiration Date:   3/6/2025     Order Specific Question:   Reason for Exam:     Answer:   beast cancer screening     Order Specific Question:   Release to patient     Answer:   Routine Release [[id number]]    DEXA Bone Density Axial     Standing Status:    Future     Standing Expiration Date:   3/6/2025     Order Specific Question:   Is patient taking or have taken long term Glucocorticoid (steroids)?     Answer:   No     Order Specific Question:   Does the patient have rheumatoid arthritis?     Answer:   No     Order Specific Question:   Does the patient have secondary osteoporosis?     Answer:   No     Order Specific Question:   Reason for Exam:     Answer:   postmenopausal     Order Specific Question:   Release to patient     Answer:   Routine Release [[id number]]    Comprehensive Metabolic Panel     Order Specific Question:   Release to patient     Answer:   Routine Release [[id number]]    Hemoglobin A1c     Order Specific Question:   Release to patient     Answer:   Routine Release [[id number]]    CK     Order Specific Question:   Release to patient     Answer:   Routine Release [[id number]]    Lipid Panel With LDL / HDL Ratio     Order Specific Question:   Release to patient     Answer:   Routine Release [[id number]]    Vitamin B12 & Folate     Order Specific Question:   Release to patient     Answer:   Routine Release [[id number]]    Vitamin D,25-Hydroxy     Order Specific Question:   Release to patient     Answer:   Routine Release [[id number]]    TSH     Order Specific Question:   Release to patient     Answer:   Routine Release [[id number]]    T4, free     Order Specific Question:   Release to patient     Answer:   Routine Release [[id number]]    T3, Free     Order Specific Question:   Release to patient     Answer:   Routine Release [[id number]]    Urinalysis With Culture If Indicated -     Order Specific Question:   Release to patient     Answer:   Routine Release [[id number]]    Ambulatory Referral to Gastroenterology     Referral Priority:   Routine     Referral Type:   Consultation     Referral Reason:   Specialty Services Required     Requested Specialty:   Gastroenterology     Number of Visits Requested:   1    CBC & Differential     Order Specific  Question:   Manual Differential     Answer:   No     Order Specific Question:   Release to patient     Answer:   Routine Release [[id number]]       No follow-ups on file.
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
## 2024-03-07 ENCOUNTER — OFFICE VISIT (OUTPATIENT)
Dept: ORTHOPEDIC SURGERY | Facility: CLINIC | Age: 64
End: 2024-03-07
Payer: MEDICARE

## 2024-03-07 ENCOUNTER — PATIENT ROUNDING (BHMG ONLY) (OUTPATIENT)
Dept: FAMILY MEDICINE CLINIC | Facility: CLINIC | Age: 64
End: 2024-03-07
Payer: MEDICARE

## 2024-03-07 ENCOUNTER — TELEPHONE (OUTPATIENT)
Dept: GASTROENTEROLOGY | Facility: CLINIC | Age: 64
End: 2024-03-07
Payer: MEDICARE

## 2024-03-07 ENCOUNTER — LAB (OUTPATIENT)
Dept: LAB | Facility: HOSPITAL | Age: 64
End: 2024-03-07
Payer: MEDICARE

## 2024-03-07 VITALS — TEMPERATURE: 96.4 F | WEIGHT: 205.8 LBS | BODY MASS INDEX: 37.87 KG/M2 | HEIGHT: 62 IN

## 2024-03-07 DIAGNOSIS — M25.562 CHRONIC PAIN OF BOTH KNEES: ICD-10-CM

## 2024-03-07 DIAGNOSIS — Z96.653 HISTORY OF TOTAL KNEE ARTHROPLASTY, BILATERAL: Primary | ICD-10-CM

## 2024-03-07 DIAGNOSIS — Z96.653 HISTORY OF TOTAL KNEE ARTHROPLASTY, BILATERAL: ICD-10-CM

## 2024-03-07 DIAGNOSIS — M25.561 CHRONIC PAIN OF BOTH KNEES: ICD-10-CM

## 2024-03-07 DIAGNOSIS — G89.29 CHRONIC PAIN OF BOTH KNEES: ICD-10-CM

## 2024-03-07 LAB
CRP SERPL-MCNC: <0.3 MG/DL (ref 0–0.5)
ERYTHROCYTE [SEDIMENTATION RATE] IN BLOOD: 7 MM/HR (ref 0–30)

## 2024-03-07 PROCEDURE — 85652 RBC SED RATE AUTOMATED: CPT

## 2024-03-07 PROCEDURE — 86140 C-REACTIVE PROTEIN: CPT

## 2024-03-07 PROCEDURE — 36415 COLL VENOUS BLD VENIPUNCTURE: CPT

## 2024-03-07 NOTE — PROGRESS NOTES
"Lydia Gutierrez : 1960 MRN: [id number] DATE: 3/7/2024    Chief Complaint: Bilateral knee pain with a history of bilateral total knee arthroplasty    SUBJECTIVE:Patient returns today for a follow up of bilateral total knee replacement with worsening knee pain. Patient had a left total knee replacement done in 2020 and a right total knee replacement done in 2021.  Patient reports that she has had pain in the knees ever since surgery.  She reports the pain is located primarily to the medial aspect of the knees around the joint line region.  She states the right knee is worse than the left.  States that she has done physical therapy and has been evaluated for by orthopedic spine nurse practitioner to rule out her back.  Patient reports that Teresita states that she did not think the back was the issue based off of her symptoms and her MRI results and that her knee should be reevaluated.  Patient denies any instability or buckling of the knees.  She denies having to use any type of ambulatory device for ambulation at the moment.  She denies any active signs or symptoms of infection, she is without any other significant complaints today.    OBJECTIVE:    Temp 96.4 °F (35.8 °C) (Temporal)   Ht 157.5 cm (62\")   Wt 93.4 kg (205 lb 12.8 oz)   BMI 37.64 kg/m²   Family History   Problem Relation Age of Onset    Heart disease Mother     Glaucoma Mother     Cancer Mother     Hypertension Father     Thyroid disease Sister     Kidney disease Brother     Glaucoma Brother     Asthma Son     Depression Son     Bipolar disorder Son     Breast cancer Paternal Grandmother     Breast cancer Paternal Aunt     Malig Hyperthermia Neg Hx      Past Medical History:   Diagnosis Date    Anxiety disorder     Arthritis     Arthritis of back Not sure    Had it for a number of years    Asthma     Borderline diabetic     Depression     GERD (gastroesophageal reflux disease)     History of kidney stones     Hyperlipidemia     " Hypertension     Hypothyroidism     Knee swelling     Had replacement in both    Low back strain     Had for a long while    Right knee pain     Sleep apnea     NO MACHINE     Past Surgical History:   Procedure Laterality Date    BREAST BIOPSY Left     benign     SECTION      CHOLECYSTECTOMY      EYE SURGERY Left     JOINT REPLACEMENT Left     TOTAL KNEE ARTHROPLASTY Left 2020    Procedure: TOTAL KNEE ARTHROPLASTY;  Surgeon: Juan Walsh MD;  Location: [facility];  Service: Orthopedics;  Laterality: Left;    TOTAL KNEE ARTHROPLASTY Right 2021    Procedure: TOTAL KNEE ARTHROPLASTY;  Surgeon: Juan Walsh MD;  Location: [facility];  Service: Orthopedics;  Laterality: Right;     Social History     Socioeconomic History    Marital status:    Tobacco Use    Smoking status: Never     Passive exposure: Never    Smokeless tobacco: Never   Vaping Use    Vaping status: Never Used   Substance and Sexual Activity    Alcohol use: No    Drug use: No    Sexual activity: Not Currently     Partners: Male       Review of Systems: 14 point review of systems performed pertinent positives and negatives discussed above, all other systems are negative    Exam:. The incisions are well healed. Range of motion is measured at 3 to 105 bilateral with pain noted at full extension as well as flexion at 105 degrees. The calf is soft and nontender with a negative Homans sign. Alignment is neutral. Good quad strength. There is no evidence of varus/valgus or flexion instability. No effusion. Intact to light touch with palpable distal pulses.  Mildly tender over Pez anserine bursa region.    DIAGNOSTIC STUDIES  Xrays: 3 views bilateral knees (AP, lateral, and sunrise views) were ordered and reviewed for evaluation of bilateral knee replacement. They demonstrate a well positioned, well aligned knee replacement without complicating factors noted. In comparison with previous films there has been no  change.    ASSESSMENT:    Bilateral knee pain with a history of bilateral total knee replacement       PLAN:      Treatment options as well as imaging results were discussed in detail with the patient.  Patient does have some slight stiffness with range of motion and some discomfort at the endpoints.  She walks with a nonantalgic gait.  I have reviewed Teresita Aguirre's note and we will further investigate the knees at this time.  I am going to send the patient for lab work for CRP and sed rate to rule out any type of underlying infection.  Based off of these results we may aspirate fluid and draw cultures if the CRP comes back elevated.  Will also do a nuclear medicine bone scan to rule out any type of loosening of the implant.  Once we get these results I will review them with Dr. Walsh and we will call the patient to discuss further treatment recommendations at that time.    Tha Castro, APRN  3/7/2024

## 2024-03-07 NOTE — PROGRESS NOTES
"My name is Johnathon Du     I am the Practice Manager with   [facility] [address] [address] [phone]      I am messaging to officially welcome you to our practice and ask about your recent visit.     Tell me about your visit with us. What things went well?         We're always looking for ways to make our patients' experiences even better. Do you have recommendations on ways we may improve?       Overall were you satisfied with your first visit to our practice?        Is there anything else I can do for you?     Also, please note that you may receive a survey from \"[employer]\" please take time to fill that out, as it provides important feedback for our office.         Thank you, and have a great day.   "

## 2024-03-07 NOTE — TELEPHONE ENCOUNTER
Spoke with patient in regards to a referral we received from Joanna Sanabria for colon cancer screening  Gastroesophageal reflux disease, unspecified whether esophagitis present. She scheduled a DA phone screening appointment on 3/20/2024

## 2024-03-15 RX ORDER — MONTELUKAST SODIUM 10 MG/1
10 TABLET ORAL
Qty: 90 TABLET | Refills: 0 | Status: SHIPPED | OUTPATIENT
Start: 2024-03-15

## 2024-03-20 ENCOUNTER — TELEPHONE (OUTPATIENT)
Dept: GASTROENTEROLOGY | Facility: CLINIC | Age: 64
End: 2024-03-20
Payer: MEDICARE

## 2024-03-20 NOTE — TELEPHONE ENCOUNTER
Contacted pt to complete nurse/ma visit. Pt reported she has issues with constipation, Offered to scheduled pt with next available APRN pt reports she cannot travel to [place] due to anxiety issues, I advised our Doctors only perform the procedures in [place], pt declines being scheduled for an appt as she only wants to go to [place]. Pt is aware to contact pcp in regards to request.

## 2024-03-21 ENCOUNTER — HOSPITAL ENCOUNTER (OUTPATIENT)
Dept: NUCLEAR MEDICINE | Facility: HOSPITAL | Age: 64
Discharge: HOME | End: 2024-03-21
Payer: MEDICARE

## 2024-03-21 DIAGNOSIS — Z96.653 HISTORY OF TOTAL KNEE ARTHROPLASTY, BILATERAL: ICD-10-CM

## 2024-03-21 PROCEDURE — 0 TECHNETIUM MEDRONATE KIT: Performed by: NURSE PRACTITIONER

## 2024-03-21 PROCEDURE — 78315 BONE IMAGING 3 PHASE: CPT

## 2024-03-21 PROCEDURE — A9503 TC99M MEDRONATE: HCPCS | Performed by: NURSE PRACTITIONER

## 2024-03-21 RX ORDER — TC 99M MEDRONATE 20 MG/10ML
22.7 INJECTION, POWDER, LYOPHILIZED, FOR SOLUTION INTRAVENOUS
Status: COMPLETED | OUTPATIENT
Start: 2024-03-21 | End: 2024-03-21

## 2024-03-21 RX ADMIN — Medication 22.7 MILLICURIE: at 10:05

## 2024-03-22 DIAGNOSIS — J30.9 ALLERGIC RHINITIS, UNSPECIFIED SEASONALITY, UNSPECIFIED TRIGGER: ICD-10-CM

## 2024-03-22 LAB
25(OH)D3+25(OH)D2 SERPL-MCNC: 22.2 NG/ML (ref 30–100)
ALBUMIN SERPL-MCNC: 4.3 G/DL (ref 3.9–4.9)
ALBUMIN/GLOB SERPL: 2 {RATIO} (ref 1.2–2.2)
ALP SERPL-CCNC: 82 IU/L (ref 44–121)
ALT SERPL-CCNC: 19 IU/L (ref 0–32)
APPEARANCE UR: ABNORMAL
AST SERPL-CCNC: 17 IU/L (ref 0–40)
BACTERIA #/AREA URNS HPF: ABNORMAL /[HPF]
BACTERIA UR CULT: NORMAL
BACTERIA UR CULT: NORMAL
BASOPHILS # BLD AUTO: 0.1 X10E3/UL (ref 0–0.2)
BASOPHILS NFR BLD AUTO: 1 %
BILIRUB SERPL-MCNC: 0.3 MG/DL (ref 0–1.2)
BILIRUB UR QL STRIP: NEGATIVE
BUN SERPL-MCNC: 19 MG/DL (ref 8–27)
BUN/CREAT SERPL: 26 (ref 12–28)
CALCIUM SERPL-MCNC: 9.4 MG/DL (ref 8.7–10.3)
CASTS URNS QL MICRO: ABNORMAL /LPF
CHLORIDE SERPL-SCNC: 103 MMOL/L (ref 96–106)
CHOLEST SERPL-MCNC: 115 MG/DL (ref 100–199)
CK SERPL-CCNC: 48 U/L (ref 32–182)
CO2 SERPL-SCNC: 22 MMOL/L (ref 20–29)
COLOR UR: YELLOW
CREAT SERPL-MCNC: 0.73 MG/DL (ref 0.57–1)
EGFRCR SERPLBLD CKD-EPI 2021: 94 ML/MIN/1.73
EOSINOPHIL # BLD AUTO: 0.2 X10E3/UL (ref 0–0.4)
EOSINOPHIL NFR BLD AUTO: 4 %
EPI CELLS #/AREA URNS HPF: >10 /HPF (ref 0–10)
ERYTHROCYTE [DISTWIDTH] IN BLOOD BY AUTOMATED COUNT: 13 % (ref 11.7–15.4)
FOLATE SERPL-MCNC: >20 NG/ML
GLOBULIN SER CALC-MCNC: 2.1 G/DL (ref 1.5–4.5)
GLUCOSE SERPL-MCNC: 108 MG/DL (ref 70–99)
GLUCOSE UR QL STRIP: NEGATIVE
HBA1C MFR BLD: 5.7 % (ref 4.8–5.6)
HCT VFR BLD AUTO: 36.8 % (ref 34–46.6)
HDLC SERPL-MCNC: 38 MG/DL
HGB BLD-MCNC: 12.5 G/DL (ref 11.1–15.9)
HGB UR QL STRIP: ABNORMAL
IMM GRANULOCYTES # BLD AUTO: 0 X10E3/UL (ref 0–0.1)
IMM GRANULOCYTES NFR BLD AUTO: 0 %
KETONES UR QL STRIP: NEGATIVE
LDLC SERPL CALC-MCNC: 51 MG/DL (ref 0–99)
LDLC/HDLC SERPL: 1.3 RATIO (ref 0–3.2)
LEUKOCYTE ESTERASE UR QL STRIP: ABNORMAL
LYMPHOCYTES # BLD AUTO: 1.7 X10E3/UL (ref 0.7–3.1)
LYMPHOCYTES NFR BLD AUTO: 27 %
MCH RBC QN AUTO: 29.8 PG (ref 26.6–33)
MCHC RBC AUTO-ENTMCNC: 34 G/DL (ref 31.5–35.7)
MCV RBC AUTO: 88 FL (ref 79–97)
MICRO URNS: ABNORMAL
MONOCYTES # BLD AUTO: 0.5 X10E3/UL (ref 0.1–0.9)
MONOCYTES NFR BLD AUTO: 8 %
NEUTROPHILS # BLD AUTO: 3.8 X10E3/UL (ref 1.4–7)
NEUTROPHILS NFR BLD AUTO: 60 %
NITRITE UR QL STRIP: NEGATIVE
PH UR STRIP: 6.5 [PH] (ref 5–7.5)
PLATELET # BLD AUTO: 313 X10E3/UL (ref 150–450)
POTASSIUM SERPL-SCNC: 3.9 MMOL/L (ref 3.5–5.2)
PROT SERPL-MCNC: 6.4 G/DL (ref 6–8.5)
PROT UR QL STRIP: ABNORMAL
RBC # BLD AUTO: 4.2 X10E6/UL (ref 3.77–5.28)
RBC #/AREA URNS HPF: ABNORMAL /HPF (ref 0–2)
SODIUM SERPL-SCNC: 140 MMOL/L (ref 134–144)
SP GR UR STRIP: 1.02 (ref 1–1.03)
T3FREE SERPL-MCNC: 2.8 PG/ML (ref 2–4.4)
T4 FREE SERPL-MCNC: 1.52 NG/DL (ref 0.82–1.77)
TRIGL SERPL-MCNC: 153 MG/DL (ref 0–149)
TSH SERPL DL<=0.005 MIU/L-ACNC: 0.32 UIU/ML (ref 0.45–4.5)
URINALYSIS REFLEX: ABNORMAL
UROBILINOGEN UR STRIP-MCNC: 0.2 MG/DL (ref 0.2–1)
VIT B12 SERPL-MCNC: 983 PG/ML (ref 232–1245)
VLDLC SERPL CALC-MCNC: 26 MG/DL (ref 5–40)
WBC # BLD AUTO: 6.3 X10E3/UL (ref 3.4–10.8)
WBC #/AREA URNS HPF: ABNORMAL /HPF (ref 0–5)

## 2024-03-22 RX ORDER — FLUTICASONE PROPIONATE 50 MCG
2 SPRAY, SUSPENSION (ML) NASAL DAILY
Qty: 16 G | Refills: 3 | Status: SHIPPED | OUTPATIENT
Start: 2024-03-22

## 2024-03-22 NOTE — PROGRESS NOTES
Please call and schedule this patient an appointment with Dr. Walsh at one of his next available appointment for re-evaluation and further treatment options.

## 2024-03-26 DIAGNOSIS — F41.8 MIXED ANXIETY DEPRESSIVE DISORDER: ICD-10-CM

## 2024-03-28 DIAGNOSIS — E03.9 ACQUIRED HYPOTHYROIDISM: ICD-10-CM

## 2024-03-28 DIAGNOSIS — R31.9 HEMATURIA, UNSPECIFIED TYPE: Primary | ICD-10-CM

## 2024-03-28 RX ORDER — PANTOPRAZOLE SODIUM 40 MG/1
40 TABLET, DELAYED RELEASE ORAL DAILY
Qty: 90 TABLET | Refills: 1 | Status: SHIPPED | OUTPATIENT
Start: 2024-03-28

## 2024-03-28 RX ORDER — ROSUVASTATIN CALCIUM 40 MG/1
40 TABLET, COATED ORAL
Qty: 90 TABLET | Refills: 1 | Status: SHIPPED | OUTPATIENT
Start: 2024-03-28

## 2024-03-28 RX ORDER — LEVOTHYROXINE SODIUM 112 UG/1
112 TABLET ORAL DAILY
Qty: 30 TABLET | Refills: 1 | Status: SHIPPED | OUTPATIENT
Start: 2024-03-28

## 2024-03-28 RX ORDER — LISINOPRIL AND HYDROCHLOROTHIAZIDE 12.5; 1 MG/1; MG/1
1 TABLET ORAL DAILY
Qty: 90 TABLET | Refills: 1 | Status: SHIPPED | OUTPATIENT
Start: 2024-03-28

## 2024-03-28 RX ORDER — AMLODIPINE BESYLATE 10 MG/1
10 TABLET ORAL DAILY
Qty: 90 TABLET | Refills: 1 | Status: SHIPPED | OUTPATIENT
Start: 2024-03-28

## 2024-04-01 DIAGNOSIS — F41.8 MIXED ANXIETY DEPRESSIVE DISORDER: ICD-10-CM

## 2024-04-01 RX ORDER — DULOXETIN HYDROCHLORIDE 30 MG/1
30 CAPSULE, DELAYED RELEASE ORAL DAILY
Qty: 30 CAPSULE | Refills: 0 | Status: SHIPPED | OUTPATIENT
Start: 2024-04-01

## 2024-04-02 DIAGNOSIS — J45.20 MILD INTERMITTENT ASTHMA WITHOUT COMPLICATION: ICD-10-CM

## 2024-04-03 RX ORDER — ALBUTEROL SULFATE 90 UG/1
2 AEROSOL, METERED RESPIRATORY (INHALATION) EVERY 6 HOURS PRN
Qty: 18 G | Refills: 0 | Status: SHIPPED | OUTPATIENT
Start: 2024-04-03

## 2024-04-03 RX ORDER — METHOCARBAMOL 750 MG/1
750 TABLET, FILM COATED ORAL NIGHTLY
Qty: 90 TABLET | Refills: 0 | Status: SHIPPED | OUTPATIENT
Start: 2024-04-03

## 2024-04-17 RX ORDER — LORATADINE 10 MG/1
10 TABLET ORAL DAILY
Qty: 30 TABLET | Refills: 2 | Status: SHIPPED | OUTPATIENT
Start: 2024-04-17

## 2024-04-24 DIAGNOSIS — J45.20 MILD INTERMITTENT ASTHMA WITHOUT COMPLICATION: ICD-10-CM

## 2024-04-24 RX ORDER — ALBUTEROL SULFATE 90 UG/1
2 AEROSOL, METERED RESPIRATORY (INHALATION) EVERY 6 HOURS PRN
Qty: 18 G | Refills: 0 | Status: SHIPPED | OUTPATIENT
Start: 2024-04-24

## 2024-04-25 ENCOUNTER — OFFICE VISIT (OUTPATIENT)
Dept: ORTHOPEDIC SURGERY | Facility: CLINIC | Age: 64
End: 2024-04-25
Payer: MEDICARE

## 2024-04-25 VITALS — TEMPERATURE: 96.8 F | HEIGHT: 61 IN | BODY MASS INDEX: 38.86 KG/M2 | WEIGHT: 205.8 LBS

## 2024-04-25 DIAGNOSIS — Z96.653 STATUS POST BILATERAL KNEE REPLACEMENTS: Primary | ICD-10-CM

## 2024-04-25 NOTE — PROGRESS NOTES
Patient: Lydia Gutierrez  YOB: 1960 63 y.o. female  Medical Record Number: [id number]    Chief Complaints:   Chief Complaint   Patient presents with    Right Knee - Follow-up    Left Knee - Follow-up    Results       History of Present Illness:Lydia Gutierrez is a 63 y.o. female who presents for follow-up of bilateral total knee replacements she has had some tightness and stiffness worse recently become somewhat deconditioned and limited in her walking tolerance.    Allergies:   Allergies   Allergen Reactions    Atorvastatin Myalgia       Medications:   Current Outpatient Medications   Medication Sig Dispense Refill    acetaminophen (TYLENOL) 500 MG tablet Take 2 tablets by mouth Every 6 (Six) Hours As Needed for Mild Pain.      amLODIPine (NORVASC) 10 MG tablet TAKE 1 TABLET BY MOUTH DAILY. 90 tablet 1    Blood Glucose Monitoring Suppl (OneTouch Verio Flex System) w/Device kit Use 1 each Daily. Check glucose once daily. 1 kit 0    DULoxetine (CYMBALTA) 30 MG capsule TAKE 1 CAPSULE BY MOUTH DAILY. 30 capsule 0    famotidine (PEPCID) 20 MG tablet TAKE 1 TABLET BY MOUTH DAILY BEFORE SUPPER. 90 tablet 1    fluticasone (FLONASE) 50 MCG/ACT nasal spray INSTILL 2 SPRAYS IN EACH NOSTRIL ONCE DAILY 16 g 3    folic acid (FOLVITE) 400 MCG tablet TAKE 1 TABLET BY MOUTH DAILY. 90 tablet 1    glucose blood (OneTouch Verio) test strip Use as instructed 200 each 6    Lancets (OneTouch Delica Plus [id number]) misc USE TO CHECK GLUCOSE DAILY AS NEEDED FOR HYPERGLYCEMIA 100 each 0    levothyroxine (SYNTHROID, LEVOTHROID) 112 MCG tablet Take 1 tablet by mouth Daily. 30 tablet 1    lisinopril-hydrochlorothiazide (PRINZIDE,ZESTORETIC) 10-12.5 MG per tablet TAKE 1 TABLET BY MOUTH DAILY. 90 tablet 1    loratadine (CLARITIN) 10 MG tablet Take 1 tablet by mouth Daily. 30 tablet 2    metFORMIN (GLUCOPHAGE) 500 MG tablet TAKE 1 TABLET BY MOUTH DAILY WITH BREAKFAST. 90 tablet 0    methocarbamol (ROBAXIN) 750 MG tablet TAKE 1 TABLET  "BY MOUTH EVERY NIGHT. FOR MUSCLE SPASMS 90 tablet 0    montelukast (SINGULAIR) 10 MG tablet TAKE 1 TABLET BY MOUTH EVERY NIGHT AT BEDTIME. 90 tablet 0    Ozempic, 1 MG/DOSE, 4 MG/3ML solution pen-injector INJECT 1 MG UNDER THE SKIN INTO THE APPROPRIATE AREA AS DIRECTED EVERY SEVEN DAYS. 3 mL 2    pantoprazole (PROTONIX) 40 MG EC tablet TAKE 1 TABLET BY MOUTH DAILY. 90 tablet 1    rosuvastatin (CRESTOR) 40 MG tablet TAKE 1 TABLET BY MOUTH EVERY NIGHT AT BEDTIME. 90 tablet 1    valACYclovir (Valtrex) 1000 MG tablet Take 1 tablet by mouth 3 (Three) Times a Day. 30 tablet 0    Ventolin  (90 Base) MCG/ACT inhaler INHALE 2 PUFFS EVERY 6 (SIX) HOURS AS NEEDED FOR WHEEZING. 18 g 0    vitamin B-12 (CYANOCOBALAMIN) 500 MCG tablet TAKE 1 TABLET BY MOUTH DAILY. 120 tablet 3    Symbicort 160-4.5 MCG/ACT inhaler  (Patient not taking: Reported on 4/25/2024)       No current facility-administered medications for this visit.     Facility-Administered Medications Ordered in Other Visits   Medication Dose Route Frequency Provider Last Rate Last Admin    Chlorhexidine Gluconate 2 % pads 2 each  2 pad  Apply externally BID Nikhil, Anne L, APRN        Chlorhexidine Gluconate Cloth 2 % pads   Apply externally BID NikhilAnne, APRN             The following portions of the patient's history were reviewed and updated as appropriate: allergies, current medications, past family history, past medical history, past social history, past surgical history and problem list.    Review of Systems:   Pertinent positives/negative listed in HPI above    Physical Exam:   Vitals:    04/25/24 1317   Temp: 96.8 °F (36 °C)   Weight: 93.4 kg (205 lb 12.8 oz)   Height: 154.9 cm (61\")   PainSc:   7   PainLoc: Knee       General: A and O x 3, ASA, NAD      Knee Exam List: Knee:  bilateral    ALIGNMENT:     Neutral  ,   Patella  tracks  Midline without crepitance    GAIT:    Nonantalgic but relatively short stride length    SKIN:    Well healed " midline incision, no erythema or fluctuance    RANGE OF MOTION:   2-108   DEG    STRENGTH:   4/ 5    LIGAMENTS:    No varus / valgus instability.   No  Flexion   instability.       DISTAL PULSES:    Paplable    DISTAL SENSATION :   Intact    LYMPHATICS:     No   lymphadenopathy    OTHER:     No   Effusion      Calf soft / nontender ,   Negative Lizet's sign            Radiology:  Xrays 3views bilateral knees (ap,lateral, sunrise) were ordered and reviewed for evaluation of knee pain demonstrating well positioned knee replacements without evidence of wear, loosening or osteolysis  Bilateral total knee replacement structurally radiographically everything looks good I think she does have some evidence of tendinitis her hamstrings are quite tight and she has some mild Pez anserine bursitis and going to send her to    Assessment/Plan:  Physical therapy and instruct her on stretching exercises I gave her a prescription for NSAID gel to apply twice a day to both knees.  Bilateral knees      There are no diagnoses linked to this encounter.    Juan Walsh MD  4/25/2024

## 2024-04-29 DIAGNOSIS — F41.8 MIXED ANXIETY DEPRESSIVE DISORDER: ICD-10-CM

## 2024-04-30 RX ORDER — DULOXETIN HYDROCHLORIDE 30 MG/1
30 CAPSULE, DELAYED RELEASE ORAL DAILY
Qty: 30 CAPSULE | Refills: 0 | Status: SHIPPED | OUTPATIENT
Start: 2024-04-30

## 2024-05-02 DIAGNOSIS — E11.9 NEW ONSET TYPE 2 DIABETES MELLITUS: ICD-10-CM

## 2024-05-02 RX ORDER — SEMAGLUTIDE 1.34 MG/ML
INJECTION, SOLUTION SUBCUTANEOUS
Qty: 3 ML | Refills: 2 | Status: SHIPPED | OUTPATIENT
Start: 2024-05-02

## 2024-05-10 ENCOUNTER — OFFICE VISIT (OUTPATIENT)
Dept: FAMILY MEDICINE CLINIC | Facility: CLINIC | Age: 64
End: 2024-05-10
Payer: MEDICARE

## 2024-05-10 VITALS
HEART RATE: 88 BPM | DIASTOLIC BLOOD PRESSURE: 78 MMHG | OXYGEN SATURATION: 99 % | WEIGHT: 205.8 LBS | TEMPERATURE: 98.6 F | BODY MASS INDEX: 38.86 KG/M2 | HEIGHT: 61 IN | SYSTOLIC BLOOD PRESSURE: 110 MMHG | RESPIRATION RATE: 16 BRPM

## 2024-05-10 DIAGNOSIS — F41.8 MIXED ANXIETY DEPRESSIVE DISORDER: ICD-10-CM

## 2024-05-10 DIAGNOSIS — E55.9 VITAMIN D DEFICIENCY: ICD-10-CM

## 2024-05-10 DIAGNOSIS — R31.9 HEMATURIA, UNSPECIFIED TYPE: ICD-10-CM

## 2024-05-10 DIAGNOSIS — K21.9 GASTROESOPHAGEAL REFLUX DISEASE, UNSPECIFIED WHETHER ESOPHAGITIS PRESENT: ICD-10-CM

## 2024-05-10 DIAGNOSIS — E03.9 ACQUIRED HYPOTHYROIDISM: Primary | ICD-10-CM

## 2024-05-10 PROCEDURE — 1159F MED LIST DOCD IN RCRD: CPT | Performed by: PHYSICIAN ASSISTANT

## 2024-05-10 PROCEDURE — 99214 OFFICE O/P EST MOD 30 MIN: CPT | Performed by: PHYSICIAN ASSISTANT

## 2024-05-10 PROCEDURE — 3074F SYST BP LT 130 MM HG: CPT | Performed by: PHYSICIAN ASSISTANT

## 2024-05-10 PROCEDURE — 1125F AMNT PAIN NOTED PAIN PRSNT: CPT | Performed by: PHYSICIAN ASSISTANT

## 2024-05-10 PROCEDURE — 3078F DIAST BP <80 MM HG: CPT | Performed by: PHYSICIAN ASSISTANT

## 2024-05-10 PROCEDURE — 3044F HG A1C LEVEL LT 7.0%: CPT | Performed by: PHYSICIAN ASSISTANT

## 2024-05-10 PROCEDURE — 1160F RVW MEDS BY RX/DR IN RCRD: CPT | Performed by: PHYSICIAN ASSISTANT

## 2024-05-10 RX ORDER — DULOXETIN HYDROCHLORIDE 20 MG/1
20 CAPSULE, DELAYED RELEASE ORAL DAILY
Qty: 30 CAPSULE | Refills: 0 | Status: SHIPPED | OUTPATIENT
Start: 2024-05-10

## 2024-05-11 LAB
T3FREE SERPL-MCNC: 2.7 PG/ML (ref 2–4.4)
T4 FREE SERPL-MCNC: 1.42 NG/DL (ref 0.82–1.77)
TSH SERPL DL<=0.005 MIU/L-ACNC: 1.03 UIU/ML (ref 0.45–4.5)

## 2024-05-13 ENCOUNTER — TELEPHONE (OUTPATIENT)
Dept: FAMILY MEDICINE CLINIC | Facility: CLINIC | Age: 64
End: 2024-05-13
Payer: MEDICARE

## 2024-05-13 NOTE — TELEPHONE ENCOUNTER
Left message to call OK FOR HUB TO RELAY        Thyroid looks good. Continue same dose. Please schedule her for follow up with me in 6 weeks, fasting.

## 2024-05-23 DIAGNOSIS — E03.9 ACQUIRED HYPOTHYROIDISM: ICD-10-CM

## 2024-05-24 RX ORDER — LEVOTHYROXINE SODIUM 112 UG/1
112 TABLET ORAL DAILY
Qty: 30 TABLET | Refills: 1 | Status: SHIPPED | OUTPATIENT
Start: 2024-05-24

## 2024-05-24 NOTE — TELEPHONE ENCOUNTER
Dramatically decreased blood count from march, needs to go to ER, likely will need blood and eval for anemia   See result note- she should be scheduled for follow up with me beginning of July once I return from vacation.

## 2024-05-31 DIAGNOSIS — D72.829 LEUKOCYTOSIS, UNSPECIFIED TYPE: ICD-10-CM

## 2024-06-05 NOTE — TELEPHONE ENCOUNTER
See result note.  Please call patient to get her scheduled for follow-up with me for 6 weeks from 5/10/2024 labs.

## 2024-06-06 DIAGNOSIS — F41.8 MIXED ANXIETY DEPRESSIVE DISORDER: ICD-10-CM

## 2024-06-06 RX ORDER — DULOXETIN HYDROCHLORIDE 20 MG/1
20 CAPSULE, DELAYED RELEASE ORAL DAILY
Qty: 90 CAPSULE | Refills: 0 | Status: SHIPPED | OUTPATIENT
Start: 2024-06-06

## 2024-06-08 DIAGNOSIS — F41.8 MIXED ANXIETY DEPRESSIVE DISORDER: ICD-10-CM

## 2024-06-11 DIAGNOSIS — K21.9 GASTROESOPHAGEAL REFLUX DISEASE, UNSPECIFIED WHETHER ESOPHAGITIS PRESENT: ICD-10-CM

## 2024-06-12 RX ORDER — FAMOTIDINE 20 MG/1
20 TABLET, FILM COATED ORAL
Qty: 90 TABLET | Refills: 1 | Status: SHIPPED | OUTPATIENT
Start: 2024-06-12

## 2024-06-12 RX ORDER — DULOXETIN HYDROCHLORIDE 20 MG/1
20 CAPSULE, DELAYED RELEASE ORAL DAILY
Qty: 90 CAPSULE | Refills: 0 | Status: SHIPPED | OUTPATIENT
Start: 2024-06-12

## 2024-06-12 RX ORDER — MONTELUKAST SODIUM 10 MG/1
10 TABLET ORAL
Qty: 90 TABLET | Refills: 0 | Status: SHIPPED | OUTPATIENT
Start: 2024-06-12

## 2024-06-12 RX ORDER — LORATADINE 10 MG/1
10 TABLET ORAL DAILY
Qty: 30 TABLET | Refills: 2 | Status: SHIPPED | OUTPATIENT
Start: 2024-06-12

## 2024-06-19 DIAGNOSIS — E53.8 FOLATE DEFICIENCY: ICD-10-CM

## 2024-06-19 RX ORDER — LANOLIN ALCOHOL/MO/W.PET/CERES
400 CREAM (GRAM) TOPICAL DAILY
Qty: 90 TABLET | Refills: 0 | Status: SHIPPED | OUTPATIENT
Start: 2024-06-19

## 2024-07-01 RX ORDER — METHOCARBAMOL 750 MG/1
750 TABLET, FILM COATED ORAL NIGHTLY
Qty: 90 TABLET | Refills: 0 | Status: SHIPPED | OUTPATIENT
Start: 2024-07-01

## 2024-07-10 ENCOUNTER — OFFICE VISIT (OUTPATIENT)
Dept: FAMILY MEDICINE CLINIC | Facility: CLINIC | Age: 64
End: 2024-07-10
Payer: MEDICARE

## 2024-07-10 VITALS
TEMPERATURE: 98.1 F | WEIGHT: 204 LBS | OXYGEN SATURATION: 99 % | DIASTOLIC BLOOD PRESSURE: 74 MMHG | BODY MASS INDEX: 38.51 KG/M2 | RESPIRATION RATE: 16 BRPM | SYSTOLIC BLOOD PRESSURE: 108 MMHG | HEIGHT: 61 IN | HEART RATE: 86 BPM

## 2024-07-10 DIAGNOSIS — I10 BENIGN ESSENTIAL HYPERTENSION: ICD-10-CM

## 2024-07-10 DIAGNOSIS — E03.9 ACQUIRED HYPOTHYROIDISM: ICD-10-CM

## 2024-07-10 DIAGNOSIS — E55.9 VITAMIN D DEFICIENCY: ICD-10-CM

## 2024-07-10 DIAGNOSIS — D72.829 LEUKOCYTOSIS, UNSPECIFIED TYPE: ICD-10-CM

## 2024-07-10 DIAGNOSIS — J30.9 ALLERGIC RHINITIS, UNSPECIFIED SEASONALITY, UNSPECIFIED TRIGGER: ICD-10-CM

## 2024-07-10 DIAGNOSIS — R06.02 SHORTNESS OF BREATH: ICD-10-CM

## 2024-07-10 DIAGNOSIS — R20.2 NUMBNESS AND TINGLING OF UPPER AND LOWER EXTREMITIES OF BOTH SIDES: ICD-10-CM

## 2024-07-10 DIAGNOSIS — G89.29 CHRONIC PAIN OF BOTH KNEES: ICD-10-CM

## 2024-07-10 DIAGNOSIS — K21.9 GASTROESOPHAGEAL REFLUX DISEASE, UNSPECIFIED WHETHER ESOPHAGITIS PRESENT: ICD-10-CM

## 2024-07-10 DIAGNOSIS — E53.8 B12 DEFICIENCY: ICD-10-CM

## 2024-07-10 DIAGNOSIS — E11.65 TYPE 2 DIABETES MELLITUS WITH HYPERGLYCEMIA, WITHOUT LONG-TERM CURRENT USE OF INSULIN: Primary | ICD-10-CM

## 2024-07-10 DIAGNOSIS — J45.20 MILD INTERMITTENT ASTHMA WITHOUT COMPLICATION: ICD-10-CM

## 2024-07-10 DIAGNOSIS — F41.8 MIXED ANXIETY DEPRESSIVE DISORDER: ICD-10-CM

## 2024-07-10 DIAGNOSIS — E53.8 FOLATE DEFICIENCY: ICD-10-CM

## 2024-07-10 DIAGNOSIS — E78.2 MIXED HYPERLIPIDEMIA: ICD-10-CM

## 2024-07-10 DIAGNOSIS — R79.89 ABNORMAL LIVER FUNCTION TESTS: ICD-10-CM

## 2024-07-10 DIAGNOSIS — M25.562 CHRONIC PAIN OF BOTH KNEES: ICD-10-CM

## 2024-07-10 DIAGNOSIS — R31.9 HEMATURIA, UNSPECIFIED TYPE: ICD-10-CM

## 2024-07-10 DIAGNOSIS — M25.561 CHRONIC PAIN OF BOTH KNEES: ICD-10-CM

## 2024-07-10 DIAGNOSIS — R42 DIZZINESS: ICD-10-CM

## 2024-07-10 DIAGNOSIS — R20.0 NUMBNESS AND TINGLING OF UPPER AND LOWER EXTREMITIES OF BOTH SIDES: ICD-10-CM

## 2024-07-10 DIAGNOSIS — Z12.11 ENCOUNTER FOR SCREENING FOR MALIGNANT NEOPLASM OF COLON: ICD-10-CM

## 2024-07-10 DIAGNOSIS — I65.23 BILATERAL CAROTID ARTERY STENOSIS: ICD-10-CM

## 2024-07-10 DIAGNOSIS — R00.0 TACHYCARDIA: ICD-10-CM

## 2024-07-10 DIAGNOSIS — R00.2 PALPITATIONS: ICD-10-CM

## 2024-07-10 DIAGNOSIS — G47.33 OBSTRUCTIVE SLEEP APNEA: ICD-10-CM

## 2024-07-10 NOTE — PROGRESS NOTES
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 referral for EMG/NCS.  Consideration of neurology for evaluation as well.  Carotid stenosis- Patient with less than 50% stenosis bilateral carotid arteries.  She will be due for repeat carotid duplex 6/2024.  I will place order.  We will consider vascular surgery for further workup of carotid stenosis with dizziness or neurology pending results.    From [facility] 3/2024.  She is overdue for Pap and DEXA and is up-to-date on mammogram.  She declines Pap at this time.  I advise she consider Pap now and in 3 years then will be complete and no longer needed per ACOG guidelines.  We will complete DEXA with her mammogram 6/2024 and refer to GI for evaluation and to consider EGD for uncontrolled GERD with colonoscopy.  She needs to check with her insurance on coverage and location of coverage for Tdap, Shingrix, RSV, and needs to ensure annual flu shot and COVID-19 booster.     Patient continues to see specialists as noted above.  I have reviewed available records, including consult notes, labs, and imaging/testing.  Patient to continue follow-up with specialists as directed by them.    I spent 30 minutes caring for Lydia Gutierrez on this date of service. This time includes time spent by me in the following activities as necessary: preparing for the visit, reviewing tests, specialists records and previous  visits, obtaining and/or reviewing a separately obtained history, performing a medically appropriate exam and/or evaluation, counseling and educating the patient, family, caregiver, referring and/or communicating with other healthcare professionals, documenting information in the medical record, independently interpreting results and communicating that information with the patient, family, caregiver, and developing a medically appropriate treatment plan with consideration of other conditions, medications, and treatments.

## 2024-07-12 LAB
25(OH)D3+25(OH)D2 SERPL-MCNC: 46.1 NG/ML (ref 30–100)
ALBUMIN SERPL-MCNC: 4.6 G/DL (ref 3.5–5.2)
ALBUMIN/CREAT UR: 8 MG/G CREAT (ref 0–29)
ALBUMIN/GLOB SERPL: 2.1 G/DL
ALP SERPL-CCNC: 89 U/L (ref 39–117)
ALT SERPL-CCNC: 20 U/L (ref 1–33)
APPEARANCE UR: ABNORMAL
AST SERPL-CCNC: 19 U/L (ref 1–32)
BACTERIA #/AREA URNS HPF: ABNORMAL /[HPF]
BACTERIA UR CULT: NORMAL
BACTERIA UR CULT: NORMAL
BASOPHILS # BLD AUTO: 0.03 10*3/MM3 (ref 0–0.2)
BASOPHILS NFR BLD AUTO: 0.5 % (ref 0–1.5)
BILIRUB SERPL-MCNC: 0.3 MG/DL (ref 0–1.2)
BILIRUB UR QL STRIP: NEGATIVE
BUN SERPL-MCNC: 19 MG/DL (ref 8–23)
BUN/CREAT SERPL: 26.8 (ref 7–25)
CALCIUM SERPL-MCNC: 9.4 MG/DL (ref 8.6–10.5)
CASTS URNS QL MICRO: ABNORMAL /LPF
CHLORIDE SERPL-SCNC: 101 MMOL/L (ref 98–107)
CHOLEST SERPL-MCNC: 114 MG/DL (ref 0–200)
CK SERPL-CCNC: 88 U/L (ref 20–180)
CO2 SERPL-SCNC: 26.3 MMOL/L (ref 22–29)
COLOR UR: YELLOW
CREAT SERPL-MCNC: 0.71 MG/DL (ref 0.57–1)
CREAT UR-MCNC: 197.8 MG/DL
CRYSTALS URNS MICRO: ABNORMAL
EGFRCR SERPLBLD CKD-EPI 2021: 96.9 ML/MIN/1.73
EOSINOPHIL # BLD AUTO: 0.15 10*3/MM3 (ref 0–0.4)
EOSINOPHIL NFR BLD AUTO: 2.7 % (ref 0.3–6.2)
EPI CELLS #/AREA URNS HPF: ABNORMAL /HPF (ref 0–10)
ERYTHROCYTE [DISTWIDTH] IN BLOOD BY AUTOMATED COUNT: 13 % (ref 12.3–15.4)
FERRITIN SERPL-MCNC: 52.8 NG/ML (ref 13–150)
FOLATE SERPL-MCNC: >20 NG/ML (ref 4.78–24.2)
GLOBULIN SER CALC-MCNC: 2.2 GM/DL
GLUCOSE SERPL-MCNC: 94 MG/DL (ref 65–99)
GLUCOSE UR QL STRIP: NEGATIVE
HBA1C MFR BLD: 5.8 % (ref 4.8–5.6)
HCT VFR BLD AUTO: 38.4 % (ref 34–46.6)
HDLC SERPL-MCNC: 40 MG/DL (ref 40–60)
HGB BLD-MCNC: 13 G/DL (ref 12–15.9)
HGB UR QL STRIP: NEGATIVE
IMM GRANULOCYTES # BLD AUTO: 0.01 10*3/MM3 (ref 0–0.05)
IMM GRANULOCYTES NFR BLD AUTO: 0.2 % (ref 0–0.5)
IRON SATN MFR SERPL: 17 % (ref 20–50)
IRON SERPL-MCNC: 80 MCG/DL (ref 37–145)
KETONES UR QL STRIP: NEGATIVE
LDLC SERPL CALC-MCNC: 49 MG/DL (ref 0–100)
LDLC/HDLC SERPL: 1.12 {RATIO}
LEUKOCYTE ESTERASE UR QL STRIP: ABNORMAL
LYMPHOCYTES # BLD AUTO: 1.55 10*3/MM3 (ref 0.7–3.1)
LYMPHOCYTES NFR BLD AUTO: 28.2 % (ref 19.6–45.3)
MCH RBC QN AUTO: 30.3 PG (ref 26.6–33)
MCHC RBC AUTO-ENTMCNC: 33.9 G/DL (ref 31.5–35.7)
MCV RBC AUTO: 89.5 FL (ref 79–97)
MICRO URNS: ABNORMAL
MICROALBUMIN UR-MCNC: 15.8 UG/ML
MONOCYTES # BLD AUTO: 0.43 10*3/MM3 (ref 0.1–0.9)
MONOCYTES NFR BLD AUTO: 7.8 % (ref 5–12)
NEUTROPHILS # BLD AUTO: 3.33 10*3/MM3 (ref 1.7–7)
NEUTROPHILS NFR BLD AUTO: 60.6 % (ref 42.7–76)
NITRITE UR QL STRIP: NEGATIVE
NRBC BLD AUTO-RTO: 0 /100 WBC (ref 0–0.2)
PH UR STRIP: 8 [PH] (ref 5–7.5)
PLATELET # BLD AUTO: 297 10*3/MM3 (ref 140–450)
POTASSIUM SERPL-SCNC: 3.8 MMOL/L (ref 3.5–5.2)
PROT SERPL-MCNC: 6.8 G/DL (ref 6–8.5)
PROT UR QL STRIP: ABNORMAL
RBC # BLD AUTO: 4.29 10*6/MM3 (ref 3.77–5.28)
RBC #/AREA URNS HPF: ABNORMAL /HPF (ref 0–2)
SODIUM SERPL-SCNC: 140 MMOL/L (ref 136–145)
SP GR UR STRIP: 1.03 (ref 1–1.03)
T3FREE SERPL-MCNC: 3.1 PG/ML (ref 2–4.4)
T4 FREE SERPL-MCNC: 1.51 NG/DL (ref 0.92–1.68)
TIBC SERPL-MCNC: 460 MCG/DL
TRIGL SERPL-MCNC: 147 MG/DL (ref 0–150)
TSH SERPL DL<=0.005 MIU/L-ACNC: 0.51 UIU/ML (ref 0.27–4.2)
UIBC SERPL-MCNC: 380 MCG/DL (ref 112–346)
UNIDENT CRYS URNS QL MICRO: PRESENT
URATE SERPL-MCNC: 4.6 MG/DL (ref 2.4–5.7)
URINALYSIS REFLEX: ABNORMAL
UROBILINOGEN UR STRIP-MCNC: 1 MG/DL (ref 0.2–1)
VIT B12 SERPL-MCNC: 1073 PG/ML (ref 211–946)
VLDLC SERPL CALC-MCNC: 25 MG/DL (ref 5–40)
WBC # BLD AUTO: 5.5 10*3/MM3 (ref 3.4–10.8)
WBC #/AREA URNS HPF: ABNORMAL /HPF (ref 0–5)

## 2024-07-14 DIAGNOSIS — J45.20 MILD INTERMITTENT ASTHMA WITHOUT COMPLICATION: ICD-10-CM

## 2024-07-15 RX ORDER — ALBUTEROL SULFATE 90 UG/1
2 AEROSOL, METERED RESPIRATORY (INHALATION) EVERY 6 HOURS PRN
Qty: 18 G | Refills: 0 | Status: SHIPPED | OUTPATIENT
Start: 2024-07-15

## 2024-07-17 DIAGNOSIS — J30.9 ALLERGIC RHINITIS, UNSPECIFIED SEASONALITY, UNSPECIFIED TRIGGER: ICD-10-CM

## 2024-07-22 DIAGNOSIS — E11.9 NEW ONSET TYPE 2 DIABETES MELLITUS: ICD-10-CM

## 2024-07-22 RX ORDER — FLUTICASONE PROPIONATE 50 MCG
2 SPRAY, SUSPENSION (ML) NASAL DAILY
Qty: 16 G | Refills: 3 | Status: SHIPPED | OUTPATIENT
Start: 2024-07-22

## 2024-07-22 RX ORDER — SEMAGLUTIDE 1.34 MG/ML
INJECTION, SOLUTION SUBCUTANEOUS
Qty: 3 ML | Refills: 2 | Status: SHIPPED | OUTPATIENT
Start: 2024-07-22

## 2024-07-27 DIAGNOSIS — E03.9 ACQUIRED HYPOTHYROIDISM: ICD-10-CM

## 2024-07-30 RX ORDER — LEVOTHYROXINE SODIUM 112 UG/1
112 TABLET ORAL DAILY
Qty: 90 TABLET | Refills: 1 | Status: SHIPPED | OUTPATIENT
Start: 2024-07-30

## 2024-08-06 DIAGNOSIS — J45.20 MILD INTERMITTENT ASTHMA WITHOUT COMPLICATION: ICD-10-CM

## 2024-08-07 RX ORDER — ALBUTEROL SULFATE 90 UG/1
2 AEROSOL, METERED RESPIRATORY (INHALATION) EVERY 6 HOURS PRN
Qty: 18 G | Refills: 0 | Status: SHIPPED | OUTPATIENT
Start: 2024-08-07

## 2024-08-28 ENCOUNTER — HOSPITAL ENCOUNTER (OUTPATIENT)
Dept: BONE DENSITY | Facility: HOSPITAL | Age: 64
Discharge: HOME | End: 2024-08-28
Payer: MEDICARE

## 2024-08-28 ENCOUNTER — HOSPITAL ENCOUNTER (OUTPATIENT)
Dept: CARDIOLOGY | Facility: HOSPITAL | Age: 64
Discharge: HOME | End: 2024-08-28
Payer: MEDICARE

## 2024-08-28 ENCOUNTER — HOSPITAL ENCOUNTER (OUTPATIENT)
Dept: MAMMOGRAPHY | Facility: HOSPITAL | Age: 64
Discharge: HOME | End: 2024-08-28
Payer: MEDICARE

## 2024-08-28 DIAGNOSIS — I65.23 BILATERAL CAROTID ARTERY STENOSIS: ICD-10-CM

## 2024-08-28 DIAGNOSIS — Z78.0 POSTMENOPAUSAL: ICD-10-CM

## 2024-08-28 DIAGNOSIS — Z12.31 ENCOUNTER FOR SCREENING MAMMOGRAM FOR MALIGNANT NEOPLASM OF BREAST: ICD-10-CM

## 2024-08-28 LAB
BH CV XLRA MEAS LEFT DIST CCA EDV: -24.8 CM/SEC
BH CV XLRA MEAS LEFT DIST CCA PSV: -85.6 CM/SEC
BH CV XLRA MEAS LEFT DIST ICA EDV: -30.4 CM/SEC
BH CV XLRA MEAS LEFT DIST ICA PSV: -85.6 CM/SEC
BH CV XLRA MEAS LEFT ICA/CCA RATIO: 1
BH CV XLRA MEAS LEFT MID ICA EDV: -29.7 CM/SEC
BH CV XLRA MEAS LEFT MID ICA PSV: -74.3 CM/SEC
BH CV XLRA MEAS LEFT PROX CCA EDV: 21.2 CM/SEC
BH CV XLRA MEAS LEFT PROX CCA PSV: 84.2 CM/SEC
BH CV XLRA MEAS LEFT PROX ECA EDV: -9.2 CM/SEC
BH CV XLRA MEAS LEFT PROX ECA PSV: -67.9 CM/SEC
BH CV XLRA MEAS LEFT PROX ICA EDV: 30.4 CM/SEC
BH CV XLRA MEAS LEFT PROX ICA PSV: 75.7 CM/SEC
BH CV XLRA MEAS LEFT PROX SCLA PSV: 103 CM/SEC
BH CV XLRA MEAS LEFT VERTEBRAL A EDV: -13.8 CM/SEC
BH CV XLRA MEAS LEFT VERTEBRAL A PSV: -34.8 CM/SEC
BH CV XLRA MEAS RIGHT DIST CCA EDV: 31.1 CM/SEC
BH CV XLRA MEAS RIGHT DIST CCA PSV: 82.1 CM/SEC
BH CV XLRA MEAS RIGHT DIST ICA EDV: -28.4 CM/SEC
BH CV XLRA MEAS RIGHT DIST ICA PSV: -80.3 CM/SEC
BH CV XLRA MEAS RIGHT ICA/CCA RATIO: 1.21
BH CV XLRA MEAS RIGHT MID ICA EDV: -38.1 CM/SEC
BH CV XLRA MEAS RIGHT MID ICA PSV: -99.1 CM/SEC
BH CV XLRA MEAS RIGHT PROX CCA EDV: 30.5 CM/SEC
BH CV XLRA MEAS RIGHT PROX CCA PSV: 87.4 CM/SEC
BH CV XLRA MEAS RIGHT PROX ECA EDV: -35.8 CM/SEC
BH CV XLRA MEAS RIGHT PROX ECA PSV: -108 CM/SEC
BH CV XLRA MEAS RIGHT PROX ICA EDV: -28.1 CM/SEC
BH CV XLRA MEAS RIGHT PROX ICA PSV: -87.9 CM/SEC
BH CV XLRA MEAS RIGHT PROX SCLA PSV: 119 CM/SEC
BH CV XLRA MEAS RIGHT VERTEBRAL A EDV: 10.6 CM/SEC
BH CV XLRA MEAS RIGHT VERTEBRAL A PSV: 30.4 CM/SEC
LEFT ARM BP: NORMAL MMHG
RIGHT ARM BP: NORMAL MMHG

## 2024-08-28 PROCEDURE — 93880 EXTRACRANIAL BILAT STUDY: CPT

## 2024-08-28 PROCEDURE — 77067 SCR MAMMO BI INCL CAD: CPT

## 2024-08-28 PROCEDURE — 77063 BREAST TOMOSYNTHESIS BI: CPT

## 2024-08-28 PROCEDURE — 77080 DXA BONE DENSITY AXIAL: CPT

## 2024-08-31 DIAGNOSIS — D72.829 LEUKOCYTOSIS, UNSPECIFIED TYPE: ICD-10-CM

## 2024-08-31 DIAGNOSIS — J45.20 MILD INTERMITTENT ASTHMA WITHOUT COMPLICATION: ICD-10-CM

## 2024-09-03 RX ORDER — ALBUTEROL SULFATE 90 UG/1
2 AEROSOL, METERED RESPIRATORY (INHALATION) EVERY 6 HOURS PRN
Qty: 18 G | Refills: 0 | Status: SHIPPED | OUTPATIENT
Start: 2024-09-03

## 2024-09-09 RX ORDER — MONTELUKAST SODIUM 10 MG/1
10 TABLET ORAL
Qty: 90 TABLET | Refills: 0 | Status: SHIPPED | OUTPATIENT
Start: 2024-09-09

## 2024-09-14 DIAGNOSIS — E53.8 FOLATE DEFICIENCY: ICD-10-CM

## 2024-09-16 DIAGNOSIS — E53.8 B12 DEFICIENCY: ICD-10-CM

## 2024-09-18 RX ORDER — UREA 10 %
500 LOTION (ML) TOPICAL DAILY
Qty: 90 TABLET | Refills: 0 | Status: SHIPPED | OUTPATIENT
Start: 2024-09-18

## 2024-09-18 RX ORDER — LANOLIN ALCOHOL/MO/W.PET/CERES
400 CREAM (GRAM) TOPICAL DAILY
Qty: 90 TABLET | Refills: 1 | Status: SHIPPED | OUTPATIENT
Start: 2024-09-18

## 2024-09-20 DIAGNOSIS — F41.8 MIXED ANXIETY DEPRESSIVE DISORDER: ICD-10-CM

## 2024-09-25 DIAGNOSIS — J45.20 MILD INTERMITTENT ASTHMA WITHOUT COMPLICATION: ICD-10-CM

## 2024-09-25 RX ORDER — LISINOPRIL/HYDROCHLOROTHIAZIDE 10-12.5 MG
1 TABLET ORAL DAILY
Qty: 90 TABLET | Refills: 1 | Status: SHIPPED | OUTPATIENT
Start: 2024-09-25

## 2024-09-25 RX ORDER — ROSUVASTATIN CALCIUM 40 MG/1
40 TABLET, COATED ORAL
Qty: 90 TABLET | Refills: 1 | Status: SHIPPED | OUTPATIENT
Start: 2024-09-25

## 2024-09-25 RX ORDER — PANTOPRAZOLE SODIUM 40 MG/1
40 TABLET, DELAYED RELEASE ORAL DAILY
Qty: 90 TABLET | Refills: 1 | Status: SHIPPED | OUTPATIENT
Start: 2024-09-25

## 2024-09-25 RX ORDER — AMLODIPINE BESYLATE 10 MG/1
10 TABLET ORAL DAILY
Qty: 90 TABLET | Refills: 1 | Status: SHIPPED | OUTPATIENT
Start: 2024-09-25

## 2024-09-25 RX ORDER — ALBUTEROL SULFATE 90 UG/1
2 AEROSOL, METERED RESPIRATORY (INHALATION) EVERY 6 HOURS PRN
Qty: 18 G | Refills: 0 | Status: SHIPPED | OUTPATIENT
Start: 2024-09-25

## 2024-10-02 RX ORDER — METHOCARBAMOL 750 MG/1
750 TABLET, FILM COATED ORAL NIGHTLY
Qty: 90 TABLET | Refills: 0 | Status: SHIPPED | OUTPATIENT
Start: 2024-10-02

## 2024-10-02 NOTE — TELEPHONE ENCOUNTER
See result note and previous refill request- please get the patient scheduled for January.     Me to [facility]   9/18/24 11:28 AM Note      See result note. Please get her scheduled for follow-up with me 1/2025 for her 6-month follow-up.

## 2024-10-11 DIAGNOSIS — E11.9 NEW ONSET TYPE 2 DIABETES MELLITUS: ICD-10-CM

## 2024-10-14 RX ORDER — SEMAGLUTIDE 1.34 MG/ML
INJECTION, SOLUTION SUBCUTANEOUS
Qty: 3 ML | Refills: 2 | Status: SHIPPED | OUTPATIENT
Start: 2024-10-14

## 2024-10-17 DIAGNOSIS — J45.20 MILD INTERMITTENT ASTHMA WITHOUT COMPLICATION: ICD-10-CM

## 2024-10-18 RX ORDER — ALBUTEROL SULFATE 90 UG/1
2 AEROSOL, METERED RESPIRATORY (INHALATION) EVERY 6 HOURS PRN
Qty: 18 G | Refills: 0 | Status: SHIPPED | OUTPATIENT
Start: 2024-10-18

## 2024-11-09 DIAGNOSIS — J45.20 MILD INTERMITTENT ASTHMA WITHOUT COMPLICATION: ICD-10-CM

## 2024-11-13 RX ORDER — ALBUTEROL SULFATE 90 UG/1
2 AEROSOL, METERED RESPIRATORY (INHALATION) EVERY 6 HOURS PRN
Qty: 18 G | Refills: 0 | Status: SHIPPED | OUTPATIENT
Start: 2024-11-13

## 2024-11-16 DIAGNOSIS — J30.9 ALLERGIC RHINITIS, UNSPECIFIED SEASONALITY, UNSPECIFIED TRIGGER: ICD-10-CM

## 2024-11-17 DIAGNOSIS — E11.9 NEW ONSET TYPE 2 DIABETES MELLITUS: ICD-10-CM

## 2024-11-18 RX ORDER — FLUTICASONE PROPIONATE 50 MCG
2 SPRAY, SUSPENSION (ML) NASAL DAILY
Qty: 16 G | Refills: 3 | Status: SHIPPED | OUTPATIENT
Start: 2024-11-18

## 2024-11-18 RX ORDER — SEMAGLUTIDE 1.34 MG/ML
1 INJECTION, SOLUTION SUBCUTANEOUS WEEKLY
Qty: 3 ML | Refills: 2 | Status: SHIPPED | OUTPATIENT
Start: 2024-11-18

## 2024-11-18 NOTE — TELEPHONE ENCOUNTER
Please schedule this patient for follow-up with me the middle of January, fasting, for her 6-month follow-up.

## 2024-11-18 NOTE — TELEPHONE ENCOUNTER
I tried to  call patient to get her scheduled for a appointment in January to see ruby. [facility]     HUB OKAY TO READ

## 2024-11-18 NOTE — TELEPHONE ENCOUNTER
See other refill request.  Please schedule her for follow-up with me in January for her 6-month follow-up.

## 2024-11-29 DIAGNOSIS — D72.829 LEUKOCYTOSIS, UNSPECIFIED TYPE: ICD-10-CM

## 2024-12-01 DIAGNOSIS — F41.8 MIXED ANXIETY DEPRESSIVE DISORDER: ICD-10-CM

## 2024-12-04 NOTE — TELEPHONE ENCOUNTER
See multiple other messages regarding this patient.  Please schedule her for follow-up with me in January.    Me to [facility]   11/18/24 10:47 AM Note      See other refill request.  Please schedule her for follow-up with me in January for her 6-month follow-up.

## 2024-12-05 RX ORDER — DULOXETIN HYDROCHLORIDE 20 MG/1
20 CAPSULE, DELAYED RELEASE ORAL DAILY
Qty: 30 CAPSULE | Refills: 0 | Status: SHIPPED | OUTPATIENT
Start: 2024-12-05

## 2024-12-05 NOTE — TELEPHONE ENCOUNTER
See other messages about this. Call the patient to get her scheduled rather than a Mychart message    Me to [facility]   12/4/24  8:14 AM Note      See multiple other messages regarding this patient.  Please schedule her for follow-up with me in January.     Me to [facility]   11/18/24 10:47 AM Note      See other refill request.  Please schedule her for follow-up with me in January for her 6-month follow-up.

## 2024-12-15 DIAGNOSIS — E53.8 B12 DEFICIENCY: ICD-10-CM

## 2024-12-19 RX ORDER — UREA 10 %
500 LOTION (ML) TOPICAL DAILY
Qty: 90 TABLET | Refills: 0 | Status: SHIPPED | OUTPATIENT
Start: 2024-12-19

## 2024-12-27 RX ORDER — METHOCARBAMOL 750 MG/1
750 TABLET, FILM COATED ORAL NIGHTLY
Qty: 90 TABLET | Refills: 0 | Status: SHIPPED | OUTPATIENT
Start: 2024-12-27

## 2025-01-22 DIAGNOSIS — E03.9 ACQUIRED HYPOTHYROIDISM: ICD-10-CM

## 2025-01-22 RX ORDER — LEVOTHYROXINE SODIUM 112 UG/1
112 TABLET ORAL DAILY
Qty: 90 TABLET | Refills: 1 | OUTPATIENT
Start: 2025-01-22

## 2025-01-29 DIAGNOSIS — F41.8 MIXED ANXIETY DEPRESSIVE DISORDER: ICD-10-CM

## 2025-01-31 RX ORDER — DULOXETIN HYDROCHLORIDE 20 MG/1
CAPSULE, DELAYED RELEASE ORAL
Qty: 30 CAPSULE | Refills: 0 | Status: SHIPPED | OUTPATIENT
Start: 2025-01-31

## 2025-02-01 DIAGNOSIS — E53.8 B12 DEFICIENCY: ICD-10-CM

## 2025-02-01 DIAGNOSIS — J45.20 MILD INTERMITTENT ASTHMA WITHOUT COMPLICATION: ICD-10-CM

## 2025-02-01 DIAGNOSIS — K21.9 GASTROESOPHAGEAL REFLUX DISEASE, UNSPECIFIED WHETHER ESOPHAGITIS PRESENT: ICD-10-CM

## 2025-02-05 RX ORDER — MONTELUKAST SODIUM 10 MG/1
10 TABLET ORAL
Qty: 90 TABLET | Refills: 0 | Status: SHIPPED | OUTPATIENT
Start: 2025-02-05

## 2025-02-05 RX ORDER — ALBUTEROL SULFATE 90 UG/1
2 AEROSOL, METERED RESPIRATORY (INHALATION) EVERY 6 HOURS PRN
Qty: 18 G | Refills: 0 | Status: SHIPPED | OUTPATIENT
Start: 2025-02-05

## 2025-02-05 RX ORDER — FAMOTIDINE 20 MG/1
20 TABLET, FILM COATED ORAL
Qty: 90 TABLET | Refills: 1 | Status: SHIPPED | OUTPATIENT
Start: 2025-02-05

## 2025-02-05 RX ORDER — LORATADINE 10 MG/1
10 TABLET ORAL DAILY
Qty: 30 TABLET | Refills: 2 | Status: SHIPPED | OUTPATIENT
Start: 2025-02-05

## 2025-02-05 RX ORDER — MULTIVITAMIN WITH IRON
500 TABLET ORAL DAILY
Qty: 90 TABLET | Refills: 0 | Status: SHIPPED | OUTPATIENT
Start: 2025-02-05

## 2025-02-10 ENCOUNTER — OFFICE VISIT (OUTPATIENT)
Dept: FAMILY MEDICINE CLINIC | Facility: CLINIC | Age: 65
End: 2025-02-10
Payer: MEDICARE

## 2025-02-10 VITALS
TEMPERATURE: 96.7 F | SYSTOLIC BLOOD PRESSURE: 110 MMHG | HEIGHT: 61 IN | BODY MASS INDEX: 39.27 KG/M2 | HEART RATE: 79 BPM | OXYGEN SATURATION: 98 % | DIASTOLIC BLOOD PRESSURE: 64 MMHG | WEIGHT: 208 LBS

## 2025-02-10 DIAGNOSIS — R06.02 SHORTNESS OF BREATH: ICD-10-CM

## 2025-02-10 DIAGNOSIS — E55.9 VITAMIN D DEFICIENCY: ICD-10-CM

## 2025-02-10 DIAGNOSIS — R00.0 TACHYCARDIA: ICD-10-CM

## 2025-02-10 DIAGNOSIS — M25.561 CHRONIC PAIN OF BOTH KNEES: ICD-10-CM

## 2025-02-10 DIAGNOSIS — J45.20 MILD INTERMITTENT ASTHMA WITHOUT COMPLICATION: ICD-10-CM

## 2025-02-10 DIAGNOSIS — E53.8 B12 DEFICIENCY: ICD-10-CM

## 2025-02-10 DIAGNOSIS — I10 BENIGN ESSENTIAL HYPERTENSION: ICD-10-CM

## 2025-02-10 DIAGNOSIS — R20.0 NUMBNESS AND TINGLING OF UPPER AND LOWER EXTREMITIES OF BOTH SIDES: ICD-10-CM

## 2025-02-10 DIAGNOSIS — E11.65 TYPE 2 DIABETES MELLITUS WITH HYPERGLYCEMIA, WITHOUT LONG-TERM CURRENT USE OF INSULIN: Primary | ICD-10-CM

## 2025-02-10 DIAGNOSIS — E78.2 MIXED HYPERLIPIDEMIA: ICD-10-CM

## 2025-02-10 DIAGNOSIS — K21.9 GASTROESOPHAGEAL REFLUX DISEASE, UNSPECIFIED WHETHER ESOPHAGITIS PRESENT: ICD-10-CM

## 2025-02-10 DIAGNOSIS — R31.9 HEMATURIA, UNSPECIFIED TYPE: ICD-10-CM

## 2025-02-10 DIAGNOSIS — G47.33 OBSTRUCTIVE SLEEP APNEA: ICD-10-CM

## 2025-02-10 DIAGNOSIS — R20.2 NUMBNESS AND TINGLING OF UPPER AND LOWER EXTREMITIES OF BOTH SIDES: ICD-10-CM

## 2025-02-10 DIAGNOSIS — F41.8 MIXED ANXIETY DEPRESSIVE DISORDER: ICD-10-CM

## 2025-02-10 DIAGNOSIS — E53.8 FOLATE DEFICIENCY: ICD-10-CM

## 2025-02-10 DIAGNOSIS — G89.29 CHRONIC PAIN OF BOTH KNEES: ICD-10-CM

## 2025-02-10 DIAGNOSIS — M25.562 CHRONIC PAIN OF BOTH KNEES: ICD-10-CM

## 2025-02-10 DIAGNOSIS — R21 RASH: ICD-10-CM

## 2025-02-10 DIAGNOSIS — E03.9 ACQUIRED HYPOTHYROIDISM: ICD-10-CM

## 2025-02-10 DIAGNOSIS — R00.2 PALPITATIONS: ICD-10-CM

## 2025-02-10 DIAGNOSIS — R79.89 ABNORMAL LIVER FUNCTION TESTS: ICD-10-CM

## 2025-02-10 DIAGNOSIS — R42 DIZZINESS: ICD-10-CM

## 2025-02-10 DIAGNOSIS — J30.9 ALLERGIC RHINITIS, UNSPECIFIED SEASONALITY, UNSPECIFIED TRIGGER: ICD-10-CM

## 2025-02-10 DIAGNOSIS — I65.23 BILATERAL CAROTID ARTERY STENOSIS: ICD-10-CM

## 2025-02-10 DIAGNOSIS — Z12.11 ENCOUNTER FOR SCREENING FOR MALIGNANT NEOPLASM OF COLON: ICD-10-CM

## 2025-02-10 PROCEDURE — 1125F AMNT PAIN NOTED PAIN PRSNT: CPT | Performed by: PHYSICIAN ASSISTANT

## 2025-02-10 PROCEDURE — 1159F MED LIST DOCD IN RCRD: CPT | Performed by: PHYSICIAN ASSISTANT

## 2025-02-10 PROCEDURE — 99214 OFFICE O/P EST MOD 30 MIN: CPT | Performed by: PHYSICIAN ASSISTANT

## 2025-02-10 PROCEDURE — 3078F DIAST BP <80 MM HG: CPT | Performed by: PHYSICIAN ASSISTANT

## 2025-02-10 PROCEDURE — 1160F RVW MEDS BY RX/DR IN RCRD: CPT | Performed by: PHYSICIAN ASSISTANT

## 2025-02-10 PROCEDURE — 3074F SYST BP LT 130 MM HG: CPT | Performed by: PHYSICIAN ASSISTANT

## 2025-02-10 NOTE — PROGRESS NOTES
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 of neurology for evaluation as well.  Carotid stenosis- Patient with less than 50% stenosis bilateral carotid arteries. We will consider vascular surgery for further workup of carotid stenosis with dizziness or neurology pending results.    From [facility] 3/2024.  She is overdue for Pap and DEXA and is up-to-date on mammogram.  She declines Pap at this time.  I advise she consider Pap now and in 3 years then will be complete and no longer needed per ACOG guidelines.  We will complete DEXA with her mammogram 6/2024 and refer to GI for evaluation and to consider EGD for uncontrolled GERD with colonoscopy.  She needs to check with her insurance on coverage and location of coverage for Tdap, Shingrix, RSV, and needs to ensure annual flu shot and COVID-19 booster.     Patient continues to see specialists as noted above.  I have reviewed available records, including consult notes, labs, and imaging/testing.  Patient to continue follow-up with specialists as directed by them.    I spent 30 minutes caring for Lydia Gutierrez on this date of service. This time includes time spent by me in the following activities as necessary: preparing for the visit, reviewing tests, specialists records and previous visits, obtaining and/or reviewing a separately obtained history, performing a medically appropriate  exam and/or evaluation, counseling and educating the patient, family, caregiver, referring and/or communicating with other healthcare professionals, documenting information in the medical record, independently interpreting results and communicating that information with the patient, family, caregiver, and developing a medically appropriate treatment plan with consideration of other conditions, medications, and treatments.

## 2025-02-12 LAB
25(OH)D3+25(OH)D2 SERPL-MCNC: 19.5 NG/ML (ref 30–100)
ALBUMIN SERPL-MCNC: 4.7 G/DL (ref 3.9–4.9)
ALBUMIN/CREAT UR: 26 MG/G CREAT (ref 0–29)
ALP SERPL-CCNC: 101 IU/L (ref 44–121)
ALT SERPL-CCNC: 16 IU/L (ref 0–32)
APPEARANCE UR: CLEAR
AST SERPL-CCNC: 14 IU/L (ref 0–40)
BACTERIA #/AREA URNS HPF: ABNORMAL /[HPF]
BACTERIA UR CULT: NORMAL
BACTERIA UR CULT: NORMAL
BASOPHILS # BLD AUTO: 0 X10E3/UL (ref 0–0.2)
BASOPHILS NFR BLD AUTO: 1 %
BILIRUB SERPL-MCNC: 0.3 MG/DL (ref 0–1.2)
BILIRUB UR QL STRIP: NEGATIVE
BUN SERPL-MCNC: 20 MG/DL (ref 8–27)
BUN/CREAT SERPL: 34 (ref 12–28)
CALCIUM SERPL-MCNC: 9.5 MG/DL (ref 8.7–10.3)
CASTS URNS QL MICRO: ABNORMAL /LPF
CHLORIDE SERPL-SCNC: 104 MMOL/L (ref 96–106)
CHOLEST SERPL-MCNC: 166 MG/DL (ref 100–199)
CK SERPL-CCNC: 47 U/L (ref 32–182)
CO2 SERPL-SCNC: 22 MMOL/L (ref 20–29)
COLOR UR: YELLOW
CREAT SERPL-MCNC: 0.58 MG/DL (ref 0.57–1)
CREAT UR-MCNC: 121.9 MG/DL
EGFRCR SERPLBLD CKD-EPI 2021: 102 ML/MIN/1.73
EOSINOPHIL # BLD AUTO: 0.2 X10E3/UL (ref 0–0.4)
EOSINOPHIL NFR BLD AUTO: 4 %
EPI CELLS #/AREA URNS HPF: ABNORMAL /HPF (ref 0–10)
ERYTHROCYTE [DISTWIDTH] IN BLOOD BY AUTOMATED COUNT: 12.7 % (ref 11.7–15.4)
FERRITIN SERPL-MCNC: 31 NG/ML (ref 15–150)
FOLATE SERPL-MCNC: 16.6 NG/ML
GLOBULIN SER CALC-MCNC: 2.5 G/DL (ref 1.5–4.5)
GLUCOSE SERPL-MCNC: 110 MG/DL (ref 70–99)
GLUCOSE UR QL STRIP: NEGATIVE
HBA1C MFR BLD: 6.5 % (ref 4.8–5.6)
HCT VFR BLD AUTO: 43.4 % (ref 34–46.6)
HDLC SERPL-MCNC: 46 MG/DL
HGB BLD-MCNC: 14.6 G/DL (ref 11.1–15.9)
HGB UR QL STRIP: NEGATIVE
IMM GRANULOCYTES # BLD AUTO: 0 X10E3/UL (ref 0–0.1)
IMM GRANULOCYTES NFR BLD AUTO: 0 %
IRON SATN MFR SERPL: 19 % (ref 15–55)
IRON SERPL-MCNC: 78 UG/DL (ref 27–139)
KETONES UR QL STRIP: NEGATIVE
LDLC SERPL CALC-MCNC: 82 MG/DL (ref 0–99)
LDLC/HDLC SERPL: 1.8 RATIO (ref 0–3.2)
LEUKOCYTE ESTERASE UR QL STRIP: ABNORMAL
LYMPHOCYTES # BLD AUTO: 1.8 X10E3/UL (ref 0.7–3.1)
LYMPHOCYTES NFR BLD AUTO: 28 %
MCH RBC QN AUTO: 30.2 PG (ref 26.6–33)
MCHC RBC AUTO-ENTMCNC: 33.6 G/DL (ref 31.5–35.7)
MCV RBC AUTO: 90 FL (ref 79–97)
MICRO URNS: ABNORMAL
MICROALBUMIN UR-MCNC: 31.6 UG/ML
MONOCYTES # BLD AUTO: 0.4 X10E3/UL (ref 0.1–0.9)
MONOCYTES NFR BLD AUTO: 6 %
MUCOUS THREADS URNS QL MICRO: PRESENT
NEUTROPHILS # BLD AUTO: 4 X10E3/UL (ref 1.4–7)
NEUTROPHILS NFR BLD AUTO: 61 %
NITRITE UR QL STRIP: NEGATIVE
PH UR STRIP: 6 [PH] (ref 5–7.5)
PLATELET # BLD AUTO: 333 X10E3/UL (ref 150–450)
POTASSIUM SERPL-SCNC: 4.3 MMOL/L (ref 3.5–5.2)
PROT SERPL-MCNC: 7.2 G/DL (ref 6–8.5)
PROT UR QL STRIP: ABNORMAL
RBC # BLD AUTO: 4.84 X10E6/UL (ref 3.77–5.28)
RBC #/AREA URNS HPF: ABNORMAL /HPF (ref 0–2)
SODIUM SERPL-SCNC: 142 MMOL/L (ref 134–144)
SP GR UR STRIP: 1.02 (ref 1–1.03)
T3FREE SERPL-MCNC: 2.4 PG/ML (ref 2–4.4)
T4 FREE SERPL-MCNC: 0.87 NG/DL (ref 0.82–1.77)
TIBC SERPL-MCNC: 413 UG/DL (ref 250–450)
TRIGL SERPL-MCNC: 227 MG/DL (ref 0–149)
TSH SERPL DL<=0.005 MIU/L-ACNC: 7.7 UIU/ML (ref 0.45–4.5)
UIBC SERPL-MCNC: 335 UG/DL (ref 118–369)
URATE SERPL-MCNC: 4 MG/DL (ref 3–7.2)
URINALYSIS REFLEX: ABNORMAL
UROBILINOGEN UR STRIP-MCNC: 0.2 MG/DL (ref 0.2–1)
VIT B12 SERPL-MCNC: 783 PG/ML (ref 232–1245)
VLDLC SERPL CALC-MCNC: 38 MG/DL (ref 5–40)
WBC # BLD AUTO: 6.5 X10E3/UL (ref 3.4–10.8)
WBC #/AREA URNS HPF: ABNORMAL /HPF (ref 0–5)

## 2025-02-24 DIAGNOSIS — E03.9 ACQUIRED HYPOTHYROIDISM: ICD-10-CM

## 2025-02-27 RX ORDER — LEVOTHYROXINE SODIUM 112 UG/1
112 TABLET ORAL DAILY
Qty: 90 TABLET | Refills: 1 | Status: SHIPPED | OUTPATIENT
Start: 2025-02-27

## 2025-03-06 DIAGNOSIS — F41.8 MIXED ANXIETY DEPRESSIVE DISORDER: ICD-10-CM

## 2025-03-10 RX ORDER — DULOXETIN HYDROCHLORIDE 20 MG/1
20 CAPSULE, DELAYED RELEASE ORAL DAILY
Qty: 90 CAPSULE | Refills: 0 | Status: SHIPPED | OUTPATIENT
Start: 2025-03-10

## 2025-03-16 ENCOUNTER — RESULTS FOLLOW-UP (OUTPATIENT)
Dept: FAMILY MEDICINE CLINIC | Facility: CLINIC | Age: 65
End: 2025-03-16
Payer: MEDICARE

## 2025-03-16 DIAGNOSIS — D72.829 LEUKOCYTOSIS, UNSPECIFIED TYPE: ICD-10-CM

## 2025-03-16 DIAGNOSIS — E53.8 FOLATE DEFICIENCY: ICD-10-CM

## 2025-03-16 DIAGNOSIS — E03.9 ACQUIRED HYPOTHYROIDISM: Primary | ICD-10-CM

## 2025-03-17 NOTE — TELEPHONE ENCOUNTER
Left message to call OK FOR HUB TO RELAY    Patient has viewed results on [facility]. Please just verify she does not have any questions and schedule her for a follow up in a couple weeks for thyroid and fasting in june    .

## 2025-03-18 RX ORDER — FOLIC ACID 0.4 MG
400 TABLET ORAL DAILY
Qty: 90 TABLET | Refills: 1 | Status: SHIPPED | OUTPATIENT
Start: 2025-03-18

## 2025-03-27 RX ORDER — METHOCARBAMOL 750 MG/1
750 TABLET, FILM COATED ORAL NIGHTLY
Qty: 90 TABLET | Refills: 0 | Status: SHIPPED | OUTPATIENT
Start: 2025-03-27

## 2025-03-27 NOTE — TELEPHONE ENCOUNTER
See result note- please schedule appt for labs ASAP and schedule her appt for follow up with me for June.

## 2025-03-30 DIAGNOSIS — F41.8 MIXED ANXIETY DEPRESSIVE DISORDER: ICD-10-CM

## 2025-04-01 RX ORDER — PANTOPRAZOLE SODIUM 40 MG/1
40 TABLET, DELAYED RELEASE ORAL DAILY
Qty: 90 TABLET | Refills: 1 | Status: SHIPPED | OUTPATIENT
Start: 2025-04-01

## 2025-04-01 RX ORDER — ROSUVASTATIN CALCIUM 40 MG/1
40 TABLET, COATED ORAL
Qty: 90 TABLET | Refills: 0 | Status: SHIPPED | OUTPATIENT
Start: 2025-04-01

## 2025-04-01 RX ORDER — LISINOPRIL AND HYDROCHLOROTHIAZIDE 10; 12.5 MG/1; MG/1
1 TABLET ORAL DAILY
Qty: 90 TABLET | Refills: 1 | Status: SHIPPED | OUTPATIENT
Start: 2025-04-01

## 2025-04-01 RX ORDER — AMLODIPINE BESYLATE 10 MG/1
10 TABLET ORAL DAILY
Qty: 90 TABLET | Refills: 0 | Status: SHIPPED | OUTPATIENT
Start: 2025-04-01

## 2025-04-01 RX ORDER — PANTOPRAZOLE SODIUM 40 MG/1
40 TABLET, DELAYED RELEASE ORAL DAILY
Qty: 90 TABLET | Refills: 1 | OUTPATIENT
Start: 2025-04-01

## 2025-04-01 RX ORDER — LISINOPRIL AND HYDROCHLOROTHIAZIDE 10; 12.5 MG/1; MG/1
1 TABLET ORAL DAILY
Qty: 90 TABLET | Refills: 1 | OUTPATIENT
Start: 2025-04-01

## 2025-04-01 NOTE — TELEPHONE ENCOUNTER
Me to [facility]  4/1/25  8:45 AM Note      Me to [facility] 4/1/25  8:44 AM Note      See result note and previous message. Please call patient to schedule her appt with me in June        Me to [facility]  3/27/25  6:34 AM Note      See result note- please schedule appt for labs ASAP and schedule her appt for follow up with me for June.

## 2025-04-01 NOTE — TELEPHONE ENCOUNTER
See result note and previous message. Please call patient to schedule her appt with me in June      Me to Bl[facility]  3/27/25  6:34 AM Note      See result note- please schedule appt for labs ASAP and schedule her appt for follow up with me for June.

## 2025-04-01 NOTE — TELEPHONE ENCOUNTER
Me to [facility] 4/1/25  8:44 AM Note      See result note and previous message. Please call patient to schedule her appt with me in June        Me to [facility]  3/27/25  6:34 AM Note      See result note- please schedule appt for labs ASAP and schedule her appt for follow up with me for June.

## 2025-04-25 DIAGNOSIS — J30.9 ALLERGIC RHINITIS, UNSPECIFIED SEASONALITY, UNSPECIFIED TRIGGER: ICD-10-CM

## 2025-04-25 DIAGNOSIS — J45.20 MILD INTERMITTENT ASTHMA WITHOUT COMPLICATION: ICD-10-CM

## 2025-04-28 RX ORDER — FLUTICASONE PROPIONATE 50 MCG
2 SPRAY, SUSPENSION (ML) NASAL DAILY
Qty: 16 G | Refills: 3 | Status: SHIPPED | OUTPATIENT
Start: 2025-04-28

## 2025-04-28 RX ORDER — ALBUTEROL SULFATE 90 UG/1
2 AEROSOL, METERED RESPIRATORY (INHALATION) EVERY 6 HOURS PRN
Qty: 18 G | Refills: 0 | Status: SHIPPED | OUTPATIENT
Start: 2025-04-28

## 2025-04-28 NOTE — TELEPHONE ENCOUNTER
See result note.  Patient was supposed to recheck her thyroid.  Please have her come in for lab only for thyroid recheck.  Please also schedule her for follow-up appointment with me in June.

## 2025-05-03 DIAGNOSIS — K21.9 GASTROESOPHAGEAL REFLUX DISEASE, UNSPECIFIED WHETHER ESOPHAGITIS PRESENT: ICD-10-CM

## 2025-05-03 DIAGNOSIS — E53.8 B12 DEFICIENCY: ICD-10-CM

## 2025-05-08 RX ORDER — MONTELUKAST SODIUM 10 MG/1
10 TABLET ORAL
Qty: 30 TABLET | Refills: 0 | Status: SHIPPED | OUTPATIENT
Start: 2025-05-08

## 2025-05-08 RX ORDER — MULTIVITAMIN WITH IRON
500 TABLET ORAL DAILY
Qty: 30 TABLET | Refills: 0 | Status: SHIPPED | OUTPATIENT
Start: 2025-05-08

## 2025-05-08 RX ORDER — FAMOTIDINE 20 MG/1
20 TABLET, FILM COATED ORAL
Qty: 30 TABLET | Refills: 0 | Status: SHIPPED | OUTPATIENT
Start: 2025-05-08

## 2025-05-08 NOTE — TELEPHONE ENCOUNTER
See result note and previous messages.      Me to Blanca [facility]  4/28/25  2:21 PM Note      See result note.  Patient was supposed to recheck her thyroid.  Please have her come in for lab only for thyroid recheck.  Please also schedule her for follow-up appointment with me in June.

## 2025-05-08 NOTE — TELEPHONE ENCOUNTER
See result note and previous messages.      Me to Blanca [facility]  4/28/25  2:21 PM Note      See result note.  Patient was supposed to recheck her thyroid.  Please have her come in for lab only for thyroid recheck.  Please also schedule her for follow-up appointment with me in June.

## 2025-05-08 NOTE — TELEPHONE ENCOUNTER
See result note and previous messages.     Me to Blanca [facility]  4/28/25  2:21 PM Note      See result note.  Patient was supposed to recheck her thyroid.  Please have her come in for lab only for thyroid recheck.  Please also schedule her for follow-up appointment with me in June.

## 2025-05-14 ENCOUNTER — OFFICE VISIT (OUTPATIENT)
Dept: FAMILY MEDICINE CLINIC | Facility: CLINIC | Age: 65
End: 2025-05-14
Payer: MEDICARE

## 2025-05-14 VITALS
TEMPERATURE: 98 F | OXYGEN SATURATION: 96 % | WEIGHT: 207 LBS | RESPIRATION RATE: 14 BRPM | DIASTOLIC BLOOD PRESSURE: 76 MMHG | SYSTOLIC BLOOD PRESSURE: 118 MMHG | HEIGHT: 61 IN | BODY MASS INDEX: 39.08 KG/M2 | HEART RATE: 85 BPM

## 2025-05-14 DIAGNOSIS — D72.829 LEUKOCYTOSIS, UNSPECIFIED TYPE: ICD-10-CM

## 2025-05-14 DIAGNOSIS — E53.8 B12 DEFICIENCY: ICD-10-CM

## 2025-05-14 DIAGNOSIS — M25.562 CHRONIC PAIN OF BOTH KNEES: ICD-10-CM

## 2025-05-14 DIAGNOSIS — R20.2 NUMBNESS AND TINGLING OF UPPER AND LOWER EXTREMITIES OF BOTH SIDES: ICD-10-CM

## 2025-05-14 DIAGNOSIS — E55.9 VITAMIN D DEFICIENCY: ICD-10-CM

## 2025-05-14 DIAGNOSIS — E03.9 ACQUIRED HYPOTHYROIDISM: ICD-10-CM

## 2025-05-14 DIAGNOSIS — E11.65 TYPE 2 DIABETES MELLITUS WITH HYPERGLYCEMIA, WITHOUT LONG-TERM CURRENT USE OF INSULIN: Primary | ICD-10-CM

## 2025-05-14 DIAGNOSIS — J45.20 MILD INTERMITTENT ASTHMA WITHOUT COMPLICATION: ICD-10-CM

## 2025-05-14 DIAGNOSIS — R79.89 ABNORMAL LIVER FUNCTION TESTS: ICD-10-CM

## 2025-05-14 DIAGNOSIS — G47.33 OBSTRUCTIVE SLEEP APNEA: ICD-10-CM

## 2025-05-14 DIAGNOSIS — J30.9 ALLERGIC RHINITIS, UNSPECIFIED SEASONALITY, UNSPECIFIED TRIGGER: ICD-10-CM

## 2025-05-14 DIAGNOSIS — R06.02 SHORTNESS OF BREATH: ICD-10-CM

## 2025-05-14 DIAGNOSIS — K21.9 GASTROESOPHAGEAL REFLUX DISEASE, UNSPECIFIED WHETHER ESOPHAGITIS PRESENT: ICD-10-CM

## 2025-05-14 DIAGNOSIS — R00.0 TACHYCARDIA: ICD-10-CM

## 2025-05-14 DIAGNOSIS — E53.8 FOLATE DEFICIENCY: ICD-10-CM

## 2025-05-14 DIAGNOSIS — R20.0 NUMBNESS AND TINGLING OF UPPER AND LOWER EXTREMITIES OF BOTH SIDES: ICD-10-CM

## 2025-05-14 DIAGNOSIS — G89.29 CHRONIC PAIN OF BOTH KNEES: ICD-10-CM

## 2025-05-14 DIAGNOSIS — I10 BENIGN ESSENTIAL HYPERTENSION: ICD-10-CM

## 2025-05-14 DIAGNOSIS — R42 DIZZINESS: ICD-10-CM

## 2025-05-14 DIAGNOSIS — R31.9 HEMATURIA, UNSPECIFIED TYPE: ICD-10-CM

## 2025-05-14 DIAGNOSIS — M25.561 CHRONIC PAIN OF BOTH KNEES: ICD-10-CM

## 2025-05-14 DIAGNOSIS — F41.8 MIXED ANXIETY DEPRESSIVE DISORDER: ICD-10-CM

## 2025-05-14 DIAGNOSIS — R79.9 ABNORMAL FINDING OF BLOOD CHEMISTRY, UNSPECIFIED: ICD-10-CM

## 2025-05-14 DIAGNOSIS — E78.2 MIXED HYPERLIPIDEMIA: ICD-10-CM

## 2025-05-14 DIAGNOSIS — I65.23 BILATERAL CAROTID ARTERY STENOSIS: ICD-10-CM

## 2025-05-14 DIAGNOSIS — R00.2 PALPITATIONS: ICD-10-CM

## 2025-05-14 RX ORDER — SEMAGLUTIDE 0.68 MG/ML
INJECTION, SOLUTION SUBCUTANEOUS
Qty: 3 ML | Refills: 0 | Status: SHIPPED | OUTPATIENT
Start: 2025-05-14 | End: 2025-06-25

## 2025-05-14 NOTE — PROGRESS NOTES
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 she will need to let me know if symptoms recur.  Asthma-Patient had more shortness of air with exertion.  She should continue follow up with pulmonology and consider cardiology follow-up.  She had appointment and canceled.  Continue Singulair 10 mg at bedtime.  To be seen ASAP if worsening, new or changing symptoms.  Allergic Rhinitis- Allergy symptoms improved with getting out of moldy apartment. Continue Singulair and can take Flonase 1 sprays in each nostril twice daily.   BREN- Patient was not using her CPAP, reporting her mask broke.  Mask was then fixed and she was using then she stopped.  She must use CPAP and follow-up with sleep medicine as directed.  I have counseled her on the risks of untreated sleep apnea, risks, and the need to treat sleep apnea.  Major Depressive Disorder and anxiety- Moods were stable, worsen, but are stable today. She was previously feeling fatigued, decreased motivation, and increased irritability. We increased Prozac to 60 mg once daily, which helped moods some then had uncontrolled moods and we had to decrease medication.  Moods improved on Prozac 20 mg daily but she has had recurrence of depression, fatigue, decreased motivation.  I stopped Prozac and started Cymbalta 30 mg daily to see if this would also help with some of her chronic  pain.  She initially noticed significant improvement with starting Cymbalta.  She noticed some anger or irritability.  It was difficult to determine if this is related to situational stressors at home or true irritability.  I advised she decrease Cymbalta to 20 mg daily to see if this helped.  Moods are pretty stable today.  If she has worsening, we will need to have her go back on Cymbalta 30 mg daily for 1 to 2 weeks then increase to 60 mg.  To be seen ASAP if worsening moods or AE with medication.  911 to ER if she develops SI/HI.  Consideration of specialist if uncontrolled moods.  OA knee s/p TKR- Patient reports being unable to work with knee pain. She had follow up with orthopedist, underwent lumbar spine workup and had knee injections.  She was not seen by orthopedist from 2022 until 3/2024.  Continue follow-up and treatment as directed by orthopedist.  Consideration of lumbar COLEMAN if no resolution.    Dizziness, palpitations, left jaw and ear pain, lump in throat sensation- She developed symptoms of dizziness, episodes of heart racing, and concerning symptoms for angina. I referred to cardiology with negative testing and added H2 blocker to PPI with consideration of esophageal spasm. She needs to be compliant with CPAP use.  Monitor blood pressure to ensure no hypotension and no dizziness.  If recurrence of symptoms, consideration of neurology for evaluation as well.  Paresthesias, neuropathy, restless leg symptoms- Ensure tight glucose control and consider referral for EMG/NCS.  Consideration of neurology for evaluation as well.  Carotid stenosis- Patient with less than 50% stenosis bilateral carotid arteries. We will consider vascular surgery for further workup of carotid stenosis with dizziness or neurology pending results.    From [facility] 3/2024.  She is overdue for Pap and DEXA and is up-to-date on mammogram.  She declines Pap at this time.  I advise she consider Pap now and in 3 years then will be complete  and no longer needed per ACOG guidelines.  We will complete DEXA with her mammogram 6/2024 and refer to GI for evaluation and to consider EGD for uncontrolled GERD with colonoscopy.  She needs to check with her insurance on coverage and location of coverage for Tdap, Shingrix, RSV, and needs to ensure annual flu shot and COVID-19 booster.     Patient continues to see specialists as noted above.  I have reviewed available records, including consult notes, labs, and imaging/testing.  Patient to continue follow-up with specialists as directed by them.    I spent 35 minutes caring for Lydia Gutierrez on this date of service. This time includes time spent by me in the following activities as necessary: preparing for the visit, reviewing tests, specialists records and previous visits, obtaining and/or reviewing a separately obtained history, performing a medically appropriate exam and/or evaluation, counseling and educating the patient, family, caregiver, referring and/or communicating with other healthcare professionals, documenting information in the medical record, independently interpreting results and communicating that information with the patient, family, caregiver, and developing a medically appropriate treatment plan with consideration of other conditions, medications, and treatments.

## 2025-05-18 LAB
25(OH)D3+25(OH)D2 SERPL-MCNC: 31.1 NG/ML (ref 30–100)
ALBUMIN SERPL-MCNC: 4.6 G/DL (ref 3.5–5.2)
ALBUMIN/CREAT UR: 7 MG/G CREAT (ref 0–29)
ALBUMIN/GLOB SERPL: 1.9 G/DL
ALP SERPL-CCNC: 97 U/L (ref 39–117)
ALT SERPL-CCNC: 18 U/L (ref 1–33)
APPEARANCE UR: CLEAR
AST SERPL-CCNC: 19 U/L (ref 1–32)
BACTERIA #/AREA URNS HPF: NORMAL /[HPF]
BACTERIA UR CULT: ABNORMAL
BACTERIA UR CULT: ABNORMAL
BASOPHILS # BLD AUTO: 0.04 10*3/MM3 (ref 0–0.2)
BASOPHILS NFR BLD AUTO: 0.7 % (ref 0–1.5)
BILIRUB SERPL-MCNC: 0.3 MG/DL (ref 0–1.2)
BILIRUB UR QL STRIP: NEGATIVE
BUN SERPL-MCNC: 20 MG/DL (ref 8–23)
BUN/CREAT SERPL: 32.3 (ref 7–25)
CALCIUM SERPL-MCNC: 9.4 MG/DL (ref 8.6–10.5)
CASTS URNS QL MICRO: NORMAL /LPF
CHLORIDE SERPL-SCNC: 106 MMOL/L (ref 98–107)
CHOLEST SERPL-MCNC: 154 MG/DL (ref 0–200)
CK SERPL-CCNC: 53 U/L (ref 20–180)
CO2 SERPL-SCNC: 25 MMOL/L (ref 22–29)
COLOR UR: YELLOW
CREAT SERPL-MCNC: 0.62 MG/DL (ref 0.57–1)
CREAT UR-MCNC: 83.6 MG/DL
EGFRCR SERPLBLD CKD-EPI 2021: 100.8 ML/MIN/1.73
EOSINOPHIL # BLD AUTO: 0.28 10*3/MM3 (ref 0–0.4)
EOSINOPHIL NFR BLD AUTO: 4.9 % (ref 0.3–6.2)
EPI CELLS #/AREA URNS HPF: NORMAL /HPF (ref 0–10)
ERYTHROCYTE [DISTWIDTH] IN BLOOD BY AUTOMATED COUNT: 13.7 % (ref 12.3–15.4)
FERRITIN SERPL-MCNC: 37.7 NG/ML (ref 13–150)
FOLATE SERPL-MCNC: >20 NG/ML (ref 4.78–24.2)
GLOBULIN SER CALC-MCNC: 2.4 GM/DL
GLUCOSE SERPL-MCNC: 111 MG/DL (ref 65–99)
GLUCOSE UR QL STRIP: NEGATIVE
HBA1C MFR BLD: 6.2 % (ref 4.8–5.6)
HCT VFR BLD AUTO: 41.1 % (ref 34–46.6)
HDLC SERPL-MCNC: 44 MG/DL (ref 40–60)
HGB BLD-MCNC: 13.5 G/DL (ref 12–15.9)
HGB UR QL STRIP: NEGATIVE
IMM GRANULOCYTES # BLD AUTO: 0.03 10*3/MM3 (ref 0–0.05)
IMM GRANULOCYTES NFR BLD AUTO: 0.5 % (ref 0–0.5)
IRON SATN MFR SERPL: 19 % (ref 20–50)
IRON SERPL-MCNC: 92 MCG/DL (ref 37–145)
KETONES UR QL STRIP: NEGATIVE
LDLC SERPL CALC-MCNC: 77 MG/DL (ref 0–100)
LDLC/HDLC SERPL: 1.62 {RATIO}
LEUKOCYTE ESTERASE UR QL STRIP: ABNORMAL
LYMPHOCYTES # BLD AUTO: 1.84 10*3/MM3 (ref 0.7–3.1)
LYMPHOCYTES NFR BLD AUTO: 32 % (ref 19.6–45.3)
MCH RBC QN AUTO: 30 PG (ref 26.6–33)
MCHC RBC AUTO-ENTMCNC: 32.8 G/DL (ref 31.5–35.7)
MCV RBC AUTO: 91.3 FL (ref 79–97)
MICRO URNS: ABNORMAL
MICROALBUMIN UR-MCNC: 6.2 UG/ML
MONOCYTES # BLD AUTO: 0.49 10*3/MM3 (ref 0.1–0.9)
MONOCYTES NFR BLD AUTO: 8.5 % (ref 5–12)
NEUTROPHILS # BLD AUTO: 3.07 10*3/MM3 (ref 1.7–7)
NEUTROPHILS NFR BLD AUTO: 53.4 % (ref 42.7–76)
NITRITE UR QL STRIP: NEGATIVE
NRBC BLD AUTO-RTO: 0 /100 WBC (ref 0–0.2)
OTHER ANTIBIOTIC SUSC ISLT: ABNORMAL
PH UR STRIP: 6 [PH] (ref 5–7.5)
PLATELET # BLD AUTO: 306 10*3/MM3 (ref 140–450)
POTASSIUM SERPL-SCNC: 4 MMOL/L (ref 3.5–5.2)
PROT SERPL-MCNC: 7 G/DL (ref 6–8.5)
PROT UR QL STRIP: NEGATIVE
RBC # BLD AUTO: 4.5 10*6/MM3 (ref 3.77–5.28)
RBC #/AREA URNS HPF: NORMAL /HPF (ref 0–2)
SODIUM SERPL-SCNC: 143 MMOL/L (ref 136–145)
SP GR UR STRIP: 1.02 (ref 1–1.03)
T3FREE SERPL-MCNC: 3.3 PG/ML (ref 2–4.4)
T4 FREE SERPL-MCNC: 1.47 NG/DL (ref 0.92–1.68)
TIBC SERPL-MCNC: 492 MCG/DL
TRIGL SERPL-MCNC: 194 MG/DL (ref 0–150)
TSH SERPL DL<=0.005 MIU/L-ACNC: 1.72 UIU/ML (ref 0.27–4.2)
UIBC SERPL-MCNC: 400 MCG/DL (ref 112–346)
URATE SERPL-MCNC: 4.8 MG/DL (ref 2.4–5.7)
URINALYSIS REFLEX: ABNORMAL
UROBILINOGEN UR STRIP-MCNC: 0.2 MG/DL (ref 0.2–1)
VIT B12 SERPL-MCNC: 1519 PG/ML (ref 211–946)
VLDLC SERPL CALC-MCNC: 33 MG/DL (ref 5–40)
WBC # BLD AUTO: 5.75 10*3/MM3 (ref 3.4–10.8)
WBC #/AREA URNS HPF: NORMAL /HPF (ref 0–5)

## 2025-05-21 RX ORDER — LORATADINE 10 MG/1
10 TABLET ORAL DAILY
Qty: 30 TABLET | Refills: 2 | Status: SHIPPED | OUTPATIENT
Start: 2025-05-21

## 2025-06-03 RX ORDER — MONTELUKAST SODIUM 10 MG/1
10 TABLET ORAL
Qty: 90 TABLET | Refills: 1 | Status: SHIPPED | OUTPATIENT
Start: 2025-06-03

## 2025-06-07 DIAGNOSIS — F41.8 MIXED ANXIETY DEPRESSIVE DISORDER: ICD-10-CM

## 2025-06-09 RX ORDER — DULOXETIN HYDROCHLORIDE 20 MG/1
20 CAPSULE, DELAYED RELEASE ORAL DAILY
Qty: 90 CAPSULE | Refills: 0 | Status: SHIPPED | OUTPATIENT
Start: 2025-06-09

## 2025-06-12 DIAGNOSIS — D72.829 LEUKOCYTOSIS, UNSPECIFIED TYPE: ICD-10-CM

## 2025-06-25 DIAGNOSIS — J45.20 MILD INTERMITTENT ASTHMA WITHOUT COMPLICATION: ICD-10-CM

## 2025-06-25 RX ORDER — ALBUTEROL SULFATE 90 UG/1
2 AEROSOL, METERED RESPIRATORY (INHALATION) EVERY 6 HOURS PRN
Qty: 18 G | Refills: 0 | Status: SHIPPED | OUTPATIENT
Start: 2025-06-25

## 2025-06-25 RX ORDER — METHOCARBAMOL 750 MG/1
TABLET, FILM COATED ORAL
Qty: 90 TABLET | Refills: 0 | Status: SHIPPED | OUTPATIENT
Start: 2025-06-25

## 2025-06-25 NOTE — TELEPHONE ENCOUNTER
See result note and previous message.  Please call the patient to get her scheduled for follow-up with me in August.    Me to [facility]  6/9/25  5:15 PM Note      See result note.  Please schedule patient for follow-up with me 3 months from last labs in May.

## 2025-06-26 DIAGNOSIS — F41.8 MIXED ANXIETY DEPRESSIVE DISORDER: ICD-10-CM

## 2025-06-26 RX ORDER — AMLODIPINE BESYLATE 10 MG/1
10 TABLET ORAL DAILY
Qty: 90 TABLET | Refills: 0 | Status: SHIPPED | OUTPATIENT
Start: 2025-06-26

## 2025-06-26 RX ORDER — ROSUVASTATIN CALCIUM 40 MG/1
40 TABLET, COATED ORAL
Qty: 90 TABLET | Refills: 0 | Status: SHIPPED | OUTPATIENT
Start: 2025-06-26

## 2025-06-26 NOTE — TELEPHONE ENCOUNTER
See result note and previous message.  Please call the patient to get her scheduled for follow-up with me in August.     Me to [facility]  6/9/25  5:15 PM Note      See result note.  Please schedule patient for follow-up with me 3 months from last labs in May.

## 2025-07-05 DIAGNOSIS — E53.8 B12 DEFICIENCY: ICD-10-CM

## 2025-07-09 RX ORDER — MULTIVITAMIN WITH IRON
500 TABLET ORAL DAILY
Qty: 90 TABLET | Refills: 0 | Status: SHIPPED | OUTPATIENT
Start: 2025-07-09

## 2025-07-09 NOTE — TELEPHONE ENCOUNTER
See result note and previous messages. Please get appt scheduled in August.     Me to [email or web address] [facility]  6/26/25 10:21 AM Note      See result note and previous message.  Please call the patient to get her scheduled for follow-up with me in August.     Me to [email or web address] [facility]  6/9/25  5:15 PM Note      See result note.  Please schedule patient for follow-up with me 3 months from last labs in May.

## 2025-07-18 DIAGNOSIS — J45.20 MILD INTERMITTENT ASTHMA WITHOUT COMPLICATION: ICD-10-CM

## 2025-07-18 RX ORDER — ALBUTEROL SULFATE 90 UG/1
2 AEROSOL, METERED RESPIRATORY (INHALATION) EVERY 6 HOURS PRN
Qty: 18 G | Refills: 0 | Status: SHIPPED | OUTPATIENT
Start: 2025-07-18

## 2025-07-18 NOTE — TELEPHONE ENCOUNTER
Last Office Visit - This Dept  5/14/2025 Joanna Sanabria PA    I tried to call patient to get her scheduled for a appointment in August but she did not answer and I was unable to leave a  -[employer] OKAY TO [employer]

## 2025-07-18 NOTE — TELEPHONE ENCOUNTER
See result note and previous messages.  Please get the patient scheduled for follow-up with me in August.    Me to [facility] 7/9/25 10:10 AM Note      See result note and previous messages. Please get appt scheduled in August.      Me to [employer] [facility]  6/26/25 10:21 AM Note      See result note and previous message.  Please call the patient to get her scheduled for follow-up with me in August.     Me to [facility]  6/9/25  5:15 PM Note      See result note.  Please schedule patient for follow-up with me 3 months from last labs in May.

## 2025-07-22 ENCOUNTER — OFFICE VISIT (OUTPATIENT)
Dept: FAMILY MEDICINE CLINIC | Facility: CLINIC | Age: 65
End: 2025-07-22
Payer: MEDICARE

## 2025-07-22 VITALS
BODY MASS INDEX: 38.71 KG/M2 | RESPIRATION RATE: 18 BRPM | OXYGEN SATURATION: 94 % | HEIGHT: 61 IN | SYSTOLIC BLOOD PRESSURE: 122 MMHG | HEART RATE: 86 BPM | WEIGHT: 205 LBS | DIASTOLIC BLOOD PRESSURE: 74 MMHG | TEMPERATURE: 98.7 F

## 2025-07-22 DIAGNOSIS — E55.9 VITAMIN D DEFICIENCY: ICD-10-CM

## 2025-07-22 DIAGNOSIS — R20.0 NUMBNESS AND TINGLING OF UPPER AND LOWER EXTREMITIES OF BOTH SIDES: ICD-10-CM

## 2025-07-22 DIAGNOSIS — R31.9 HEMATURIA, UNSPECIFIED TYPE: ICD-10-CM

## 2025-07-22 DIAGNOSIS — R00.2 PALPITATIONS: ICD-10-CM

## 2025-07-22 DIAGNOSIS — D72.829 LEUKOCYTOSIS, UNSPECIFIED TYPE: ICD-10-CM

## 2025-07-22 DIAGNOSIS — R00.0 TACHYCARDIA: ICD-10-CM

## 2025-07-22 DIAGNOSIS — M25.561 CHRONIC PAIN OF BOTH KNEES: ICD-10-CM

## 2025-07-22 DIAGNOSIS — G47.33 OBSTRUCTIVE SLEEP APNEA: ICD-10-CM

## 2025-07-22 DIAGNOSIS — I65.23 BILATERAL CAROTID ARTERY STENOSIS: ICD-10-CM

## 2025-07-22 DIAGNOSIS — E03.9 ACQUIRED HYPOTHYROIDISM: ICD-10-CM

## 2025-07-22 DIAGNOSIS — Z12.11 ENCOUNTER FOR SCREENING FOR MALIGNANT NEOPLASM OF COLON: ICD-10-CM

## 2025-07-22 DIAGNOSIS — E78.2 MIXED HYPERLIPIDEMIA: ICD-10-CM

## 2025-07-22 DIAGNOSIS — E53.8 B12 DEFICIENCY: ICD-10-CM

## 2025-07-22 DIAGNOSIS — K21.9 GASTROESOPHAGEAL REFLUX DISEASE, UNSPECIFIED WHETHER ESOPHAGITIS PRESENT: ICD-10-CM

## 2025-07-22 DIAGNOSIS — R06.02 SHORTNESS OF BREATH: ICD-10-CM

## 2025-07-22 DIAGNOSIS — R42 DIZZINESS: ICD-10-CM

## 2025-07-22 DIAGNOSIS — J45.20 MILD INTERMITTENT ASTHMA WITHOUT COMPLICATION: ICD-10-CM

## 2025-07-22 DIAGNOSIS — I10 BENIGN ESSENTIAL HYPERTENSION: ICD-10-CM

## 2025-07-22 DIAGNOSIS — R20.2 NUMBNESS AND TINGLING OF UPPER AND LOWER EXTREMITIES OF BOTH SIDES: ICD-10-CM

## 2025-07-22 DIAGNOSIS — R79.89 ABNORMAL LIVER FUNCTION TESTS: ICD-10-CM

## 2025-07-22 DIAGNOSIS — G89.29 CHRONIC PAIN OF BOTH KNEES: ICD-10-CM

## 2025-07-22 DIAGNOSIS — J30.9 ALLERGIC RHINITIS, UNSPECIFIED SEASONALITY, UNSPECIFIED TRIGGER: ICD-10-CM

## 2025-07-22 DIAGNOSIS — M25.562 CHRONIC PAIN OF BOTH KNEES: ICD-10-CM

## 2025-07-22 DIAGNOSIS — E53.8 FOLATE DEFICIENCY: ICD-10-CM

## 2025-07-22 DIAGNOSIS — E11.65 TYPE 2 DIABETES MELLITUS WITH HYPERGLYCEMIA, WITHOUT LONG-TERM CURRENT USE OF INSULIN: Primary | ICD-10-CM

## 2025-07-22 PROCEDURE — 99214 OFFICE O/P EST MOD 30 MIN: CPT | Performed by: PHYSICIAN ASSISTANT

## 2025-07-22 PROCEDURE — 3074F SYST BP LT 130 MM HG: CPT | Performed by: PHYSICIAN ASSISTANT

## 2025-07-22 PROCEDURE — G2211 COMPLEX E/M VISIT ADD ON: HCPCS | Performed by: PHYSICIAN ASSISTANT

## 2025-07-22 PROCEDURE — 1160F RVW MEDS BY RX/DR IN RCRD: CPT | Performed by: PHYSICIAN ASSISTANT

## 2025-07-22 PROCEDURE — 1159F MED LIST DOCD IN RCRD: CPT | Performed by: PHYSICIAN ASSISTANT

## 2025-07-22 PROCEDURE — 1125F AMNT PAIN NOTED PAIN PRSNT: CPT | Performed by: PHYSICIAN ASSISTANT

## 2025-07-22 PROCEDURE — 3044F HG A1C LEVEL LT 7.0%: CPT | Performed by: PHYSICIAN ASSISTANT

## 2025-07-22 PROCEDURE — 3078F DIAST BP <80 MM HG: CPT | Performed by: PHYSICIAN ASSISTANT

## 2025-07-22 NOTE — PROGRESS NOTES
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 mg at bedtime.  To be seen ASAP if worsening, new or changing symptoms.  Allergic Rhinitis- Allergy symptoms improved with getting out of moldy apartment. Continue Singulair and can take Flonase 1 sprays in each nostril twice daily.   BREN- Patient was not using her CPAP, reporting her mask broke.  Mask was then fixed and she was using then she stopped.  She must use CPAP and follow-up with sleep medicine as directed.  I have counseled her on the risks of untreated sleep apnea, risks, and the need to treat sleep apnea.  Major Depressive Disorder and anxiety- Moods were stable, worsened, but are stable today. She was previously feeling fatigued, decreased motivation, and increased irritability. We increased Prozac to 60 mg once daily, which helped moods some then had uncontrolled moods and we had to decrease medication.  Moods improved on Prozac 20 mg daily but she has had recurrence of depression, fatigue, decreased motivation.  I stopped Prozac and started Cymbalta 30 mg daily to see if this would also help with some of her chronic pain.  She initially noticed significant improvement with starting Cymbalta.  She noticed some anger or irritability.  It was difficult to determine if this is related to situational stressors at home or true irritability.  I advised she decrease  Cymbalta to 20 mg daily to see if this helped.  Moods are pretty stable today.  If she has worsening, we will need to have her go back on Cymbalta 30 mg daily for 1 to 2 weeks then increase to 60 mg.  To be seen ASAP if worsening moods or AE with medication.  911 to ER if she develops SI/HI.  Consideration of specialist if uncontrolled moods.  OA knee s/p TKR- Patient reports being unable to work with knee pain. She had follow up with orthopedist, underwent lumbar spine workup and had knee injections.  She was not seen by orthopedist from 2022 until 3/2024.  Continue follow-up and treatment as directed by orthopedist.  Consideration of lumbar COLEMAN if no resolution.    Dizziness, palpitations, left jaw and ear pain, lump in throat sensation- She developed symptoms of dizziness, episodes of heart racing, and concerning symptoms for angina. I referred to cardiology with negative testing and added H2 blocker to PPI with consideration of esophageal spasm. She needs to be compliant with CPAP use.  Monitor blood pressure to ensure no hypotension and no dizziness.  If recurrence of symptoms, consideration of neurology for evaluation as well.  Paresthesias, neuropathy, restless leg symptoms- Ensure tight glucose control and consider referral for EMG/NCS.  Consideration of neurology for evaluation as well.  Carotid stenosis- Patient with less than 50% stenosis bilateral carotid arteries. We will consider vascular surgery for further workup of carotid stenosis with dizziness or neurology pending results.    From [facility] 3/2024.  She is overdue for Pap and DEXA and is up-to-date on mammogram.  She declines Pap at this time.  I advise she consider Pap now and in 3 years then will be complete and no longer needed per ACOG guidelines.  We will complete DEXA with her mammogram 6/2024 and refer to GI for evaluation and to consider EGD for uncontrolled GERD with colonoscopy.  She needs to check with her insurance on coverage and location  of coverage for Tdap, Shingrix, RSV, and needs to ensure annual flu shot and COVID-19 booster.     Patient continues to see specialists as noted above.  I have reviewed available records, including consult notes, labs, and imaging/testing.  Patient to continue follow-up with specialists as directed by them.    I spent 35 minutes caring for Lydia Gutierrez on this date of service. This time includes time spent by me in the following activities as necessary: preparing for the visit, reviewing tests, specialists records and previous visits, obtaining and/or reviewing a separately obtained history, performing a medically appropriate exam and/or evaluation, counseling and educating the patient, family, caregiver, referring and/or communicating with other healthcare professionals, documenting information in the medical record, independently interpreting results and communicating that information with the patient, family, caregiver, and developing a medically appropriate treatment plan with consideration of other conditions, medications, and treatments.

## 2025-08-08 DIAGNOSIS — J45.20 MILD INTERMITTENT ASTHMA WITHOUT COMPLICATION: ICD-10-CM

## 2025-08-08 RX ORDER — ALBUTEROL SULFATE 90 UG/1
2 AEROSOL, METERED RESPIRATORY (INHALATION) EVERY 6 HOURS PRN
Qty: 18 G | Refills: 0 | Status: SHIPPED | OUTPATIENT
Start: 2025-08-08

## 2025-08-18 RX ORDER — LORATADINE 10 MG/1
10 TABLET ORAL DAILY
Qty: 30 TABLET | Refills: 2 | Status: SHIPPED | OUTPATIENT
Start: 2025-08-18

## 2025-08-22 LAB
25(OH)D3+25(OH)D2 SERPL-MCNC: 43.9 NG/ML (ref 30–100)
ALBUMIN SERPL-MCNC: 4.5 G/DL (ref 3.9–4.9)
ALBUMIN/CREAT UR: 21 MG/G CREAT (ref 0–29)
ALP SERPL-CCNC: 97 IU/L (ref 44–121)
ALT SERPL-CCNC: 20 IU/L (ref 0–32)
AST SERPL-CCNC: 21 IU/L (ref 0–40)
BASOPHILS # BLD AUTO: 0 X10E3/UL (ref 0–0.2)
BASOPHILS NFR BLD AUTO: 1 %
BILIRUB SERPL-MCNC: 0.3 MG/DL (ref 0–1.2)
BUN SERPL-MCNC: 20 MG/DL (ref 8–27)
BUN/CREAT SERPL: 31 (ref 12–28)
CALCIUM SERPL-MCNC: 9.7 MG/DL (ref 8.7–10.3)
CHLORIDE SERPL-SCNC: 104 MMOL/L (ref 96–106)
CHOLEST SERPL-MCNC: 165 MG/DL (ref 100–199)
CK SERPL-CCNC: 54 U/L (ref 32–182)
CO2 SERPL-SCNC: 20 MMOL/L (ref 20–29)
CREAT SERPL-MCNC: 0.64 MG/DL (ref 0.57–1)
CREAT UR-MCNC: 184.5 MG/DL
EGFRCR SERPLBLD CKD-EPI 2021: 99 ML/MIN/1.73
EOSINOPHIL # BLD AUTO: 0.3 X10E3/UL (ref 0–0.4)
EOSINOPHIL NFR BLD AUTO: 4 %
ERYTHROCYTE [DISTWIDTH] IN BLOOD BY AUTOMATED COUNT: 13.3 % (ref 11.7–15.4)
FOLATE SERPL-MCNC: >20 NG/ML
GLOBULIN SER CALC-MCNC: 2.5 G/DL (ref 1.5–4.5)
GLUCOSE SERPL-MCNC: 121 MG/DL (ref 70–99)
HBA1C MFR BLD: 6.4 % (ref 4.8–5.6)
HCT VFR BLD AUTO: 40 % (ref 34–46.6)
HDLC SERPL-MCNC: 41 MG/DL
HGB BLD-MCNC: 13 G/DL (ref 11.1–15.9)
IMM GRANULOCYTES # BLD AUTO: 0 X10E3/UL (ref 0–0.1)
IMM GRANULOCYTES NFR BLD AUTO: 0 %
LDLC SERPL CALC-MCNC: 81 MG/DL (ref 0–99)
LDLC/HDLC SERPL: 2 RATIO (ref 0–3.2)
LYMPHOCYTES # BLD AUTO: 1.9 X10E3/UL (ref 0.7–3.1)
LYMPHOCYTES NFR BLD AUTO: 30 %
MCH RBC QN AUTO: 29.8 PG (ref 26.6–33)
MCHC RBC AUTO-ENTMCNC: 32.5 G/DL (ref 31.5–35.7)
MCV RBC AUTO: 92 FL (ref 79–97)
MICROALBUMIN UR-MCNC: 38.2 UG/ML
MONOCYTES # BLD AUTO: 0.4 X10E3/UL (ref 0.1–0.9)
MONOCYTES NFR BLD AUTO: 7 %
NEUTROPHILS # BLD AUTO: 3.6 X10E3/UL (ref 1.4–7)
NEUTROPHILS NFR BLD AUTO: 58 %
PLATELET # BLD AUTO: 297 X10E3/UL (ref 150–450)
POTASSIUM SERPL-SCNC: 3.9 MMOL/L (ref 3.5–5.2)
PROT SERPL-MCNC: 7 G/DL (ref 6–8.5)
RBC # BLD AUTO: 4.36 X10E6/UL (ref 3.77–5.28)
SODIUM SERPL-SCNC: 142 MMOL/L (ref 134–144)
T3FREE SERPL-MCNC: 3.1 PG/ML (ref 2–4.4)
T4 FREE SERPL-MCNC: 1.38 NG/DL (ref 0.82–1.77)
TRIGL SERPL-MCNC: 258 MG/DL (ref 0–149)
TSH SERPL DL<=0.005 MIU/L-ACNC: 1.9 UIU/ML (ref 0.45–4.5)
UNABLE TO VOID: NORMAL
URATE SERPL-MCNC: 5.2 MG/DL (ref 3–7.2)
VIT B12 SERPL-MCNC: 1006 PG/ML (ref 232–1245)
VLDLC SERPL CALC-MCNC: 43 MG/DL (ref 5–40)
WBC # BLD AUTO: 6.3 X10E3/UL (ref 3.4–10.8)

## 2025-08-25 DIAGNOSIS — E03.9 ACQUIRED HYPOTHYROIDISM: ICD-10-CM

## 2025-08-27 RX ORDER — LEVOTHYROXINE SODIUM 112 UG/1
112 TABLET ORAL DAILY
Qty: 90 TABLET | Refills: 1 | Status: SHIPPED | OUTPATIENT
Start: 2025-08-27

## (undated) DEVICE — GLV SURG SENSICARE W/ALOE PF LF 7.5 STRL

## (undated) DEVICE — APPL DURAPREP IODOPHOR APL 26ML

## (undated) DEVICE — GLV SURG SENSICARE PI MIC PF SZ7 LF STRL

## (undated) DEVICE — 3M™ IOBAN™ 2 ANTIMICROBIAL INCISE DRAPE 6640EZ: Brand: IOBAN™ 2

## (undated) DEVICE — MEDI-VAC YANKAUER SUCTION HANDLE W/BULBOUS TIP: Brand: CARDINAL HEALTH

## (undated) DEVICE — SYS CLS SKIN PREMIERPRO EXOFINFUSION 22CM

## (undated) DEVICE — NEEDLE, QUINCKE 22GX3.5": Brand: MEDLINE INDUSTRIES, INC.

## (undated) DEVICE — KT DRN EVAC WND PVC PCH WTROC RND 10F400

## (undated) DEVICE — TRAP FLD MINIVAC MEGADYNE 100ML

## (undated) DEVICE — PREP SOL POVIDONE/IODINE BT 4OZ

## (undated) DEVICE — UNDERCAST PADDING: Brand: DEROYAL

## (undated) DEVICE — SOL NACL 0.9PCT 100ML SGL

## (undated) DEVICE — STPLR SKIN VISISTAT WD 35CT

## (undated) DEVICE — PK KN TOTL 40

## (undated) DEVICE — GLV SURG SENSICARE W/ALOE PF LF 8 STRL

## (undated) DEVICE — GLV SURG PREMIERPRO ORTHO LTX PF SZ7.5 BRN

## (undated) DEVICE — PREMIUM WET SKIN PREP TRAY: Brand: MEDLINE INDUSTRIES, INC.

## (undated) DEVICE — DUAL CUT SAGITTAL BLADE

## (undated) DEVICE — PENCL E/S ULTRAVAC TELESCP NOSE HOLSTR 10FT

## (undated) DEVICE — DRSNG SURESITE WNDW 2.38X2.75

## (undated) DEVICE — GLV SURG SENSICARE PI PF LF 7 GRN STRL

## (undated) DEVICE — SUT VIC 0 CT1 36IN J946H

## (undated) DEVICE — MAT FLR ABSORBENT LG 4FT 10 2.5FT

## (undated) DEVICE — STERILE PATIENT PROTECTIVE PAD FOR IMP® KNEE POSITIONERS & COHESIVE WRAP (10 / CASE): Brand: DE MAYO KNEE POSITIONER®

## (undated) DEVICE — SUT VIC 1 CT1 36IN J947H

## (undated) DEVICE — BNDG ELAS ELITE V/CLOSE 6IN 5YD LF STRL

## (undated) DEVICE — ADHS SKIN DERMABOND TOP ADVANCED

## (undated) DEVICE — GLV SURG BIOGEL LTX PF 7 1/2